# Patient Record
Sex: FEMALE | Race: WHITE
[De-identification: names, ages, dates, MRNs, and addresses within clinical notes are randomized per-mention and may not be internally consistent; named-entity substitution may affect disease eponyms.]

---

## 2017-01-04 ENCOUNTER — APPOINTMENT (OUTPATIENT)
Dept: SPINE | Facility: CLINIC | Age: 62
End: 2017-01-04

## 2017-01-25 ENCOUNTER — APPOINTMENT (OUTPATIENT)
Dept: SPINE | Facility: CLINIC | Age: 62
End: 2017-01-25

## 2017-06-13 ENCOUNTER — FORM ENCOUNTER (OUTPATIENT)
Age: 62
End: 2017-06-13

## 2017-06-14 ENCOUNTER — APPOINTMENT (OUTPATIENT)
Dept: SPINE | Facility: CLINIC | Age: 62
End: 2017-06-14

## 2017-06-14 ENCOUNTER — OUTPATIENT (OUTPATIENT)
Dept: OUTPATIENT SERVICES | Facility: HOSPITAL | Age: 62
LOS: 1 days | End: 2017-06-14
Payer: MEDICARE

## 2017-06-14 VITALS
SYSTOLIC BLOOD PRESSURE: 117 MMHG | OXYGEN SATURATION: 97 % | HEIGHT: 67 IN | BODY MASS INDEX: 21.19 KG/M2 | HEART RATE: 64 BPM | WEIGHT: 135 LBS | DIASTOLIC BLOOD PRESSURE: 81 MMHG

## 2017-06-14 DIAGNOSIS — Z86.69 PERSONAL HISTORY OF OTHER DISEASES OF THE NERVOUS SYSTEM AND SENSE ORGANS: ICD-10-CM

## 2017-06-14 DIAGNOSIS — Z83.3 FAMILY HISTORY OF DIABETES MELLITUS: ICD-10-CM

## 2017-06-14 DIAGNOSIS — Z86.39 PERSONAL HISTORY OF OTHER ENDOCRINE, NUTRITIONAL AND METABOLIC DISEASE: ICD-10-CM

## 2017-06-14 DIAGNOSIS — Z86.19 PERSONAL HISTORY OF OTHER INFECTIOUS AND PARASITIC DISEASES: ICD-10-CM

## 2017-06-14 DIAGNOSIS — Z82.3 FAMILY HISTORY OF STROKE: ICD-10-CM

## 2017-06-14 DIAGNOSIS — F17.200 NICOTINE DEPENDENCE, UNSPECIFIED, UNCOMPLICATED: ICD-10-CM

## 2017-06-14 DIAGNOSIS — R29.6 REPEATED FALLS: ICD-10-CM

## 2017-06-14 DIAGNOSIS — Z82.49 FAMILY HISTORY OF ISCHEMIC HEART DISEASE AND OTHER DISEASES OF THE CIRCULATORY SYSTEM: ICD-10-CM

## 2017-06-14 DIAGNOSIS — Z87.39 PERSONAL HISTORY OF OTHER DISEASES OF THE MUSCULOSKELETAL SYSTEM AND CONNECTIVE TISSUE: ICD-10-CM

## 2017-06-14 DIAGNOSIS — Z78.9 OTHER SPECIFIED HEALTH STATUS: ICD-10-CM

## 2017-06-14 DIAGNOSIS — Z86.79 PERSONAL HISTORY OF OTHER DISEASES OF THE CIRCULATORY SYSTEM: ICD-10-CM

## 2017-06-14 PROCEDURE — 72084 X-RAY EXAM ENTIRE SPI 6/> VW: CPT | Mod: 26

## 2017-06-14 PROCEDURE — 72110 X-RAY EXAM L-2 SPINE 4/>VWS: CPT

## 2017-06-14 PROCEDURE — 72082 X-RAY EXAM ENTIRE SPI 2/3 VW: CPT

## 2017-06-15 PROBLEM — Z87.39 HISTORY OF ARTHRITIS: Status: RESOLVED | Noted: 2017-06-14 | Resolved: 2017-06-15

## 2017-06-15 PROBLEM — Z82.3 FAMILY HISTORY OF CEREBROVASCULAR ACCIDENT (CVA): Status: ACTIVE | Noted: 2017-06-14

## 2017-06-15 PROBLEM — Z83.3 FAMILY HISTORY OF DIABETES MELLITUS: Status: ACTIVE | Noted: 2017-06-14

## 2017-06-15 PROBLEM — Z86.69 HISTORY OF MIGRAINE HEADACHES: Status: RESOLVED | Noted: 2017-06-14 | Resolved: 2017-06-15

## 2017-06-15 PROBLEM — Z86.79 HISTORY OF VARICOSE VEINS: Status: RESOLVED | Noted: 2017-06-15 | Resolved: 2017-06-15

## 2017-06-15 PROBLEM — F17.200 CURRENT EVERY DAY SMOKER: Status: ACTIVE | Noted: 2017-06-14

## 2017-06-15 PROBLEM — Z86.39 HISTORY OF HYPERCHOLESTEROLEMIA: Status: RESOLVED | Noted: 2017-06-15 | Resolved: 2017-06-15

## 2017-06-15 PROBLEM — Z86.19 HISTORY OF HEPATITIS: Status: RESOLVED | Noted: 2017-06-14 | Resolved: 2017-06-15

## 2017-06-15 PROBLEM — Z78.9 DOES NOT USE ILLICIT DRUGS: Status: ACTIVE | Noted: 2017-06-14

## 2017-06-15 PROBLEM — R29.6 MULTIPLE FALLS: Status: RESOLVED | Noted: 2017-06-15 | Resolved: 2017-06-15

## 2017-06-15 PROBLEM — Z82.49 FAMILY HISTORY OF HYPERTENSION: Status: ACTIVE | Noted: 2017-06-14

## 2017-06-15 RX ORDER — NIFEDIPINE 90 MG/1
90 TABLET, EXTENDED RELEASE ORAL
Refills: 0 | Status: ACTIVE | COMMUNITY

## 2017-06-15 RX ORDER — DULOXETINE HYDROCHLORIDE 20 MG/1
20 CAPSULE, DELAYED RELEASE ORAL
Refills: 0 | Status: ACTIVE | COMMUNITY

## 2017-06-15 RX ORDER — GABAPENTIN 100 MG/1
100 CAPSULE ORAL
Refills: 0 | Status: ACTIVE | COMMUNITY

## 2017-07-26 ENCOUNTER — APPOINTMENT (OUTPATIENT)
Dept: SPINE | Facility: CLINIC | Age: 62
End: 2017-07-26

## 2017-08-03 ENCOUNTER — INPATIENT (INPATIENT)
Facility: HOSPITAL | Age: 62
LOS: 3 days | Discharge: HOME | End: 2017-08-07
Attending: INTERNAL MEDICINE | Admitting: INTERNAL MEDICINE

## 2017-08-03 DIAGNOSIS — K68.19 OTHER RETROPERITONEAL ABSCESS: ICD-10-CM

## 2017-08-03 DIAGNOSIS — G90.529 COMPLEX REGIONAL PAIN SYNDROME I OF UNSPECIFIED LOWER LIMB: ICD-10-CM

## 2017-08-03 DIAGNOSIS — R53.1 WEAKNESS: ICD-10-CM

## 2017-08-03 DIAGNOSIS — M86.9 OSTEOMYELITIS, UNSPECIFIED: ICD-10-CM

## 2017-08-03 DIAGNOSIS — L03.90 CELLULITIS, UNSPECIFIED: ICD-10-CM
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
## 2017-08-10 DIAGNOSIS — L03.113 CELLULITIS OF RIGHT UPPER LIMB: ICD-10-CM

## 2017-08-10 DIAGNOSIS — F32.9 MAJOR DEPRESSIVE DISORDER, SINGLE EPISODE, UNSPECIFIED: ICD-10-CM

## 2017-08-10 DIAGNOSIS — I10 ESSENTIAL (PRIMARY) HYPERTENSION: ICD-10-CM

## 2017-08-10 DIAGNOSIS — L03.111 CELLULITIS OF RIGHT AXILLA: ICD-10-CM

## 2017-08-10 DIAGNOSIS — K59.00 CONSTIPATION, UNSPECIFIED: ICD-10-CM

## 2017-08-10 DIAGNOSIS — I73.00 RAYNAUD'S SYNDROME WITHOUT GANGRENE: ICD-10-CM

## 2017-08-10 DIAGNOSIS — S61.451A OPEN BITE OF RIGHT HAND, INITIAL ENCOUNTER: ICD-10-CM
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
## 2017-08-11 DIAGNOSIS — S61.451A OPEN BITE OF RIGHT HAND, INITIAL ENCOUNTER: ICD-10-CM

## 2017-09-05 ENCOUNTER — FORM ENCOUNTER (OUTPATIENT)
Age: 62
End: 2017-09-05

## 2017-09-06 ENCOUNTER — APPOINTMENT (OUTPATIENT)
Dept: SPINE | Facility: CLINIC | Age: 62
End: 2017-09-06
Payer: COMMERCIAL

## 2017-09-06 ENCOUNTER — OUTPATIENT (OUTPATIENT)
Dept: OUTPATIENT SERVICES | Facility: HOSPITAL | Age: 62
LOS: 1 days | End: 2017-09-06
Payer: COMMERCIAL

## 2017-09-06 VITALS
HEIGHT: 67 IN | SYSTOLIC BLOOD PRESSURE: 117 MMHG | BODY MASS INDEX: 21.19 KG/M2 | HEART RATE: 70 BPM | OXYGEN SATURATION: 99 % | DIASTOLIC BLOOD PRESSURE: 79 MMHG | WEIGHT: 135 LBS

## 2017-09-06 PROCEDURE — 72131 CT LUMBAR SPINE W/O DYE: CPT | Mod: 26

## 2017-09-06 PROCEDURE — 99214 OFFICE O/P EST MOD 30 MIN: CPT

## 2017-09-06 PROCEDURE — 72131 CT LUMBAR SPINE W/O DYE: CPT

## 2017-09-13 ENCOUNTER — MOBILE ON CALL (OUTPATIENT)
Age: 62
End: 2017-09-13

## 2018-05-09 ENCOUNTER — OUTPATIENT (OUTPATIENT)
Dept: OUTPATIENT SERVICES | Facility: HOSPITAL | Age: 63
LOS: 1 days | Discharge: HOME | End: 2018-05-09

## 2018-05-09 VITALS
WEIGHT: 128.09 LBS | HEIGHT: 67 IN | RESPIRATION RATE: 16 BRPM | OXYGEN SATURATION: 100 % | DIASTOLIC BLOOD PRESSURE: 84 MMHG | TEMPERATURE: 96 F | HEART RATE: 58 BPM | SYSTOLIC BLOOD PRESSURE: 137 MMHG

## 2018-05-09 DIAGNOSIS — Z01.818 ENCOUNTER FOR OTHER PREPROCEDURAL EXAMINATION: ICD-10-CM

## 2018-05-09 DIAGNOSIS — G89.29 OTHER CHRONIC PAIN: ICD-10-CM

## 2018-05-09 DIAGNOSIS — M19.90 UNSPECIFIED OSTEOARTHRITIS, UNSPECIFIED SITE: Chronic | ICD-10-CM

## 2018-05-09 DIAGNOSIS — M54.5 LOW BACK PAIN: ICD-10-CM

## 2018-05-09 DIAGNOSIS — M06.9 RHEUMATOID ARTHRITIS, UNSPECIFIED: Chronic | ICD-10-CM

## 2018-05-09 DIAGNOSIS — M54.40 LUMBAGO WITH SCIATICA, UNSPECIFIED SIDE: Chronic | ICD-10-CM

## 2018-05-09 LAB
ALBUMIN SERPL ELPH-MCNC: 4.2 G/DL — SIGNIFICANT CHANGE UP (ref 3.5–5.2)
ALP SERPL-CCNC: 76 U/L — SIGNIFICANT CHANGE UP (ref 30–115)
ALT FLD-CCNC: 48 U/L — HIGH (ref 0–41)
ANION GAP SERPL CALC-SCNC: 12 MMOL/L — SIGNIFICANT CHANGE UP (ref 7–14)
APPEARANCE UR: CLEAR — SIGNIFICANT CHANGE UP
APTT BLD: 28.8 SEC — SIGNIFICANT CHANGE UP (ref 27–39.2)
AST SERPL-CCNC: 47 U/L — HIGH (ref 0–41)
BASOPHILS # BLD AUTO: 0.08 K/UL — SIGNIFICANT CHANGE UP (ref 0–0.2)
BASOPHILS NFR BLD AUTO: 1.4 % — HIGH (ref 0–1)
BILIRUB SERPL-MCNC: <0.2 MG/DL — SIGNIFICANT CHANGE UP (ref 0.2–1.2)
BILIRUB UR-MCNC: NEGATIVE — SIGNIFICANT CHANGE UP
BLD GP AB SCN SERPL QL: SIGNIFICANT CHANGE UP
BUN SERPL-MCNC: 28 MG/DL — HIGH (ref 10–20)
CALCIUM SERPL-MCNC: 11.2 MG/DL — HIGH (ref 8.5–10.1)
CHLORIDE SERPL-SCNC: 101 MMOL/L — SIGNIFICANT CHANGE UP (ref 98–110)
CO2 SERPL-SCNC: 30 MMOL/L — SIGNIFICANT CHANGE UP (ref 17–32)
COLOR SPEC: YELLOW — SIGNIFICANT CHANGE UP
CREAT SERPL-MCNC: 0.8 MG/DL — SIGNIFICANT CHANGE UP (ref 0.7–1.5)
DIFF PNL FLD: NEGATIVE — SIGNIFICANT CHANGE UP
EOSINOPHIL # BLD AUTO: 0.25 K/UL — SIGNIFICANT CHANGE UP (ref 0–0.7)
EOSINOPHIL NFR BLD AUTO: 4.2 % — SIGNIFICANT CHANGE UP (ref 0–8)
GLUCOSE SERPL-MCNC: 86 MG/DL — SIGNIFICANT CHANGE UP (ref 70–99)
GLUCOSE UR QL: NEGATIVE MG/DL — SIGNIFICANT CHANGE UP
HCT VFR BLD CALC: 39.7 % — SIGNIFICANT CHANGE UP (ref 37–47)
HGB BLD-MCNC: 13.6 G/DL — SIGNIFICANT CHANGE UP (ref 12–16)
IMM GRANULOCYTES NFR BLD AUTO: 0.2 % — SIGNIFICANT CHANGE UP (ref 0.1–0.3)
INR BLD: 0.98 RATIO — SIGNIFICANT CHANGE UP (ref 0.65–1.3)
KETONES UR-MCNC: NEGATIVE — SIGNIFICANT CHANGE UP
LEUKOCYTE ESTERASE UR-ACNC: NEGATIVE — SIGNIFICANT CHANGE UP
LYMPHOCYTES # BLD AUTO: 1.63 K/UL — SIGNIFICANT CHANGE UP (ref 1.2–3.4)
LYMPHOCYTES # BLD AUTO: 27.6 % — SIGNIFICANT CHANGE UP (ref 20.5–51.1)
MCHC RBC-ENTMCNC: 31.7 PG — HIGH (ref 27–31)
MCHC RBC-ENTMCNC: 34.3 G/DL — SIGNIFICANT CHANGE UP (ref 32–37)
MCV RBC AUTO: 92.5 FL — SIGNIFICANT CHANGE UP (ref 81–99)
MONOCYTES # BLD AUTO: 0.41 K/UL — SIGNIFICANT CHANGE UP (ref 0.1–0.6)
MONOCYTES NFR BLD AUTO: 6.9 % — SIGNIFICANT CHANGE UP (ref 1.7–9.3)
NEUTROPHILS # BLD AUTO: 3.52 K/UL — SIGNIFICANT CHANGE UP (ref 1.4–6.5)
NEUTROPHILS NFR BLD AUTO: 59.7 % — SIGNIFICANT CHANGE UP (ref 42.2–75.2)
NITRITE UR-MCNC: NEGATIVE — SIGNIFICANT CHANGE UP
NRBC # BLD: 0 /100 WBCS — SIGNIFICANT CHANGE UP (ref 0–0)
PH UR: 7 — SIGNIFICANT CHANGE UP (ref 5–8)
PLATELET # BLD AUTO: 217 K/UL — SIGNIFICANT CHANGE UP (ref 130–400)
POTASSIUM SERPL-MCNC: 4.4 MMOL/L — SIGNIFICANT CHANGE UP (ref 3.5–5)
POTASSIUM SERPL-SCNC: 4.4 MMOL/L — SIGNIFICANT CHANGE UP (ref 3.5–5)
PROT SERPL-MCNC: 6.6 G/DL — SIGNIFICANT CHANGE UP (ref 6–8)
PROT UR-MCNC: NEGATIVE MG/DL — SIGNIFICANT CHANGE UP
PROTHROM AB SERPL-ACNC: 10.6 SEC — SIGNIFICANT CHANGE UP (ref 9.95–12.87)
RBC # BLD: 4.29 M/UL — SIGNIFICANT CHANGE UP (ref 4.2–5.4)
RBC # FLD: 12.8 % — SIGNIFICANT CHANGE UP (ref 11.5–14.5)
SODIUM SERPL-SCNC: 143 MMOL/L — SIGNIFICANT CHANGE UP (ref 135–146)
SP GR SPEC: 1.02 — SIGNIFICANT CHANGE UP (ref 1.01–1.03)
TYPE + AB SCN PNL BLD: SIGNIFICANT CHANGE UP
UROBILINOGEN FLD QL: 0.2 MG/DL — SIGNIFICANT CHANGE UP (ref 0.2–0.2)
WBC # BLD: 5.9 K/UL — SIGNIFICANT CHANGE UP (ref 4.8–10.8)
WBC # FLD AUTO: 5.9 K/UL — SIGNIFICANT CHANGE UP (ref 4.8–10.8)

## 2018-05-09 NOTE — H&P PST ADULT - REASON FOR ADMISSION
62 yo female presents  s/p fall ~10 yrs ago &  c/o "10 yrs of pain, I have been getting epidurals, my legs give me crazy pain 24/7;  in 2015, I had procedure that they cut the nerve in my left leg, it has made my pain worse, I am having a spinal cord stimulator"; denies cp,palp,sob,dyspnea,dysuria; ex liam: 2 fos/blocks w/o sob

## 2018-05-09 NOTE — H&P PST ADULT - SKIN COMMENTS
b/l lower extremities warm& reddened from mid shin to toes, no open areas pt states x10 yrs, minimal edema

## 2018-05-09 NOTE — H&P PST ADULT - ATTENDING COMMENTS
05-16-18 @ 11:25  ANA LILIA VERMA  [id number]  63yFemale      I have discussed the risks and benefits of SCS with the patient including but not limited to bleeding, infection, weakness, numbness, paralysis, CSF leak requiring repair, no change or increase in pain or other symptoms, change in bowel/bladder/sexual function, need for decompression, revision, repeat or other procedure(s).  I have also discussed the possibility of the following:    Removal or replacement of device(s)  Lack of coverage compared to trial or abdominal pain/paresthesias  Extended hospital/rehab/skilled nursing facility stay  Need for flat bedrest      I have also discussed the alternatives to the procedure as well as options for no treatment and/or expected courses for all.  I also discussed the role of any MD/PA first assistant and the patient assents to their participation.  All questions were answered and the patient wishes to proceed.

## 2018-05-09 NOTE — H&P PST ADULT - PMH
Anxiety    Depression    Kidney cysts    OA (osteoarthritis)    Osteoporosis    Peripheral neuropathy    RA (rheumatoid arthritis)

## 2018-05-10 DIAGNOSIS — F41.9 ANXIETY DISORDER, UNSPECIFIED: ICD-10-CM

## 2018-05-10 DIAGNOSIS — F32.9 MAJOR DEPRESSIVE DISORDER, SINGLE EPISODE, UNSPECIFIED: ICD-10-CM

## 2018-05-10 DIAGNOSIS — M54.9 DORSALGIA, UNSPECIFIED: ICD-10-CM

## 2018-05-10 DIAGNOSIS — Z87.891 PERSONAL HISTORY OF NICOTINE DEPENDENCE: ICD-10-CM

## 2018-05-16 ENCOUNTER — OUTPATIENT (OUTPATIENT)
Dept: OUTPATIENT SERVICES | Facility: HOSPITAL | Age: 63
LOS: 1 days | Discharge: HOME | End: 2018-05-16

## 2018-05-16 VITALS — RESPIRATION RATE: 17 BRPM | SYSTOLIC BLOOD PRESSURE: 102 MMHG | DIASTOLIC BLOOD PRESSURE: 57 MMHG | HEART RATE: 57 BPM

## 2018-05-16 VITALS
RESPIRATION RATE: 18 BRPM | WEIGHT: 128.09 LBS | SYSTOLIC BLOOD PRESSURE: 119 MMHG | HEIGHT: 67 IN | HEART RATE: 65 BPM | TEMPERATURE: 98 F | DIASTOLIC BLOOD PRESSURE: 83 MMHG

## 2018-05-16 DIAGNOSIS — M54.40 LUMBAGO WITH SCIATICA, UNSPECIFIED SIDE: Chronic | ICD-10-CM

## 2018-05-16 RX ORDER — SODIUM CHLORIDE 9 MG/ML
1000 INJECTION, SOLUTION INTRAVENOUS
Qty: 0 | Refills: 0 | Status: DISCONTINUED | OUTPATIENT
Start: 2018-05-16 | End: 2018-05-17

## 2018-05-16 RX ORDER — ONDANSETRON 8 MG/1
4 TABLET, FILM COATED ORAL ONCE
Qty: 0 | Refills: 0 | Status: DISCONTINUED | OUTPATIENT
Start: 2018-05-16 | End: 2018-05-17

## 2018-05-16 RX ORDER — HYDROMORPHONE HYDROCHLORIDE 2 MG/ML
1 INJECTION INTRAMUSCULAR; INTRAVENOUS; SUBCUTANEOUS
Qty: 0 | Refills: 0 | Status: DISCONTINUED | OUTPATIENT
Start: 2018-05-16 | End: 2018-05-17

## 2018-05-16 RX ADMIN — SODIUM CHLORIDE 100 MILLILITER(S): 9 INJECTION, SOLUTION INTRAVENOUS at 15:43

## 2018-05-16 RX ADMIN — HYDROMORPHONE HYDROCHLORIDE 1 MILLIGRAM(S): 2 INJECTION INTRAMUSCULAR; INTRAVENOUS; SUBCUTANEOUS at 18:08

## 2018-05-16 NOTE — ASU PATIENT PROFILE, ADULT - FALLEN IN THE PAST
FELL ASLEEP IN CHAIR AT HOME AND FELL TO FLOOR HAS A BRUISE ON LEFT THIGH AND STATES SHE HIT HER HEAD.  NO FOLLOW UP BY PT./yes

## 2018-05-16 NOTE — PRE-ANESTHESIA EVALUATION ADULT - NSANTHPEFT_GEN_ALL_CORE
CVS:rrr+s1s2  lungs:+b/s bilaterally  patient with classical signs of Raynauds phenomenum such as purple fingers and toes, also with b/l lower extremity cellulitis which I spoke to surgeon and was ok to proceed

## 2018-05-16 NOTE — PRE-ANESTHESIA EVALUATION ADULT - NSANTHADDINFOFT_GEN_ALL_CORE
General anesthesia. discussed with the patient all the risks, benefits, alternatives, complications. all questions answered. willing to proceed

## 2018-05-16 NOTE — ASU DISCHARGE PLAN (ADULT/PEDIATRIC). - MEDICATION SUMMARY - MEDICATIONS TO TAKE
I will START or STAY ON the medications listed below when I get home from the hospital:    nabumetone 750 mg oral tablet  -- 2 tab(s) by mouth once a day, As Needed  -- Indication: For pain    HYDROcodone-acetaminophen 10 mg-325 mg oral tablet  -- 1 tab(s) by mouth every 6 hours, As Needed  -- Indication: For pain    gabapentin 600 mg oral tablet  -- 1 tab(s) by mouth 3 times a day  -- Indication: For pain    Ativan 0.5 mg oral tablet  -- orally once a day (at bedtime)  -- Indication: For sedative    Cymbalta 60 mg oral delayed release capsule  -- 1 cap(s) by mouth once a day  -- Indication: For depression    NIFEdipine 90 mg oral tablet, extended release  -- 1 tab(s) by mouth once a day, per pt was ordered by dr lechuga  for hand pain  -- Indication: For HTN

## 2018-05-16 NOTE — ASU PREOP CHECKLIST - PATIENT'S PERSONAL PROPERTY GIVEN TO
family member/locker #[id number]
Alert and oriented x 3, normal mood and affect, no apparent risk to self or others.

## 2018-05-16 NOTE — CHART NOTE - NSCHARTNOTEFT_GEN_A_CORE
05-16-18 @ 11:27  ANA LILIA VERMA  [id number]  63yFemale    I have discussed the risks and benefits of SCS with the patient including but not limited to bleeding, infection, weakness, numbness, paralysis, CSF leak requiring repair, no change or increase in pain or other symptoms, change in bowel/bladder/sexual function, need for decompression, revision, repeat or other procedure(s).  I have also discussed the possibility of the following:    Removal or replacement of device(s)  Lack of coverage compared to trial or abdominal pain/paresthesias  Extended hospital/rehab/skilled nursing facility stay  Need for flat bedrest    I have also discussed the alternatives to the procedure as well as options for no treatment and/or expected courses for all.  I also discussed the role of any MD/PA first assistant and the patient assents to their participation.  All questions were answered and the patient wishes to proceed.

## 2018-05-16 NOTE — BRIEF OPERATIVE NOTE - PROCEDURE
<<-----Click on this checkbox to enter Procedure Spinal cord nerve stimulator implantation  05/16/2018  via thoracic laminectomy  Active  [id number]

## 2018-05-22 DIAGNOSIS — M06.9 RHEUMATOID ARTHRITIS, UNSPECIFIED: ICD-10-CM

## 2018-05-22 DIAGNOSIS — G89.4 CHRONIC PAIN SYNDROME: ICD-10-CM

## 2018-05-22 DIAGNOSIS — M81.0 AGE-RELATED OSTEOPOROSIS WITHOUT CURRENT PATHOLOGICAL FRACTURE: ICD-10-CM

## 2018-05-22 DIAGNOSIS — G90.523 COMPLEX REGIONAL PAIN SYNDROME I OF LOWER LIMB, BILATERAL: ICD-10-CM

## 2018-05-22 DIAGNOSIS — F32.9 MAJOR DEPRESSIVE DISORDER, SINGLE EPISODE, UNSPECIFIED: ICD-10-CM

## 2018-05-22 DIAGNOSIS — F17.210 NICOTINE DEPENDENCE, CIGARETTES, UNCOMPLICATED: ICD-10-CM

## 2018-05-22 DIAGNOSIS — M19.90 UNSPECIFIED OSTEOARTHRITIS, UNSPECIFIED SITE: ICD-10-CM

## 2018-05-22 DIAGNOSIS — I73.00 RAYNAUD'S SYNDROME WITHOUT GANGRENE: ICD-10-CM

## 2018-05-22 DIAGNOSIS — F41.9 ANXIETY DISORDER, UNSPECIFIED: ICD-10-CM

## 2018-05-22 DIAGNOSIS — M40.209 UNSPECIFIED KYPHOSIS, SITE UNSPECIFIED: ICD-10-CM

## 2018-05-22 DIAGNOSIS — G62.9 POLYNEUROPATHY, UNSPECIFIED: ICD-10-CM

## 2019-01-07 ENCOUNTER — EMERGENCY (EMERGENCY)
Facility: HOSPITAL | Age: 64
LOS: 0 days | Discharge: HOME | End: 2019-01-07
Attending: EMERGENCY MEDICINE | Admitting: EMERGENCY MEDICINE

## 2019-01-07 VITALS
OXYGEN SATURATION: 100 % | HEIGHT: 67 IN | TEMPERATURE: 98 F | DIASTOLIC BLOOD PRESSURE: 71 MMHG | SYSTOLIC BLOOD PRESSURE: 131 MMHG | HEART RATE: 87 BPM | WEIGHT: 119.93 LBS | RESPIRATION RATE: 18 BRPM

## 2019-01-07 VITALS — SYSTOLIC BLOOD PRESSURE: 104 MMHG | DIASTOLIC BLOOD PRESSURE: 63 MMHG

## 2019-01-07 DIAGNOSIS — F32.9 MAJOR DEPRESSIVE DISORDER, SINGLE EPISODE, UNSPECIFIED: ICD-10-CM

## 2019-01-07 DIAGNOSIS — Z98.890 OTHER SPECIFIED POSTPROCEDURAL STATES: ICD-10-CM

## 2019-01-07 DIAGNOSIS — K62.3 RECTAL PROLAPSE: ICD-10-CM

## 2019-01-07 DIAGNOSIS — Z79.899 OTHER LONG TERM (CURRENT) DRUG THERAPY: ICD-10-CM

## 2019-01-07 DIAGNOSIS — M54.40 LUMBAGO WITH SCIATICA, UNSPECIFIED SIDE: Chronic | ICD-10-CM

## 2019-01-07 PROBLEM — M19.90 UNSPECIFIED OSTEOARTHRITIS, UNSPECIFIED SITE: Chronic | Status: ACTIVE | Noted: 2018-05-09

## 2019-01-07 PROBLEM — M06.9 RHEUMATOID ARTHRITIS, UNSPECIFIED: Chronic | Status: ACTIVE | Noted: 2018-05-09

## 2019-01-07 PROBLEM — F41.9 ANXIETY DISORDER, UNSPECIFIED: Chronic | Status: ACTIVE | Noted: 2018-05-09

## 2019-01-07 PROBLEM — M81.0 AGE-RELATED OSTEOPOROSIS WITHOUT CURRENT PATHOLOGICAL FRACTURE: Chronic | Status: ACTIVE | Noted: 2018-05-09

## 2019-01-07 PROBLEM — N28.1 CYST OF KIDNEY, ACQUIRED: Chronic | Status: ACTIVE | Noted: 2018-05-09

## 2019-01-07 PROBLEM — G62.9 POLYNEUROPATHY, UNSPECIFIED: Chronic | Status: ACTIVE | Noted: 2018-05-09

## 2019-01-07 LAB
ALBUMIN SERPL ELPH-MCNC: 3.6 G/DL — SIGNIFICANT CHANGE UP (ref 3.5–5.2)
ALP SERPL-CCNC: 137 U/L — HIGH (ref 30–115)
ALT FLD-CCNC: 24 U/L — SIGNIFICANT CHANGE UP (ref 0–41)
ANION GAP SERPL CALC-SCNC: 11 MMOL/L — SIGNIFICANT CHANGE UP (ref 7–14)
APTT BLD: 33 SEC — SIGNIFICANT CHANGE UP (ref 27–39.2)
AST SERPL-CCNC: 21 U/L — SIGNIFICANT CHANGE UP (ref 0–41)
BASOPHILS # BLD AUTO: 0.07 K/UL — SIGNIFICANT CHANGE UP (ref 0–0.2)
BASOPHILS NFR BLD AUTO: 0.6 % — SIGNIFICANT CHANGE UP (ref 0–1)
BILIRUB SERPL-MCNC: <0.2 MG/DL — SIGNIFICANT CHANGE UP (ref 0.2–1.2)
BLD GP AB SCN SERPL QL: SIGNIFICANT CHANGE UP
BUN SERPL-MCNC: 33 MG/DL — HIGH (ref 10–20)
CALCIUM SERPL-MCNC: 10.3 MG/DL — HIGH (ref 8.5–10.1)
CHLORIDE SERPL-SCNC: 93 MMOL/L — LOW (ref 98–110)
CO2 SERPL-SCNC: 31 MMOL/L — SIGNIFICANT CHANGE UP (ref 17–32)
CREAT SERPL-MCNC: 0.9 MG/DL — SIGNIFICANT CHANGE UP (ref 0.7–1.5)
EOSINOPHIL # BLD AUTO: 0.16 K/UL — SIGNIFICANT CHANGE UP (ref 0–0.7)
EOSINOPHIL NFR BLD AUTO: 1.4 % — SIGNIFICANT CHANGE UP (ref 0–8)
GLUCOSE SERPL-MCNC: 99 MG/DL — SIGNIFICANT CHANGE UP (ref 70–99)
HCT VFR BLD CALC: 34.7 % — LOW (ref 37–47)
HGB BLD-MCNC: 11.3 G/DL — LOW (ref 12–16)
IMM GRANULOCYTES NFR BLD AUTO: 1.4 % — HIGH (ref 0.1–0.3)
INR BLD: 0.98 RATIO — SIGNIFICANT CHANGE UP (ref 0.65–1.3)
LYMPHOCYTES # BLD AUTO: 2.27 K/UL — SIGNIFICANT CHANGE UP (ref 1.2–3.4)
LYMPHOCYTES # BLD AUTO: 20.2 % — LOW (ref 20.5–51.1)
MCHC RBC-ENTMCNC: 31.6 PG — HIGH (ref 27–31)
MCHC RBC-ENTMCNC: 32.6 G/DL — SIGNIFICANT CHANGE UP (ref 32–37)
MCV RBC AUTO: 96.9 FL — SIGNIFICANT CHANGE UP (ref 81–99)
MONOCYTES # BLD AUTO: 0.65 K/UL — HIGH (ref 0.1–0.6)
MONOCYTES NFR BLD AUTO: 5.8 % — SIGNIFICANT CHANGE UP (ref 1.7–9.3)
NEUTROPHILS # BLD AUTO: 7.92 K/UL — HIGH (ref 1.4–6.5)
NEUTROPHILS NFR BLD AUTO: 70.6 % — SIGNIFICANT CHANGE UP (ref 42.2–75.2)
NRBC # BLD: 0 /100 WBCS — SIGNIFICANT CHANGE UP (ref 0–0)
PLATELET # BLD AUTO: 483 K/UL — HIGH (ref 130–400)
POTASSIUM SERPL-MCNC: 4.6 MMOL/L — SIGNIFICANT CHANGE UP (ref 3.5–5)
POTASSIUM SERPL-SCNC: 4.6 MMOL/L — SIGNIFICANT CHANGE UP (ref 3.5–5)
PROT SERPL-MCNC: 6.7 G/DL — SIGNIFICANT CHANGE UP (ref 6–8)
PROTHROM AB SERPL-ACNC: 11.3 SEC — SIGNIFICANT CHANGE UP (ref 9.95–12.87)
RBC # BLD: 3.58 M/UL — LOW (ref 4.2–5.4)
RBC # FLD: 12.9 % — SIGNIFICANT CHANGE UP (ref 11.5–14.5)
SODIUM SERPL-SCNC: 135 MMOL/L — SIGNIFICANT CHANGE UP (ref 135–146)
TYPE + AB SCN PNL BLD: SIGNIFICANT CHANGE UP
WBC # BLD: 11.23 K/UL — HIGH (ref 4.8–10.8)
WBC # FLD AUTO: 11.23 K/UL — HIGH (ref 4.8–10.8)

## 2019-01-07 RX ORDER — MORPHINE SULFATE 50 MG/1
4 CAPSULE, EXTENDED RELEASE ORAL ONCE
Qty: 0 | Refills: 0 | Status: DISCONTINUED | OUTPATIENT
Start: 2019-01-07 | End: 2019-01-07

## 2019-01-07 RX ORDER — SODIUM CHLORIDE 9 MG/ML
1000 INJECTION INTRAMUSCULAR; INTRAVENOUS; SUBCUTANEOUS ONCE
Qty: 0 | Refills: 0 | Status: COMPLETED | OUTPATIENT
Start: 2019-01-07 | End: 2019-01-07

## 2019-01-07 RX ORDER — HYDROCODONE BITARTRATE AND ACETAMINOPHEN 7.5; 325 MG/15ML; MG/15ML
1 SOLUTION ORAL
Qty: 0 | Refills: 0 | DISCHARGE
Start: 2019-01-07 | End: 2019-01-11

## 2019-01-07 RX ORDER — SODIUM CHLORIDE 9 MG/ML
1000 INJECTION INTRAMUSCULAR; INTRAVENOUS; SUBCUTANEOUS ONCE
Qty: 0 | Refills: 0 | Status: DISCONTINUED | OUTPATIENT
Start: 2019-01-07 | End: 2019-01-07

## 2019-01-07 RX ADMIN — MORPHINE SULFATE 4 MILLIGRAM(S): 50 CAPSULE, EXTENDED RELEASE ORAL at 17:07

## 2019-01-07 RX ADMIN — MORPHINE SULFATE 4 MILLIGRAM(S): 50 CAPSULE, EXTENDED RELEASE ORAL at 17:35

## 2019-01-07 RX ADMIN — SODIUM CHLORIDE 1000 MILLILITER(S): 9 INJECTION INTRAMUSCULAR; INTRAVENOUS; SUBCUTANEOUS at 18:45

## 2019-01-07 RX ADMIN — SODIUM CHLORIDE 2000 MILLILITER(S): 9 INJECTION INTRAMUSCULAR; INTRAVENOUS; SUBCUTANEOUS at 15:32

## 2019-01-07 RX ADMIN — MORPHINE SULFATE 4 MILLIGRAM(S): 50 CAPSULE, EXTENDED RELEASE ORAL at 19:53

## 2019-01-07 NOTE — ED PROVIDER NOTE - MEDICAL DECISION MAKING DETAILS
follow up at [facility]. Pt advised regarding symptomatic/supportive care, importance of PMD f/u, and symptoms to prompt ED return.

## 2019-01-07 NOTE — ED ADULT NURSE NOTE - NS ED NURSE IV DC DT
Progress Note - Neurosurgery   Desire Britton 73 y o  female MRN: [id number]  Unit/Bed#: [facility] 827-01 Encounter: [id number]    Assessment:  1  Acute lumbar radiculopathy  2  Lumbar spinal stenosis  3  Lumbar facet hypertrophy/arthopathy  4  Lumbar degenerative disc disease  5  History of uterine cancer  6  Chronic pain syndrome      Plan:  - pain management  - consider CLIFF Monday  - gabapentin   - PT/OT    Subjective/Objective     Pain radiating down her left leg  Wants to get out of bed to the bath room      Invasive Devices     Peripheral Intravenous Line            Peripheral IV 05/17/18 Right Hand 1 day                Physical Exam:     Vitals: Blood pressure 137/64, pulse 63, temperature 98 6 °F (37 °C), temperature source Oral, resp  rate 18, height 5' 2" (1 575 m), weight 98 9 kg (218 lb), SpO2 97 %  ,Body mass index is 39 87 kg/m²  Awake and alert  Moves all extremities  LLE strength 4+/5  RLL 5/5  Sensation intact  Lungs clear   Heart regular  Abdomen soft      Lab Results: I have personally reviewed pertinent results  Imaging Studies: I have personally reviewed pertinent reports          VTE Pharmacologic Prophylaxis: Enoxaparin (Lovenox)    VTE Mechanical Prophylaxis: sequential compression device 07-Jan-2019 21:09

## 2019-01-07 NOTE — ED PROVIDER NOTE - PROGRESS NOTE DETAILS
spoke with surgery, will see pateint ATTENDING NOTE:   63 y/o F with PMH of recent uterine and rectal prolapse seen at [facility], presenting with prolapse of her rectum. Exam as noted above. A/P: Will get surgical consult, labs and likely admit. Pateint was seen by surgery and prolapse was reduced, but it came out again. Plan is to d/c pateint to follow up as outpateint with surgeon from [facility] or Dr Morales. patient agrees.

## 2019-01-07 NOTE — ED PROVIDER NOTE - NS ED ROS FT
Constitutional: See HPI. No fever/chills.  Eyes: No visual changes, eye pain or discharge.  ENT: No hearing changes, pain, discharge or infections.  Neck: No neck pain or stiffness.  Cardiac: No chest pain, SOB or edema. No chest pain with exertion.  Respiratory: No cough or respiratory distress. No hemoptysis.   GI: + rectal prolapse. No nausea, vomiting, diarrhea or abdominal pain.  : No dysuria, frequency or burning.   MS: No myalgia, muscle weakness, joint pain or back pain.  Neuro: No headache or weakness. No LOC.  Skin: No rash.  Endocrine: No history of thyroid disease or diabetes.  Except as documented in the HPI, all other systems are negative.

## 2019-01-07 NOTE — ED ADULT TRIAGE NOTE - CHIEF COMPLAINT QUOTE
"I have a rectal prolapse, and a uterus prolapse. I have been bleeding from the rectal prolapse since last saturday"

## 2019-01-07 NOTE — CONSULT NOTE ADULT - SUBJECTIVE AND OBJECTIVE BOX
ANA LILIA VERMA [id number]  64y Female PMH below recently seen at [facility] in [place] for a similar problem. Patient presents with rectal prolapse and is found to have pelvic organ prolapse as well (history of 2 ). Patient was discharged from [facility] with plan for outpatient follow up and repair however was unsatisfied after recurrence of prolapse and patient presented to [facility] ED today for evaluation      PAST MEDICAL & SURGICAL HISTORY:  OA (osteoarthritis)  RA (rheumatoid arthritis)  Peripheral neuropathy  Kidney cysts  Osteoporosis  Depression  Anxiety  Low back pain with radiation:         MEDICATIONS  (STANDING):    MEDICATIONS  (PRN):      Allergies    No Known Allergies    Intolerances        REVIEW OF SYSTEMS    [X] A ten-point review of systems was otherwise negative except as noted.  [ ] Due to altered mental status/intubation, subjective information were not able to be obtained from the patient. History was obtained, to the extent possible, from review of the chart and collateral sources of information.      Vital Signs Last 24 Hrs  T(C): 35.7 (2019 16:48), Max: 36.7 (2019 12:08)  T(F): 96.2 (2019 16:48), Max: 98 (2019 12:08)  HR: 79 (2019 16:48) (79 - 87)  BP: 102/51 (2019 16:48) (102/51 - 131/71)  BP(mean): --  RR: 18 (2019 16:48) (18 - 18)  SpO2: 93% (2019 16:48) (93% - 100%)    PHYSICAL EXAM:  GENERAL: NAD, appears in discomfort secondary to prolapse  CHEST/LUNG: Clear to auscultation bilaterally  HEART: Regular rate and rhythm  ABDOMEN: Soft, Nontender, Nondistended;   RECTAL: prolapsed rectum 6cm, s/p reduction at bedside      LABS:  Labs:  CAPILLARY BLOOD GLUCOSE                              11.3   11.23 )-----------( 483      ( 2019 15:00 )             34.7       Auto Neutrophil %: 70.6 % (19 @ 15:00)  Auto Immature Granulocyte %: 1.4 % (19 @ 15:00)        135  |  93<L>  |  33<H>  ----------------------------<  99  4.6   |  31  |  0.9      Calcium, Total Serum: 10.3 mg/dL (19 @ 15:00)      LFTs:             6.7  | <0.2 | 21       ------------------[137     ( 2019 15:00 )  3.6  | x    | 24          Lipase:x      Amylase:x             Coags:     11.30  ----< 0.98    ( 2019 15:00 )     33.0          RADIOLOGY & ADDITIONAL STUDIES:  None

## 2019-01-07 NOTE — CONSULT NOTE ADULT - ASSESSMENT
64F with rectal prolapse presents to ED for evaluation    Plan:  S/P reduction at bedside  No need for acute/emergent surgical intervention  Patient will require outpatient follow up with Dr. Aguirre for eventual repair    OBGYN consult for reported pelvic organ prolapse    Plan discussed with Dr. Aguirre

## 2019-01-07 NOTE — ED PROVIDER NOTE - CARE PROVIDER_API CALL
Macho Romeo), Surgery  [address] [place] [address]  [address] [place] [address]  Phone: [phone]  Fax: [phone]    your surgeon from [place],   Phone: (   )    -  Fax: (   )    -

## 2019-01-07 NOTE — ED PROVIDER NOTE - OBJECTIVE STATEMENT
Patient is a 64 year old female with PMHX RA, chronic pain presents to ED with c/o rectal prolapse x 10 days. States that 10 days ago she experienced rectal and uterine prolapse. Was seen at a hospital in [place] for the same. Had pessary placed for the uterus and the rectum was reduced. Patient states that since then the rectum has prolapsed again after several reductions. Patient also c/o bleeding from the area. Came to [facility] because she wanted to see a different Dr to get this resolved. Patient denies abdominal pain n/v/d, fevers, cp, sob

## 2019-01-07 NOTE — ED PROVIDER NOTE - PROVIDER TOKENS
TOKEN:'[id number]',FREE:[LAST:[your surgeon from [place]],PHONE:[(   )    -],FAX:[(   )    -]]

## 2019-01-07 NOTE — ED PROVIDER NOTE - PHYSICAL EXAMINATION
VITAL SIGNS: I have reviewed nursing notes and confirm.  CONSTITUTIONAL: Well-developed; well-nourished; in no acute distress.  SKIN: Skin exam is warm and dry, no acute rash.  HEAD: Normocephalic; atraumatic.  EYES: PERRL, EOM intact; conjunctiva and sclera clear.  ENT: No nasal discharge; airway clear.   NECK: Supple; non tender.  CARD: S1, S2 normal; no murmurs, gallops, or rubs. Regular rate and rhythm.  RESP: No wheezes, rales or rhonchi.  ABD: Normal bowel sounds; soft; non-distended; non-tender; no hepatosplenomegaly.  RECTAL: Rectum prolapsed. no bleeding noted.  EXT: Normal ROM. No clubbing, cyanosis or edema.  LYMPH: No acute cervical adenopathy.  NEURO: Alert, oriented. Grossly unremarkable. No focal deficits.  PSYCH: Cooperative, appropriate.

## 2019-01-21 NOTE — ASU PATIENT PROFILE, ADULT - MEDICATION HERBAL REMEDIES, PROFILE
Alert and oriented to person, place and time, memory intact, behavior appropriate to situation, PERRL. no

## 2020-03-25 ENCOUNTER — RESULT REVIEW (OUTPATIENT)
Age: 65
End: 2020-03-25

## 2020-03-25 ENCOUNTER — OUTPATIENT (OUTPATIENT)
Dept: OUTPATIENT SERVICES | Facility: HOSPITAL | Age: 65
LOS: 1 days | Discharge: HOME | End: 2020-03-25
Payer: COMMERCIAL

## 2020-03-25 DIAGNOSIS — K50.90 CROHN'S DISEASE, UNSPECIFIED, WITHOUT COMPLICATIONS: ICD-10-CM

## 2020-03-25 DIAGNOSIS — M54.40 LUMBAGO WITH SCIATICA, UNSPECIFIED SIDE: Chronic | ICD-10-CM

## 2020-03-25 PROCEDURE — 74183 MRI ABD W/O CNTR FLWD CNTR: CPT | Mod: 26

## 2020-03-25 PROCEDURE — 72197 MRI PELVIS W/O & W/DYE: CPT | Mod: 26

## 2020-07-24 ENCOUNTER — EMERGENCY (EMERGENCY)
Facility: HOSPITAL | Age: 65
LOS: 0 days | Discharge: HOME | End: 2020-07-24
Attending: STUDENT IN AN ORGANIZED HEALTH CARE EDUCATION/TRAINING PROGRAM | Admitting: STUDENT IN AN ORGANIZED HEALTH CARE EDUCATION/TRAINING PROGRAM
Payer: COMMERCIAL

## 2020-07-24 VITALS
SYSTOLIC BLOOD PRESSURE: 136 MMHG | TEMPERATURE: 98 F | OXYGEN SATURATION: 98 % | DIASTOLIC BLOOD PRESSURE: 83 MMHG | HEART RATE: 69 BPM | RESPIRATION RATE: 18 BRPM

## 2020-07-24 VITALS
SYSTOLIC BLOOD PRESSURE: 121 MMHG | HEART RATE: 77 BPM | OXYGEN SATURATION: 99 % | RESPIRATION RATE: 18 BRPM | TEMPERATURE: 98 F | DIASTOLIC BLOOD PRESSURE: 74 MMHG

## 2020-07-24 DIAGNOSIS — Z23 ENCOUNTER FOR IMMUNIZATION: ICD-10-CM

## 2020-07-24 DIAGNOSIS — Z79.899 OTHER LONG TERM (CURRENT) DRUG THERAPY: ICD-10-CM

## 2020-07-24 DIAGNOSIS — Z79.891 LONG TERM (CURRENT) USE OF OPIATE ANALGESIC: ICD-10-CM

## 2020-07-24 DIAGNOSIS — S81.812A LACERATION WITHOUT FOREIGN BODY, LEFT LOWER LEG, INITIAL ENCOUNTER: ICD-10-CM

## 2020-07-24 DIAGNOSIS — Z87.39 PERSONAL HISTORY OF OTHER DISEASES OF THE MUSCULOSKELETAL SYSTEM AND CONNECTIVE TISSUE: ICD-10-CM
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
## 2020-07-24 DIAGNOSIS — Z86.59 PERSONAL HISTORY OF OTHER MENTAL AND BEHAVIORAL DISORDERS: ICD-10-CM
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
## 2020-07-24 DIAGNOSIS — Z86.69 PERSONAL HISTORY OF OTHER DISEASES OF THE NERVOUS SYSTEM AND SENSE ORGANS: ICD-10-CM
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
## 2020-07-24 DIAGNOSIS — M54.40 LUMBAGO WITH SCIATICA, UNSPECIFIED SIDE: Chronic | ICD-10-CM

## 2020-07-24 DIAGNOSIS — F17.210 NICOTINE DEPENDENCE, CIGARETTES, UNCOMPLICATED: ICD-10-CM

## 2020-07-24 PROCEDURE — 73590 X-RAY EXAM OF LOWER LEG: CPT | Mod: 26,LT

## 2020-07-24 PROCEDURE — 12002 RPR S/N/AX/GEN/TRNK2.6-7.5CM: CPT

## 2020-07-24 PROCEDURE — 99283 EMERGENCY DEPT VISIT LOW MDM: CPT | Mod: 25

## 2020-07-24 RX ORDER — TETANUS TOXOID, REDUCED DIPHTHERIA TOXOID AND ACELLULAR PERTUSSIS VACCINE, ADSORBED 5; 2.5; 8; 8; 2.5 [IU]/.5ML; [IU]/.5ML; UG/.5ML; UG/.5ML; UG/.5ML
0.5 SUSPENSION INTRAMUSCULAR ONCE
Refills: 0 | Status: COMPLETED | OUTPATIENT
Start: 2020-07-24 | End: 2020-07-24

## 2020-07-24 RX ORDER — CEPHALEXIN 500 MG
1 CAPSULE ORAL
Qty: 14 | Refills: 0
Start: 2020-07-24 | End: 2020-07-30

## 2020-07-24 RX ADMIN — TETANUS TOXOID, REDUCED DIPHTHERIA TOXOID AND ACELLULAR PERTUSSIS VACCINE, ADSORBED 0.5 MILLILITER(S): 5; 2.5; 8; 8; 2.5 SUSPENSION INTRAMUSCULAR at 12:54

## 2020-07-24 NOTE — ED ADULT NURSE NOTE - OBJECTIVE STATEMENT
Pt presents to ED pt states "I fell this morning in my basement and my foot hurts". Pt denies LOC denies head injury, denies AC. Pt ambulatory with steady gait.

## 2020-07-24 NOTE — ED PROVIDER NOTE - NSFOLLOWUPINSTRUCTIONS_ED_ALL_ED_FT
Laceration    A laceration is a cut that goes through all of the layers of the skin and into the tissue that is right under the skin. Some lacerations heal on their own. Others need to be closed with skin adhesive strips, skin glue, stitches (sutures), or staples. Proper laceration care minimizes the risk of infection and helps the laceration to heal better.  If non-absorbable stitches or staples have been placed, they must be taken out within the time frame instructed by your healthcare provider.    SEEK IMMEDIATE MEDICAL CARE IF YOU HAVE ANY OF THE FOLLOWING SYMPTOMS: swelling around the wound, worsening pain, drainage from the wound, red streaking going away from your wound, inability to move finger or toe near the laceration, or discoloration of skin near the laceration.  --------------  Please take your Keflex twice a day for 7 days.  Please be sure to follow up with your Telemedicine appointment on Tuesday 7/28/20 at 10 AM.  Please return to the ED or see your primary doctor in 12-15 days to have your sutures removed.

## 2020-07-24 NOTE — ED PROVIDER NOTE - PROGRESS NOTE DETAILS
SL: Laceration repair performed, pt tolerated procedure well, return precuations given. Plan for XR and tetanus. Will assist patient with telemedicine follow up.

## 2020-07-24 NOTE — ED PROVIDER NOTE - NS ED ROS FT
Review of Systems:  CONSTITUTIONAL: No fever, No diaphoresis  SKIN: cut on left leg  HEMATOLOGIC: chronic discoloration of legs  EYES: No eye pain, No blurred vision  ENT: No change in hearing, No sore throat, No neck pain, No rhinorrhea, No ear pain  RESPIRATORY: No shortness of breath, No cough  CARDIAC: No chest pain, No palpitations  GI: No abdominal pain, No nausea, No vomiting, No diarrhea, No constipation, No bright red blood per rectum or melena. No flank pain  : No dysuria, frequency, hematuria.   ENDO: No polydypsia, No polyuria, No heat/cold intolerance  MUSCULOSKELETAL: No joint paint, No swelling, No back pain  NEUROLOGIC: No numbness, No focal weakness, No headache, No dizziness  All other systems negative, unless specified in HPI Review of Systems:  CONSTITUTIONAL: No fever, No diaphoresis  SKIN: cut on left leg  ENT: No change in hearing, No sore throat, No neck pain, No rhinorrhea, No ear pain  RESPIRATORY: No shortness of breath, No cough  CARDIAC: No chest pain, No palpitations  GI: No abdominal pain, No nausea, No vomiting, No diarrhea, No constipation, No bright red blood per rectum or melena. No flank pain  : No dysuria, frequency, hematuria.   ENDO: No polydypsia, No polyuria, No heat/cold intolerance  MUSCULOSKELETAL: +L leg pain per HPI  NEUROLOGIC: No numbness, No focal weakness, No headache, No dizziness  All other systems negative, unless specified in HPI

## 2020-07-24 NOTE — ED PROVIDER NOTE - OBJECTIVE STATEMENT
65F with PMHx of RA, OA, and chronic pain presents to ED for a fall today. Pt was walking up the stairs from basement and tripped and fell forward, caught the railing but landed on her L leg. Now complaining of 8/10 pain in LLE, non radiating, constant, has improved after 1 dose of oxycodone taken prior to arrival. No LOC, did not hit head. Only injury is laceration on L leg, not complaining of pain anywhere else. No fever/chills, chest pain/sob/cough, abd pain/n/v/d.

## 2020-07-24 NOTE — ED PROCEDURE NOTE - CPROC ED LACER REPAIR DETAIL1
Multiple flaps were aligned./Undermining was performed./The wound was explored to base in bloodless field./No foreign body

## 2020-07-24 NOTE — ED PROCEDURE NOTE - CPROC ED LOCAL ANESTHESIA1
Inpatient Rehabilitation - Physical Therapy Treatment Note  [facility]     Patient Name: Liana Carrero  : 1936  MRN: [id number]    Today's Date: 2020                 Admit Date: 2019      Visit Dx:      ICD-10-CM ICD-9-CM   1. Unsteadiness on feet R26.81 781.2   2. Difficulty walking R26.2 719.7   3. Hemiparesis of left nondominant side due to cerebrovascular disease (CMS/[facility]) I67.9 438.22    G81.94        Patient Active Problem List   Diagnosis   • Stroke (cerebrum) (CMS/[facility])   • Chest pain       Therapy Treatment    IRF Treatment Summary     Row Name 20 1403 20 1015 20 0941       Evaluation/Treatment Time and Intent    Subjective Information  complains of;fatigue  -SL  no complaints  -EE  no complaints  -SL    Existing Precautions/Restrictions  fall  -SL  fall  -EE  fall  -SL    Document Type  therapy note (daily note)  -SL  therapy note (daily note)  -EE  therapy note (daily note)  -    Mode of Treatment  individual therapy;speech-language pathology  -  physical therapy  -EE  individual therapy;speech-language pathology  -    Patient/Family Observations  cooperative  -SL  Pt sitting up in WC in am, supine in bed in pm in no acute distress.   -EE  pt was in bed when slp arrived- encouraged to get up and participate in tx; upset re: extension of rehab stay  -SL    Recorded by [SL] Kaetlyn James, MS CCC-SLP [EE] Kayla Uribe, PT [SL] Katelyn James MS CCC-SLP    Row Name 20 1015             Cognition/Psychosocial- PT/OT    Orientation Status (Cognition)  oriented x 4  -EE      Follows Commands (Cognition)  follows one step commands;over 90% accuracy;repetition of directions required;verbal cues/prompting required;increased processing time needed  -EE      Personal Safety Interventions  fall prevention program maintained;gait belt;muscle strengthening facilitated;nonskid shoes/slippers when out of bed;supervised activity  -EE      Cognitive Function (Cognitive)  safety  deficit  -EE      Safety Deficit (Cognitive)  awareness of need for assistance;insight into deficits/self awareness  -EE      Recorded by [EE] Kayla Uribe, PT      Row Name 01/02/20 1015             Bed Mobility Assessment/Treatment    Supine-Sit [place] (Bed Mobility)  conditional independence  -EE      Assistive Device (Bed Mobility)  bed rails  -EE      Recorded by [EE] Kayla Uribe, PT      Row Name 01/02/20 1015             Bed-Chair Transfer    Bed-Chair [place] (Transfers)  contact guard;stand by assist;verbal cues  -EE      Assistive Device (Bed-Chair Transfers)  wheelchair  -EE      Recorded by [EE] Kayla Uribe, PT      Row Name 01/02/20 1015             Sit-Stand Transfer    Sit-Stand [place] (Transfers)  contact guard;stand by assist;verbal cues  -EE      Assistive Device (Sit-Stand Transfers)  walker, front-wheeled  -EE      Recorded by [EE] Kayla Uribe, PT      Row Name 01/02/20 1015             Stand-Sit Transfer    Stand-Sit [place] (Transfers)  contact guard;verbal cues  -EE      Assistive Device (Stand-Sit Transfers)  walker, front-wheeled  -EE      Recorded by [EE] Kayla Uribe, PT      Row Name 01/02/20 1015             Gait/Stairs Assessment/Training    [place] Level (Gait)  contact guard;minimum assist (75% patient effort);verbal cues  -EE      Assistive Device (Gait)  walker, front-wheeled  -EE      Distance in Feet (Gait)  160', 80' x 3  -EE      Pattern (Gait)  step-through  -EE      Deviations/Abnormal Patterns (Gait)  stride length decreased;radha decreased  -EE      Bilateral Gait Deviations  forward flexed posture;heel strike decreased  -EE      Left Sided Gait Deviations  leans left  -EE      Right Sided Gait Deviations  weight shift ability decreased  -EE      [place] Level (Stairs)  contact guard;minimum assist (75% patient effort);verbal cues  -EE      Handrail Location (Stairs)  both sides  -EE      Number of Steps (Stairs)  8  -EE      Ascending  Technique (Stairs)  step-over-step  -EE      Descending Technique (Stairs)  step-to-step  -EE      Stairs, Safety Issues  balance decreased during turns;weight-shifting ability decreased  -EE      Comment (Gait/Stairs)  L lean worsened with fatigue. Verbal cues to stay close to walker for safety. Performed visual scanning activity to locate objects while ambulating through gym.   -EE      Recorded by [EE] Kayla Uribe, PT      Row Name 01/02/20 1015             Curb Negotiation (Mobility)    [place], Curb Negotiation  minimal assist, 75% or more patient effort;contact guard;verbal cues  -EE      Comment, [place] Negotiation (Mobility)  with rwx; verbal cues for sequencing  -EE      Recorded by [EE] Kayla Uribe, PT      Row Name 01/02/20 1015             Pain Scale: Numbers Pre/Post-Treatment    Pain Scale: Numbers, Pretreatment  3/10  -EE      Pain Scale: Numbers, Post-Treatment  3/10  -EE      Pain Location - Side  Bilateral  -EE      Pain Location - Orientation  lower  -EE      Pain Location  back  -EE      Pain Intervention(s)  Repositioned;Medication (See MAR);Ambulation/increased activity  -EE      Recorded by [EE] Kayla Uribe, PT      Row Name 01/02/20 1015             Lower Extremity Seated Therapeutic Exercise    Performed, Seated Lower Extremity (Therapeutic Exercise)  hip abduction/adduction;knee flexion/extension;LAQ (long arc quad), knee extension;ankle dorsiflexion/plantarflexion  -EE      Device, Seated Lower Extremity (Therapeutic Exercise)  elastic bands/tubing;free weights, cuff 1# weight, yellow theraband  -EE      Exercise Type, Seated Lower Extremity (Therapeutic Exercise)  AROM (active range of motion);resistive exercise  -EE      Expected Outcomes, Seated Lower Extremity (Therapeutic Exercise)  improve functional stability;improve performance, gait skills;improve performance, transfer skills  -EE      Sets/Reps Detail, Seated Lower Extremity (Therapeutic Exercise)  1/15  -EE      Recorded  by [EE] Kayla Uribe PT      Row Name 01/02/20 1015             Positioning and Restraints    Pre-Treatment Position  sitting in chair/recliner  -EE      Post Treatment Position  wheelchair  -EE      In Wheelchair  sitting;exit alarm on;with OT;with family/caregiver  -EE      Recorded by [EE] Kayla Uribe PT        User Key  (r) = Recorded By, (t) = Taken By, (c) = Cosigned By    Initials Name Effective Dates    Katelyn Fan, MS CCC-SLP 06/08/18 -     EE Kayla Uirbe PT 04/03/18 -                  PT Recommendation and Plan  Anticipated Equipment Needs at Discharge (PT Eval): (TBD pending progress)  Planned Therapy Interventions (PT Eval): balance training, home exercise program, bed mobility training, gait training, motor coordination training, neuromuscular re-education, patient/family education, postural re-education, ROM (range of motion), stair training, strengthening, stretching, transfer training  Frequency of Treatment (PT Eval): 60 minutes per session     Daily Summary of Progress (PT)  Functional Goal Overall Progress: Physical Therapy: progressing toward functional goals as expected  Impairments Continuing to Limit Function: Physical Therapy: impaired balance, impaired vision, pain, postural control impaired, strength decreased               Time Calculation:     PT Charges     Row Name 01/02/20 1510 01/02/20 1243          Time Calculation    Start Time  1230  -EE  1000  -EE     Stop Time  1300  -EE  1030  -EE     Time Calculation (min)  30 min  -EE  30 min  -EE     PT Received On  01/02/20  -EE  01/02/20  -EE     PT - Next Appointment  01/03/20  -EE  01/02/20  -EE        Time Calculation- PT    Total Timed Code Minutes- PT  30 minute(s)  -EE  30 minute(s)  -EE       User Key  (r) = Recorded By, (t) = Taken By, (c) = Cosigned By    Initials Name Provider Type    EE Kayla Uribe PT Physical Therapist          Therapy Charges for Today     Code Description Service Date Service Provider Modifiers Qty     02162969779 HC GAIT TRAINING EA 15 MIN 1/2/2020 Kayla Uribe, PT GP 2    64042702732 HC PT THER PROC EA 15 MIN 1/2/2020 Kayla Uribe, PT GP 1    [id number] HC PT NEUROMUSC RE EDUCATION EA 15 MIN 1/2/2020 Kayla Uribe, PT GP 1                   Kayla Uribe, PT  1/2/2020        with EPI/2% lidocaine

## 2020-07-24 NOTE — ED PROVIDER NOTE - ATTENDING CONTRIBUTION TO CARE
65F with PMHx of RA, OA, and chronic pain here for nonsyncopal fall. pt was walking up stairs and fell forward landed on L leg. no head/neck pain. no loc. no hand pain.     vss  gen- NAD, aaox3  card-rrr  lungs-ctab, no wheezing or rhonchi  abd-sntnd, no guarding or rebound  neuro- full str/sensation, cn ii-xii grossly intact, normal coordination   MSK  UE- no hand pain b/l, no snuff box ttp b/l  LLE- L shin w/ triangular lac, no active bleeding, wound not dirty  RLE- stigmata of chronic venous stasis  Spine- no midline c/t/l spine ttp    xr, tdap, lac repair

## 2020-07-24 NOTE — ED PROVIDER NOTE - PATIENT PORTAL LINK FT
You can access the FollowMyHealth Patient Portal offered by [facility] by registering at the following website: [email or web address]. By joining [employer]’s FollowMyHealth portal, you will also be able to view your health information using other applications (apps) compatible with our system.

## 2020-07-24 NOTE — ED PROVIDER NOTE - ADDITIONAL NOTES AND INSTRUCTIONS:
Carlitokamran Armando received care at [facility] on 7/24/20. Please excuse her from work until 7/29/20 or until she feels ready to work again.

## 2020-07-24 NOTE — ED PROVIDER NOTE - PHYSICAL EXAMINATION
VITAL SIGNS: I have reviewed nursing notes and confirm.  CONSTITUTIONAL: well-appearing, non-toxic, NAD  HEAD: NCAT  EYES: EOMI, no scleral icterus  ENT: Moist mucous membranes  NECK: Supple; non tender. Full ROM.   CARD: RRR, no murmurs, rubs or gallops  RESP: clear to ausculation b/l.  No rales, rhonchi, or wheezing.  ABD: soft, non-tender, non-distended, no rebound or guarding. No CVA tenderness  EXT: Triangular laceration over L shin 6cm x 5 cm x 4cm, Bilateral LE edema, LLE warm to touch, RLE cool to touch, chronic discoloration and scar tissue on RLE, no TTP of extrem, distal pulses +1 BL.  NEURO: Decreased strength in LLE due to pain. Equal sensation of LE bilaterally.  PSYCH: Cooperative, appropriate.

## 2020-07-28 ENCOUNTER — APPOINTMENT (OUTPATIENT)
Dept: EMERGENCY DEPT | Facility: TELEHEALTH | Age: 65
End: 2020-07-28

## 2020-08-29 ENCOUNTER — EMERGENCY (EMERGENCY)
Facility: HOSPITAL | Age: 65
LOS: 0 days | Discharge: LEFT BEFORE DISCHARGE | End: 2020-08-29
Attending: STUDENT IN AN ORGANIZED HEALTH CARE EDUCATION/TRAINING PROGRAM | Admitting: STUDENT IN AN ORGANIZED HEALTH CARE EDUCATION/TRAINING PROGRAM
Payer: COMMERCIAL

## 2020-08-29 VITALS
TEMPERATURE: 97 F | OXYGEN SATURATION: 100 % | SYSTOLIC BLOOD PRESSURE: 122 MMHG | HEART RATE: 87 BPM | RESPIRATION RATE: 18 BRPM | DIASTOLIC BLOOD PRESSURE: 87 MMHG

## 2020-08-29 DIAGNOSIS — Z79.899 OTHER LONG TERM (CURRENT) DRUG THERAPY: ICD-10-CM

## 2020-08-29 DIAGNOSIS — F17.200 NICOTINE DEPENDENCE, UNSPECIFIED, UNCOMPLICATED: ICD-10-CM

## 2020-08-29 DIAGNOSIS — M54.40 LUMBAGO WITH SCIATICA, UNSPECIFIED SIDE: Chronic | ICD-10-CM

## 2020-08-29 DIAGNOSIS — M79.89 OTHER SPECIFIED SOFT TISSUE DISORDERS: ICD-10-CM

## 2020-08-29 DIAGNOSIS — F32.9 MAJOR DEPRESSIVE DISORDER, SINGLE EPISODE, UNSPECIFIED: ICD-10-CM

## 2020-08-29 DIAGNOSIS — R50.9 FEVER, UNSPECIFIED: ICD-10-CM

## 2020-08-29 DIAGNOSIS — L03.116 CELLULITIS OF LEFT LOWER LIMB: ICD-10-CM

## 2020-08-29 LAB
ALBUMIN SERPL ELPH-MCNC: 3.7 G/DL — SIGNIFICANT CHANGE UP (ref 3.5–5.2)
ALP SERPL-CCNC: 69 U/L — SIGNIFICANT CHANGE UP (ref 30–115)
ALT FLD-CCNC: 36 U/L — SIGNIFICANT CHANGE UP (ref 0–41)
ANION GAP SERPL CALC-SCNC: 10 MMOL/L — SIGNIFICANT CHANGE UP (ref 7–14)
AST SERPL-CCNC: 32 U/L — SIGNIFICANT CHANGE UP (ref 0–41)
BASOPHILS # BLD AUTO: 0.1 K/UL — SIGNIFICANT CHANGE UP (ref 0–0.2)
BASOPHILS NFR BLD AUTO: 1.3 % — HIGH (ref 0–1)
BILIRUB SERPL-MCNC: <0.2 MG/DL — SIGNIFICANT CHANGE UP (ref 0.2–1.2)
BUN SERPL-MCNC: 21 MG/DL — HIGH (ref 10–20)
CALCIUM SERPL-MCNC: 9.2 MG/DL — SIGNIFICANT CHANGE UP (ref 8.5–10.1)
CHLORIDE SERPL-SCNC: 103 MMOL/L — SIGNIFICANT CHANGE UP (ref 98–110)
CO2 SERPL-SCNC: 27 MMOL/L — SIGNIFICANT CHANGE UP (ref 17–32)
CREAT SERPL-MCNC: 0.6 MG/DL — LOW (ref 0.7–1.5)
EOSINOPHIL # BLD AUTO: 0.33 K/UL — SIGNIFICANT CHANGE UP (ref 0–0.7)
EOSINOPHIL NFR BLD AUTO: 4.1 % — SIGNIFICANT CHANGE UP (ref 0–8)
GLUCOSE SERPL-MCNC: 80 MG/DL — SIGNIFICANT CHANGE UP (ref 70–99)
HCT VFR BLD CALC: 37.2 % — SIGNIFICANT CHANGE UP (ref 37–47)
HGB BLD-MCNC: 11.9 G/DL — LOW (ref 12–16)
IMM GRANULOCYTES NFR BLD AUTO: 0.5 % — HIGH (ref 0.1–0.3)
LACTATE SERPL-SCNC: 0.8 MMOL/L — SIGNIFICANT CHANGE UP (ref 0.7–2)
LYMPHOCYTES # BLD AUTO: 2.08 K/UL — SIGNIFICANT CHANGE UP (ref 1.2–3.4)
LYMPHOCYTES # BLD AUTO: 26 % — SIGNIFICANT CHANGE UP (ref 20.5–51.1)
MCHC RBC-ENTMCNC: 31.2 PG — HIGH (ref 27–31)
MCHC RBC-ENTMCNC: 32 G/DL — SIGNIFICANT CHANGE UP (ref 32–37)
MCV RBC AUTO: 97.6 FL — SIGNIFICANT CHANGE UP (ref 81–99)
MONOCYTES # BLD AUTO: 0.56 K/UL — SIGNIFICANT CHANGE UP (ref 0.1–0.6)
MONOCYTES NFR BLD AUTO: 7 % — SIGNIFICANT CHANGE UP (ref 1.7–9.3)
NEUTROPHILS # BLD AUTO: 4.88 K/UL — SIGNIFICANT CHANGE UP (ref 1.4–6.5)
NEUTROPHILS NFR BLD AUTO: 61.1 % — SIGNIFICANT CHANGE UP (ref 42.2–75.2)
NRBC # BLD: 0 /100 WBCS — SIGNIFICANT CHANGE UP (ref 0–0)
PLATELET # BLD AUTO: 307 K/UL — SIGNIFICANT CHANGE UP (ref 130–400)
POTASSIUM SERPL-MCNC: 4.5 MMOL/L — SIGNIFICANT CHANGE UP (ref 3.5–5)
POTASSIUM SERPL-SCNC: 4.5 MMOL/L — SIGNIFICANT CHANGE UP (ref 3.5–5)
PROT SERPL-MCNC: 6 G/DL — SIGNIFICANT CHANGE UP (ref 6–8)
RBC # BLD: 3.81 M/UL — LOW (ref 4.2–5.4)
RBC # FLD: 13.5 % — SIGNIFICANT CHANGE UP (ref 11.5–14.5)
SODIUM SERPL-SCNC: 140 MMOL/L — SIGNIFICANT CHANGE UP (ref 135–146)
WBC # BLD: 7.99 K/UL — SIGNIFICANT CHANGE UP (ref 4.8–10.8)
WBC # FLD AUTO: 7.99 K/UL — SIGNIFICANT CHANGE UP (ref 4.8–10.8)

## 2020-08-29 PROCEDURE — 99284 EMERGENCY DEPT VISIT MOD MDM: CPT

## 2020-08-29 RX ORDER — VANCOMYCIN HCL 1 G
1000 VIAL (EA) INTRAVENOUS ONCE
Refills: 0 | Status: COMPLETED | OUTPATIENT
Start: 2020-08-29 | End: 2020-08-29

## 2020-08-29 RX ORDER — ACETAMINOPHEN 500 MG
975 TABLET ORAL ONCE
Refills: 0 | Status: COMPLETED | OUTPATIENT
Start: 2020-08-29 | End: 2020-08-29

## 2020-08-29 RX ORDER — AZTREONAM 2 G
2000 VIAL (EA) INJECTION ONCE
Refills: 0 | Status: COMPLETED | OUTPATIENT
Start: 2020-08-29 | End: 2020-08-29

## 2020-08-29 RX ORDER — CEPHALEXIN 500 MG
1 CAPSULE ORAL
Qty: 30 | Refills: 0
Start: 2020-08-29 | End: 2020-09-07

## 2020-08-29 RX ORDER — SODIUM CHLORIDE 9 MG/ML
1000 INJECTION, SOLUTION INTRAVENOUS ONCE
Refills: 0 | Status: COMPLETED | OUTPATIENT
Start: 2020-08-29 | End: 2020-08-29

## 2020-08-29 RX ORDER — VANCOMYCIN HCL 1 G
VIAL (EA) INTRAVENOUS
Refills: 0 | Status: DISCONTINUED | OUTPATIENT
Start: 2020-08-29 | End: 2020-08-29

## 2020-08-29 RX ORDER — AZTREONAM 2 G
2 VIAL (EA) INJECTION
Qty: 40 | Refills: 0
Start: 2020-08-29 | End: 2020-09-07

## 2020-08-29 RX ORDER — AZTREONAM 2 G
VIAL (EA) INJECTION
Refills: 0 | Status: DISCONTINUED | OUTPATIENT
Start: 2020-08-29 | End: 2020-08-29

## 2020-08-29 RX ADMIN — SODIUM CHLORIDE 1000 MILLILITER(S): 9 INJECTION, SOLUTION INTRAVENOUS at 15:14

## 2020-08-29 NOTE — ED ADULT NURSE NOTE - NSIMPLEMENTINTERV_GEN_ALL_ED
Implemented All Fall with Harm Risk Interventions:  [place] to call system. Call bell, personal items and telephone within reach. Instruct patient to call for assistance. Room bathroom lighting operational. Non-slip footwear when patient is off stretcher. Physically safe environment: no spills, clutter or unnecessary equipment. Stretcher in lowest position, wheels locked, appropriate side rails in place. Provide visual cue, wrist band, yellow gown, etc. Monitor gait and stability. Monitor for mental status changes and reorient to person, place, and time. Review medications for side effects contributing to fall risk. Reinforce activity limits and safety measures with patient and family. Provide visual clues: red socks.

## 2020-08-29 NOTE — ED ADULT TRIAGE NOTE - CHIEF COMPLAINT QUOTE
left leg swelling and redness x 1 week after stitches removed 2 weeks ago. left leg swelling and redness x 1 week after stitches removed 2 weeks ago. doppler used. pedal pulses heard. sensation in tact

## 2020-08-29 NOTE — ED PROVIDER NOTE - CLINICAL SUMMARY MEDICAL DECISION MAKING FREE TEXT BOX
pt w/ L leg cellulitis. labs reassuring, but extent of cellulitis, age of patient pmh of pt, and outpt abx failure concerning. Plan was for admission. Pt declined. Pt also declined further tx including ordered abx. Had extension conversation with pt regarding, need for abx, need for admission; also risk of not getting abx and inpt caere, including worsening condition, hospitlaization, loss of lifestyle, death. Pt understood, but wanted to leave, pref outpt tx. abx sent to pharmacy. pt welcomed back at anytime. pt AMA;ed.

## 2020-08-29 NOTE — ED PROVIDER NOTE - OBJECTIVE STATEMENT
65F with a hx of RA, OA, osteoporosis, neuropathy, nerve ablation (2015), depression, and anxiety presents with left lower extremity pain, swelling, redness, and open sores x2 weeks. Pt had a mechanical fall approximately 1mo ago and was seen in our ED for a lac sustained to the left shin for which she received sutures. The sutures were subsequently removed and she apparently recovered well until two weeks ago when she had another mechanical fall. Despite no obvious lacerations or injuries to the lower leg from the fall, the patient reports redness, swelling, and pain beginning in her foot and lower shin which have since worsened continually and spread upward on her leg. She endorses subjective fevers/chills and fatigue but no chest pain, cough, SOB, abdominal pain, N/V/D, or urinary symptoms. The patient does smoke cigarettes but has no history of DVT or PE, no recent surgeries or immobilization, no known hypercoaguable disorders or malignancies.

## 2020-08-29 NOTE — ED PROVIDER NOTE - NS ED ROS FT
Eyes:  No visual changes, eye pain or discharge.  ENMT:  No hearing changes, pain, no sore throat or runny nose, no difficulty swallowing  Cardiac:  No chest pain, SOB or edema. No chest pain with exertion.  Respiratory:  No cough or respiratory distress. No hemoptysis. No history of asthma or RAD.  GI:  No nausea, vomiting, diarrhea or abdominal pain.  :  No dysuria, frequency or burning.  MS:  + left lower extremity pain, swelling, redness with spontaneous weeping wounds appearing. No muscle weakness, joint pain or back pain.  Neuro:  No headache or weakness.  No LOC.  Skin:  No skin rash.   Endocrine: No history of thyroid disease or diabetes.

## 2020-08-29 NOTE — ED PROVIDER NOTE - PROGRESS NOTE DETAILS
TD: Patient wishes to leave AMA. Discussed with patient the medical indications for further evaluation in the ED and admission for treatment of her cellulitis. Patient indicates understanding but states that she wants to be at home and not in the hospital. Patient informed that if her symptoms worsen, change, or if she develops any new symptoms she is always welcome to return to the ED for evaluation and treatment. Patient understands and agrees with the plan to discharge her AMA with a prescription for home oral keflex and bactrim.

## 2020-08-29 NOTE — ED PROVIDER NOTE - ATTENDING CONTRIBUTION TO CARE
64 y/o F   pmh OA, RA, neuropathy  p/w LLE pain swelling erythema open draining wounds.  Fell 2wks ago trauma to leg, seen by PMD then, given and completed course augment 4d ago.  + subjective fever and chills. + fatigue. No n/v 66 y/o F   pmh OA, RA, neuropathy, depression, anxiety  p/w LLE pain swelling erythema open draining wounds.  Fell 2wks ago trauma to leg, seen by PMD then, given and completed course augment 4d ago.  + subjective fever and chills. + fatigue. No n/v    PE: tired appearing, LLE: + erythema, swelling and ttp foot to about tib plateau; + multiple small wounds on anterior and medial leg, w/ larger about 3 x 3 cm wound on ant tib draining whitish material; + DP pulse.    IMP: cellulitis, w/ outpt abx failure.  P: labs, blood cx, iv abx, ivf, analgesia, reassess, plan to admit.

## 2020-08-29 NOTE — ED ADULT NURSE NOTE - OBJECTIVE STATEMENT
presented to ED with left leg redness, swelling, pain and multiple new ulcerations s/p fall and stitch removal last week. Denies SOB, chest pain, f/n/v/d.

## 2020-08-29 NOTE — ED PROVIDER NOTE - NSFOLLOWUPINSTRUCTIONS_ED_ALL_ED_FT
You have been diagnosed with cellulitis. Please return to the nearest emergency department immediately if you have any worsening, changed, or new symptoms including fevers/chills, chest pain, shortness of breath, or worsened redness. You are always welcome to return to our emergency department for evaluation and treatment.    ------------------------------------------    Cellulitis    Cellulitis is a skin infection caused by bacteria. This condition occurs most often in the arms and lower legs but can occur anywhere over the body. Symptoms include redness, swelling, warm skin, tenderness, and chills/fever. If you were prescribed an antibiotic medicine, take it as told by your health care provider. Do not stop taking the antibiotic even if you start to feel better.    SEEK IMMEDIATE MEDICAL CARE IF YOU HAVE THE FOLLOWING SYMPTOMS: worsening fever, red streaks coming from affected area, vomiting or diarrhea, or dizziness/lightheadedness.

## 2020-08-29 NOTE — ED PROVIDER NOTE - PATIENT PORTAL LINK FT
You can access the FollowMyHealth Patient Portal offered by [facility] by registering at the following website: [email or web address]. By joining [employer]’s FollowMyHealth portal, you will also be able to view your health information using other applications (apps) compatible with our system.

## 2020-08-29 NOTE — ED ADULT NURSE NOTE - CHIEF COMPLAINT QUOTE
left leg swelling and redness x 1 week after stitches removed 2 weeks ago. doppler used. pedal pulses heard. sensation in tact

## 2020-08-29 NOTE — ED PROVIDER NOTE - NSPTACCESSSVCSAPPTDETAILS_ED_ALL_ED_FT
URGENT please have patient follow up with PCP as soon as possible for her cellulitis for which she left our emergency department AMA.

## 2020-08-29 NOTE — ED PROVIDER NOTE - MUSCULOSKELETAL MINIMAL EXAM
Left leg severely erythematous with significant edema and TTP. Multiple small, weeping wounds present on left foot and lower leg raning in size from 0.5cm to 2cm in diameter. Sensory, motor intact with full ROM

## 2020-09-03 LAB
CULTURE RESULTS: SIGNIFICANT CHANGE UP
CULTURE RESULTS: SIGNIFICANT CHANGE UP
SPECIMEN SOURCE: SIGNIFICANT CHANGE UP
SPECIMEN SOURCE: SIGNIFICANT CHANGE UP

## 2021-08-25 NOTE — PACU DISCHARGE NOTE - NAUSEA/VOMITING:
Algie Marion called requesting a refill of the below medication which has been pended for you:     Requested Prescriptions     Pending Prescriptions Disp Refills    dilTIAZem (CARDIZEM CD) 180 MG extended release capsule [Pharmacy Med Name: DILTIAZEM HCL ER 180MG COAT 180 Capsule] 30 capsule 1     Sig: TAKE 1 CAPSULE BY MOUTH DAILY    calcitRIOL (ROCALTROL) 0.25 MCG capsule [Pharmacy Med Name: CALCITRIOL 0.25 MCG CAPS 0.25 Capsule] 30 capsule 4     Sig: TAKE 1 CAPSULE BY MOUTH DAILY       Last Appointment Date: 6/28/2021  Next Appointment Date: 9/28/2021    Allergies   Allergen Reactions    Other Anaphylaxis and Itching     Cloth bandaids , causes redness of skin    Ciprofloxacin Itching    Codeine Itching    Perflutren Lipid Microsphere Other (See Comments)     Back pain    Tetanus Toxoids Itching     Itching around site    Tizanidine Hcl Itching    Tetanus Toxoid      Reaction: [phone]
None
None

## 2022-01-01 ENCOUNTER — TRANSCRIPTION ENCOUNTER (OUTPATIENT)
Age: 67
End: 2022-01-01

## 2022-01-01 ENCOUNTER — EMERGENCY (EMERGENCY)
Facility: HOSPITAL | Age: 67
LOS: 0 days | Discharge: LEFT BEFORE DISCHARGE | End: 2022-04-04
Attending: STUDENT IN AN ORGANIZED HEALTH CARE EDUCATION/TRAINING PROGRAM | Admitting: STUDENT IN AN ORGANIZED HEALTH CARE EDUCATION/TRAINING PROGRAM
Payer: COMMERCIAL

## 2022-01-01 ENCOUNTER — OUTPATIENT (OUTPATIENT)
Dept: OUTPATIENT SERVICES | Facility: HOSPITAL | Age: 67
LOS: 1 days | Discharge: HOME | End: 2022-01-01

## 2022-01-01 ENCOUNTER — OUTPATIENT (OUTPATIENT)
Dept: OUTPATIENT SERVICES | Facility: HOSPITAL | Age: 67
LOS: 1 days | Discharge: HOME | End: 2022-01-01
Payer: COMMERCIAL

## 2022-01-01 ENCOUNTER — EMERGENCY (EMERGENCY)
Facility: HOSPITAL | Age: 67
LOS: 0 days | Discharge: LEFT BEFORE DISCHARGE | End: 2022-04-27
Attending: EMERGENCY MEDICINE | Admitting: EMERGENCY MEDICINE
Payer: COMMERCIAL

## 2022-01-01 ENCOUNTER — APPOINTMENT (OUTPATIENT)
Dept: BURN CARE | Facility: CLINIC | Age: 67
End: 2022-01-01

## 2022-01-01 ENCOUNTER — LABORATORY RESULT (OUTPATIENT)
Age: 67
End: 2022-01-01

## 2022-01-01 ENCOUNTER — APPOINTMENT (OUTPATIENT)
Dept: NEUROSURGERY | Facility: CLINIC | Age: 67
End: 2022-01-01
Payer: COMMERCIAL

## 2022-01-01 ENCOUNTER — APPOINTMENT (OUTPATIENT)
Dept: BURN CARE | Facility: CLINIC | Age: 67
End: 2022-01-01
Payer: COMMERCIAL

## 2022-01-01 ENCOUNTER — NON-APPOINTMENT (OUTPATIENT)
Age: 67
End: 2022-01-01

## 2022-01-01 ENCOUNTER — APPOINTMENT (OUTPATIENT)
Dept: NEUROSURGERY | Facility: CLINIC | Age: 67
End: 2022-01-01

## 2022-01-01 ENCOUNTER — APPOINTMENT (OUTPATIENT)
Dept: NEUROSURGERY | Facility: HOSPITAL | Age: 67
End: 2022-01-01

## 2022-01-01 ENCOUNTER — EMERGENCY (EMERGENCY)
Facility: HOSPITAL | Age: 67
LOS: 0 days | Discharge: HOME | End: 2022-05-12
Attending: STUDENT IN AN ORGANIZED HEALTH CARE EDUCATION/TRAINING PROGRAM | Admitting: STUDENT IN AN ORGANIZED HEALTH CARE EDUCATION/TRAINING PROGRAM
Payer: COMMERCIAL

## 2022-01-01 ENCOUNTER — INPATIENT (INPATIENT)
Facility: HOSPITAL | Age: 67
LOS: 26 days | Discharge: NURSING, REHAB OR LONG-TERM CARE | End: 2022-12-24
Attending: INTERNAL MEDICINE | Admitting: INTERNAL MEDICINE

## 2022-01-01 ENCOUNTER — INPATIENT (INPATIENT)
Facility: HOSPITAL | Age: 67
LOS: 0 days | Discharge: HOME HEALTH | End: 2022-11-11
Attending: HOSPITALIST | Admitting: HOSPITALIST
Payer: COMMERCIAL

## 2022-01-01 ENCOUNTER — EMERGENCY (EMERGENCY)
Facility: HOSPITAL | Age: 67
LOS: 0 days | Discharge: HOME | End: 2022-03-16
Attending: EMERGENCY MEDICINE | Admitting: EMERGENCY MEDICINE
Payer: COMMERCIAL

## 2022-01-01 VITALS
HEART RATE: 81 BPM | OXYGEN SATURATION: 98 % | TEMPERATURE: 98 F | SYSTOLIC BLOOD PRESSURE: 122 MMHG | DIASTOLIC BLOOD PRESSURE: 75 MMHG | RESPIRATION RATE: 21 BRPM | SYSTOLIC BLOOD PRESSURE: 110 MMHG | RESPIRATION RATE: 20 BRPM | DIASTOLIC BLOOD PRESSURE: 58 MMHG | HEART RATE: 70 BPM | OXYGEN SATURATION: 98 %

## 2022-01-01 VITALS — WEIGHT: 108.03 LBS

## 2022-01-01 VITALS
RESPIRATION RATE: 18 BRPM | DIASTOLIC BLOOD PRESSURE: 71 MMHG | SYSTOLIC BLOOD PRESSURE: 118 MMHG | HEART RATE: 70 BPM | WEIGHT: 130.07 LBS | HEIGHT: 67 IN | OXYGEN SATURATION: 99 % | TEMPERATURE: 98 F

## 2022-01-01 VITALS
HEART RATE: 77 BPM | TEMPERATURE: 99 F | SYSTOLIC BLOOD PRESSURE: 111 MMHG | DIASTOLIC BLOOD PRESSURE: 57 MMHG | RESPIRATION RATE: 19 BRPM | OXYGEN SATURATION: 98 %

## 2022-01-01 VITALS — BODY MASS INDEX: 16.95 KG/M2 | HEIGHT: 67 IN | WEIGHT: 108 LBS

## 2022-01-01 VITALS
HEART RATE: 69 BPM | HEIGHT: 67 IN | WEIGHT: 106.04 LBS | DIASTOLIC BLOOD PRESSURE: 67 MMHG | OXYGEN SATURATION: 100 % | TEMPERATURE: 96 F | SYSTOLIC BLOOD PRESSURE: 119 MMHG | RESPIRATION RATE: 16 BRPM

## 2022-01-01 VITALS
DIASTOLIC BLOOD PRESSURE: 59 MMHG | HEIGHT: 67 IN | WEIGHT: 110.01 LBS | TEMPERATURE: 98 F | HEART RATE: 78 BPM | OXYGEN SATURATION: 96 % | SYSTOLIC BLOOD PRESSURE: 103 MMHG | RESPIRATION RATE: 17 BRPM

## 2022-01-01 VITALS
OXYGEN SATURATION: 99 % | DIASTOLIC BLOOD PRESSURE: 70 MMHG | WEIGHT: 108.03 LBS | HEART RATE: 88 BPM | HEIGHT: 67 IN | TEMPERATURE: 97 F | SYSTOLIC BLOOD PRESSURE: 114 MMHG

## 2022-01-01 VITALS
HEIGHT: 67 IN | DIASTOLIC BLOOD PRESSURE: 45 MMHG | HEART RATE: 89 BPM | SYSTOLIC BLOOD PRESSURE: 72 MMHG | RESPIRATION RATE: 16 BRPM

## 2022-01-01 VITALS — WEIGHT: 128 LBS | HEIGHT: 67 IN | BODY MASS INDEX: 20.09 KG/M2

## 2022-01-01 VITALS
HEIGHT: 63 IN | WEIGHT: 103.62 LBS | SYSTOLIC BLOOD PRESSURE: 103 MMHG | OXYGEN SATURATION: 96 % | RESPIRATION RATE: 18 BRPM | HEART RATE: 56 BPM | TEMPERATURE: 97 F | DIASTOLIC BLOOD PRESSURE: 64 MMHG

## 2022-01-01 VITALS — WEIGHT: 108.03 LBS | HEIGHT: 67 IN
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
## 2022-01-01 DIAGNOSIS — M46.42 DISCITIS, UNSPECIFIED, CERVICAL REGION: ICD-10-CM

## 2022-01-01 DIAGNOSIS — F50.00 ANOREXIA NERVOSA, UNSPECIFIED: ICD-10-CM

## 2022-01-01 DIAGNOSIS — S81.801D UNSPECIFIED OPEN WOUND, RIGHT LOWER LEG, SUBSEQUENT ENCOUNTER: ICD-10-CM

## 2022-01-01 DIAGNOSIS — L97.519 NON-PRESSURE CHRONIC ULCER OF OTHER PART OF RIGHT FOOT WITH UNSPECIFIED SEVERITY: ICD-10-CM

## 2022-01-01 DIAGNOSIS — S81.802D UNSPECIFIED OPEN WOUND, LEFT LOWER LEG, SUBSEQUENT ENCOUNTER: ICD-10-CM

## 2022-01-01 DIAGNOSIS — L03.116 CELLULITIS OF LEFT LOWER LIMB: ICD-10-CM
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
## 2022-01-01 DIAGNOSIS — M06.9 RHEUMATOID ARTHRITIS, UNSPECIFIED: ICD-10-CM

## 2022-01-01 DIAGNOSIS — S80.822A BLISTER (NONTHERMAL), LEFT LOWER LEG, INITIAL ENCOUNTER: ICD-10-CM

## 2022-01-01 DIAGNOSIS — M19.90 UNSPECIFIED OSTEOARTHRITIS, UNSPECIFIED SITE: ICD-10-CM
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
## 2022-01-01 DIAGNOSIS — S81.012A LACERATION WITHOUT FOREIGN BODY, LEFT KNEE, INITIAL ENCOUNTER: ICD-10-CM

## 2022-01-01 DIAGNOSIS — M54.40 LUMBAGO WITH SCIATICA, UNSPECIFIED SIDE: Chronic | ICD-10-CM

## 2022-01-01 DIAGNOSIS — R65.21 SEVERE SEPSIS WITH SEPTIC SHOCK: ICD-10-CM

## 2022-01-01 DIAGNOSIS — G62.9 POLYNEUROPATHY, UNSPECIFIED: ICD-10-CM

## 2022-01-01 DIAGNOSIS — K50.90 CROHN'S DISEASE, UNSPECIFIED, WITHOUT COMPLICATIONS: ICD-10-CM

## 2022-01-01 DIAGNOSIS — R22.0 LOCALIZED SWELLING, MASS AND LUMP, HEAD: ICD-10-CM

## 2022-01-01 DIAGNOSIS — F17.210 NICOTINE DEPENDENCE, CIGARETTES, UNCOMPLICATED: ICD-10-CM

## 2022-01-01 DIAGNOSIS — R63.0 ANOREXIA: ICD-10-CM

## 2022-01-01 DIAGNOSIS — F41.9 ANXIETY DISORDER, UNSPECIFIED: ICD-10-CM

## 2022-01-01 DIAGNOSIS — S81.812D LACERATION WITHOUT FOREIGN BODY, LEFT LOWER LEG, SUBSEQUENT ENCOUNTER: ICD-10-CM

## 2022-01-01 DIAGNOSIS — E16.2 HYPOGLYCEMIA, UNSPECIFIED: ICD-10-CM

## 2022-01-01 DIAGNOSIS — Z53.29 PROCEDURE AND TREATMENT NOT CARRIED OUT BECAUSE OF PATIENT'S DECISION FOR OTHER REASONS: ICD-10-CM

## 2022-01-01 DIAGNOSIS — S81.002A UNSPECIFIED OPEN WOUND, LEFT KNEE, INITIAL ENCOUNTER: ICD-10-CM

## 2022-01-01 DIAGNOSIS — T85.113A BREAKDOWN (MECHANICAL) OF IMPLANTED ELECTRONIC NEUROSTIMULATOR, GENERATOR, INITIAL ENCOUNTER: ICD-10-CM

## 2022-01-01 DIAGNOSIS — F32.A DEPRESSION, UNSPECIFIED: ICD-10-CM

## 2022-01-01 DIAGNOSIS — Z86.19 PERSONAL HISTORY OF OTHER INFECTIOUS AND PARASITIC DISEASES: ICD-10-CM

## 2022-01-01 DIAGNOSIS — G89.4 CHRONIC PAIN SYNDROME: ICD-10-CM

## 2022-01-01 DIAGNOSIS — I73.9 PERIPHERAL VASCULAR DISEASE, UNSPECIFIED: ICD-10-CM

## 2022-01-01 DIAGNOSIS — E87.29 OTHER ACIDOSIS: ICD-10-CM

## 2022-01-01 DIAGNOSIS — S91.302A UNSPECIFIED OPEN WOUND, LEFT FOOT, INITIAL ENCOUNTER: ICD-10-CM

## 2022-01-01 DIAGNOSIS — L97.329 NON-PRESSURE CHRONIC ULCER OF LEFT ANKLE WITH UNSPECIFIED SEVERITY: ICD-10-CM

## 2022-01-01 DIAGNOSIS — Z01.818 ENCOUNTER FOR OTHER PREPROCEDURAL EXAMINATION: ICD-10-CM

## 2022-01-01 DIAGNOSIS — D69.6 THROMBOCYTOPENIA, UNSPECIFIED: ICD-10-CM

## 2022-01-01 DIAGNOSIS — E03.9 HYPOTHYROIDISM, UNSPECIFIED: ICD-10-CM

## 2022-01-01 DIAGNOSIS — M81.0 AGE-RELATED OSTEOPOROSIS WITHOUT CURRENT PATHOLOGICAL FRACTURE: ICD-10-CM

## 2022-01-01 DIAGNOSIS — S81.802A UNSPECIFIED OPEN WOUND, LEFT LOWER LEG, INITIAL ENCOUNTER: ICD-10-CM

## 2022-01-01 DIAGNOSIS — S32.020A WEDGE COMPRESSION FRACTURE OF SECOND LUMBAR VERTEBRA, INITIAL ENCOUNTER FOR CLOSED FRACTURE: ICD-10-CM
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
## 2022-01-01 DIAGNOSIS — A41.52 SEPSIS DUE TO PSEUDOMONAS: ICD-10-CM

## 2022-01-01 DIAGNOSIS — M79.7 FIBROMYALGIA: ICD-10-CM

## 2022-01-01 DIAGNOSIS — Z96.82 PRESENCE OF NEUROSTIMULATOR: ICD-10-CM

## 2022-01-01 DIAGNOSIS — L08.89 OTHER SPECIFIED LOCAL INFECTIONS OF THE SKIN AND SUBCUTANEOUS TISSUE: ICD-10-CM
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
## 2022-01-01 DIAGNOSIS — Z86.79 PERSONAL HISTORY OF OTHER DISEASES OF THE CIRCULATORY SYSTEM: ICD-10-CM

## 2022-01-01 DIAGNOSIS — Z86.718 PERSONAL HISTORY OF OTHER VENOUS THROMBOSIS AND EMBOLISM: ICD-10-CM

## 2022-01-01 DIAGNOSIS — G89.29 OTHER CHRONIC PAIN: ICD-10-CM

## 2022-01-01 DIAGNOSIS — A41.9 SEPSIS, UNSPECIFIED ORGANISM: ICD-10-CM

## 2022-01-01 DIAGNOSIS — E43 UNSPECIFIED SEVERE PROTEIN-CALORIE MALNUTRITION: ICD-10-CM

## 2022-01-01 DIAGNOSIS — E83.42 HYPOMAGNESEMIA: ICD-10-CM

## 2022-01-01 DIAGNOSIS — F17.200 NICOTINE DEPENDENCE, UNSPECIFIED, UNCOMPLICATED: ICD-10-CM

## 2022-01-01 DIAGNOSIS — M54.50 LOW BACK PAIN, UNSPECIFIED: ICD-10-CM
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
## 2022-01-01 DIAGNOSIS — I83.028 VARICOSE VEINS OF LEFT LOWER EXTREMITY WITH ULCER OTHER PART OF LOWER LEG: ICD-10-CM
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
## 2022-01-01 DIAGNOSIS — M72.6 NECROTIZING FASCIITIS: ICD-10-CM

## 2022-01-01 DIAGNOSIS — Z79.891 LONG TERM (CURRENT) USE OF OPIATE ANALGESIC: ICD-10-CM

## 2022-01-01 DIAGNOSIS — L97.529 NON-PRESSURE CHRONIC ULCER OF OTHER PART OF LEFT FOOT WITH UNSPECIFIED SEVERITY: ICD-10-CM
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
## 2022-01-01 DIAGNOSIS — Z51.5 ENCOUNTER FOR PALLIATIVE CARE: ICD-10-CM

## 2022-01-01 DIAGNOSIS — N28.1 CYST OF KIDNEY, ACQUIRED: ICD-10-CM

## 2022-01-01 DIAGNOSIS — G92.8 OTHER TOXIC ENCEPHALOPATHY: ICD-10-CM

## 2022-01-01 DIAGNOSIS — E78.5 HYPERLIPIDEMIA, UNSPECIFIED: ICD-10-CM
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
## 2022-01-01 DIAGNOSIS — M25.562 PAIN IN LEFT KNEE: ICD-10-CM

## 2022-01-01 DIAGNOSIS — S12.500A UNSPECIFIED DISPLACED FRACTURE OF SIXTH CERVICAL VERTEBRA, INITIAL ENCOUNTER FOR CLOSED FRACTURE: ICD-10-CM

## 2022-01-01 DIAGNOSIS — I83.018 VARICOSE VEINS OF RIGHT LOWER EXTREMITY WITH ULCER OTHER PART OF LOWER LEG: ICD-10-CM
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
## 2022-01-01 DIAGNOSIS — M86.8X7 OTHER OSTEOMYELITIS, ANKLE AND FOOT: ICD-10-CM

## 2022-01-01 DIAGNOSIS — M54.2 CERVICALGIA: ICD-10-CM
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
## 2022-01-01 DIAGNOSIS — S00.93XA CONTUSION OF UNSPECIFIED PART OF HEAD, INITIAL ENCOUNTER: ICD-10-CM

## 2022-01-01 DIAGNOSIS — J96.01 ACUTE RESPIRATORY FAILURE WITH HYPOXIA: ICD-10-CM

## 2022-01-01 DIAGNOSIS — L98.8 OTHER SPECIFIED DISORDERS OF THE SKIN AND SUBCUTANEOUS TISSUE: ICD-10-CM

## 2022-01-01 DIAGNOSIS — M25.511 PAIN IN RIGHT SHOULDER: ICD-10-CM

## 2022-01-01 DIAGNOSIS — S81.002D UNSPECIFIED OPEN WOUND, LEFT KNEE, SUBSEQUENT ENCOUNTER: ICD-10-CM

## 2022-01-01 DIAGNOSIS — L03.115 CELLULITIS OF RIGHT LOWER LIMB: ICD-10-CM

## 2022-01-01 DIAGNOSIS — R13.10 DYSPHAGIA, UNSPECIFIED: ICD-10-CM

## 2022-01-01 DIAGNOSIS — K71.9 TOXIC LIVER DISEASE, UNSPECIFIED: ICD-10-CM

## 2022-01-01 DIAGNOSIS — F55.2 ABUSE OF LAXATIVES: ICD-10-CM

## 2022-01-01 DIAGNOSIS — U07.1 COVID-19: ICD-10-CM

## 2022-01-01 DIAGNOSIS — T50.995A ADVERSE EFFECT OF OTHER DRUGS, MEDICAMENTS AND BIOLOGICAL SUBSTANCES, INITIAL ENCOUNTER: ICD-10-CM

## 2022-01-01 DIAGNOSIS — G90.523 COMPLEX REGIONAL PAIN SYNDROME I OF LOWER LIMB, BILATERAL: ICD-10-CM

## 2022-01-01 DIAGNOSIS — R52 PAIN, UNSPECIFIED: ICD-10-CM

## 2022-01-01 DIAGNOSIS — R46.0 VERY LOW LEVEL OF PERSONAL HYGIENE: ICD-10-CM
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
## 2022-01-01 DIAGNOSIS — R91.8 OTHER NONSPECIFIC ABNORMAL FINDING OF LUNG FIELD: ICD-10-CM

## 2022-01-01 LAB
-  AMIKACIN: SIGNIFICANT CHANGE UP
-  AZTREONAM: SIGNIFICANT CHANGE UP
-  CEFEPIME: SIGNIFICANT CHANGE UP
-  CEFTAZIDIME: SIGNIFICANT CHANGE UP
-  CEFTRIAXONE: SIGNIFICANT CHANGE UP
-  CIPROFLOXACIN: SIGNIFICANT CHANGE UP
-  GENTAMICIN: SIGNIFICANT CHANGE UP
-  IMIPENEM: SIGNIFICANT CHANGE UP
-  LEVOFLOXACIN: SIGNIFICANT CHANGE UP
-  MEROPENEM: SIGNIFICANT CHANGE UP
-  PIPERACILLIN/TAZOBACTAM: SIGNIFICANT CHANGE UP
-  TOBRAMYCIN: SIGNIFICANT CHANGE UP
-  TRIMETHOPRIM/SULFAMETHOXAZOLE: SIGNIFICANT CHANGE UP
24R-OH-CALCIDIOL SERPL-MCNC: 89 NG/ML — HIGH (ref 30–80)
6-ACETYLMORPHINE, UR RESULT: NEGATIVE NG/ML — SIGNIFICANT CHANGE UP
6MAM UR CFM-MCNC: NEGATIVE NG/ML — SIGNIFICANT CHANGE UP
A1C WITH ESTIMATED AVERAGE GLUCOSE RESULT: 5.1 % — SIGNIFICANT CHANGE UP (ref 4–5.6)
A1C WITH ESTIMATED AVERAGE GLUCOSE RESULT: 5.4 % — SIGNIFICANT CHANGE UP (ref 4–5.6)
ALBUMIN SERPL ELPH-MCNC: 2.2 G/DL — LOW (ref 3.5–5.2)
ALBUMIN SERPL ELPH-MCNC: 2.2 G/DL — LOW (ref 3.5–5.2)
ALBUMIN SERPL ELPH-MCNC: 2.3 G/DL — LOW (ref 3.5–5.2)
ALBUMIN SERPL ELPH-MCNC: 2.4 G/DL — LOW (ref 3.5–5.2)
ALBUMIN SERPL ELPH-MCNC: 2.5 G/DL — LOW (ref 3.5–5.2)
ALBUMIN SERPL ELPH-MCNC: 2.6 G/DL — LOW (ref 3.5–5.2)
ALBUMIN SERPL ELPH-MCNC: 2.7 G/DL — LOW (ref 3.5–5.2)
ALBUMIN SERPL ELPH-MCNC: 2.8 G/DL — LOW (ref 3.5–5.2)
ALBUMIN SERPL ELPH-MCNC: 3.1 G/DL — LOW (ref 3.5–5.2)
ALBUMIN SERPL ELPH-MCNC: 3.7 G/DL — SIGNIFICANT CHANGE UP (ref 3.5–5.2)
ALP SERPL-CCNC: 122 U/L — HIGH (ref 30–115)
ALP SERPL-CCNC: 130 U/L — HIGH (ref 30–115)
ALP SERPL-CCNC: 130 U/L — HIGH (ref 30–115)
ALP SERPL-CCNC: 136 U/L — HIGH (ref 30–115)
ALP SERPL-CCNC: 136 U/L — HIGH (ref 30–115)
ALP SERPL-CCNC: 141 U/L — HIGH (ref 30–115)
ALP SERPL-CCNC: 142 U/L — HIGH (ref 30–115)
ALP SERPL-CCNC: 157 U/L — HIGH (ref 30–115)
ALP SERPL-CCNC: 157 U/L — HIGH (ref 30–115)
ALP SERPL-CCNC: 159 U/L — HIGH (ref 30–115)
ALP SERPL-CCNC: 162 U/L — HIGH (ref 30–115)
ALP SERPL-CCNC: 163 U/L — HIGH (ref 30–115)
ALP SERPL-CCNC: 163 U/L — HIGH (ref 30–115)
ALP SERPL-CCNC: 164 U/L — HIGH (ref 30–115)
ALP SERPL-CCNC: 164 U/L — HIGH (ref 30–115)
ALP SERPL-CCNC: 165 U/L — HIGH (ref 30–115)
ALP SERPL-CCNC: 168 U/L — HIGH (ref 30–115)
ALP SERPL-CCNC: 168 U/L — HIGH (ref 30–115)
ALP SERPL-CCNC: 175 U/L — HIGH (ref 30–115)
ALP SERPL-CCNC: 180 U/L — HIGH (ref 30–115)
ALP SERPL-CCNC: 180 U/L — HIGH (ref 30–115)
ALP SERPL-CCNC: 184 U/L — HIGH (ref 30–115)
ALP SERPL-CCNC: 249 U/L — HIGH (ref 30–115)
ALP SERPL-CCNC: 250 U/L — HIGH (ref 30–115)
ALP SERPL-CCNC: 270 U/L — HIGH (ref 30–115)
ALP SERPL-CCNC: 324 U/L — HIGH (ref 30–115)
ALP SERPL-CCNC: 362 U/L — HIGH (ref 30–115)
ALP SERPL-CCNC: 426 U/L — HIGH (ref 30–115)
ALP SERPL-CCNC: 86 U/L — SIGNIFICANT CHANGE UP (ref 30–115)
ALT FLD-CCNC: 103 U/L — HIGH (ref 0–41)
ALT FLD-CCNC: 112 U/L — HIGH (ref 0–41)
ALT FLD-CCNC: 12 U/L — SIGNIFICANT CHANGE UP (ref 0–41)
ALT FLD-CCNC: 15 U/L — SIGNIFICANT CHANGE UP (ref 0–41)
ALT FLD-CCNC: 150 U/L — HIGH (ref 0–41)
ALT FLD-CCNC: 16 U/L — SIGNIFICANT CHANGE UP (ref 0–41)
ALT FLD-CCNC: 18 U/L — SIGNIFICANT CHANGE UP (ref 0–41)
ALT FLD-CCNC: 221 U/L — HIGH (ref 0–41)
ALT FLD-CCNC: 27 U/L — SIGNIFICANT CHANGE UP (ref 0–41)
ALT FLD-CCNC: 284 U/L — HIGH (ref 0–41)
ALT FLD-CCNC: 31 U/L — SIGNIFICANT CHANGE UP (ref 0–41)
ALT FLD-CCNC: 31 U/L — SIGNIFICANT CHANGE UP (ref 0–41)
ALT FLD-CCNC: 32 U/L — SIGNIFICANT CHANGE UP (ref 0–41)
ALT FLD-CCNC: 33 U/L — SIGNIFICANT CHANGE UP (ref 0–41)
ALT FLD-CCNC: 35 U/L — SIGNIFICANT CHANGE UP (ref 0–41)
ALT FLD-CCNC: 35 U/L — SIGNIFICANT CHANGE UP (ref 0–41)
ALT FLD-CCNC: 36 U/L — SIGNIFICANT CHANGE UP (ref 0–41)
ALT FLD-CCNC: 37 U/L — SIGNIFICANT CHANGE UP (ref 0–41)
ALT FLD-CCNC: 39 U/L — SIGNIFICANT CHANGE UP (ref 0–41)
ALT FLD-CCNC: 39 U/L — SIGNIFICANT CHANGE UP (ref 0–41)
ALT FLD-CCNC: 40 U/L — SIGNIFICANT CHANGE UP (ref 0–41)
ALT FLD-CCNC: 40 U/L — SIGNIFICANT CHANGE UP (ref 0–41)
ALT FLD-CCNC: 43 U/L — HIGH (ref 0–41)
ALT FLD-CCNC: 44 U/L — HIGH (ref 0–41)
ALT FLD-CCNC: 45 U/L — HIGH (ref 0–41)
ALT FLD-CCNC: 56 U/L — HIGH (ref 0–41)
ALT FLD-CCNC: 59 U/L — HIGH (ref 0–41)
ALT FLD-CCNC: 66 U/L — HIGH (ref 0–41)
ALT FLD-CCNC: 84 U/L — HIGH (ref 0–41)
AMMONIA BLD-MCNC: 22 UMOL/L — SIGNIFICANT CHANGE UP (ref 11–55)
AMPHET UR-MCNC: NEGATIVE — SIGNIFICANT CHANGE UP
AMYLASE P1 CFR SERPL: 32 U/L — SIGNIFICANT CHANGE UP (ref 25–115)
ANION GAP SERPL CALC-SCNC: 10 MMOL/L — SIGNIFICANT CHANGE UP (ref 7–14)
ANION GAP SERPL CALC-SCNC: 11 MMOL/L — SIGNIFICANT CHANGE UP (ref 7–14)
ANION GAP SERPL CALC-SCNC: 11 MMOL/L — SIGNIFICANT CHANGE UP (ref 7–14)
ANION GAP SERPL CALC-SCNC: 13 MMOL/L — SIGNIFICANT CHANGE UP (ref 7–14)
ANION GAP SERPL CALC-SCNC: 19 MMOL/L — HIGH (ref 7–14)
ANION GAP SERPL CALC-SCNC: 5 MMOL/L — LOW (ref 7–14)
ANION GAP SERPL CALC-SCNC: 6 MMOL/L — LOW (ref 7–14)
ANION GAP SERPL CALC-SCNC: 7 MMOL/L — SIGNIFICANT CHANGE UP (ref 7–14)
ANION GAP SERPL CALC-SCNC: 8 MMOL/L — SIGNIFICANT CHANGE UP (ref 7–14)
ANION GAP SERPL CALC-SCNC: 9 MMOL/L — SIGNIFICANT CHANGE UP (ref 7–14)
APPEARANCE UR: CLEAR — SIGNIFICANT CHANGE UP
APTT BLD: 28.9 SEC — SIGNIFICANT CHANGE UP (ref 27–39.2)
APTT BLD: 35.4 SEC — SIGNIFICANT CHANGE UP (ref 27–39.2)
APTT BLD: 40.2 SEC — HIGH (ref 27–39.2)
APTT BLD: 44 SEC — HIGH (ref 27–39.2)
AST SERPL-CCNC: 100 U/L — HIGH (ref 0–41)
AST SERPL-CCNC: 11 U/L — SIGNIFICANT CHANGE UP (ref 0–41)
AST SERPL-CCNC: 11 U/L — SIGNIFICANT CHANGE UP (ref 0–41)
AST SERPL-CCNC: 118 U/L — HIGH (ref 0–41)
AST SERPL-CCNC: 12 U/L — SIGNIFICANT CHANGE UP (ref 0–41)
AST SERPL-CCNC: 128 U/L — HIGH (ref 0–41)
AST SERPL-CCNC: 16 U/L — SIGNIFICANT CHANGE UP (ref 0–41)
AST SERPL-CCNC: 165 U/L — HIGH (ref 0–41)
AST SERPL-CCNC: 20 U/L — SIGNIFICANT CHANGE UP (ref 0–41)
AST SERPL-CCNC: 20 U/L — SIGNIFICANT CHANGE UP (ref 0–41)
AST SERPL-CCNC: 23 U/L — SIGNIFICANT CHANGE UP (ref 0–41)
AST SERPL-CCNC: 23 U/L — SIGNIFICANT CHANGE UP (ref 0–41)
AST SERPL-CCNC: 25 U/L — SIGNIFICANT CHANGE UP (ref 0–41)
AST SERPL-CCNC: 25 U/L — SIGNIFICANT CHANGE UP (ref 0–41)
AST SERPL-CCNC: 26 U/L — SIGNIFICANT CHANGE UP (ref 0–41)
AST SERPL-CCNC: 27 U/L — SIGNIFICANT CHANGE UP (ref 0–41)
AST SERPL-CCNC: 29 U/L — SIGNIFICANT CHANGE UP (ref 0–41)
AST SERPL-CCNC: 30 U/L — SIGNIFICANT CHANGE UP (ref 0–41)
AST SERPL-CCNC: 32 U/L — SIGNIFICANT CHANGE UP (ref 0–41)
AST SERPL-CCNC: 32 U/L — SIGNIFICANT CHANGE UP (ref 0–41)
AST SERPL-CCNC: 381 U/L — HIGH (ref 0–41)
AST SERPL-CCNC: 47 U/L — HIGH (ref 0–41)
AST SERPL-CCNC: 51 U/L — HIGH (ref 0–41)
AST SERPL-CCNC: 55 U/L — HIGH (ref 0–41)
AST SERPL-CCNC: 572 U/L — HIGH (ref 0–41)
AST SERPL-CCNC: 88 U/L — HIGH (ref 0–41)
AST SERPL-CCNC: 9 U/L — SIGNIFICANT CHANGE UP (ref 0–41)
BACTERIA # UR AUTO: NEGATIVE — SIGNIFICANT CHANGE UP
BACTERIA SPEC CULT: ABNORMAL
BARBITURATES UR SCN-MCNC: NEGATIVE — SIGNIFICANT CHANGE UP
BASE EXCESS BLDA CALC-SCNC: 3.9 MMOL/L — HIGH (ref -2–3)
BASE EXCESS BLDV CALC-SCNC: -3.2 MMOL/L — LOW (ref -2–3)
BASE EXCESS BLDV CALC-SCNC: 0.3 MMOL/L — SIGNIFICANT CHANGE UP (ref -2–3)
BASOPHILS # BLD AUTO: 0 K/UL — SIGNIFICANT CHANGE UP (ref 0–0.2)
BASOPHILS # BLD AUTO: 0.01 K/UL — SIGNIFICANT CHANGE UP (ref 0–0.2)
BASOPHILS # BLD AUTO: 0.02 K/UL — SIGNIFICANT CHANGE UP (ref 0–0.2)
BASOPHILS # BLD AUTO: 0.03 K/UL — SIGNIFICANT CHANGE UP (ref 0–0.2)
BASOPHILS # BLD AUTO: 0.04 K/UL — SIGNIFICANT CHANGE UP (ref 0–0.2)
BASOPHILS # BLD AUTO: 0.04 K/UL — SIGNIFICANT CHANGE UP (ref 0–0.2)
BASOPHILS # BLD AUTO: 0.05 K/UL — SIGNIFICANT CHANGE UP (ref 0–0.2)
BASOPHILS # BLD AUTO: 0.06 K/UL — SIGNIFICANT CHANGE UP (ref 0–0.2)
BASOPHILS # BLD AUTO: 0.06 K/UL — SIGNIFICANT CHANGE UP (ref 0–0.2)
BASOPHILS # BLD AUTO: 0.08 K/UL — SIGNIFICANT CHANGE UP (ref 0–0.2)
BASOPHILS NFR BLD AUTO: 0 % — SIGNIFICANT CHANGE UP (ref 0–1)
BASOPHILS NFR BLD AUTO: 0.1 % — SIGNIFICANT CHANGE UP (ref 0–1)
BASOPHILS NFR BLD AUTO: 0.1 % — SIGNIFICANT CHANGE UP (ref 0–1)
BASOPHILS NFR BLD AUTO: 0.2 % — SIGNIFICANT CHANGE UP (ref 0–1)
BASOPHILS NFR BLD AUTO: 0.3 % — SIGNIFICANT CHANGE UP (ref 0–1)
BASOPHILS NFR BLD AUTO: 0.4 % — SIGNIFICANT CHANGE UP (ref 0–1)
BASOPHILS NFR BLD AUTO: 0.5 % — SIGNIFICANT CHANGE UP (ref 0–1)
BASOPHILS NFR BLD AUTO: 0.5 % — SIGNIFICANT CHANGE UP (ref 0–1)
BASOPHILS NFR BLD AUTO: 0.6 % — SIGNIFICANT CHANGE UP (ref 0–1)
BASOPHILS NFR BLD AUTO: 0.7 % — SIGNIFICANT CHANGE UP (ref 0–1)
BASOPHILS NFR BLD AUTO: 0.9 % — SIGNIFICANT CHANGE UP (ref 0–1)
BASOPHILS NFR BLD AUTO: 1.2 % — HIGH (ref 0–1)
BENZODIAZ UR-MCNC: NEGATIVE — SIGNIFICANT CHANGE UP
BILIRUB SERPL-MCNC: 0.2 MG/DL — SIGNIFICANT CHANGE UP (ref 0.2–1.2)
BILIRUB SERPL-MCNC: 0.3 MG/DL — SIGNIFICANT CHANGE UP (ref 0.2–1.2)
BILIRUB SERPL-MCNC: 0.4 MG/DL — SIGNIFICANT CHANGE UP (ref 0.2–1.2)
BILIRUB SERPL-MCNC: <0.2 MG/DL — SIGNIFICANT CHANGE UP (ref 0.2–1.2)
BILIRUB UR-MCNC: NEGATIVE — SIGNIFICANT CHANGE UP
BLD GP AB SCN SERPL QL: SIGNIFICANT CHANGE UP
BUN SERPL-MCNC: 10 MG/DL — SIGNIFICANT CHANGE UP (ref 10–20)
BUN SERPL-MCNC: 10 MG/DL — SIGNIFICANT CHANGE UP (ref 10–20)
BUN SERPL-MCNC: 11 MG/DL — SIGNIFICANT CHANGE UP (ref 10–20)
BUN SERPL-MCNC: 11 MG/DL — SIGNIFICANT CHANGE UP (ref 10–20)
BUN SERPL-MCNC: 12 MG/DL — SIGNIFICANT CHANGE UP (ref 10–20)
BUN SERPL-MCNC: 17 MG/DL — SIGNIFICANT CHANGE UP (ref 10–20)
BUN SERPL-MCNC: 17 MG/DL — SIGNIFICANT CHANGE UP (ref 10–20)
BUN SERPL-MCNC: 18 MG/DL — SIGNIFICANT CHANGE UP (ref 10–20)
BUN SERPL-MCNC: 18 MG/DL — SIGNIFICANT CHANGE UP (ref 10–20)
BUN SERPL-MCNC: 19 MG/DL — SIGNIFICANT CHANGE UP (ref 10–20)
BUN SERPL-MCNC: 19 MG/DL — SIGNIFICANT CHANGE UP (ref 10–20)
BUN SERPL-MCNC: 20 MG/DL — SIGNIFICANT CHANGE UP (ref 10–20)
BUN SERPL-MCNC: 21 MG/DL — HIGH (ref 10–20)
BUN SERPL-MCNC: 22 MG/DL — HIGH (ref 10–20)
BUN SERPL-MCNC: 24 MG/DL — HIGH (ref 10–20)
BUN SERPL-MCNC: 26 MG/DL — HIGH (ref 10–20)
BUN SERPL-MCNC: 27 MG/DL — HIGH (ref 10–20)
BUN SERPL-MCNC: 28 MG/DL — HIGH (ref 10–20)
BUN SERPL-MCNC: 29 MG/DL — HIGH (ref 10–20)
BUN SERPL-MCNC: 31 MG/DL — HIGH (ref 10–20)
BUN SERPL-MCNC: 31 MG/DL — HIGH (ref 10–20)
BUN SERPL-MCNC: 35 MG/DL — HIGH (ref 10–20)
BUN SERPL-MCNC: 5 MG/DL — LOW (ref 10–20)
BUN SERPL-MCNC: 52 MG/DL — HIGH (ref 10–20)
BUN SERPL-MCNC: 6 MG/DL — LOW (ref 10–20)
BUN SERPL-MCNC: 8 MG/DL — LOW (ref 10–20)
BUN SERPL-MCNC: 9 MG/DL — LOW (ref 10–20)
BUN SERPL-MCNC: 9 MG/DL — LOW (ref 10–20)
C DIFF BY PCR RESULT: SIGNIFICANT CHANGE UP
CA-I SERPL-SCNC: 1.33 MMOL/L — SIGNIFICANT CHANGE UP (ref 1.15–1.33)
CALCIUM SERPL-MCNC: 7.4 MG/DL — LOW (ref 8.4–10.5)
CALCIUM SERPL-MCNC: 7.5 MG/DL — LOW (ref 8.4–10.5)
CALCIUM SERPL-MCNC: 7.6 MG/DL — LOW (ref 8.4–10.5)
CALCIUM SERPL-MCNC: 7.7 MG/DL — LOW (ref 8.4–10.5)
CALCIUM SERPL-MCNC: 7.7 MG/DL — LOW (ref 8.4–10.5)
CALCIUM SERPL-MCNC: 7.9 MG/DL — LOW (ref 8.4–10.4)
CALCIUM SERPL-MCNC: 7.9 MG/DL — LOW (ref 8.4–10.5)
CALCIUM SERPL-MCNC: 8 MG/DL — LOW (ref 8.4–10.5)
CALCIUM SERPL-MCNC: 8.1 MG/DL — LOW (ref 8.4–10.4)
CALCIUM SERPL-MCNC: 8.1 MG/DL — LOW (ref 8.4–10.5)
CALCIUM SERPL-MCNC: 8.2 MG/DL — LOW (ref 8.4–10.5)
CALCIUM SERPL-MCNC: 8.4 MG/DL — SIGNIFICANT CHANGE UP (ref 8.4–10.4)
CALCIUM SERPL-MCNC: 8.4 MG/DL — SIGNIFICANT CHANGE UP (ref 8.4–10.5)
CALCIUM SERPL-MCNC: 8.4 MG/DL — SIGNIFICANT CHANGE UP (ref 8.4–10.5)
CALCIUM SERPL-MCNC: 8.5 MG/DL — SIGNIFICANT CHANGE UP (ref 8.4–10.5)
CALCIUM SERPL-MCNC: 8.6 MG/DL — SIGNIFICANT CHANGE UP (ref 8.4–10.5)
CALCIUM SERPL-MCNC: 8.6 MG/DL — SIGNIFICANT CHANGE UP (ref 8.4–10.5)
CALCIUM SERPL-MCNC: 8.7 MG/DL — SIGNIFICANT CHANGE UP (ref 8.4–10.4)
CALCIUM SERPL-MCNC: 8.7 MG/DL — SIGNIFICANT CHANGE UP (ref 8.4–10.4)
CALCIUM SERPL-MCNC: 8.8 MG/DL — SIGNIFICANT CHANGE UP (ref 8.4–10.5)
CALCIUM SERPL-MCNC: 8.8 MG/DL — SIGNIFICANT CHANGE UP (ref 8.4–10.5)
CALCIUM SERPL-MCNC: 8.9 MG/DL — SIGNIFICANT CHANGE UP (ref 8.4–10.4)
CALCIUM SERPL-MCNC: 9.1 MG/DL — SIGNIFICANT CHANGE UP (ref 8.4–10.5)
CALCIUM SERPL-MCNC: 9.1 MG/DL — SIGNIFICANT CHANGE UP (ref 8.4–10.5)
CALCIUM SERPL-MCNC: 9.2 MG/DL — SIGNIFICANT CHANGE UP (ref 8.4–10.4)
CALCIUM SERPL-MCNC: 9.2 MG/DL — SIGNIFICANT CHANGE UP (ref 8.4–10.4)
CALCIUM SERPL-MCNC: 9.6 MG/DL — SIGNIFICANT CHANGE UP (ref 8.5–10.1)
CHLORIDE SERPL-SCNC: 100 MMOL/L — SIGNIFICANT CHANGE UP (ref 98–110)
CHLORIDE SERPL-SCNC: 101 MMOL/L — SIGNIFICANT CHANGE UP (ref 98–110)
CHLORIDE SERPL-SCNC: 101 MMOL/L — SIGNIFICANT CHANGE UP (ref 98–110)
CHLORIDE SERPL-SCNC: 102 MMOL/L — SIGNIFICANT CHANGE UP (ref 98–110)
CHLORIDE SERPL-SCNC: 102 MMOL/L — SIGNIFICANT CHANGE UP (ref 98–110)
CHLORIDE SERPL-SCNC: 103 MMOL/L — SIGNIFICANT CHANGE UP (ref 98–110)
CHLORIDE SERPL-SCNC: 104 MMOL/L — SIGNIFICANT CHANGE UP (ref 98–110)
CHLORIDE SERPL-SCNC: 104 MMOL/L — SIGNIFICANT CHANGE UP (ref 98–110)
CHLORIDE SERPL-SCNC: 105 MMOL/L — SIGNIFICANT CHANGE UP (ref 98–110)
CHLORIDE SERPL-SCNC: 106 MMOL/L — SIGNIFICANT CHANGE UP (ref 98–110)
CHLORIDE SERPL-SCNC: 107 MMOL/L — SIGNIFICANT CHANGE UP (ref 98–110)
CHLORIDE SERPL-SCNC: 109 MMOL/L — SIGNIFICANT CHANGE UP (ref 98–110)
CHLORIDE SERPL-SCNC: 111 MMOL/L — HIGH (ref 98–110)
CHLORIDE SERPL-SCNC: 90 MMOL/L — LOW (ref 98–110)
CHLORIDE SERPL-SCNC: 91 MMOL/L — LOW (ref 98–110)
CHLORIDE SERPL-SCNC: 92 MMOL/L — LOW (ref 98–110)
CHLORIDE SERPL-SCNC: 93 MMOL/L — LOW (ref 98–110)
CHLORIDE SERPL-SCNC: 93 MMOL/L — LOW (ref 98–110)
CHLORIDE SERPL-SCNC: 94 MMOL/L — LOW (ref 98–110)
CHLORIDE SERPL-SCNC: 95 MMOL/L — LOW (ref 98–110)
CHLORIDE SERPL-SCNC: 96 MMOL/L — LOW (ref 98–110)
CHLORIDE SERPL-SCNC: 97 MMOL/L — LOW (ref 98–110)
CHLORIDE SERPL-SCNC: 97 MMOL/L — LOW (ref 98–110)
CHOLEST SERPL-MCNC: 98 MG/DL — SIGNIFICANT CHANGE UP
CHOLEST SERPL-MCNC: 99 MG/DL — SIGNIFICANT CHANGE UP
CK SERPL-CCNC: 127 U/L — SIGNIFICANT CHANGE UP (ref 0–225)
CK SERPL-CCNC: 190 U/L — SIGNIFICANT CHANGE UP (ref 0–225)
CK SERPL-CCNC: 330 U/L — HIGH (ref 0–225)
CO2 SERPL-SCNC: 18 MMOL/L — SIGNIFICANT CHANGE UP (ref 17–32)
CO2 SERPL-SCNC: 23 MMOL/L — SIGNIFICANT CHANGE UP (ref 17–32)
CO2 SERPL-SCNC: 24 MMOL/L — SIGNIFICANT CHANGE UP (ref 17–32)
CO2 SERPL-SCNC: 25 MMOL/L — SIGNIFICANT CHANGE UP (ref 17–32)
CO2 SERPL-SCNC: 25 MMOL/L — SIGNIFICANT CHANGE UP (ref 17–32)
CO2 SERPL-SCNC: 26 MMOL/L — SIGNIFICANT CHANGE UP (ref 17–32)
CO2 SERPL-SCNC: 26 MMOL/L — SIGNIFICANT CHANGE UP (ref 17–32)
CO2 SERPL-SCNC: 27 MMOL/L — SIGNIFICANT CHANGE UP (ref 17–32)
CO2 SERPL-SCNC: 28 MMOL/L — SIGNIFICANT CHANGE UP (ref 17–32)
CO2 SERPL-SCNC: 29 MMOL/L — SIGNIFICANT CHANGE UP (ref 17–32)
CO2 SERPL-SCNC: 30 MMOL/L — SIGNIFICANT CHANGE UP (ref 17–32)
CO2 SERPL-SCNC: 31 MMOL/L — SIGNIFICANT CHANGE UP (ref 17–32)
CO2 SERPL-SCNC: 32 MMOL/L — SIGNIFICANT CHANGE UP (ref 17–32)
CO2 SERPL-SCNC: 32 MMOL/L — SIGNIFICANT CHANGE UP (ref 17–32)
CO2 SERPL-SCNC: 33 MMOL/L — HIGH (ref 17–32)
CO2 SERPL-SCNC: 33 MMOL/L — HIGH (ref 17–32)
CO2 SERPL-SCNC: 34 MMOL/L — HIGH (ref 17–32)
CO2 SERPL-SCNC: 35 MMOL/L — HIGH (ref 17–32)
CO2 SERPL-SCNC: 37 MMOL/L — HIGH (ref 17–32)
CO2 SERPL-SCNC: 39 MMOL/L — HIGH (ref 17–32)
COCAINE METAB.OTHER UR-MCNC: NEGATIVE — SIGNIFICANT CHANGE UP
COD CRY URNS QL: ABNORMAL
CODEINE UR CFM-MCNC: NEGATIVE NG/ML — SIGNIFICANT CHANGE UP
CODEINE, UR RESULT: NEGATIVE NG/ML — SIGNIFICANT CHANGE UP
COLOR SPEC: YELLOW — SIGNIFICANT CHANGE UP
COPPER SERPL-MCNC: 108 UG/DL — SIGNIFICANT CHANGE UP (ref 80–158)
CORTICOSTEROID BINDING GLOBULIN RESULT: 1.5 MG/DL — LOW
CORTIS F/TOTAL MFR SERPL: 84 % — SIGNIFICANT CHANGE UP
CORTIS SERPL-MCNC: 63 UG/DL — HIGH
CORTISOL, FREE RESULT: 53 UG/DL — HIGH
CREAT SERPL-MCNC: 0.5 MG/DL — LOW (ref 0.7–1.5)
CREAT SERPL-MCNC: 0.6 MG/DL — LOW (ref 0.7–1.5)
CREAT SERPL-MCNC: 0.6 MG/DL — LOW (ref 0.7–1.5)
CREAT SERPL-MCNC: 0.7 MG/DL — SIGNIFICANT CHANGE UP (ref 0.7–1.5)
CREAT SERPL-MCNC: 0.8 MG/DL — SIGNIFICANT CHANGE UP (ref 0.7–1.5)
CREAT SERPL-MCNC: 0.9 MG/DL — SIGNIFICANT CHANGE UP (ref 0.7–1.5)
CREAT SERPL-MCNC: <0.5 MG/DL — LOW (ref 0.7–1.5)
CRP SERPL-MCNC: 9.6 MG/L — HIGH
CULTURE RESULTS: SIGNIFICANT CHANGE UP
D DIMER BLD IA.RAPID-MCNC: 1602 NG/ML DDU — HIGH (ref 0–230)
D DIMER BLD IA.RAPID-MCNC: 982 NG/ML DDU — HIGH
DIFF PNL FLD: ABNORMAL
EGFR: 103 ML/MIN/1.73M2 — SIGNIFICANT CHANGE UP
EGFR: 109 ML/MIN/1.73M2 — SIGNIFICANT CHANGE UP
EGFR: 117 ML/MIN/1.73M2 — SIGNIFICANT CHANGE UP
EGFR: 129 ML/MIN/1.73M2 — SIGNIFICANT CHANGE UP
EGFR: 129 ML/MIN/1.73M2 — SIGNIFICANT CHANGE UP
EGFR: 71 ML/MIN/1.73M2 — SIGNIFICANT CHANGE UP
EGFR: 81 ML/MIN/1.73M2 — SIGNIFICANT CHANGE UP
EGFR: 95 ML/MIN/1.73M2 — SIGNIFICANT CHANGE UP
EGFR: 99 ML/MIN/1.73M2 — SIGNIFICANT CHANGE UP
EGFR: 99 ML/MIN/1.73M2 — SIGNIFICANT CHANGE UP
EOSINOPHIL # BLD AUTO: 0 K/UL — SIGNIFICANT CHANGE UP (ref 0–0.7)
EOSINOPHIL # BLD AUTO: 0.02 K/UL — SIGNIFICANT CHANGE UP (ref 0–0.7)
EOSINOPHIL # BLD AUTO: 0.04 K/UL — SIGNIFICANT CHANGE UP (ref 0–0.7)
EOSINOPHIL # BLD AUTO: 0.05 K/UL — SIGNIFICANT CHANGE UP (ref 0–0.7)
EOSINOPHIL # BLD AUTO: 0.06 K/UL — SIGNIFICANT CHANGE UP (ref 0–0.7)
EOSINOPHIL # BLD AUTO: 0.07 K/UL — SIGNIFICANT CHANGE UP (ref 0–0.7)
EOSINOPHIL # BLD AUTO: 0.07 K/UL — SIGNIFICANT CHANGE UP (ref 0–0.7)
EOSINOPHIL # BLD AUTO: 0.12 K/UL — SIGNIFICANT CHANGE UP (ref 0–0.7)
EOSINOPHIL NFR BLD AUTO: 0 % — SIGNIFICANT CHANGE UP (ref 0–8)
EOSINOPHIL NFR BLD AUTO: 0.3 % — SIGNIFICANT CHANGE UP (ref 0–8)
EOSINOPHIL NFR BLD AUTO: 0.4 % — SIGNIFICANT CHANGE UP (ref 0–8)
EOSINOPHIL NFR BLD AUTO: 0.4 % — SIGNIFICANT CHANGE UP (ref 0–8)
EOSINOPHIL NFR BLD AUTO: 0.5 % — SIGNIFICANT CHANGE UP (ref 0–8)
EOSINOPHIL NFR BLD AUTO: 0.5 % — SIGNIFICANT CHANGE UP (ref 0–8)
EOSINOPHIL NFR BLD AUTO: 0.7 % — SIGNIFICANT CHANGE UP (ref 0–8)
EOSINOPHIL NFR BLD AUTO: 0.7 % — SIGNIFICANT CHANGE UP (ref 0–8)
EOSINOPHIL NFR BLD AUTO: 0.8 % — SIGNIFICANT CHANGE UP (ref 0–8)
EOSINOPHIL NFR BLD AUTO: 0.9 % — SIGNIFICANT CHANGE UP (ref 0–8)
EOSINOPHIL NFR BLD AUTO: 1.2 % — SIGNIFICANT CHANGE UP (ref 0–8)
EOSINOPHIL NFR BLD AUTO: 1.3 % — SIGNIFICANT CHANGE UP (ref 0–8)
EOSINOPHIL NFR BLD AUTO: 1.8 % — SIGNIFICANT CHANGE UP (ref 0–8)
EPI CELLS # UR: 3 /HPF — SIGNIFICANT CHANGE UP (ref 0–5)
ERYTHROCYTE [SEDIMENTATION RATE] IN BLOOD: 25 MM/HR — HIGH (ref 0–20)
ESTIMATED AVERAGE GLUCOSE: 100 MG/DL — SIGNIFICANT CHANGE UP (ref 68–114)
ESTIMATED AVERAGE GLUCOSE: 108 MG/DL — SIGNIFICANT CHANGE UP (ref 68–114)
FERRITIN SERPL-MCNC: 360 NG/ML — HIGH (ref 15–150)
FIBRINOGEN PPP-MCNC: 651 MG/DL — HIGH (ref 204.4–570.6)
FLUAV AG NPH QL: SIGNIFICANT CHANGE UP
FLUBV AG NPH QL: SIGNIFICANT CHANGE UP
FOLATE SERPL-MCNC: 8.4 NG/ML — SIGNIFICANT CHANGE UP
GAMMA INTERFERON BACKGROUND BLD IA-ACNC: 0.02 IU/ML — SIGNIFICANT CHANGE UP
GAS PNL BLDA: SIGNIFICANT CHANGE UP
GAS PNL BLDV: 126 MMOL/L — LOW (ref 136–145)
GAS PNL BLDV: 138 MMOL/L — SIGNIFICANT CHANGE UP (ref 136–145)
GAS PNL BLDV: SIGNIFICANT CHANGE UP
GAS PNL BLDV: SIGNIFICANT CHANGE UP
GI PCR PANEL: SIGNIFICANT CHANGE UP
GI PCR PANEL: SIGNIFICANT CHANGE UP
GLUCOSE BLDC GLUCOMTR-MCNC: 100 MG/DL — HIGH (ref 70–99)
GLUCOSE BLDC GLUCOMTR-MCNC: 101 MG/DL — HIGH (ref 70–99)
GLUCOSE BLDC GLUCOMTR-MCNC: 102 MG/DL — HIGH (ref 70–99)
GLUCOSE BLDC GLUCOMTR-MCNC: 105 MG/DL — HIGH (ref 70–99)
GLUCOSE BLDC GLUCOMTR-MCNC: 106 MG/DL — HIGH (ref 70–99)
GLUCOSE BLDC GLUCOMTR-MCNC: 108 MG/DL — HIGH (ref 70–99)
GLUCOSE BLDC GLUCOMTR-MCNC: 111 MG/DL — HIGH (ref 70–99)
GLUCOSE BLDC GLUCOMTR-MCNC: 112 MG/DL — HIGH (ref 70–99)
GLUCOSE BLDC GLUCOMTR-MCNC: 112 MG/DL — HIGH (ref 70–99)
GLUCOSE BLDC GLUCOMTR-MCNC: 113 MG/DL — HIGH (ref 70–99)
GLUCOSE BLDC GLUCOMTR-MCNC: 114 MG/DL — HIGH (ref 70–99)
GLUCOSE BLDC GLUCOMTR-MCNC: 114 MG/DL — HIGH (ref 70–99)
GLUCOSE BLDC GLUCOMTR-MCNC: 115 MG/DL — HIGH (ref 70–99)
GLUCOSE BLDC GLUCOMTR-MCNC: 115 MG/DL — HIGH (ref 70–99)
GLUCOSE BLDC GLUCOMTR-MCNC: 117 MG/DL — HIGH (ref 70–99)
GLUCOSE BLDC GLUCOMTR-MCNC: 118 MG/DL — HIGH (ref 70–99)
GLUCOSE BLDC GLUCOMTR-MCNC: 121 MG/DL — HIGH (ref 70–99)
GLUCOSE BLDC GLUCOMTR-MCNC: 122 MG/DL — HIGH (ref 70–99)
GLUCOSE BLDC GLUCOMTR-MCNC: 122 MG/DL — HIGH (ref 70–99)
GLUCOSE BLDC GLUCOMTR-MCNC: 123 MG/DL — HIGH (ref 70–99)
GLUCOSE BLDC GLUCOMTR-MCNC: 124 MG/DL — HIGH (ref 70–99)
GLUCOSE BLDC GLUCOMTR-MCNC: 127 MG/DL — HIGH (ref 70–99)
GLUCOSE BLDC GLUCOMTR-MCNC: 128 MG/DL — HIGH (ref 70–99)
GLUCOSE BLDC GLUCOMTR-MCNC: 128 MG/DL — HIGH (ref 70–99)
GLUCOSE BLDC GLUCOMTR-MCNC: 129 MG/DL — HIGH (ref 70–99)
GLUCOSE BLDC GLUCOMTR-MCNC: 131 MG/DL — HIGH (ref 70–99)
GLUCOSE BLDC GLUCOMTR-MCNC: 132 MG/DL — HIGH (ref 70–99)
GLUCOSE BLDC GLUCOMTR-MCNC: 132 MG/DL — HIGH (ref 70–99)
GLUCOSE BLDC GLUCOMTR-MCNC: 133 MG/DL — HIGH (ref 70–99)
GLUCOSE BLDC GLUCOMTR-MCNC: 133 MG/DL — HIGH (ref 70–99)
GLUCOSE BLDC GLUCOMTR-MCNC: 136 MG/DL — HIGH (ref 70–99)
GLUCOSE BLDC GLUCOMTR-MCNC: 137 MG/DL — HIGH (ref 70–99)
GLUCOSE BLDC GLUCOMTR-MCNC: 139 MG/DL — HIGH (ref 70–99)
GLUCOSE BLDC GLUCOMTR-MCNC: 140 MG/DL — HIGH (ref 70–99)
GLUCOSE BLDC GLUCOMTR-MCNC: 141 MG/DL — HIGH (ref 70–99)
GLUCOSE BLDC GLUCOMTR-MCNC: 142 MG/DL — HIGH (ref 70–99)
GLUCOSE BLDC GLUCOMTR-MCNC: 147 MG/DL — HIGH (ref 70–99)
GLUCOSE BLDC GLUCOMTR-MCNC: 148 MG/DL — HIGH (ref 70–99)
GLUCOSE BLDC GLUCOMTR-MCNC: 152 MG/DL — HIGH (ref 70–99)
GLUCOSE BLDC GLUCOMTR-MCNC: 153 MG/DL — HIGH (ref 70–99)
GLUCOSE BLDC GLUCOMTR-MCNC: 154 MG/DL — HIGH (ref 70–99)
GLUCOSE BLDC GLUCOMTR-MCNC: 155 MG/DL — HIGH (ref 70–99)
GLUCOSE BLDC GLUCOMTR-MCNC: 159 MG/DL — HIGH (ref 70–99)
GLUCOSE BLDC GLUCOMTR-MCNC: 160 MG/DL — HIGH (ref 70–99)
GLUCOSE BLDC GLUCOMTR-MCNC: 161 MG/DL — HIGH (ref 70–99)
GLUCOSE BLDC GLUCOMTR-MCNC: 168 MG/DL — HIGH (ref 70–99)
GLUCOSE BLDC GLUCOMTR-MCNC: 168 MG/DL — HIGH (ref 70–99)
GLUCOSE BLDC GLUCOMTR-MCNC: 171 MG/DL — HIGH (ref 70–99)
GLUCOSE BLDC GLUCOMTR-MCNC: 176 MG/DL — HIGH (ref 70–99)
GLUCOSE BLDC GLUCOMTR-MCNC: 182 MG/DL — HIGH (ref 70–99)
GLUCOSE BLDC GLUCOMTR-MCNC: 199 MG/DL — HIGH (ref 70–99)
GLUCOSE BLDC GLUCOMTR-MCNC: 200 MG/DL — HIGH (ref 70–99)
GLUCOSE BLDC GLUCOMTR-MCNC: 205 MG/DL — HIGH (ref 70–99)
GLUCOSE BLDC GLUCOMTR-MCNC: 232 MG/DL — HIGH (ref 70–99)
GLUCOSE BLDC GLUCOMTR-MCNC: 233 MG/DL — HIGH (ref 70–99)
GLUCOSE BLDC GLUCOMTR-MCNC: 236 MG/DL — HIGH (ref 70–99)
GLUCOSE BLDC GLUCOMTR-MCNC: 238 MG/DL — HIGH (ref 70–99)
GLUCOSE BLDC GLUCOMTR-MCNC: 239 MG/DL — HIGH (ref 70–99)
GLUCOSE BLDC GLUCOMTR-MCNC: 34 MG/DL — CRITICAL LOW (ref 70–99)
GLUCOSE BLDC GLUCOMTR-MCNC: 48 MG/DL — CRITICAL LOW (ref 70–99)
GLUCOSE BLDC GLUCOMTR-MCNC: 49 MG/DL — CRITICAL LOW (ref 70–99)
GLUCOSE BLDC GLUCOMTR-MCNC: 50 MG/DL — CRITICAL LOW (ref 70–99)
GLUCOSE BLDC GLUCOMTR-MCNC: 58 MG/DL — LOW (ref 70–99)
GLUCOSE BLDC GLUCOMTR-MCNC: 64 MG/DL — LOW (ref 70–99)
GLUCOSE BLDC GLUCOMTR-MCNC: 65 MG/DL — LOW (ref 70–99)
GLUCOSE BLDC GLUCOMTR-MCNC: 68 MG/DL — LOW (ref 70–99)
GLUCOSE BLDC GLUCOMTR-MCNC: 69 MG/DL — LOW (ref 70–99)
GLUCOSE BLDC GLUCOMTR-MCNC: 80 MG/DL — SIGNIFICANT CHANGE UP (ref 70–99)
GLUCOSE BLDC GLUCOMTR-MCNC: 81 MG/DL — SIGNIFICANT CHANGE UP (ref 70–99)
GLUCOSE BLDC GLUCOMTR-MCNC: 82 MG/DL — SIGNIFICANT CHANGE UP (ref 70–99)
GLUCOSE BLDC GLUCOMTR-MCNC: 84 MG/DL — SIGNIFICANT CHANGE UP (ref 70–99)
GLUCOSE BLDC GLUCOMTR-MCNC: 86 MG/DL — SIGNIFICANT CHANGE UP (ref 70–99)
GLUCOSE BLDC GLUCOMTR-MCNC: 89 MG/DL — SIGNIFICANT CHANGE UP (ref 70–99)
GLUCOSE BLDC GLUCOMTR-MCNC: 94 MG/DL — SIGNIFICANT CHANGE UP (ref 70–99)
GLUCOSE BLDC GLUCOMTR-MCNC: 95 MG/DL — SIGNIFICANT CHANGE UP (ref 70–99)
GLUCOSE BLDC GLUCOMTR-MCNC: 97 MG/DL — SIGNIFICANT CHANGE UP (ref 70–99)
GLUCOSE BLDC GLUCOMTR-MCNC: 99 MG/DL — SIGNIFICANT CHANGE UP (ref 70–99)
GLUCOSE SERPL-MCNC: 100 MG/DL — HIGH (ref 70–99)
GLUCOSE SERPL-MCNC: 102 MG/DL — HIGH (ref 70–99)
GLUCOSE SERPL-MCNC: 112 MG/DL — HIGH (ref 70–99)
GLUCOSE SERPL-MCNC: 113 MG/DL — HIGH (ref 70–99)
GLUCOSE SERPL-MCNC: 113 MG/DL — HIGH (ref 70–99)
GLUCOSE SERPL-MCNC: 117 MG/DL — HIGH (ref 70–99)
GLUCOSE SERPL-MCNC: 118 MG/DL — HIGH (ref 70–99)
GLUCOSE SERPL-MCNC: 118 MG/DL — HIGH (ref 70–99)
GLUCOSE SERPL-MCNC: 126 MG/DL — HIGH (ref 70–99)
GLUCOSE SERPL-MCNC: 150 MG/DL — HIGH (ref 70–99)
GLUCOSE SERPL-MCNC: 163 MG/DL — HIGH (ref 70–99)
GLUCOSE SERPL-MCNC: 181 MG/DL — HIGH (ref 70–99)
GLUCOSE SERPL-MCNC: 20 MG/DL — CRITICAL LOW (ref 70–99)
GLUCOSE SERPL-MCNC: 219 MG/DL — HIGH (ref 70–99)
GLUCOSE SERPL-MCNC: 35 MG/DL — CRITICAL LOW (ref 70–99)
GLUCOSE SERPL-MCNC: 47 MG/DL — CRITICAL LOW (ref 70–99)
GLUCOSE SERPL-MCNC: 50 MG/DL — CRITICAL LOW (ref 70–99)
GLUCOSE SERPL-MCNC: 50 MG/DL — CRITICAL LOW (ref 70–99)
GLUCOSE SERPL-MCNC: 51 MG/DL — CRITICAL LOW (ref 70–99)
GLUCOSE SERPL-MCNC: 62 MG/DL — LOW (ref 70–99)
GLUCOSE SERPL-MCNC: 62 MG/DL — LOW (ref 70–99)
GLUCOSE SERPL-MCNC: 65 MG/DL — LOW (ref 70–99)
GLUCOSE SERPL-MCNC: 65 MG/DL — LOW (ref 70–99)
GLUCOSE SERPL-MCNC: 66 MG/DL — LOW (ref 70–99)
GLUCOSE SERPL-MCNC: 75 MG/DL — SIGNIFICANT CHANGE UP (ref 70–99)
GLUCOSE SERPL-MCNC: 77 MG/DL — SIGNIFICANT CHANGE UP (ref 70–99)
GLUCOSE SERPL-MCNC: 77 MG/DL — SIGNIFICANT CHANGE UP (ref 70–99)
GLUCOSE SERPL-MCNC: 81 MG/DL — SIGNIFICANT CHANGE UP (ref 70–99)
GLUCOSE SERPL-MCNC: 86 MG/DL — SIGNIFICANT CHANGE UP (ref 70–99)
GLUCOSE SERPL-MCNC: 96 MG/DL — SIGNIFICANT CHANGE UP (ref 70–99)
GLUCOSE SERPL-MCNC: 96 MG/DL — SIGNIFICANT CHANGE UP (ref 70–99)
GLUCOSE SERPL-MCNC: 97 MG/DL — SIGNIFICANT CHANGE UP (ref 70–99)
GLUCOSE UR QL: ABNORMAL
HCO3 BLDA-SCNC: 30 MMOL/L — HIGH (ref 21–28)
HCO3 BLDV-SCNC: 26 MMOL/L — SIGNIFICANT CHANGE UP (ref 22–29)
HCO3 BLDV-SCNC: 29 MMOL/L — SIGNIFICANT CHANGE UP (ref 22–29)
HCT VFR BLD CALC: 25.7 % — LOW (ref 37–47)
HCT VFR BLD CALC: 26.7 % — LOW (ref 37–47)
HCT VFR BLD CALC: 27.4 % — LOW (ref 37–47)
HCT VFR BLD CALC: 27.8 % — LOW (ref 37–47)
HCT VFR BLD CALC: 28 % — LOW (ref 37–47)
HCT VFR BLD CALC: 28.1 % — LOW (ref 37–47)
HCT VFR BLD CALC: 28.3 % — LOW (ref 37–47)
HCT VFR BLD CALC: 28.4 % — LOW (ref 37–47)
HCT VFR BLD CALC: 28.5 % — LOW (ref 37–47)
HCT VFR BLD CALC: 28.8 % — LOW (ref 37–47)
HCT VFR BLD CALC: 29.5 % — LOW (ref 37–47)
HCT VFR BLD CALC: 29.6 % — LOW (ref 37–47)
HCT VFR BLD CALC: 29.7 % — LOW (ref 37–47)
HCT VFR BLD CALC: 29.8 % — LOW (ref 37–47)
HCT VFR BLD CALC: 30.8 % — LOW (ref 37–47)
HCT VFR BLD CALC: 30.8 % — LOW (ref 37–47)
HCT VFR BLD CALC: 31.3 % — LOW (ref 37–47)
HCT VFR BLD CALC: 32.1 % — LOW (ref 37–47)
HCT VFR BLD CALC: 32.5 % — LOW (ref 37–47)
HCT VFR BLD CALC: 32.7 % — LOW (ref 37–47)
HCT VFR BLD CALC: 32.7 % — LOW (ref 37–47)
HCT VFR BLD CALC: 32.8 % — LOW (ref 37–47)
HCT VFR BLD CALC: 33.2 % — LOW (ref 37–47)
HCT VFR BLD CALC: 34.4 % — LOW (ref 37–47)
HCT VFR BLD CALC: 34.5 % — LOW (ref 37–47)
HCT VFR BLD CALC: 34.6 % — LOW (ref 37–47)
HCT VFR BLD CALC: 35.2 % — LOW (ref 37–47)
HCT VFR BLD CALC: 35.3 % — LOW (ref 37–47)
HCT VFR BLD CALC: 35.4 % — LOW (ref 37–47)
HCT VFR BLD CALC: 38.5 % — SIGNIFICANT CHANGE UP (ref 37–47)
HCT VFR BLDA CALC: 30 % — LOW (ref 39–51)
HCT VFR BLDA CALC: 41 % — SIGNIFICANT CHANGE UP (ref 39–51)
HCV AB S/CO SERPL IA: 0.04 COI — SIGNIFICANT CHANGE UP
HCV AB SERPL-IMP: SIGNIFICANT CHANGE UP
HDLC SERPL-MCNC: 63 MG/DL — SIGNIFICANT CHANGE UP
HDLC SERPL-MCNC: 68 MG/DL — SIGNIFICANT CHANGE UP
HEPARIN-PF4 AB RESULT: <0.6 U/ML — SIGNIFICANT CHANGE UP (ref 0–0.9)
HGB BLD CALC-MCNC: 10.1 G/DL — LOW (ref 12.6–17.4)
HGB BLD CALC-MCNC: 13.7 G/DL — SIGNIFICANT CHANGE UP (ref 12.6–17.4)
HGB BLD-MCNC: 10 G/DL — LOW (ref 12–16)
HGB BLD-MCNC: 10.2 G/DL — LOW (ref 12–16)
HGB BLD-MCNC: 10.2 G/DL — LOW (ref 12–16)
HGB BLD-MCNC: 10.5 G/DL — LOW (ref 12–16)
HGB BLD-MCNC: 10.8 G/DL — LOW (ref 12–16)
HGB BLD-MCNC: 10.8 G/DL — LOW (ref 12–16)
HGB BLD-MCNC: 10.9 G/DL — LOW (ref 12–16)
HGB BLD-MCNC: 11 G/DL — LOW (ref 12–16)
HGB BLD-MCNC: 11 G/DL — LOW (ref 12–16)
HGB BLD-MCNC: 11.1 G/DL — LOW (ref 12–16)
HGB BLD-MCNC: 11.3 G/DL — LOW (ref 12–16)
HGB BLD-MCNC: 11.3 G/DL — LOW (ref 12–16)
HGB BLD-MCNC: 11.5 G/DL — LOW (ref 12–16)
HGB BLD-MCNC: 11.6 G/DL — LOW (ref 12–16)
HGB BLD-MCNC: 11.9 G/DL — LOW (ref 12–16)
HGB BLD-MCNC: 11.9 G/DL — LOW (ref 12–16)
HGB BLD-MCNC: 12 G/DL — SIGNIFICANT CHANGE UP (ref 12–16)
HGB BLD-MCNC: 13.3 G/DL — SIGNIFICANT CHANGE UP (ref 12–16)
HGB BLD-MCNC: 8.5 G/DL — LOW (ref 12–16)
HGB BLD-MCNC: 9.2 G/DL — LOW (ref 12–16)
HGB BLD-MCNC: 9.3 G/DL — LOW (ref 12–16)
HGB BLD-MCNC: 9.4 G/DL — LOW (ref 12–16)
HGB BLD-MCNC: 9.6 G/DL — LOW (ref 12–16)
HGB BLD-MCNC: 9.7 G/DL — LOW (ref 12–16)
HGB BLD-MCNC: 9.8 G/DL — LOW (ref 12–16)
HOROWITZ INDEX BLDA+IHG-RTO: 21 — SIGNIFICANT CHANGE UP
HYALINE CASTS # UR AUTO: 1 /LPF — SIGNIFICANT CHANGE UP (ref 0–7)
HYDROCODONE UR QL CFM: 420 NG/ML — SIGNIFICANT CHANGE UP
HYDROCODONE, UR RESULT: 420 NG/ML — SIGNIFICANT CHANGE UP
HYDROMORPHONE UR QL CFM: 200 NG/ML — SIGNIFICANT CHANGE UP
HYDROMORPHONE, UR RESULT: 200 NG/ML — SIGNIFICANT CHANGE UP
IMM GRANULOCYTES NFR BLD AUTO: 0.2 % — SIGNIFICANT CHANGE UP (ref 0.1–0.3)
IMM GRANULOCYTES NFR BLD AUTO: 0.3 % — SIGNIFICANT CHANGE UP (ref 0.1–0.3)
IMM GRANULOCYTES NFR BLD AUTO: 0.4 % — HIGH (ref 0.1–0.3)
IMM GRANULOCYTES NFR BLD AUTO: 0.5 % — HIGH (ref 0.1–0.3)
IMM GRANULOCYTES NFR BLD AUTO: 0.6 % — HIGH (ref 0.1–0.3)
IMM GRANULOCYTES NFR BLD AUTO: 0.6 % — HIGH (ref 0.1–0.3)
IMM GRANULOCYTES NFR BLD AUTO: 0.7 % — HIGH (ref 0.1–0.3)
IMM GRANULOCYTES NFR BLD AUTO: 0.7 % — HIGH (ref 0.1–0.3)
IMM GRANULOCYTES NFR BLD AUTO: 0.8 % — HIGH (ref 0.1–0.3)
IMM GRANULOCYTES NFR BLD AUTO: 0.9 % — HIGH (ref 0.1–0.3)
IMM GRANULOCYTES NFR BLD AUTO: 1 % — HIGH (ref 0.1–0.3)
INR BLD: 0.95 RATIO — SIGNIFICANT CHANGE UP (ref 0.65–1.3)
INR BLD: 0.99 RATIO — SIGNIFICANT CHANGE UP (ref 0.65–1.3)
INR BLD: 1.14 RATIO — SIGNIFICANT CHANGE UP (ref 0.65–1.3)
INR BLD: 1.5 RATIO — HIGH (ref 0.65–1.3)
IRON SATN MFR SERPL: 19 UG/DL — LOW (ref 35–150)
KETONES UR-MCNC: SIGNIFICANT CHANGE UP
LACTATE BLDV-MCNC: 0.6 MMOL/L — SIGNIFICANT CHANGE UP (ref 0.5–2)
LACTATE BLDV-MCNC: 0.8 MMOL/L — SIGNIFICANT CHANGE UP (ref 0.5–2)
LEGIONELLA AG UR QL: NEGATIVE — SIGNIFICANT CHANGE UP
LEUKOCYTE ESTERASE UR-ACNC: ABNORMAL
LIDOCAIN IGE QN: 28 U/L — SIGNIFICANT CHANGE UP (ref 7–60)
LIPID PNL WITH DIRECT LDL SERPL: 20 MG/DL — SIGNIFICANT CHANGE UP
LIPID PNL WITH DIRECT LDL SERPL: 21 MG/DL — SIGNIFICANT CHANGE UP
LYMPHOCYTES # BLD AUTO: 0.29 K/UL — LOW (ref 1.2–3.4)
LYMPHOCYTES # BLD AUTO: 0.37 K/UL — LOW (ref 1.2–3.4)
LYMPHOCYTES # BLD AUTO: 0.42 K/UL — LOW (ref 1.2–3.4)
LYMPHOCYTES # BLD AUTO: 0.43 K/UL — LOW (ref 1.2–3.4)
LYMPHOCYTES # BLD AUTO: 0.46 K/UL — LOW (ref 1.2–3.4)
LYMPHOCYTES # BLD AUTO: 0.49 K/UL — LOW (ref 1.2–3.4)
LYMPHOCYTES # BLD AUTO: 0.52 K/UL — LOW (ref 1.2–3.4)
LYMPHOCYTES # BLD AUTO: 0.54 K/UL — LOW (ref 1.2–3.4)
LYMPHOCYTES # BLD AUTO: 0.54 K/UL — LOW (ref 1.2–3.4)
LYMPHOCYTES # BLD AUTO: 0.55 K/UL — LOW (ref 1.2–3.4)
LYMPHOCYTES # BLD AUTO: 0.59 K/UL — LOW (ref 1.2–3.4)
LYMPHOCYTES # BLD AUTO: 0.61 K/UL — LOW (ref 1.2–3.4)
LYMPHOCYTES # BLD AUTO: 0.63 K/UL — LOW (ref 1.2–3.4)
LYMPHOCYTES # BLD AUTO: 0.63 K/UL — LOW (ref 1.2–3.4)
LYMPHOCYTES # BLD AUTO: 0.67 K/UL — LOW (ref 1.2–3.4)
LYMPHOCYTES # BLD AUTO: 0.69 K/UL — LOW (ref 1.2–3.4)
LYMPHOCYTES # BLD AUTO: 0.71 K/UL — LOW (ref 1.2–3.4)
LYMPHOCYTES # BLD AUTO: 0.72 K/UL — LOW (ref 1.2–3.4)
LYMPHOCYTES # BLD AUTO: 0.89 K/UL — LOW (ref 1.2–3.4)
LYMPHOCYTES # BLD AUTO: 1.67 K/UL — SIGNIFICANT CHANGE UP (ref 1.2–3.4)
LYMPHOCYTES # BLD AUTO: 1.81 K/UL — SIGNIFICANT CHANGE UP (ref 1.2–3.4)
LYMPHOCYTES # BLD AUTO: 10.1 % — LOW (ref 20.5–51.1)
LYMPHOCYTES # BLD AUTO: 10.6 % — LOW (ref 20.5–51.1)
LYMPHOCYTES # BLD AUTO: 10.8 % — LOW (ref 20.5–51.1)
LYMPHOCYTES # BLD AUTO: 10.9 % — LOW (ref 20.5–51.1)
LYMPHOCYTES # BLD AUTO: 12.2 % — LOW (ref 20.5–51.1)
LYMPHOCYTES # BLD AUTO: 12.9 % — LOW (ref 20.5–51.1)
LYMPHOCYTES # BLD AUTO: 13.1 % — LOW (ref 20.5–51.1)
LYMPHOCYTES # BLD AUTO: 13.6 % — LOW (ref 20.5–51.1)
LYMPHOCYTES # BLD AUTO: 14.6 % — LOW (ref 20.5–51.1)
LYMPHOCYTES # BLD AUTO: 18.3 % — LOW (ref 20.5–51.1)
LYMPHOCYTES # BLD AUTO: 27.7 % — SIGNIFICANT CHANGE UP (ref 20.5–51.1)
LYMPHOCYTES # BLD AUTO: 3.5 % — LOW (ref 20.5–51.1)
LYMPHOCYTES # BLD AUTO: 3.7 % — LOW (ref 20.5–51.1)
LYMPHOCYTES # BLD AUTO: 3.9 % — LOW (ref 20.5–51.1)
LYMPHOCYTES # BLD AUTO: 5.7 % — LOW (ref 20.5–51.1)
LYMPHOCYTES # BLD AUTO: 6.7 % — LOW (ref 20.5–51.1)
LYMPHOCYTES # BLD AUTO: 6.8 % — LOW (ref 20.5–51.1)
LYMPHOCYTES # BLD AUTO: 6.9 % — LOW (ref 20.5–51.1)
LYMPHOCYTES # BLD AUTO: 7.8 % — LOW (ref 20.5–51.1)
LYMPHOCYTES # BLD AUTO: 8.2 % — LOW (ref 20.5–51.1)
LYMPHOCYTES # BLD AUTO: 8.6 % — LOW (ref 20.5–51.1)
LYMPHOCYTES # BLD AUTO: 8.7 % — LOW (ref 20.5–51.1)
LYMPHOCYTES # BLD AUTO: 9 % — LOW (ref 20.5–51.1)
M TB IFN-G BLD-IMP: NEGATIVE — SIGNIFICANT CHANGE UP
M TB IFN-G CD4+ BCKGRND COR BLD-ACNC: -0.01 IU/ML — SIGNIFICANT CHANGE UP
M TB IFN-G CD4+CD8+ BCKGRND COR BLD-ACNC: -0.01 IU/ML — SIGNIFICANT CHANGE UP
MAGNESIUM SERPL-MCNC: 1.2 MG/DL — LOW (ref 1.8–2.4)
MAGNESIUM SERPL-MCNC: 1.3 MG/DL — LOW (ref 1.8–2.4)
MAGNESIUM SERPL-MCNC: 1.4 MG/DL — LOW (ref 1.8–2.4)
MAGNESIUM SERPL-MCNC: 1.5 MG/DL — LOW (ref 1.8–2.4)
MAGNESIUM SERPL-MCNC: 1.5 MG/DL — LOW (ref 1.8–2.4)
MAGNESIUM SERPL-MCNC: 1.6 MG/DL — LOW (ref 1.8–2.4)
MAGNESIUM SERPL-MCNC: 1.7 MG/DL — LOW (ref 1.8–2.4)
MAGNESIUM SERPL-MCNC: 1.8 MG/DL — SIGNIFICANT CHANGE UP (ref 1.8–2.4)
MAGNESIUM SERPL-MCNC: 1.8 MG/DL — SIGNIFICANT CHANGE UP (ref 1.8–2.4)
MAGNESIUM SERPL-MCNC: 1.9 MG/DL — SIGNIFICANT CHANGE UP (ref 1.8–2.4)
MAGNESIUM SERPL-MCNC: 2.1 MG/DL — SIGNIFICANT CHANGE UP (ref 1.8–2.4)
MAGNESIUM SERPL-MCNC: 2.2 MG/DL — SIGNIFICANT CHANGE UP (ref 1.8–2.4)
MAGNESIUM SERPL-MCNC: 2.3 MG/DL — SIGNIFICANT CHANGE UP (ref 1.8–2.4)
MCHC RBC-ENTMCNC: 31.9 PG — HIGH (ref 27–31)
MCHC RBC-ENTMCNC: 32.1 PG — HIGH (ref 27–31)
MCHC RBC-ENTMCNC: 32.3 PG — HIGH (ref 27–31)
MCHC RBC-ENTMCNC: 32.4 PG — HIGH (ref 27–31)
MCHC RBC-ENTMCNC: 32.4 PG — HIGH (ref 27–31)
MCHC RBC-ENTMCNC: 32.5 PG — HIGH (ref 27–31)
MCHC RBC-ENTMCNC: 32.5 PG — HIGH (ref 27–31)
MCHC RBC-ENTMCNC: 32.6 PG — HIGH (ref 27–31)
MCHC RBC-ENTMCNC: 32.6 PG — HIGH (ref 27–31)
MCHC RBC-ENTMCNC: 32.7 PG — HIGH (ref 27–31)
MCHC RBC-ENTMCNC: 32.8 G/DL — SIGNIFICANT CHANGE UP (ref 32–37)
MCHC RBC-ENTMCNC: 32.8 PG — HIGH (ref 27–31)
MCHC RBC-ENTMCNC: 32.8 PG — HIGH (ref 27–31)
MCHC RBC-ENTMCNC: 32.9 PG — HIGH (ref 27–31)
MCHC RBC-ENTMCNC: 32.9 PG — HIGH (ref 27–31)
MCHC RBC-ENTMCNC: 33.1 G/DL — SIGNIFICANT CHANGE UP (ref 32–37)
MCHC RBC-ENTMCNC: 33.1 G/DL — SIGNIFICANT CHANGE UP (ref 32–37)
MCHC RBC-ENTMCNC: 33.1 PG — HIGH (ref 27–31)
MCHC RBC-ENTMCNC: 33.2 G/DL — SIGNIFICANT CHANGE UP (ref 32–37)
MCHC RBC-ENTMCNC: 33.2 PG — HIGH (ref 27–31)
MCHC RBC-ENTMCNC: 33.3 G/DL — SIGNIFICANT CHANGE UP (ref 32–37)
MCHC RBC-ENTMCNC: 33.3 PG — HIGH (ref 27–31)
MCHC RBC-ENTMCNC: 33.3 PG — HIGH (ref 27–31)
MCHC RBC-ENTMCNC: 33.5 G/DL — SIGNIFICANT CHANGE UP (ref 32–37)
MCHC RBC-ENTMCNC: 33.6 G/DL — SIGNIFICANT CHANGE UP (ref 32–37)
MCHC RBC-ENTMCNC: 33.6 G/DL — SIGNIFICANT CHANGE UP (ref 32–37)
MCHC RBC-ENTMCNC: 33.6 PG — HIGH (ref 27–31)
MCHC RBC-ENTMCNC: 33.7 G/DL — SIGNIFICANT CHANGE UP (ref 32–37)
MCHC RBC-ENTMCNC: 33.7 PG — HIGH (ref 27–31)
MCHC RBC-ENTMCNC: 33.9 G/DL — SIGNIFICANT CHANGE UP (ref 32–37)
MCHC RBC-ENTMCNC: 33.9 PG — HIGH (ref 27–31)
MCHC RBC-ENTMCNC: 34.1 G/DL — SIGNIFICANT CHANGE UP (ref 32–37)
MCHC RBC-ENTMCNC: 34.1 G/DL — SIGNIFICANT CHANGE UP (ref 32–37)
MCHC RBC-ENTMCNC: 34.2 G/DL — SIGNIFICANT CHANGE UP (ref 32–37)
MCHC RBC-ENTMCNC: 34.3 G/DL — SIGNIFICANT CHANGE UP (ref 32–37)
MCHC RBC-ENTMCNC: 34.5 G/DL — SIGNIFICANT CHANGE UP (ref 32–37)
MCHC RBC-ENTMCNC: 34.6 G/DL — SIGNIFICANT CHANGE UP (ref 32–37)
MCHC RBC-ENTMCNC: 34.6 G/DL — SIGNIFICANT CHANGE UP (ref 32–37)
MCHC RBC-ENTMCNC: 34.9 G/DL — SIGNIFICANT CHANGE UP (ref 32–37)
MCHC RBC-ENTMCNC: 35 G/DL — SIGNIFICANT CHANGE UP (ref 32–37)
MCHC RBC-ENTMCNC: 35.3 G/DL — SIGNIFICANT CHANGE UP (ref 32–37)
MCHC RBC-ENTMCNC: 35.7 G/DL — SIGNIFICANT CHANGE UP (ref 32–37)
MCHC RBC-ENTMCNC: 35.8 G/DL — SIGNIFICANT CHANGE UP (ref 32–37)
MCHC RBC-ENTMCNC: 36.1 G/DL — SIGNIFICANT CHANGE UP (ref 32–37)
MCHC RBC-ENTMCNC: 36.2 G/DL — SIGNIFICANT CHANGE UP (ref 32–37)
MCV RBC AUTO: 92.8 FL — SIGNIFICANT CHANGE UP (ref 81–99)
MCV RBC AUTO: 92.9 FL — SIGNIFICANT CHANGE UP (ref 81–99)
MCV RBC AUTO: 93 FL — SIGNIFICANT CHANGE UP (ref 81–99)
MCV RBC AUTO: 93.1 FL — SIGNIFICANT CHANGE UP (ref 81–99)
MCV RBC AUTO: 94.3 FL — SIGNIFICANT CHANGE UP (ref 81–99)
MCV RBC AUTO: 94.7 FL — SIGNIFICANT CHANGE UP (ref 81–99)
MCV RBC AUTO: 94.7 FL — SIGNIFICANT CHANGE UP (ref 81–99)
MCV RBC AUTO: 95.1 FL — SIGNIFICANT CHANGE UP (ref 81–99)
MCV RBC AUTO: 95.2 FL — SIGNIFICANT CHANGE UP (ref 81–99)
MCV RBC AUTO: 95.3 FL — SIGNIFICANT CHANGE UP (ref 81–99)
MCV RBC AUTO: 95.3 FL — SIGNIFICANT CHANGE UP (ref 81–99)
MCV RBC AUTO: 95.7 FL — SIGNIFICANT CHANGE UP (ref 81–99)
MCV RBC AUTO: 95.8 FL — SIGNIFICANT CHANGE UP (ref 81–99)
MCV RBC AUTO: 95.9 FL — SIGNIFICANT CHANGE UP (ref 81–99)
MCV RBC AUTO: 96.5 FL — SIGNIFICANT CHANGE UP (ref 81–99)
MCV RBC AUTO: 96.9 FL — SIGNIFICANT CHANGE UP (ref 81–99)
MCV RBC AUTO: 97 FL — SIGNIFICANT CHANGE UP (ref 81–99)
MCV RBC AUTO: 97 FL — SIGNIFICANT CHANGE UP (ref 81–99)
MCV RBC AUTO: 97.2 FL — SIGNIFICANT CHANGE UP (ref 81–99)
MCV RBC AUTO: 97.5 FL — SIGNIFICANT CHANGE UP (ref 81–99)
MCV RBC AUTO: 97.7 FL — SIGNIFICANT CHANGE UP (ref 81–99)
MCV RBC AUTO: 97.8 FL — SIGNIFICANT CHANGE UP (ref 81–99)
MCV RBC AUTO: 97.9 FL — SIGNIFICANT CHANGE UP (ref 81–99)
MCV RBC AUTO: 98.5 FL — SIGNIFICANT CHANGE UP (ref 81–99)
MCV RBC AUTO: 98.6 FL — SIGNIFICANT CHANGE UP (ref 81–99)
MCV RBC AUTO: 98.9 FL — SIGNIFICANT CHANGE UP (ref 81–99)
MCV RBC AUTO: 99.1 FL — HIGH (ref 81–99)
MCV RBC AUTO: 99.3 FL — HIGH (ref 81–99)
METHADONE UR-MCNC: NEGATIVE — SIGNIFICANT CHANGE UP
METHOD TYPE: SIGNIFICANT CHANGE UP
MONOCYTES # BLD AUTO: 0.11 K/UL — SIGNIFICANT CHANGE UP (ref 0.1–0.6)
MONOCYTES # BLD AUTO: 0.16 K/UL — SIGNIFICANT CHANGE UP (ref 0.1–0.6)
MONOCYTES # BLD AUTO: 0.18 K/UL — SIGNIFICANT CHANGE UP (ref 0.1–0.6)
MONOCYTES # BLD AUTO: 0.2 K/UL — SIGNIFICANT CHANGE UP (ref 0.1–0.6)
MONOCYTES # BLD AUTO: 0.21 K/UL — SIGNIFICANT CHANGE UP (ref 0.1–0.6)
MONOCYTES # BLD AUTO: 0.22 K/UL — SIGNIFICANT CHANGE UP (ref 0.1–0.6)
MONOCYTES # BLD AUTO: 0.24 K/UL — SIGNIFICANT CHANGE UP (ref 0.1–0.6)
MONOCYTES # BLD AUTO: 0.24 K/UL — SIGNIFICANT CHANGE UP (ref 0.1–0.6)
MONOCYTES # BLD AUTO: 0.28 K/UL — SIGNIFICANT CHANGE UP (ref 0.1–0.6)
MONOCYTES # BLD AUTO: 0.28 K/UL — SIGNIFICANT CHANGE UP (ref 0.1–0.6)
MONOCYTES # BLD AUTO: 0.29 K/UL — SIGNIFICANT CHANGE UP (ref 0.1–0.6)
MONOCYTES # BLD AUTO: 0.31 K/UL — SIGNIFICANT CHANGE UP (ref 0.1–0.6)
MONOCYTES # BLD AUTO: 0.33 K/UL — SIGNIFICANT CHANGE UP (ref 0.1–0.6)
MONOCYTES # BLD AUTO: 0.37 K/UL — SIGNIFICANT CHANGE UP (ref 0.1–0.6)
MONOCYTES # BLD AUTO: 0.4 K/UL — SIGNIFICANT CHANGE UP (ref 0.1–0.6)
MONOCYTES # BLD AUTO: 0.41 K/UL — SIGNIFICANT CHANGE UP (ref 0.1–0.6)
MONOCYTES # BLD AUTO: 0.42 K/UL — SIGNIFICANT CHANGE UP (ref 0.1–0.6)
MONOCYTES # BLD AUTO: 0.43 K/UL — SIGNIFICANT CHANGE UP (ref 0.1–0.6)
MONOCYTES # BLD AUTO: 0.67 K/UL — HIGH (ref 0.1–0.6)
MONOCYTES NFR BLD AUTO: 0.9 % — LOW (ref 1.7–9.3)
MONOCYTES NFR BLD AUTO: 10.1 % — HIGH (ref 1.7–9.3)
MONOCYTES NFR BLD AUTO: 2.2 % — SIGNIFICANT CHANGE UP (ref 1.7–9.3)
MONOCYTES NFR BLD AUTO: 2.6 % — SIGNIFICANT CHANGE UP (ref 1.7–9.3)
MONOCYTES NFR BLD AUTO: 2.8 % — SIGNIFICANT CHANGE UP (ref 1.7–9.3)
MONOCYTES NFR BLD AUTO: 3.1 % — SIGNIFICANT CHANGE UP (ref 1.7–9.3)
MONOCYTES NFR BLD AUTO: 3.3 % — SIGNIFICANT CHANGE UP (ref 1.7–9.3)
MONOCYTES NFR BLD AUTO: 3.5 % — SIGNIFICANT CHANGE UP (ref 1.7–9.3)
MONOCYTES NFR BLD AUTO: 3.8 % — SIGNIFICANT CHANGE UP (ref 1.7–9.3)
MONOCYTES NFR BLD AUTO: 4.2 % — SIGNIFICANT CHANGE UP (ref 1.7–9.3)
MONOCYTES NFR BLD AUTO: 4.4 % — SIGNIFICANT CHANGE UP (ref 1.7–9.3)
MONOCYTES NFR BLD AUTO: 4.7 % — SIGNIFICANT CHANGE UP (ref 1.7–9.3)
MONOCYTES NFR BLD AUTO: 5 % — SIGNIFICANT CHANGE UP (ref 1.7–9.3)
MONOCYTES NFR BLD AUTO: 5.1 % — SIGNIFICANT CHANGE UP (ref 1.7–9.3)
MONOCYTES NFR BLD AUTO: 5.3 % — SIGNIFICANT CHANGE UP (ref 1.7–9.3)
MONOCYTES NFR BLD AUTO: 6.3 % — SIGNIFICANT CHANGE UP (ref 1.7–9.3)
MONOCYTES NFR BLD AUTO: 6.9 % — SIGNIFICANT CHANGE UP (ref 1.7–9.3)
MONOCYTES NFR BLD AUTO: 7 % — SIGNIFICANT CHANGE UP (ref 1.7–9.3)
MONOCYTES NFR BLD AUTO: 7.1 % — SIGNIFICANT CHANGE UP (ref 1.7–9.3)
MONOCYTES NFR BLD AUTO: 7.6 % — SIGNIFICANT CHANGE UP (ref 1.7–9.3)
MONOCYTES NFR BLD AUTO: 7.7 % — SIGNIFICANT CHANGE UP (ref 1.7–9.3)
MORPHINE UR QL CFM: NEGATIVE NG/ML — SIGNIFICANT CHANGE UP
MORPHINE, UR RESULT: NEGATIVE NG/ML — SIGNIFICANT CHANGE UP
MRSA PCR RESULT.: NEGATIVE — SIGNIFICANT CHANGE UP
NEUTROPHILS # BLD AUTO: 11.02 K/UL — HIGH (ref 1.4–6.5)
NEUTROPHILS # BLD AUTO: 13.13 K/UL — HIGH (ref 1.4–6.5)
NEUTROPHILS # BLD AUTO: 3.16 K/UL — SIGNIFICANT CHANGE UP (ref 1.4–6.5)
NEUTROPHILS # BLD AUTO: 3.46 K/UL — SIGNIFICANT CHANGE UP (ref 1.4–6.5)
NEUTROPHILS # BLD AUTO: 3.65 K/UL — SIGNIFICANT CHANGE UP (ref 1.4–6.5)
NEUTROPHILS # BLD AUTO: 3.73 K/UL — SIGNIFICANT CHANGE UP (ref 1.4–6.5)
NEUTROPHILS # BLD AUTO: 4.02 K/UL — SIGNIFICANT CHANGE UP (ref 1.4–6.5)
NEUTROPHILS # BLD AUTO: 4.09 K/UL — SIGNIFICANT CHANGE UP (ref 1.4–6.5)
NEUTROPHILS # BLD AUTO: 4.41 K/UL — SIGNIFICANT CHANGE UP (ref 1.4–6.5)
NEUTROPHILS # BLD AUTO: 4.65 K/UL — SIGNIFICANT CHANGE UP (ref 1.4–6.5)
NEUTROPHILS # BLD AUTO: 5.12 K/UL — SIGNIFICANT CHANGE UP (ref 1.4–6.5)
NEUTROPHILS # BLD AUTO: 5.52 K/UL — SIGNIFICANT CHANGE UP (ref 1.4–6.5)
NEUTROPHILS # BLD AUTO: 5.61 K/UL — SIGNIFICANT CHANGE UP (ref 1.4–6.5)
NEUTROPHILS # BLD AUTO: 5.67 K/UL — SIGNIFICANT CHANGE UP (ref 1.4–6.5)
NEUTROPHILS # BLD AUTO: 5.81 K/UL — SIGNIFICANT CHANGE UP (ref 1.4–6.5)
NEUTROPHILS # BLD AUTO: 5.94 K/UL — SIGNIFICANT CHANGE UP (ref 1.4–6.5)
NEUTROPHILS # BLD AUTO: 5.99 K/UL — SIGNIFICANT CHANGE UP (ref 1.4–6.5)
NEUTROPHILS # BLD AUTO: 6.31 K/UL — SIGNIFICANT CHANGE UP (ref 1.4–6.5)
NEUTROPHILS # BLD AUTO: 6.88 K/UL — HIGH (ref 1.4–6.5)
NEUTROPHILS # BLD AUTO: 7.07 K/UL — HIGH (ref 1.4–6.5)
NEUTROPHILS # BLD AUTO: 7.21 K/UL — HIGH (ref 1.4–6.5)
NEUTROPHILS # BLD AUTO: 7.63 K/UL — HIGH (ref 1.4–6.5)
NEUTROPHILS # BLD AUTO: 7.68 K/UL — HIGH (ref 1.4–6.5)
NEUTROPHILS NFR BLD AUTO: 62.5 % — SIGNIFICANT CHANGE UP (ref 42.2–75.2)
NEUTROPHILS NFR BLD AUTO: 75.2 % — SIGNIFICANT CHANGE UP (ref 42.2–75.2)
NEUTROPHILS NFR BLD AUTO: 76.6 % — HIGH (ref 42.2–75.2)
NEUTROPHILS NFR BLD AUTO: 77.5 % — HIGH (ref 42.2–75.2)
NEUTROPHILS NFR BLD AUTO: 77.5 % — HIGH (ref 42.2–75.2)
NEUTROPHILS NFR BLD AUTO: 77.6 % — HIGH (ref 42.2–75.2)
NEUTROPHILS NFR BLD AUTO: 78.6 % — HIGH (ref 42.2–75.2)
NEUTROPHILS NFR BLD AUTO: 78.6 % — HIGH (ref 42.2–75.2)
NEUTROPHILS NFR BLD AUTO: 82.4 % — HIGH (ref 42.2–75.2)
NEUTROPHILS NFR BLD AUTO: 82.7 % — HIGH (ref 42.2–75.2)
NEUTROPHILS NFR BLD AUTO: 84.4 % — HIGH (ref 42.2–75.2)
NEUTROPHILS NFR BLD AUTO: 85.3 % — HIGH (ref 42.2–75.2)
NEUTROPHILS NFR BLD AUTO: 86 % — HIGH (ref 42.2–75.2)
NEUTROPHILS NFR BLD AUTO: 86.3 % — HIGH (ref 42.2–75.2)
NEUTROPHILS NFR BLD AUTO: 86.5 % — HIGH (ref 42.2–75.2)
NEUTROPHILS NFR BLD AUTO: 86.7 % — HIGH (ref 42.2–75.2)
NEUTROPHILS NFR BLD AUTO: 89.2 % — HIGH (ref 42.2–75.2)
NEUTROPHILS NFR BLD AUTO: 89.5 % — HIGH (ref 42.2–75.2)
NEUTROPHILS NFR BLD AUTO: 89.7 % — HIGH (ref 42.2–75.2)
NEUTROPHILS NFR BLD AUTO: 90.2 % — HIGH (ref 42.2–75.2)
NEUTROPHILS NFR BLD AUTO: 93 % — HIGH (ref 42.2–75.2)
NEUTROPHILS NFR BLD AUTO: 93.3 % — HIGH (ref 42.2–75.2)
NEUTROPHILS NFR BLD AUTO: 94.2 % — HIGH (ref 42.2–75.2)
NITRITE UR-MCNC: NEGATIVE — SIGNIFICANT CHANGE UP
NON HDL CHOLESTEROL: 31 MG/DL — SIGNIFICANT CHANGE UP
NON HDL CHOLESTEROL: 35 MG/DL — SIGNIFICANT CHANGE UP
NOROXYCODONE (OPIATES), UR RESULT: NEGATIVE NG/ML — SIGNIFICANT CHANGE UP
NOROXYCODONE UR CFM-MCNC: NEGATIVE NG/ML — SIGNIFICANT CHANGE UP
NRBC # BLD: 0 /100 WBCS — SIGNIFICANT CHANGE UP (ref 0–0)
NT-PROBNP SERPL-SCNC: 2184 PG/ML — HIGH (ref 0–300)
OPIATES IN-HOUSE INTERPRETATION: POSITIVE
OPIATES UR QL CFM: POSITIVE
OPIATES UR-MCNC: POSITIVE
ORGANISM # SPEC MICROSCOPIC CNT: SIGNIFICANT CHANGE UP
OXYCODONE (OPIATES), UR RESULT: NEGATIVE NG/ML — SIGNIFICANT CHANGE UP
OXYCODONE UR-MCNC: NEGATIVE NG/ML — SIGNIFICANT CHANGE UP
OXYMORPHONE (OPIATES), UR RESULT: NEGATIVE NG/ML — SIGNIFICANT CHANGE UP
OXYMORPHONE UR CFM-MCNC: NEGATIVE NG/ML — SIGNIFICANT CHANGE UP
PCO2 BLDA: 51 MMHG — HIGH (ref 25–48)
PCO2 BLDV: 61 MMHG — HIGH (ref 39–42)
PCO2 BLDV: 69 MMHG — HIGH (ref 39–42)
PCP SPEC-MCNC: SIGNIFICANT CHANGE UP
PF4 HEPARIN CMPLX AB SER-ACNC: NEGATIVE — SIGNIFICANT CHANGE UP
PH BLDA: 7.38 — SIGNIFICANT CHANGE UP (ref 7.35–7.45)
PH BLDV: 7.23 — LOW (ref 7.32–7.43)
PH BLDV: 7.23 — LOW (ref 7.32–7.43)
PH UR: 6.5 — SIGNIFICANT CHANGE UP (ref 5–8)
PHOSPHATE SERPL-MCNC: 1.9 MG/DL — LOW (ref 2.1–4.9)
PHOSPHATE SERPL-MCNC: 2.5 MG/DL — SIGNIFICANT CHANGE UP (ref 2.1–4.9)
PHOSPHATE SERPL-MCNC: 2.6 MG/DL — SIGNIFICANT CHANGE UP (ref 2.1–4.9)
PHOSPHATE SERPL-MCNC: 2.8 MG/DL — SIGNIFICANT CHANGE UP (ref 2.1–4.9)
PHOSPHATE SERPL-MCNC: 3 MG/DL — SIGNIFICANT CHANGE UP (ref 2.1–4.9)
PHOSPHATE SERPL-MCNC: 3.4 MG/DL — SIGNIFICANT CHANGE UP (ref 2.1–4.9)
PHOSPHATE SERPL-MCNC: 3.4 MG/DL — SIGNIFICANT CHANGE UP (ref 2.1–4.9)
PHOSPHATE SERPL-MCNC: 4.1 MG/DL — SIGNIFICANT CHANGE UP (ref 2.1–4.9)
PHOSPHATE SERPL-MCNC: 4.3 MG/DL — SIGNIFICANT CHANGE UP (ref 2.1–4.9)
PHOSPHATE SERPL-MCNC: 4.3 MG/DL — SIGNIFICANT CHANGE UP (ref 2.1–4.9)
PHOSPHATE SERPL-MCNC: 4.5 MG/DL — SIGNIFICANT CHANGE UP (ref 2.1–4.9)
PLATELET # BLD AUTO: 101 K/UL — LOW (ref 130–400)
PLATELET # BLD AUTO: 105 K/UL — LOW (ref 130–400)
PLATELET # BLD AUTO: 105 K/UL — LOW (ref 130–400)
PLATELET # BLD AUTO: 121 K/UL — LOW (ref 130–400)
PLATELET # BLD AUTO: 138 K/UL — SIGNIFICANT CHANGE UP (ref 130–400)
PLATELET # BLD AUTO: 141 K/UL — SIGNIFICANT CHANGE UP (ref 130–400)
PLATELET # BLD AUTO: 143 K/UL — SIGNIFICANT CHANGE UP (ref 130–400)
PLATELET # BLD AUTO: 148 K/UL — SIGNIFICANT CHANGE UP (ref 130–400)
PLATELET # BLD AUTO: 150 K/UL — SIGNIFICANT CHANGE UP (ref 130–400)
PLATELET # BLD AUTO: 152 K/UL — SIGNIFICANT CHANGE UP (ref 130–400)
PLATELET # BLD AUTO: 154 K/UL — SIGNIFICANT CHANGE UP (ref 130–400)
PLATELET # BLD AUTO: 161 K/UL — SIGNIFICANT CHANGE UP (ref 130–400)
PLATELET # BLD AUTO: 168 K/UL — SIGNIFICANT CHANGE UP (ref 130–400)
PLATELET # BLD AUTO: 173 K/UL — SIGNIFICANT CHANGE UP (ref 130–400)
PLATELET # BLD AUTO: 173 K/UL — SIGNIFICANT CHANGE UP (ref 130–400)
PLATELET # BLD AUTO: 177 K/UL — SIGNIFICANT CHANGE UP (ref 130–400)
PLATELET # BLD AUTO: 190 K/UL — SIGNIFICANT CHANGE UP (ref 130–400)
PLATELET # BLD AUTO: 204 K/UL — SIGNIFICANT CHANGE UP (ref 130–400)
PLATELET # BLD AUTO: 225 K/UL — SIGNIFICANT CHANGE UP (ref 130–400)
PLATELET # BLD AUTO: 233 K/UL — SIGNIFICANT CHANGE UP (ref 130–400)
PLATELET # BLD AUTO: 237 K/UL — SIGNIFICANT CHANGE UP (ref 130–400)
PLATELET # BLD AUTO: 244 K/UL — SIGNIFICANT CHANGE UP (ref 130–400)
PLATELET # BLD AUTO: 252 K/UL — SIGNIFICANT CHANGE UP (ref 130–400)
PLATELET # BLD AUTO: 253 K/UL — SIGNIFICANT CHANGE UP (ref 130–400)
PLATELET # BLD AUTO: 284 K/UL — SIGNIFICANT CHANGE UP (ref 130–400)
PLATELET # BLD AUTO: 50 K/UL — LOW (ref 130–400)
PLATELET # BLD AUTO: 59 K/UL — LOW (ref 130–400)
PLATELET # BLD AUTO: 61 K/UL — LOW (ref 130–400)
PLATELET # BLD AUTO: 70 K/UL — LOW (ref 130–400)
PLATELET # BLD AUTO: 76 K/UL — LOW (ref 130–400)
PO2 BLDA: 89 MMHG — SIGNIFICANT CHANGE UP (ref 83–108)
PO2 BLDV: 110 MMHG — SIGNIFICANT CHANGE UP
PO2 BLDV: 31 MMHG — SIGNIFICANT CHANGE UP
POTASSIUM BLDV-SCNC: 17.3 MMOL/L — CRITICAL HIGH (ref 3.5–5.1)
POTASSIUM BLDV-SCNC: 3.5 MMOL/L — SIGNIFICANT CHANGE UP (ref 3.5–5.1)
POTASSIUM SERPL-MCNC: 2.2 MMOL/L — CRITICAL LOW (ref 3.5–5)
POTASSIUM SERPL-MCNC: 2.7 MMOL/L — CRITICAL LOW (ref 3.5–5)
POTASSIUM SERPL-MCNC: 3.1 MMOL/L — LOW (ref 3.5–5)
POTASSIUM SERPL-MCNC: 3.2 MMOL/L — LOW (ref 3.5–5)
POTASSIUM SERPL-MCNC: 3.3 MMOL/L — LOW (ref 3.5–5)
POTASSIUM SERPL-MCNC: 3.4 MMOL/L — LOW (ref 3.5–5)
POTASSIUM SERPL-MCNC: 3.4 MMOL/L — LOW (ref 3.5–5)
POTASSIUM SERPL-MCNC: 3.5 MMOL/L — SIGNIFICANT CHANGE UP (ref 3.5–5)
POTASSIUM SERPL-MCNC: 3.5 MMOL/L — SIGNIFICANT CHANGE UP (ref 3.5–5)
POTASSIUM SERPL-MCNC: 3.6 MMOL/L — SIGNIFICANT CHANGE UP (ref 3.5–5)
POTASSIUM SERPL-MCNC: 3.6 MMOL/L — SIGNIFICANT CHANGE UP (ref 3.5–5)
POTASSIUM SERPL-MCNC: 3.7 MMOL/L — SIGNIFICANT CHANGE UP (ref 3.5–5)
POTASSIUM SERPL-MCNC: 3.9 MMOL/L — SIGNIFICANT CHANGE UP (ref 3.5–5)
POTASSIUM SERPL-MCNC: 4 MMOL/L — SIGNIFICANT CHANGE UP (ref 3.5–5)
POTASSIUM SERPL-MCNC: 4.1 MMOL/L — SIGNIFICANT CHANGE UP (ref 3.5–5)
POTASSIUM SERPL-MCNC: 4.1 MMOL/L — SIGNIFICANT CHANGE UP (ref 3.5–5)
POTASSIUM SERPL-MCNC: 4.2 MMOL/L — SIGNIFICANT CHANGE UP (ref 3.5–5)
POTASSIUM SERPL-MCNC: 4.2 MMOL/L — SIGNIFICANT CHANGE UP (ref 3.5–5)
POTASSIUM SERPL-MCNC: 4.3 MMOL/L — SIGNIFICANT CHANGE UP (ref 3.5–5)
POTASSIUM SERPL-MCNC: 4.4 MMOL/L — SIGNIFICANT CHANGE UP (ref 3.5–5)
POTASSIUM SERPL-MCNC: 4.5 MMOL/L — SIGNIFICANT CHANGE UP (ref 3.5–5)
POTASSIUM SERPL-MCNC: 4.5 MMOL/L — SIGNIFICANT CHANGE UP (ref 3.5–5)
POTASSIUM SERPL-MCNC: 4.7 MMOL/L — SIGNIFICANT CHANGE UP (ref 3.5–5)
POTASSIUM SERPL-MCNC: 4.8 MMOL/L — SIGNIFICANT CHANGE UP (ref 3.5–5)
POTASSIUM SERPL-MCNC: 4.9 MMOL/L — SIGNIFICANT CHANGE UP (ref 3.5–5)
POTASSIUM SERPL-MCNC: 5 MMOL/L — SIGNIFICANT CHANGE UP (ref 3.5–5)
POTASSIUM SERPL-MCNC: 5.2 MMOL/L — HIGH (ref 3.5–5)
POTASSIUM SERPL-MCNC: SIGNIFICANT CHANGE UP MMOL/L (ref 3.5–5)
POTASSIUM SERPL-SCNC: 2.2 MMOL/L — CRITICAL LOW (ref 3.5–5)
POTASSIUM SERPL-SCNC: 2.7 MMOL/L — CRITICAL LOW (ref 3.5–5)
POTASSIUM SERPL-SCNC: 3.1 MMOL/L — LOW (ref 3.5–5)
POTASSIUM SERPL-SCNC: 3.2 MMOL/L — LOW (ref 3.5–5)
POTASSIUM SERPL-SCNC: 3.3 MMOL/L — LOW (ref 3.5–5)
POTASSIUM SERPL-SCNC: 3.4 MMOL/L — LOW (ref 3.5–5)
POTASSIUM SERPL-SCNC: 3.4 MMOL/L — LOW (ref 3.5–5)
POTASSIUM SERPL-SCNC: 3.5 MMOL/L — SIGNIFICANT CHANGE UP (ref 3.5–5)
POTASSIUM SERPL-SCNC: 3.5 MMOL/L — SIGNIFICANT CHANGE UP (ref 3.5–5)
POTASSIUM SERPL-SCNC: 3.6 MMOL/L — SIGNIFICANT CHANGE UP (ref 3.5–5)
POTASSIUM SERPL-SCNC: 3.6 MMOL/L — SIGNIFICANT CHANGE UP (ref 3.5–5)
POTASSIUM SERPL-SCNC: 3.7 MMOL/L — SIGNIFICANT CHANGE UP (ref 3.5–5)
POTASSIUM SERPL-SCNC: 3.9 MMOL/L — SIGNIFICANT CHANGE UP (ref 3.5–5)
POTASSIUM SERPL-SCNC: 4 MMOL/L — SIGNIFICANT CHANGE UP (ref 3.5–5)
POTASSIUM SERPL-SCNC: 4.1 MMOL/L — SIGNIFICANT CHANGE UP (ref 3.5–5)
POTASSIUM SERPL-SCNC: 4.1 MMOL/L — SIGNIFICANT CHANGE UP (ref 3.5–5)
POTASSIUM SERPL-SCNC: 4.2 MMOL/L — SIGNIFICANT CHANGE UP (ref 3.5–5)
POTASSIUM SERPL-SCNC: 4.2 MMOL/L — SIGNIFICANT CHANGE UP (ref 3.5–5)
POTASSIUM SERPL-SCNC: 4.3 MMOL/L — SIGNIFICANT CHANGE UP (ref 3.5–5)
POTASSIUM SERPL-SCNC: 4.4 MMOL/L — SIGNIFICANT CHANGE UP (ref 3.5–5)
POTASSIUM SERPL-SCNC: 4.5 MMOL/L — SIGNIFICANT CHANGE UP (ref 3.5–5)
POTASSIUM SERPL-SCNC: 4.5 MMOL/L — SIGNIFICANT CHANGE UP (ref 3.5–5)
POTASSIUM SERPL-SCNC: 4.7 MMOL/L — SIGNIFICANT CHANGE UP (ref 3.5–5)
POTASSIUM SERPL-SCNC: 4.8 MMOL/L — SIGNIFICANT CHANGE UP (ref 3.5–5)
POTASSIUM SERPL-SCNC: 4.9 MMOL/L — SIGNIFICANT CHANGE UP (ref 3.5–5)
POTASSIUM SERPL-SCNC: 5 MMOL/L — SIGNIFICANT CHANGE UP (ref 3.5–5)
POTASSIUM SERPL-SCNC: 5.2 MMOL/L — HIGH (ref 3.5–5)
POTASSIUM SERPL-SCNC: SIGNIFICANT CHANGE UP MMOL/L (ref 3.5–5)
PREALB SERPL-MCNC: 13 MG/DL — LOW (ref 20–40)
PROCALCITONIN SERPL-MCNC: 0.15 NG/ML — HIGH (ref 0.02–0.1)
PROCALCITONIN SERPL-MCNC: 0.61 NG/ML — HIGH (ref 0.02–0.1)
PROPOXYPHENE QUALITATIVE URINE RESULT: NEGATIVE — SIGNIFICANT CHANGE UP
PROT SERPL-MCNC: 4.1 G/DL — LOW (ref 6–8)
PROT SERPL-MCNC: 4.1 G/DL — LOW (ref 6–8)
PROT SERPL-MCNC: 4.2 G/DL — LOW (ref 6–8)
PROT SERPL-MCNC: 4.3 G/DL — LOW (ref 6–8)
PROT SERPL-MCNC: 4.4 G/DL — LOW (ref 6–8)
PROT SERPL-MCNC: 4.5 G/DL — LOW (ref 6–8)
PROT SERPL-MCNC: 4.6 G/DL — LOW (ref 6–8)
PROT SERPL-MCNC: 4.6 G/DL — LOW (ref 6–8)
PROT SERPL-MCNC: 4.7 G/DL — LOW (ref 6–8)
PROT SERPL-MCNC: 4.8 G/DL — LOW (ref 6–8)
PROT SERPL-MCNC: 4.9 G/DL — LOW (ref 6–8)
PROT SERPL-MCNC: 5 G/DL — LOW (ref 6–8)
PROT SERPL-MCNC: 5 G/DL — LOW (ref 6–8)
PROT SERPL-MCNC: 5.1 G/DL — LOW (ref 6–8)
PROT SERPL-MCNC: 5.1 G/DL — LOW (ref 6–8)
PROT SERPL-MCNC: 5.7 G/DL — LOW (ref 6–8)
PROT SERPL-MCNC: 5.8 G/DL — LOW (ref 6–8)
PROT UR-MCNC: ABNORMAL
PROTHROM AB SERPL-ACNC: 10.9 SEC — SIGNIFICANT CHANGE UP (ref 9.95–12.87)
PROTHROM AB SERPL-ACNC: 11.3 SEC — SIGNIFICANT CHANGE UP (ref 9.95–12.87)
PROTHROM AB SERPL-ACNC: 13 SEC — HIGH (ref 9.95–12.87)
PROTHROM AB SERPL-ACNC: 17.3 SEC — HIGH (ref 9.95–12.87)
QUANT TB PLUS MITOGEN MINUS NIL: 1.68 IU/ML — SIGNIFICANT CHANGE UP
RBC # BLD: 2.61 M/UL — LOW (ref 4.2–5.4)
RBC # BLD: 2.82 M/UL — LOW (ref 4.2–5.4)
RBC # BLD: 2.84 M/UL — LOW (ref 4.2–5.4)
RBC # BLD: 2.87 M/UL — LOW (ref 4.2–5.4)
RBC # BLD: 2.9 M/UL — LOW (ref 4.2–5.4)
RBC # BLD: 2.92 M/UL — LOW (ref 4.2–5.4)
RBC # BLD: 2.92 M/UL — LOW (ref 4.2–5.4)
RBC # BLD: 2.93 M/UL — LOW (ref 4.2–5.4)
RBC # BLD: 2.95 M/UL — LOW (ref 4.2–5.4)
RBC # BLD: 3.01 M/UL — LOW (ref 4.2–5.4)
RBC # BLD: 3.03 M/UL — LOW (ref 4.2–5.4)
RBC # BLD: 3.04 M/UL — LOW (ref 4.2–5.4)
RBC # BLD: 3.1 M/UL — LOW (ref 4.2–5.4)
RBC # BLD: 3.21 M/UL — LOW (ref 4.2–5.4)
RBC # BLD: 3.24 M/UL — LOW (ref 4.2–5.4)
RBC # BLD: 3.31 M/UL — LOW (ref 4.2–5.4)
RBC # BLD: 3.32 M/UL — LOW (ref 4.2–5.4)
RBC # BLD: 3.35 M/UL — LOW (ref 4.2–5.4)
RBC # BLD: 3.35 M/UL — LOW (ref 4.2–5.4)
RBC # BLD: 3.43 M/UL — LOW (ref 4.2–5.4)
RBC # BLD: 3.45 M/UL — LOW (ref 4.2–5.4)
RBC # BLD: 3.47 M/UL — LOW (ref 4.2–5.4)
RBC # BLD: 3.48 M/UL — LOW (ref 4.2–5.4)
RBC # BLD: 3.5 M/UL — LOW (ref 4.2–5.4)
RBC # BLD: 3.52 M/UL — LOW (ref 4.2–5.4)
RBC # BLD: 3.54 M/UL — LOW (ref 4.2–5.4)
RBC # BLD: 3.58 M/UL — LOW (ref 4.2–5.4)
RBC # BLD: 3.59 M/UL — LOW (ref 4.2–5.4)
RBC # BLD: 3.6 M/UL — LOW (ref 4.2–5.4)
RBC # BLD: 3.95 M/UL — LOW (ref 4.2–5.4)
RBC # FLD: 12.8 % — SIGNIFICANT CHANGE UP (ref 11.5–14.5)
RBC # FLD: 13.1 % — SIGNIFICANT CHANGE UP (ref 11.5–14.5)
RBC # FLD: 13.2 % — SIGNIFICANT CHANGE UP (ref 11.5–14.5)
RBC # FLD: 13.3 % — SIGNIFICANT CHANGE UP (ref 11.5–14.5)
RBC # FLD: 13.4 % — SIGNIFICANT CHANGE UP (ref 11.5–14.5)
RBC # FLD: 13.4 % — SIGNIFICANT CHANGE UP (ref 11.5–14.5)
RBC # FLD: 13.5 % — SIGNIFICANT CHANGE UP (ref 11.5–14.5)
RBC # FLD: 13.6 % — SIGNIFICANT CHANGE UP (ref 11.5–14.5)
RBC # FLD: 13.6 % — SIGNIFICANT CHANGE UP (ref 11.5–14.5)
RBC # FLD: 13.7 % — SIGNIFICANT CHANGE UP (ref 11.5–14.5)
RBC # FLD: 13.7 % — SIGNIFICANT CHANGE UP (ref 11.5–14.5)
RBC # FLD: 13.8 % — SIGNIFICANT CHANGE UP (ref 11.5–14.5)
RBC # FLD: 13.9 % — SIGNIFICANT CHANGE UP (ref 11.5–14.5)
RBC # FLD: 14.2 % — SIGNIFICANT CHANGE UP (ref 11.5–14.5)
RBC # FLD: 14.3 % — SIGNIFICANT CHANGE UP (ref 11.5–14.5)
RBC # FLD: 14.3 % — SIGNIFICANT CHANGE UP (ref 11.5–14.5)
RBC # FLD: 14.6 % — HIGH (ref 11.5–14.5)
RBC # FLD: 14.6 % — HIGH (ref 11.5–14.5)
RBC # FLD: 14.8 % — HIGH (ref 11.5–14.5)
RBC # FLD: 14.8 % — HIGH (ref 11.5–14.5)
RBC CASTS # UR COMP ASSIST: 2 /HPF — SIGNIFICANT CHANGE UP (ref 0–4)
RSV RNA NPH QL NAA+NON-PROBE: SIGNIFICANT CHANGE UP
S PNEUM AG UR QL: NEGATIVE — SIGNIFICANT CHANGE UP
SAO2 % BLDA: 95.8 % — SIGNIFICANT CHANGE UP (ref 94–98)
SAO2 % BLDV: 55.7 % — SIGNIFICANT CHANGE UP
SAO2 % BLDV: 99.7 % — SIGNIFICANT CHANGE UP
SARS-COV-2 RNA SPEC QL NAA+PROBE: DETECTED
SARS-COV-2 RNA SPEC QL NAA+PROBE: DETECTED
SARS-COV-2 RNA SPEC QL NAA+PROBE: SIGNIFICANT CHANGE UP
SODIUM SERPL-SCNC: 126 MMOL/L — LOW (ref 135–146)
SODIUM SERPL-SCNC: 126 MMOL/L — LOW (ref 135–146)
SODIUM SERPL-SCNC: 130 MMOL/L — LOW (ref 135–146)
SODIUM SERPL-SCNC: 130 MMOL/L — LOW (ref 135–146)
SODIUM SERPL-SCNC: 131 MMOL/L — LOW (ref 135–146)
SODIUM SERPL-SCNC: 132 MMOL/L — LOW (ref 135–146)
SODIUM SERPL-SCNC: 133 MMOL/L — LOW (ref 135–146)
SODIUM SERPL-SCNC: 134 MMOL/L — LOW (ref 135–146)
SODIUM SERPL-SCNC: 135 MMOL/L — SIGNIFICANT CHANGE UP (ref 135–146)
SODIUM SERPL-SCNC: 135 MMOL/L — SIGNIFICANT CHANGE UP (ref 135–146)
SODIUM SERPL-SCNC: 136 MMOL/L — SIGNIFICANT CHANGE UP (ref 135–146)
SODIUM SERPL-SCNC: 138 MMOL/L — SIGNIFICANT CHANGE UP (ref 135–146)
SODIUM SERPL-SCNC: 139 MMOL/L — SIGNIFICANT CHANGE UP (ref 135–146)
SODIUM SERPL-SCNC: 140 MMOL/L — SIGNIFICANT CHANGE UP (ref 135–146)
SODIUM SERPL-SCNC: 141 MMOL/L — SIGNIFICANT CHANGE UP (ref 135–146)
SODIUM SERPL-SCNC: 142 MMOL/L — SIGNIFICANT CHANGE UP (ref 135–146)
SODIUM SERPL-SCNC: 142 MMOL/L — SIGNIFICANT CHANGE UP (ref 135–146)
SODIUM SERPL-SCNC: 143 MMOL/L — SIGNIFICANT CHANGE UP (ref 135–146)
SODIUM SERPL-SCNC: 144 MMOL/L — SIGNIFICANT CHANGE UP (ref 135–146)
SODIUM SERPL-SCNC: 145 MMOL/L — SIGNIFICANT CHANGE UP (ref 135–146)
SP GR SPEC: >1.05 (ref 1.01–1.03)
SPECIMEN SOURCE: SIGNIFICANT CHANGE UP
T3 SERPL-MCNC: 73 NG/DL — LOW (ref 80–200)
T3FREE SERPL-MCNC: 1.21 PG/ML — LOW (ref 2–4.4)
T3FREE SERPL-MCNC: 1.39 PG/ML — LOW (ref 2–4.4)
T4 AB SER-ACNC: 7.7 UG/DL — SIGNIFICANT CHANGE UP (ref 4.6–12)
T4 FREE SERPL-MCNC: 0.8 NG/DL — LOW (ref 0.9–1.8)
T4 FREE SERPL-MCNC: 1.6 NG/DL — SIGNIFICANT CHANGE UP (ref 0.9–1.8)
TRIGL SERPL-MCNC: 54 MG/DL — SIGNIFICANT CHANGE UP
TRIGL SERPL-MCNC: 72 MG/DL — SIGNIFICANT CHANGE UP
TROPONIN T SERPL-MCNC: 0.04 NG/ML — CRITICAL HIGH
TROPONIN T SERPL-MCNC: 0.04 NG/ML — CRITICAL HIGH
TROPONIN T SERPL-MCNC: 0.05 NG/ML — CRITICAL HIGH
TSH SERPL-MCNC: 7.12 UIU/ML — HIGH (ref 0.27–4.2)
TSH SERPL-MCNC: 7.92 UIU/ML — HIGH (ref 0.27–4.2)
UROBILINOGEN FLD QL: SIGNIFICANT CHANGE UP
VIT B12 SERPL-MCNC: 1371 PG/ML — HIGH (ref 232–1245)
WBC # BLD: 11.71 K/UL — HIGH (ref 4.8–10.8)
WBC # BLD: 12.87 K/UL — HIGH (ref 4.8–10.8)
WBC # BLD: 14.11 K/UL — HIGH (ref 4.8–10.8)
WBC # BLD: 4.02 K/UL — LOW (ref 4.8–10.8)
WBC # BLD: 4.47 K/UL — LOW (ref 4.8–10.8)
WBC # BLD: 4.63 K/UL — LOW (ref 4.8–10.8)
WBC # BLD: 4.65 K/UL — LOW (ref 4.8–10.8)
WBC # BLD: 4.87 K/UL — SIGNIFICANT CHANGE UP (ref 4.8–10.8)
WBC # BLD: 5.18 K/UL — SIGNIFICANT CHANGE UP (ref 4.8–10.8)
WBC # BLD: 5.33 K/UL — SIGNIFICANT CHANGE UP (ref 4.8–10.8)
WBC # BLD: 5.64 K/UL — SIGNIFICANT CHANGE UP (ref 4.8–10.8)
WBC # BLD: 5.98 K/UL — SIGNIFICANT CHANGE UP (ref 4.8–10.8)
WBC # BLD: 6.25 K/UL — SIGNIFICANT CHANGE UP (ref 4.8–10.8)
WBC # BLD: 6.25 K/UL — SIGNIFICANT CHANGE UP (ref 4.8–10.8)
WBC # BLD: 6.3 K/UL — SIGNIFICANT CHANGE UP (ref 4.8–10.8)
WBC # BLD: 6.34 K/UL — SIGNIFICANT CHANGE UP (ref 4.8–10.8)
WBC # BLD: 6.39 K/UL — SIGNIFICANT CHANGE UP (ref 4.8–10.8)
WBC # BLD: 6.44 K/UL — SIGNIFICANT CHANGE UP (ref 4.8–10.8)
WBC # BLD: 6.54 K/UL — SIGNIFICANT CHANGE UP (ref 4.8–10.8)
WBC # BLD: 6.61 K/UL — SIGNIFICANT CHANGE UP (ref 4.8–10.8)
WBC # BLD: 6.92 K/UL — SIGNIFICANT CHANGE UP (ref 4.8–10.8)
WBC # BLD: 6.97 K/UL — SIGNIFICANT CHANGE UP (ref 4.8–10.8)
WBC # BLD: 6.99 K/UL — SIGNIFICANT CHANGE UP (ref 4.8–10.8)
WBC # BLD: 7.28 K/UL — SIGNIFICANT CHANGE UP (ref 4.8–10.8)
WBC # BLD: 8.18 K/UL — SIGNIFICANT CHANGE UP (ref 4.8–10.8)
WBC # BLD: 8.38 K/UL — SIGNIFICANT CHANGE UP (ref 4.8–10.8)
WBC # BLD: 8.39 K/UL — SIGNIFICANT CHANGE UP (ref 4.8–10.8)
WBC # BLD: 8.6 K/UL — SIGNIFICANT CHANGE UP (ref 4.8–10.8)
WBC # BLD: 8.9 K/UL — SIGNIFICANT CHANGE UP (ref 4.8–10.8)
WBC # BLD: 9.14 K/UL — SIGNIFICANT CHANGE UP (ref 4.8–10.8)
WBC # FLD AUTO: 11.71 K/UL — HIGH (ref 4.8–10.8)
WBC # FLD AUTO: 12.87 K/UL — HIGH (ref 4.8–10.8)
WBC # FLD AUTO: 14.11 K/UL — HIGH (ref 4.8–10.8)
WBC # FLD AUTO: 4.02 K/UL — LOW (ref 4.8–10.8)
WBC # FLD AUTO: 4.47 K/UL — LOW (ref 4.8–10.8)
WBC # FLD AUTO: 4.63 K/UL — LOW (ref 4.8–10.8)
WBC # FLD AUTO: 4.65 K/UL — LOW (ref 4.8–10.8)
WBC # FLD AUTO: 4.87 K/UL — SIGNIFICANT CHANGE UP (ref 4.8–10.8)
WBC # FLD AUTO: 5.18 K/UL — SIGNIFICANT CHANGE UP (ref 4.8–10.8)
WBC # FLD AUTO: 5.33 K/UL — SIGNIFICANT CHANGE UP (ref 4.8–10.8)
WBC # FLD AUTO: 5.64 K/UL — SIGNIFICANT CHANGE UP (ref 4.8–10.8)
WBC # FLD AUTO: 5.98 K/UL — SIGNIFICANT CHANGE UP (ref 4.8–10.8)
WBC # FLD AUTO: 6.25 K/UL — SIGNIFICANT CHANGE UP (ref 4.8–10.8)
WBC # FLD AUTO: 6.25 K/UL — SIGNIFICANT CHANGE UP (ref 4.8–10.8)
WBC # FLD AUTO: 6.3 K/UL — SIGNIFICANT CHANGE UP (ref 4.8–10.8)
WBC # FLD AUTO: 6.34 K/UL — SIGNIFICANT CHANGE UP (ref 4.8–10.8)
WBC # FLD AUTO: 6.39 K/UL — SIGNIFICANT CHANGE UP (ref 4.8–10.8)
WBC # FLD AUTO: 6.44 K/UL — SIGNIFICANT CHANGE UP (ref 4.8–10.8)
WBC # FLD AUTO: 6.54 K/UL — SIGNIFICANT CHANGE UP (ref 4.8–10.8)
WBC # FLD AUTO: 6.61 K/UL — SIGNIFICANT CHANGE UP (ref 4.8–10.8)
WBC # FLD AUTO: 6.92 K/UL — SIGNIFICANT CHANGE UP (ref 4.8–10.8)
WBC # FLD AUTO: 6.97 K/UL — SIGNIFICANT CHANGE UP (ref 4.8–10.8)
WBC # FLD AUTO: 6.99 K/UL — SIGNIFICANT CHANGE UP (ref 4.8–10.8)
WBC # FLD AUTO: 7.28 K/UL — SIGNIFICANT CHANGE UP (ref 4.8–10.8)
WBC # FLD AUTO: 8.18 K/UL — SIGNIFICANT CHANGE UP (ref 4.8–10.8)
WBC # FLD AUTO: 8.38 K/UL — SIGNIFICANT CHANGE UP (ref 4.8–10.8)
WBC # FLD AUTO: 8.39 K/UL — SIGNIFICANT CHANGE UP (ref 4.8–10.8)
WBC # FLD AUTO: 8.6 K/UL — SIGNIFICANT CHANGE UP (ref 4.8–10.8)
WBC # FLD AUTO: 8.9 K/UL — SIGNIFICANT CHANGE UP (ref 4.8–10.8)
WBC # FLD AUTO: 9.14 K/UL — SIGNIFICANT CHANGE UP (ref 4.8–10.8)
WBC UR QL: 19 /HPF — HIGH (ref 0–5)
ZINC SERPL-MCNC: 77 UG/DL — SIGNIFICANT CHANGE UP (ref 44–115)

## 2022-01-01 PROCEDURE — 70450 CT HEAD/BRAIN W/O DYE: CPT | Mod: 26,59,MA

## 2022-01-01 PROCEDURE — 99213 OFFICE O/P EST LOW 20 MIN: CPT

## 2022-01-01 PROCEDURE — 99282 EMERGENCY DEPT VISIT SF MDM: CPT

## 2022-01-01 PROCEDURE — 99233 SBSQ HOSP IP/OBS HIGH 50: CPT

## 2022-01-01 PROCEDURE — 74177 CT ABD & PELVIS W/CONTRAST: CPT | Mod: 26,MA

## 2022-01-01 PROCEDURE — 99232 SBSQ HOSP IP/OBS MODERATE 35: CPT

## 2022-01-01 PROCEDURE — 71045 X-RAY EXAM CHEST 1 VIEW: CPT | Mod: 26

## 2022-01-01 PROCEDURE — 99221 1ST HOSP IP/OBS SF/LOW 40: CPT

## 2022-01-01 PROCEDURE — 99291 CRITICAL CARE FIRST HOUR: CPT

## 2022-01-01 PROCEDURE — 97597 DBRDMT OPN WND 1ST 20 CM/<: CPT

## 2022-01-01 PROCEDURE — 76705 ECHO EXAM OF ABDOMEN: CPT | Mod: 26

## 2022-01-01 PROCEDURE — 63688 REV/RMV IMP SP NPG/R DTCH CN: CPT

## 2022-01-01 PROCEDURE — 71260 CT THORAX DX C+: CPT | Mod: 26,MA

## 2022-01-01 PROCEDURE — 93306 TTE W/DOPPLER COMPLETE: CPT | Mod: 26

## 2022-01-01 PROCEDURE — 36556 INSERT NON-TUNNEL CV CATH: CPT

## 2022-01-01 PROCEDURE — 70496 CT ANGIOGRAPHY HEAD: CPT | Mod: 26,MA

## 2022-01-01 PROCEDURE — 99212 OFFICE O/P EST SF 10 MIN: CPT

## 2022-01-01 PROCEDURE — 99283 EMERGENCY DEPT VISIT LOW MDM: CPT | Mod: GC

## 2022-01-01 PROCEDURE — 99212 OFFICE O/P EST SF 10 MIN: CPT | Mod: 25

## 2022-01-01 PROCEDURE — 99223 1ST HOSP IP/OBS HIGH 75: CPT

## 2022-01-01 PROCEDURE — 11045 DBRDMT SUBQ TISS EACH ADDL: CPT

## 2022-01-01 PROCEDURE — 99285 EMERGENCY DEPT VISIT HI MDM: CPT | Mod: 25

## 2022-01-01 PROCEDURE — 73630 X-RAY EXAM OF FOOT: CPT | Mod: 26,LT

## 2022-01-01 PROCEDURE — 12004 RPR S/N/AX/GEN/TRK7.6-12.5CM: CPT

## 2022-01-01 PROCEDURE — 72125 CT NECK SPINE W/O DYE: CPT | Mod: 26,MA

## 2022-01-01 PROCEDURE — 70498 CT ANGIOGRAPHY NECK: CPT | Mod: 26,MA

## 2022-01-01 PROCEDURE — 99283 EMERGENCY DEPT VISIT LOW MDM: CPT | Mod: 25

## 2022-01-01 PROCEDURE — 71045 X-RAY EXAM CHEST 1 VIEW: CPT | Mod: 26,76

## 2022-01-01 PROCEDURE — 93010 ELECTROCARDIOGRAM REPORT: CPT

## 2022-01-01 PROCEDURE — 73701 CT LOWER EXTREMITY W/DYE: CPT | Mod: 26,50,MA

## 2022-01-01 PROCEDURE — 99285 EMERGENCY DEPT VISIT HI MDM: CPT

## 2022-01-01 PROCEDURE — 99214 OFFICE O/P EST MOD 30 MIN: CPT

## 2022-01-01 PROCEDURE — 93925 LOWER EXTREMITY STUDY: CPT | Mod: 26

## 2022-01-01 PROCEDURE — 11042 DBRDMT SUBQ TIS 1ST 20SQCM/<: CPT

## 2022-01-01 PROCEDURE — 93970 EXTREMITY STUDY: CPT | Mod: 26

## 2022-01-01 PROCEDURE — 99497 ADVNCD CARE PLAN 30 MIN: CPT | Mod: 25

## 2022-01-01 PROCEDURE — 99283 EMERGENCY DEPT VISIT LOW MDM: CPT

## 2022-01-01 PROCEDURE — 99441: CPT

## 2022-01-01 PROCEDURE — 93010 ELECTROCARDIOGRAM REPORT: CPT | Mod: 77

## 2022-01-01 PROCEDURE — 99231 SBSQ HOSP IP/OBS SF/LOW 25: CPT

## 2022-01-01 PROCEDURE — 43753 TX GASTRO INTUB W/ASP: CPT

## 2022-01-01 PROCEDURE — 74230 X-RAY XM SWLNG FUNCJ C+: CPT | Mod: 26

## 2022-01-01 PROCEDURE — 70450 CT HEAD/BRAIN W/O DYE: CPT | Mod: 26

## 2022-01-01 PROCEDURE — 99222 1ST HOSP IP/OBS MODERATE 55: CPT

## 2022-01-01 PROCEDURE — 76937 US GUIDE VASCULAR ACCESS: CPT | Mod: 26

## 2022-01-01 PROCEDURE — 31500 INSERT EMERGENCY AIRWAY: CPT

## 2022-01-01 PROCEDURE — 99284 EMERGENCY DEPT VISIT MOD MDM: CPT

## 2022-01-01 PROCEDURE — 71250 CT THORAX DX C-: CPT | Mod: 26

## 2022-01-01 PROCEDURE — 99284 EMERGENCY DEPT VISIT MOD MDM: CPT | Mod: 25

## 2022-01-01 PROCEDURE — 99204 OFFICE O/P NEW MOD 45 MIN: CPT

## 2022-01-01 RX ORDER — MAGNESIUM SULFATE 500 MG/ML
2 VIAL (ML) INJECTION ONCE
Refills: 0 | Status: COMPLETED | OUTPATIENT
Start: 2022-01-01 | End: 2022-01-01

## 2022-01-01 RX ORDER — LANOLIN ALCOHOL/MO/W.PET/CERES
5 CREAM (GRAM) TOPICAL AT BEDTIME
Refills: 0 | Status: DISCONTINUED | OUTPATIENT
Start: 2022-01-01 | End: 2022-01-01

## 2022-01-01 RX ORDER — DEXTROSE 50 % IN WATER 50 %
50 SYRINGE (ML) INTRAVENOUS ONCE
Refills: 0 | Status: COMPLETED | OUTPATIENT
Start: 2022-01-01 | End: 2022-01-01

## 2022-01-01 RX ORDER — VANCOMYCIN HCL 1 G
1000 VIAL (EA) INTRAVENOUS ONCE
Refills: 0 | Status: COMPLETED | OUTPATIENT
Start: 2022-01-01 | End: 2022-01-01

## 2022-01-01 RX ORDER — MORPHINE SULFATE 50 MG/1
2 CAPSULE, EXTENDED RELEASE ORAL ONCE
Refills: 0 | Status: DISCONTINUED | OUTPATIENT
Start: 2022-01-01 | End: 2022-01-01

## 2022-01-01 RX ORDER — POTASSIUM CHLORIDE 20 MEQ
20 PACKET (EA) ORAL ONCE
Refills: 0 | Status: COMPLETED | OUTPATIENT
Start: 2022-01-01 | End: 2022-01-01

## 2022-01-01 RX ORDER — PIPERACILLIN AND TAZOBACTAM 4; .5 G/20ML; G/20ML
3.38 INJECTION, POWDER, LYOPHILIZED, FOR SOLUTION INTRAVENOUS ONCE
Refills: 0 | Status: COMPLETED | OUTPATIENT
Start: 2022-01-01 | End: 2022-01-01

## 2022-01-01 RX ORDER — LEVOTHYROXINE SODIUM 125 MCG
400 TABLET ORAL AT BEDTIME
Refills: 0 | Status: DISCONTINUED | OUTPATIENT
Start: 2022-01-01 | End: 2022-01-01

## 2022-01-01 RX ORDER — MAGNESIUM SULFATE 500 MG/ML
2 VIAL (ML) INJECTION
Refills: 0 | Status: COMPLETED | OUTPATIENT
Start: 2022-01-01 | End: 2022-01-01

## 2022-01-01 RX ORDER — MAGNESIUM SULFATE 500 MG/ML
2 VIAL (ML) INJECTION EVERY 4 HOURS
Refills: 0 | Status: COMPLETED | OUTPATIENT
Start: 2022-01-01 | End: 2022-01-01

## 2022-01-01 RX ORDER — DEXMEDETOMIDINE HYDROCHLORIDE IN 0.9% SODIUM CHLORIDE 4 UG/ML
0.05 INJECTION INTRAVENOUS
Qty: 400 | Refills: 0 | Status: DISCONTINUED | OUTPATIENT
Start: 2022-01-01 | End: 2022-01-01

## 2022-01-01 RX ORDER — GABAPENTIN 400 MG/1
1 CAPSULE ORAL
Qty: 0 | Refills: 0 | DISCHARGE

## 2022-01-01 RX ORDER — HYDROMORPHONE HYDROCHLORIDE 2 MG/ML
0.5 INJECTION INTRAMUSCULAR; INTRAVENOUS; SUBCUTANEOUS
Refills: 0 | Status: DISCONTINUED | OUTPATIENT
Start: 2022-01-01 | End: 2022-01-01

## 2022-01-01 RX ORDER — SILVER SULFADIAZINE 10 G/1000G
1 CREAM TOPICAL DAILY
Qty: 1 | Refills: 0 | Status: ACTIVE | COMMUNITY
Start: 2022-01-01 | End: 1900-01-01

## 2022-01-01 RX ORDER — AZTREONAM 2 G
2 VIAL (EA) INJECTION
Qty: 28 | Refills: 0
Start: 2022-01-01 | End: 2022-01-01

## 2022-01-01 RX ORDER — POTASSIUM CHLORIDE 20 MEQ
20 PACKET (EA) ORAL
Refills: 0 | Status: COMPLETED | OUTPATIENT
Start: 2022-01-01 | End: 2022-01-01

## 2022-01-01 RX ORDER — ACETAMINOPHEN 500 MG
650 TABLET ORAL EVERY 6 HOURS
Refills: 0 | Status: DISCONTINUED | OUTPATIENT
Start: 2022-01-01 | End: 2022-01-01

## 2022-01-01 RX ORDER — HYDROCORTISONE 20 MG
25 TABLET ORAL
Refills: 0 | Status: DISCONTINUED | OUTPATIENT
Start: 2022-01-01 | End: 2022-01-01

## 2022-01-01 RX ORDER — CHLORHEXIDINE GLUCONATE 213 G/1000ML
15 SOLUTION TOPICAL
Refills: 0 | Status: DISCONTINUED | OUTPATIENT
Start: 2022-01-01 | End: 2022-01-01

## 2022-01-01 RX ORDER — ENOXAPARIN SODIUM 100 MG/ML
40 INJECTION SUBCUTANEOUS EVERY 24 HOURS
Refills: 0 | Status: DISCONTINUED | OUTPATIENT
Start: 2022-01-01 | End: 2022-01-01

## 2022-01-01 RX ORDER — VANCOMYCIN HCL 1 G
900 VIAL (EA) INTRAVENOUS ONCE
Refills: 0 | Status: DISCONTINUED | OUTPATIENT
Start: 2022-01-01 | End: 2022-01-01

## 2022-01-01 RX ORDER — CHLORHEXIDINE GLUCONATE 213 G/1000ML
1 SOLUTION TOPICAL
Refills: 0 | Status: DISCONTINUED | OUTPATIENT
Start: 2022-01-01 | End: 2022-01-01

## 2022-01-01 RX ORDER — NOREPINEPHRINE BITARTRATE/D5W 8 MG/250ML
0.05 PLASTIC BAG, INJECTION (ML) INTRAVENOUS
Qty: 16 | Refills: 0 | Status: DISCONTINUED | OUTPATIENT
Start: 2022-01-01 | End: 2022-01-01

## 2022-01-01 RX ORDER — LOPERAMIDE HCL 2 MG
2 TABLET ORAL ONCE
Refills: 0 | Status: COMPLETED | OUTPATIENT
Start: 2022-01-01 | End: 2022-01-01

## 2022-01-01 RX ORDER — MIDAZOLAM HYDROCHLORIDE 1 MG/ML
2 INJECTION, SOLUTION INTRAMUSCULAR; INTRAVENOUS ONCE
Refills: 0 | Status: DISCONTINUED | OUTPATIENT
Start: 2022-01-01 | End: 2022-01-01

## 2022-01-01 RX ORDER — ACETAMINOPHEN 500 MG
650 TABLET ORAL ONCE
Refills: 0 | Status: DISCONTINUED | OUTPATIENT
Start: 2022-01-01 | End: 2022-01-01

## 2022-01-01 RX ORDER — SODIUM CHLORIDE 9 MG/ML
500 INJECTION, SOLUTION INTRAVENOUS ONCE
Refills: 0 | Status: COMPLETED | OUTPATIENT
Start: 2022-01-01 | End: 2022-01-01

## 2022-01-01 RX ORDER — SERTRALINE 25 MG/1
1 TABLET, FILM COATED ORAL
Qty: 0 | Refills: 0 | DISCHARGE

## 2022-01-01 RX ORDER — SODIUM CHLORIDE 9 MG/ML
1000 INJECTION INTRAMUSCULAR; INTRAVENOUS; SUBCUTANEOUS
Refills: 0 | Status: DISCONTINUED | OUTPATIENT
Start: 2022-01-01 | End: 2022-01-01

## 2022-01-01 RX ORDER — MULTIVIT-MIN/FERROUS GLUCONATE 9 MG/15 ML
15 LIQUID (ML) ORAL DAILY
Refills: 0 | Status: DISCONTINUED | OUTPATIENT
Start: 2022-01-01 | End: 2022-01-01

## 2022-01-01 RX ORDER — METRONIDAZOLE 500 MG
500 TABLET ORAL EVERY 8 HOURS
Refills: 0 | Status: DISCONTINUED | OUTPATIENT
Start: 2022-01-01 | End: 2022-01-01

## 2022-01-01 RX ORDER — SERTRALINE 25 MG/1
50 TABLET, FILM COATED ORAL DAILY
Refills: 0 | Status: DISCONTINUED | OUTPATIENT
Start: 2022-01-01 | End: 2022-01-01

## 2022-01-01 RX ORDER — SODIUM CHLORIDE 9 MG/ML
1000 INJECTION, SOLUTION INTRAVENOUS
Refills: 0 | Status: DISCONTINUED | OUTPATIENT
Start: 2022-01-01 | End: 2022-01-01

## 2022-01-01 RX ORDER — MIDODRINE HYDROCHLORIDE 2.5 MG/1
10 TABLET ORAL EVERY 8 HOURS
Refills: 0 | Status: DISCONTINUED | OUTPATIENT
Start: 2022-01-01 | End: 2022-01-01

## 2022-01-01 RX ORDER — ROCURONIUM BROMIDE 10 MG/ML
50 VIAL (ML) INTRAVENOUS ONCE
Refills: 0 | Status: COMPLETED | OUTPATIENT
Start: 2022-01-01 | End: 2022-01-01

## 2022-01-01 RX ORDER — OXYCODONE AND ACETAMINOPHEN 5; 325 MG/1; MG/1
2 TABLET ORAL EVERY 6 HOURS
Refills: 0 | Status: DISCONTINUED | OUTPATIENT
Start: 2022-01-01 | End: 2022-01-01

## 2022-01-01 RX ORDER — OXYCODONE AND ACETAMINOPHEN 5; 325 MG/1; MG/1
2 TABLET ORAL
Qty: 0 | Refills: 0 | DISCHARGE
Start: 2022-01-01

## 2022-01-01 RX ORDER — MULTIVIT-MIN/FERROUS GLUCONATE 9 MG/15 ML
1 LIQUID (ML) ORAL DAILY
Refills: 0 | Status: DISCONTINUED | OUTPATIENT
Start: 2022-01-01 | End: 2022-01-01

## 2022-01-01 RX ORDER — LOPERAMIDE HCL 2 MG
2 TABLET ORAL DAILY
Refills: 0 | Status: DISCONTINUED | OUTPATIENT
Start: 2022-01-01 | End: 2022-01-01

## 2022-01-01 RX ORDER — ALPRAZOLAM 0.25 MG
1 TABLET ORAL
Qty: 0 | Refills: 0 | DISCHARGE

## 2022-01-01 RX ORDER — PANTOPRAZOLE SODIUM 20 MG/1
40 TABLET, DELAYED RELEASE ORAL ONCE
Refills: 0 | Status: COMPLETED | OUTPATIENT
Start: 2022-01-01 | End: 2022-01-01

## 2022-01-01 RX ORDER — THIAMINE MONONITRATE (VIT B1) 100 MG
100 TABLET ORAL DAILY
Refills: 0 | Status: COMPLETED | OUTPATIENT
Start: 2022-01-01 | End: 2022-01-01

## 2022-01-01 RX ORDER — VANCOMYCIN HCL 1 G
1000 VIAL (EA) INTRAVENOUS EVERY 12 HOURS
Refills: 0 | Status: DISCONTINUED | OUTPATIENT
Start: 2022-01-01 | End: 2022-01-01

## 2022-01-01 RX ORDER — ROSUVASTATIN CALCIUM 5 MG/1
1 TABLET ORAL
Qty: 0 | Refills: 0 | DISCHARGE

## 2022-01-01 RX ORDER — LOPERAMIDE HCL 2 MG
1 TABLET ORAL
Qty: 0 | Refills: 0 | DISCHARGE
Start: 2022-01-01

## 2022-01-01 RX ORDER — ATROPINE SULFATE 0.1 MG/ML
0.5 SYRINGE (ML) INJECTION ONCE
Refills: 0 | Status: DISCONTINUED | OUTPATIENT
Start: 2022-01-01 | End: 2022-01-01

## 2022-01-01 RX ORDER — OXYCODONE HYDROCHLORIDE 5 MG/1
10 TABLET ORAL ONCE
Refills: 0 | Status: DISCONTINUED | OUTPATIENT
Start: 2022-01-01 | End: 2022-01-01

## 2022-01-01 RX ORDER — CEFEPIME 1 G/1
2000 INJECTION, POWDER, FOR SOLUTION INTRAMUSCULAR; INTRAVENOUS EVERY 12 HOURS
Refills: 0 | Status: DISCONTINUED | OUTPATIENT
Start: 2022-01-01 | End: 2022-01-01

## 2022-01-01 RX ORDER — LEVOTHYROXINE SODIUM 125 MCG
25 TABLET ORAL DAILY
Refills: 0 | Status: DISCONTINUED | OUTPATIENT
Start: 2022-01-01 | End: 2022-01-01

## 2022-01-01 RX ORDER — CHLORHEXIDINE GLUCONATE 213 G/1000ML
15 SOLUTION TOPICAL EVERY 12 HOURS
Refills: 0 | Status: DISCONTINUED | OUTPATIENT
Start: 2022-01-01 | End: 2022-01-01

## 2022-01-01 RX ORDER — SODIUM CHLORIDE 9 MG/ML
1000 INJECTION, SOLUTION INTRAVENOUS ONCE
Refills: 0 | Status: COMPLETED | OUTPATIENT
Start: 2022-01-01 | End: 2022-01-01

## 2022-01-01 RX ORDER — DULOXETINE HYDROCHLORIDE 30 MG/1
1 CAPSULE, DELAYED RELEASE ORAL
Qty: 0 | Refills: 0 | DISCHARGE

## 2022-01-01 RX ORDER — POTASSIUM CHLORIDE 20 MEQ
20 PACKET (EA) ORAL
Refills: 0 | Status: DISCONTINUED | OUTPATIENT
Start: 2022-01-01 | End: 2022-01-01

## 2022-01-01 RX ORDER — CELECOXIB 200 MG/1
200 CAPSULE ORAL ONCE
Refills: 0 | Status: COMPLETED | OUTPATIENT
Start: 2022-01-01 | End: 2022-01-01

## 2022-01-01 RX ORDER — HYDROCORTISONE 20 MG
100 TABLET ORAL EVERY 8 HOURS
Refills: 0 | Status: DISCONTINUED | OUTPATIENT
Start: 2022-01-01 | End: 2022-01-01

## 2022-01-01 RX ORDER — ATORVASTATIN CALCIUM 80 MG/1
20 TABLET, FILM COATED ORAL AT BEDTIME
Refills: 0 | Status: DISCONTINUED | OUTPATIENT
Start: 2022-01-01 | End: 2022-01-01

## 2022-01-01 RX ORDER — ALPRAZOLAM 0.25 MG
TABLET ORAL
Refills: 0 | Status: DISCONTINUED | OUTPATIENT
Start: 2022-01-01 | End: 2022-01-01

## 2022-01-01 RX ORDER — MOXIFLOXACIN HYDROCHLORIDE TABLETS, 400 MG 400 MG/1
1 TABLET, FILM COATED ORAL
Qty: 20 | Refills: 0
Start: 2022-01-01 | End: 2022-01-01

## 2022-01-01 RX ORDER — MAGNESIUM SULFATE 500 MG/ML
4 VIAL (ML) INJECTION ONCE
Refills: 0 | Status: DISCONTINUED | OUTPATIENT
Start: 2022-01-01 | End: 2022-01-01

## 2022-01-01 RX ORDER — VANCOMYCIN HCL 1 G
VIAL (EA) INTRAVENOUS
Refills: 0 | Status: DISCONTINUED | OUTPATIENT
Start: 2022-01-01 | End: 2022-01-01

## 2022-01-01 RX ORDER — POTASSIUM PHOSPHATE, MONOBASIC POTASSIUM PHOSPHATE, DIBASIC 236; 224 MG/ML; MG/ML
15 INJECTION, SOLUTION INTRAVENOUS ONCE
Refills: 0 | Status: COMPLETED | OUTPATIENT
Start: 2022-01-01 | End: 2022-01-01

## 2022-01-01 RX ORDER — NOREPINEPHRINE BITARTRATE/D5W 8 MG/250ML
0.02 PLASTIC BAG, INJECTION (ML) INTRAVENOUS
Qty: 8 | Refills: 0 | Status: DISCONTINUED | OUTPATIENT
Start: 2022-01-01 | End: 2022-01-01

## 2022-01-01 RX ORDER — CHLORHEXIDINE GLUCONATE 213 G/1000ML
4 SOLUTION TOPICAL
Qty: 1 | Refills: 6 | Status: ACTIVE | COMMUNITY
Start: 2022-01-01 | End: 1900-01-01

## 2022-01-01 RX ORDER — SODIUM CHLORIDE 9 MG/ML
1850 INJECTION, SOLUTION INTRAVENOUS ONCE
Refills: 0 | Status: COMPLETED | OUTPATIENT
Start: 2022-01-01 | End: 2022-01-01

## 2022-01-01 RX ORDER — PANTOPRAZOLE SODIUM 20 MG/1
40 TABLET, DELAYED RELEASE ORAL DAILY
Refills: 0 | Status: DISCONTINUED | OUTPATIENT
Start: 2022-01-01 | End: 2022-01-01

## 2022-01-01 RX ORDER — MAGNESIUM SULFATE 500 MG/ML
2 VIAL (ML) INJECTION
Refills: 0 | Status: DISCONTINUED | OUTPATIENT
Start: 2022-01-01 | End: 2022-01-01

## 2022-01-01 RX ORDER — DAPTOMYCIN 500 MG/10ML
320 INJECTION, POWDER, LYOPHILIZED, FOR SOLUTION INTRAVENOUS EVERY 24 HOURS
Refills: 0 | Status: DISCONTINUED | OUTPATIENT
Start: 2022-01-01 | End: 2022-01-01

## 2022-01-01 RX ORDER — CHLORHEXIDINE GLUCONATE 213 G/1000ML
1 SOLUTION TOPICAL DAILY
Refills: 0 | Status: DISCONTINUED | OUTPATIENT
Start: 2022-01-01 | End: 2022-01-01

## 2022-01-01 RX ORDER — LEVOTHYROXINE SODIUM 125 MCG
50 TABLET ORAL DAILY
Refills: 0 | Status: DISCONTINUED | OUTPATIENT
Start: 2022-01-01 | End: 2022-01-01

## 2022-01-01 RX ORDER — LOPERAMIDE HCL 2 MG
2 TABLET ORAL ONCE
Refills: 0 | Status: DISCONTINUED | OUTPATIENT
Start: 2022-01-01 | End: 2022-01-01

## 2022-01-01 RX ORDER — NABUMETONE 750 MG
2 TABLET ORAL
Qty: 0 | Refills: 0 | DISCHARGE

## 2022-01-01 RX ORDER — PIPERACILLIN AND TAZOBACTAM 4; .5 G/20ML; G/20ML
3.38 INJECTION, POWDER, LYOPHILIZED, FOR SOLUTION INTRAVENOUS EVERY 8 HOURS
Refills: 0 | Status: COMPLETED | OUTPATIENT
Start: 2022-01-01 | End: 2022-01-01

## 2022-01-01 RX ORDER — CEPHALEXIN 500 MG
1 CAPSULE ORAL
Qty: 28 | Refills: 0
Start: 2022-01-01 | End: 2022-01-01

## 2022-01-01 RX ORDER — QUETIAPINE FUMARATE 200 MG/1
50 TABLET, FILM COATED ORAL EVERY 12 HOURS
Refills: 0 | Status: DISCONTINUED | OUTPATIENT
Start: 2022-01-01 | End: 2022-01-01

## 2022-01-01 RX ORDER — DILTIAZEM HCL 120 MG
1 CAPSULE, EXT RELEASE 24 HR ORAL
Qty: 0 | Refills: 0 | DISCHARGE

## 2022-01-01 RX ORDER — GABAPENTIN 400 MG/1
600 CAPSULE ORAL THREE TIMES A DAY
Refills: 0 | Status: DISCONTINUED | OUTPATIENT
Start: 2022-01-01 | End: 2022-01-01

## 2022-01-01 RX ORDER — CALCIUM CARBONATE 500(1250)
1 TABLET ORAL
Refills: 0 | Status: DISCONTINUED | OUTPATIENT
Start: 2022-01-01 | End: 2022-01-01

## 2022-01-01 RX ORDER — MEROPENEM 1 G/30ML
1000 INJECTION INTRAVENOUS ONCE
Refills: 0 | Status: COMPLETED | OUTPATIENT
Start: 2022-01-01 | End: 2022-01-01

## 2022-01-01 RX ORDER — ATROPINE SULFATE 0.1 MG/ML
0.5 SYRINGE (ML) INJECTION ONCE
Refills: 0 | Status: COMPLETED | OUTPATIENT
Start: 2022-01-01 | End: 2022-01-01

## 2022-01-01 RX ORDER — DEXTROSE 10 % IN WATER 10 %
250 INTRAVENOUS SOLUTION INTRAVENOUS ONCE
Refills: 0 | Status: DISCONTINUED | OUTPATIENT
Start: 2022-01-01 | End: 2022-01-01

## 2022-01-01 RX ORDER — NIFEDIPINE 30 MG
1 TABLET, EXTENDED RELEASE 24 HR ORAL
Qty: 0 | Refills: 0 | DISCHARGE

## 2022-01-01 RX ORDER — CILOSTAZOL 100 MG/1
1 TABLET ORAL
Qty: 0 | Refills: 0 | DISCHARGE

## 2022-01-01 RX ORDER — ALPRAZOLAM 0.25 MG
0.25 TABLET ORAL
Refills: 0 | Status: DISCONTINUED | OUTPATIENT
Start: 2022-01-01 | End: 2022-01-01

## 2022-01-01 RX ORDER — VANCOMYCIN HCL 1 G
900 VIAL (EA) INTRAVENOUS EVERY 12 HOURS
Refills: 0 | Status: DISCONTINUED | OUTPATIENT
Start: 2022-01-01 | End: 2022-01-01

## 2022-01-01 RX ORDER — VANCOMYCIN HCL 1 G
750 VIAL (EA) INTRAVENOUS EVERY 12 HOURS
Refills: 0 | Status: DISCONTINUED | OUTPATIENT
Start: 2022-01-01 | End: 2022-01-01

## 2022-01-01 RX ORDER — KETAMINE HYDROCHLORIDE 100 MG/ML
50 INJECTION INTRAMUSCULAR; INTRAVENOUS ONCE
Refills: 0 | Status: DISCONTINUED | OUTPATIENT
Start: 2022-01-01 | End: 2022-01-01

## 2022-01-01 RX ORDER — GABAPENTIN 400 MG/1
300 CAPSULE ORAL ONCE
Refills: 0 | Status: COMPLETED | OUTPATIENT
Start: 2022-01-01 | End: 2022-01-01

## 2022-01-01 RX ORDER — POLYETHYLENE GLYCOL 3350 17 G/17G
17 POWDER, FOR SOLUTION ORAL DAILY
Refills: 0 | Status: DISCONTINUED | OUTPATIENT
Start: 2022-01-01 | End: 2022-01-01

## 2022-01-01 RX ORDER — SENNA PLUS 8.6 MG/1
2 TABLET ORAL AT BEDTIME
Refills: 0 | Status: DISCONTINUED | OUTPATIENT
Start: 2022-01-01 | End: 2022-01-01

## 2022-01-01 RX ORDER — LEVOTHYROXINE SODIUM 125 MCG
1 TABLET ORAL
Qty: 30 | Refills: 0
Start: 2022-01-01 | End: 2023-01-01

## 2022-01-01 RX ORDER — MORPHINE SULFATE 50 MG/1
4 CAPSULE, EXTENDED RELEASE ORAL ONCE
Refills: 0 | Status: DISCONTINUED | OUTPATIENT
Start: 2022-01-01 | End: 2022-01-01

## 2022-01-01 RX ORDER — ALPRAZOLAM 0.25 MG
0.25 TABLET ORAL ONCE
Refills: 0 | Status: DISCONTINUED | OUTPATIENT
Start: 2022-01-01 | End: 2022-01-01

## 2022-01-01 RX ORDER — POTASSIUM CHLORIDE 20 MEQ
40 PACKET (EA) ORAL ONCE
Refills: 0 | Status: COMPLETED | OUTPATIENT
Start: 2022-01-01 | End: 2022-01-01

## 2022-01-01 RX ORDER — ENOXAPARIN SODIUM 100 MG/ML
30 INJECTION SUBCUTANEOUS EVERY 24 HOURS
Refills: 0 | Status: DISCONTINUED | OUTPATIENT
Start: 2022-01-01 | End: 2022-01-01

## 2022-01-01 RX ORDER — SILVER SULFADIAZINE 10 MG/G
1 CREAM TOPICAL TWICE DAILY
Qty: 1 | Refills: 1 | Status: ACTIVE | COMMUNITY
Start: 2022-01-01 | End: 1900-01-01

## 2022-01-01 RX ORDER — FENTANYL CITRATE 50 UG/ML
0.5 INJECTION INTRAVENOUS
Qty: 2500 | Refills: 0 | Status: DISCONTINUED | OUTPATIENT
Start: 2022-01-01 | End: 2022-01-01

## 2022-01-01 RX ORDER — HYDROCORTISONE 20 MG
50 TABLET ORAL
Refills: 0 | Status: DISCONTINUED | OUTPATIENT
Start: 2022-01-01 | End: 2022-01-01

## 2022-01-01 RX ORDER — ACETAMINOPHEN 500 MG
1000 TABLET ORAL ONCE
Refills: 0 | Status: COMPLETED | OUTPATIENT
Start: 2022-01-01 | End: 2022-01-01

## 2022-01-01 RX ORDER — PHENYLEPHRINE HYDROCHLORIDE 10 MG/ML
300 INJECTION INTRAVENOUS ONCE
Refills: 0 | Status: COMPLETED | OUTPATIENT
Start: 2022-01-01 | End: 2022-01-01

## 2022-01-01 RX ORDER — MAGNESIUM OXIDE 400 MG ORAL TABLET 241.3 MG
1 TABLET ORAL
Qty: 30 | Refills: 0
Start: 2022-01-01 | End: 2023-01-01

## 2022-01-01 RX ORDER — HYDROCORTISONE 20 MG
100 TABLET ORAL
Refills: 0 | Status: DISCONTINUED | OUTPATIENT
Start: 2022-01-01 | End: 2022-01-01

## 2022-01-01 RX ORDER — SILVER SULFADIAZINE 10 MG/G
1 CREAM TOPICAL TWICE DAILY
Qty: 1 | Refills: 0 | Status: ACTIVE | COMMUNITY
Start: 2022-01-01 | End: 1900-01-01

## 2022-01-01 RX ORDER — DEXTROSE 50 % IN WATER 50 %
100 SYRINGE (ML) INTRAVENOUS ONCE
Refills: 0 | Status: COMPLETED | OUTPATIENT
Start: 2022-01-01 | End: 2022-01-01

## 2022-01-01 RX ADMIN — Medication 25 GRAM(S): at 05:27

## 2022-01-01 RX ADMIN — Medication 20 MILLIEQUIVALENT(S): at 19:05

## 2022-01-01 RX ADMIN — PIPERACILLIN AND TAZOBACTAM 25 GRAM(S): 4; .5 INJECTION, POWDER, LYOPHILIZED, FOR SOLUTION INTRAVENOUS at 16:49

## 2022-01-01 RX ADMIN — Medication 1 TABLET(S): at 11:24

## 2022-01-01 RX ADMIN — Medication 1 TABLET(S): at 12:07

## 2022-01-01 RX ADMIN — Medication 1 APPLICATION(S): at 05:28

## 2022-01-01 RX ADMIN — Medication 0.25 MILLIGRAM(S): at 05:27

## 2022-01-01 RX ADMIN — Medication 100 MILLIGRAM(S): at 23:47

## 2022-01-01 RX ADMIN — PIPERACILLIN AND TAZOBACTAM 25 GRAM(S): 4; .5 INJECTION, POWDER, LYOPHILIZED, FOR SOLUTION INTRAVENOUS at 05:30

## 2022-01-01 RX ADMIN — CHLORHEXIDINE GLUCONATE 1 APPLICATION(S): 213 SOLUTION TOPICAL at 13:54

## 2022-01-01 RX ADMIN — Medication 50 MILLIEQUIVALENT(S): at 11:30

## 2022-01-01 RX ADMIN — Medication 650 MILLIGRAM(S): at 23:16

## 2022-01-01 RX ADMIN — CHLORHEXIDINE GLUCONATE 15 MILLILITER(S): 213 SOLUTION TOPICAL at 05:19

## 2022-01-01 RX ADMIN — PIPERACILLIN AND TAZOBACTAM 25 GRAM(S): 4; .5 INJECTION, POWDER, LYOPHILIZED, FOR SOLUTION INTRAVENOUS at 21:28

## 2022-01-01 RX ADMIN — SODIUM CHLORIDE 50 MILLILITER(S): 9 INJECTION, SOLUTION INTRAVENOUS at 04:28

## 2022-01-01 RX ADMIN — Medication 1 TABLET(S): at 11:48

## 2022-01-01 RX ADMIN — Medication 1.88 MICROGRAM(S)/KG/MIN: at 05:04

## 2022-01-01 RX ADMIN — CHLORHEXIDINE GLUCONATE 1 APPLICATION(S): 213 SOLUTION TOPICAL at 12:08

## 2022-01-01 RX ADMIN — PHENYLEPHRINE HYDROCHLORIDE 300 MICROGRAM(S): 10 INJECTION INTRAVENOUS at 04:44

## 2022-01-01 RX ADMIN — Medication 1 APPLICATION(S): at 19:05

## 2022-01-01 RX ADMIN — Medication 100 MILLIGRAM(S): at 13:44

## 2022-01-01 RX ADMIN — Medication 20 MILLIEQUIVALENT(S): at 19:12

## 2022-01-01 RX ADMIN — Medication 25 GRAM(S): at 10:52

## 2022-01-01 RX ADMIN — Medication 25 GRAM(S): at 10:08

## 2022-01-01 RX ADMIN — SODIUM CHLORIDE 50 MILLILITER(S): 9 INJECTION, SOLUTION INTRAVENOUS at 22:44

## 2022-01-01 RX ADMIN — Medication 50 MICROGRAM(S): at 05:25

## 2022-01-01 RX ADMIN — Medication 50 MILLIEQUIVALENT(S): at 11:43

## 2022-01-01 RX ADMIN — CHLORHEXIDINE GLUCONATE 15 MILLILITER(S): 213 SOLUTION TOPICAL at 17:59

## 2022-01-01 RX ADMIN — PIPERACILLIN AND TAZOBACTAM 25 GRAM(S): 4; .5 INJECTION, POWDER, LYOPHILIZED, FOR SOLUTION INTRAVENOUS at 14:02

## 2022-01-01 RX ADMIN — SERTRALINE 50 MILLIGRAM(S): 25 TABLET, FILM COATED ORAL at 12:22

## 2022-01-01 RX ADMIN — SODIUM CHLORIDE 1000 MILLILITER(S): 9 INJECTION, SOLUTION INTRAVENOUS at 12:06

## 2022-01-01 RX ADMIN — Medication 25 GRAM(S): at 14:18

## 2022-01-01 RX ADMIN — DEXMEDETOMIDINE HYDROCHLORIDE IN 0.9% SODIUM CHLORIDE 0.5 MICROGRAM(S)/KG/HR: 4 INJECTION INTRAVENOUS at 14:38

## 2022-01-01 RX ADMIN — Medication 250 MILLIGRAM(S): at 05:06

## 2022-01-01 RX ADMIN — PIPERACILLIN AND TAZOBACTAM 25 GRAM(S): 4; .5 INJECTION, POWDER, LYOPHILIZED, FOR SOLUTION INTRAVENOUS at 13:44

## 2022-01-01 RX ADMIN — Medication 100 MILLIGRAM(S): at 05:09

## 2022-01-01 RX ADMIN — Medication 1.88 MICROGRAM(S)/KG/MIN: at 11:53

## 2022-01-01 RX ADMIN — PIPERACILLIN AND TAZOBACTAM 25 GRAM(S): 4; .5 INJECTION, POWDER, LYOPHILIZED, FOR SOLUTION INTRAVENOUS at 05:50

## 2022-01-01 RX ADMIN — Medication 20 MILLIEQUIVALENT(S): at 05:32

## 2022-01-01 RX ADMIN — SODIUM CHLORIDE 50 MILLILITER(S): 9 INJECTION INTRAMUSCULAR; INTRAVENOUS; SUBCUTANEOUS at 12:02

## 2022-01-01 RX ADMIN — Medication 1 APPLICATION(S): at 06:22

## 2022-01-01 RX ADMIN — Medication 50 MILLILITER(S): at 20:09

## 2022-01-01 RX ADMIN — Medication 400 MICROGRAM(S): at 01:25

## 2022-01-01 RX ADMIN — Medication 650 MILLIGRAM(S): at 00:16

## 2022-01-01 RX ADMIN — GABAPENTIN 600 MILLIGRAM(S): 400 CAPSULE ORAL at 05:09

## 2022-01-01 RX ADMIN — ENOXAPARIN SODIUM 40 MILLIGRAM(S): 100 INJECTION SUBCUTANEOUS at 00:30

## 2022-01-01 RX ADMIN — Medication 1 TABLET(S): at 11:21

## 2022-01-01 RX ADMIN — Medication 100 MILLIGRAM(S): at 17:58

## 2022-01-01 RX ADMIN — CHLORHEXIDINE GLUCONATE 1 APPLICATION(S): 213 SOLUTION TOPICAL at 05:20

## 2022-01-01 RX ADMIN — Medication 1 APPLICATION(S): at 18:08

## 2022-01-01 RX ADMIN — ENOXAPARIN SODIUM 30 MILLIGRAM(S): 100 INJECTION SUBCUTANEOUS at 12:22

## 2022-01-01 RX ADMIN — Medication 5 MILLIGRAM(S): at 22:02

## 2022-01-01 RX ADMIN — Medication 1 APPLICATION(S): at 05:32

## 2022-01-01 RX ADMIN — Medication 1 APPLICATION(S): at 17:43

## 2022-01-01 RX ADMIN — Medication 100 MILLIGRAM(S): at 23:22

## 2022-01-01 RX ADMIN — Medication 0.25 MILLIGRAM(S): at 17:30

## 2022-01-01 RX ADMIN — Medication 20 MILLIEQUIVALENT(S): at 05:15

## 2022-01-01 RX ADMIN — Medication 5 MILLIGRAM(S): at 21:46

## 2022-01-01 RX ADMIN — Medication 100 MILLIGRAM(S): at 05:46

## 2022-01-01 RX ADMIN — Medication 50 MILLIEQUIVALENT(S): at 22:00

## 2022-01-01 RX ADMIN — ATORVASTATIN CALCIUM 20 MILLIGRAM(S): 80 TABLET, FILM COATED ORAL at 22:34

## 2022-01-01 RX ADMIN — SERTRALINE 50 MILLIGRAM(S): 25 TABLET, FILM COATED ORAL at 12:57

## 2022-01-01 RX ADMIN — CHLORHEXIDINE GLUCONATE 1 APPLICATION(S): 213 SOLUTION TOPICAL at 11:32

## 2022-01-01 RX ADMIN — CHLORHEXIDINE GLUCONATE 1 APPLICATION(S): 213 SOLUTION TOPICAL at 11:48

## 2022-01-01 RX ADMIN — CHLORHEXIDINE GLUCONATE 1 APPLICATION(S): 213 SOLUTION TOPICAL at 12:02

## 2022-01-01 RX ADMIN — Medication 25 GRAM(S): at 00:08

## 2022-01-01 RX ADMIN — Medication 50 MILLIEQUIVALENT(S): at 09:38

## 2022-01-01 RX ADMIN — Medication 650 MILLIGRAM(S): at 21:40

## 2022-01-01 RX ADMIN — Medication 5 MILLIGRAM(S): at 21:11

## 2022-01-01 RX ADMIN — Medication 100 MILLIGRAM(S): at 05:52

## 2022-01-01 RX ADMIN — PIPERACILLIN AND TAZOBACTAM 3.38 GRAM(S): 4; .5 INJECTION, POWDER, LYOPHILIZED, FOR SOLUTION INTRAVENOUS at 12:06

## 2022-01-01 RX ADMIN — POLYETHYLENE GLYCOL 3350 17 GRAM(S): 17 POWDER, FOR SOLUTION ORAL at 12:03

## 2022-01-01 RX ADMIN — Medication 1.88 MICROGRAM(S)/KG/MIN: at 05:12

## 2022-01-01 RX ADMIN — PIPERACILLIN AND TAZOBACTAM 25 GRAM(S): 4; .5 INJECTION, POWDER, LYOPHILIZED, FOR SOLUTION INTRAVENOUS at 21:12

## 2022-01-01 RX ADMIN — POLYETHYLENE GLYCOL 3350 17 GRAM(S): 17 POWDER, FOR SOLUTION ORAL at 11:55

## 2022-01-01 RX ADMIN — Medication 1 APPLICATION(S): at 06:23

## 2022-01-01 RX ADMIN — CHLORHEXIDINE GLUCONATE 1 APPLICATION(S): 213 SOLUTION TOPICAL at 11:54

## 2022-01-01 RX ADMIN — Medication 1 TABLET(S): at 12:13

## 2022-01-01 RX ADMIN — SERTRALINE 50 MILLIGRAM(S): 25 TABLET, FILM COATED ORAL at 12:13

## 2022-01-01 RX ADMIN — Medication 100 MILLIGRAM(S): at 05:18

## 2022-01-01 RX ADMIN — Medication 0.25 MILLIGRAM(S): at 18:07

## 2022-01-01 RX ADMIN — Medication 150 GRAM(S): at 16:35

## 2022-01-01 RX ADMIN — FENTANYL CITRATE 2 MICROGRAM(S)/KG/HR: 50 INJECTION INTRAVENOUS at 18:48

## 2022-01-01 RX ADMIN — MORPHINE SULFATE 2 MILLIGRAM(S): 50 CAPSULE, EXTENDED RELEASE ORAL at 21:17

## 2022-01-01 RX ADMIN — PIPERACILLIN AND TAZOBACTAM 25 GRAM(S): 4; .5 INJECTION, POWDER, LYOPHILIZED, FOR SOLUTION INTRAVENOUS at 05:58

## 2022-01-01 RX ADMIN — Medication 1 APPLICATION(S): at 17:51

## 2022-01-01 RX ADMIN — Medication 1 APPLICATION(S): at 19:26

## 2022-01-01 RX ADMIN — MORPHINE SULFATE 2 MILLIGRAM(S): 50 CAPSULE, EXTENDED RELEASE ORAL at 18:09

## 2022-01-01 RX ADMIN — Medication 1 TABLET(S): at 13:47

## 2022-01-01 RX ADMIN — Medication 100 MILLIGRAM(S): at 14:02

## 2022-01-01 RX ADMIN — SODIUM CHLORIDE 75 MILLILITER(S): 9 INJECTION, SOLUTION INTRAVENOUS at 04:54

## 2022-01-01 RX ADMIN — Medication 1 TABLET(S): at 12:00

## 2022-01-01 RX ADMIN — Medication 1 APPLICATION(S): at 17:39

## 2022-01-01 RX ADMIN — Medication 50 MILLIEQUIVALENT(S): at 15:12

## 2022-01-01 RX ADMIN — SERTRALINE 50 MILLIGRAM(S): 25 TABLET, FILM COATED ORAL at 11:33

## 2022-01-01 RX ADMIN — SERTRALINE 50 MILLIGRAM(S): 25 TABLET, FILM COATED ORAL at 12:07

## 2022-01-01 RX ADMIN — SODIUM CHLORIDE 50 MILLILITER(S): 9 INJECTION, SOLUTION INTRAVENOUS at 03:08

## 2022-01-01 RX ADMIN — Medication 1.88 MICROGRAM(S)/KG/MIN: at 18:49

## 2022-01-01 RX ADMIN — SODIUM CHLORIDE 50 MILLILITER(S): 9 INJECTION, SOLUTION INTRAVENOUS at 21:40

## 2022-01-01 RX ADMIN — PIPERACILLIN AND TAZOBACTAM 25 GRAM(S): 4; .5 INJECTION, POWDER, LYOPHILIZED, FOR SOLUTION INTRAVENOUS at 12:07

## 2022-01-01 RX ADMIN — CHLORHEXIDINE GLUCONATE 1 APPLICATION(S): 213 SOLUTION TOPICAL at 11:21

## 2022-01-01 RX ADMIN — PIPERACILLIN AND TAZOBACTAM 25 GRAM(S): 4; .5 INJECTION, POWDER, LYOPHILIZED, FOR SOLUTION INTRAVENOUS at 21:15

## 2022-01-01 RX ADMIN — Medication 1 APPLICATION(S): at 18:10

## 2022-01-01 RX ADMIN — PIPERACILLIN AND TAZOBACTAM 25 GRAM(S): 4; .5 INJECTION, POWDER, LYOPHILIZED, FOR SOLUTION INTRAVENOUS at 05:08

## 2022-01-01 RX ADMIN — Medication 50 MILLIGRAM(S): at 17:32

## 2022-01-01 RX ADMIN — MORPHINE SULFATE 2 MILLIGRAM(S): 50 CAPSULE, EXTENDED RELEASE ORAL at 21:14

## 2022-01-01 RX ADMIN — HYDROMORPHONE HYDROCHLORIDE 0.5 MILLIGRAM(S): 2 INJECTION INTRAMUSCULAR; INTRAVENOUS; SUBCUTANEOUS at 09:32

## 2022-01-01 RX ADMIN — Medication 1 APPLICATION(S): at 19:08

## 2022-01-01 RX ADMIN — Medication 1 APPLICATION(S): at 18:35

## 2022-01-01 RX ADMIN — Medication 5 MILLIGRAM(S): at 21:28

## 2022-01-01 RX ADMIN — CHLORHEXIDINE GLUCONATE 1 APPLICATION(S): 213 SOLUTION TOPICAL at 05:45

## 2022-01-01 RX ADMIN — Medication 1 TABLET(S): at 17:58

## 2022-01-01 RX ADMIN — ENOXAPARIN SODIUM 30 MILLIGRAM(S): 100 INJECTION SUBCUTANEOUS at 12:38

## 2022-01-01 RX ADMIN — MIDAZOLAM HYDROCHLORIDE 2 MILLIGRAM(S): 1 INJECTION, SOLUTION INTRAMUSCULAR; INTRAVENOUS at 04:15

## 2022-01-01 RX ADMIN — Medication 1 TABLET(S): at 12:02

## 2022-01-01 RX ADMIN — Medication 1 TABLET(S): at 12:57

## 2022-01-01 RX ADMIN — PIPERACILLIN AND TAZOBACTAM 25 GRAM(S): 4; .5 INJECTION, POWDER, LYOPHILIZED, FOR SOLUTION INTRAVENOUS at 11:10

## 2022-01-01 RX ADMIN — Medication 1 TABLET(S): at 12:23

## 2022-01-01 RX ADMIN — Medication 25 MICROGRAM(S): at 06:13

## 2022-01-01 RX ADMIN — ENOXAPARIN SODIUM 30 MILLIGRAM(S): 100 INJECTION SUBCUTANEOUS at 13:39

## 2022-01-01 RX ADMIN — CHLORHEXIDINE GLUCONATE 1 APPLICATION(S): 213 SOLUTION TOPICAL at 05:26

## 2022-01-01 RX ADMIN — ENOXAPARIN SODIUM 30 MILLIGRAM(S): 100 INJECTION SUBCUTANEOUS at 12:57

## 2022-01-01 RX ADMIN — FENTANYL CITRATE 2 MICROGRAM(S)/KG/HR: 50 INJECTION INTRAVENOUS at 18:49

## 2022-01-01 RX ADMIN — Medication 1 APPLICATION(S): at 05:54

## 2022-01-01 RX ADMIN — Medication 50 MILLIEQUIVALENT(S): at 09:44

## 2022-01-01 RX ADMIN — OXYCODONE AND ACETAMINOPHEN 2 TABLET(S): 5; 325 TABLET ORAL at 13:25

## 2022-01-01 RX ADMIN — Medication 0.25 MILLIGRAM(S): at 05:57

## 2022-01-01 RX ADMIN — Medication 1 APPLICATION(S): at 05:20

## 2022-01-01 RX ADMIN — CHLORHEXIDINE GLUCONATE 1 APPLICATION(S): 213 SOLUTION TOPICAL at 05:08

## 2022-01-01 RX ADMIN — Medication 50 MILLILITER(S): at 06:12

## 2022-01-01 RX ADMIN — Medication 1 TABLET(S): at 12:03

## 2022-01-01 RX ADMIN — Medication 25 GRAM(S): at 18:15

## 2022-01-01 RX ADMIN — Medication 0.25 MILLIGRAM(S): at 05:48

## 2022-01-01 RX ADMIN — Medication 100 MILLIGRAM(S): at 05:50

## 2022-01-01 RX ADMIN — SODIUM CHLORIDE 50 MILLILITER(S): 9 INJECTION INTRAMUSCULAR; INTRAVENOUS; SUBCUTANEOUS at 06:07

## 2022-01-01 RX ADMIN — Medication 2 MILLIGRAM(S): at 22:02

## 2022-01-01 RX ADMIN — Medication 25 GRAM(S): at 13:10

## 2022-01-01 RX ADMIN — Medication 0.25 MILLIGRAM(S): at 12:01

## 2022-01-01 RX ADMIN — Medication 2 MILLIGRAM(S): at 14:14

## 2022-01-01 RX ADMIN — Medication 25 GRAM(S): at 11:47

## 2022-01-01 RX ADMIN — Medication 250 MILLIGRAM(S): at 17:59

## 2022-01-01 RX ADMIN — Medication 1 APPLICATION(S): at 06:05

## 2022-01-01 RX ADMIN — Medication 50 MILLILITER(S): at 16:18

## 2022-01-01 RX ADMIN — Medication 1 APPLICATION(S): at 05:41

## 2022-01-01 RX ADMIN — Medication 1 APPLICATION(S): at 21:02

## 2022-01-01 RX ADMIN — Medication 1 TABLET(S): at 12:35

## 2022-01-01 RX ADMIN — MEROPENEM 100 MILLIGRAM(S): 1 INJECTION INTRAVENOUS at 17:59

## 2022-01-01 RX ADMIN — Medication 100 MILLIGRAM(S): at 14:03

## 2022-01-01 RX ADMIN — Medication 50 MICROGRAM(S): at 05:32

## 2022-01-01 RX ADMIN — Medication 25 GRAM(S): at 12:15

## 2022-01-01 RX ADMIN — Medication 400 MICROGRAM(S): at 06:22

## 2022-01-01 RX ADMIN — Medication 0.25 MILLIGRAM(S): at 17:32

## 2022-01-01 RX ADMIN — Medication 5 MILLIGRAM(S): at 22:10

## 2022-01-01 RX ADMIN — Medication 25 MILLIGRAM(S): at 18:31

## 2022-01-01 RX ADMIN — PIPERACILLIN AND TAZOBACTAM 25 GRAM(S): 4; .5 INJECTION, POWDER, LYOPHILIZED, FOR SOLUTION INTRAVENOUS at 22:02

## 2022-01-01 RX ADMIN — Medication 100 MILLIGRAM(S): at 12:00

## 2022-01-01 RX ADMIN — Medication 25 MILLIGRAM(S): at 17:34

## 2022-01-01 RX ADMIN — Medication 25 MICROGRAM(S): at 05:31

## 2022-01-01 RX ADMIN — Medication 5 MILLIGRAM(S): at 22:35

## 2022-01-01 RX ADMIN — Medication 100 MILLIGRAM(S): at 22:03

## 2022-01-01 RX ADMIN — Medication 25 GRAM(S): at 21:35

## 2022-01-01 RX ADMIN — Medication 100 MILLIGRAM(S): at 21:08

## 2022-01-01 RX ADMIN — Medication 100 MILLIGRAM(S): at 05:31

## 2022-01-01 RX ADMIN — Medication 100 MILLIGRAM(S): at 18:08

## 2022-01-01 RX ADMIN — Medication 1 APPLICATION(S): at 05:09

## 2022-01-01 RX ADMIN — Medication 1 TABLET(S): at 12:12

## 2022-01-01 RX ADMIN — Medication 5 MILLIGRAM(S): at 21:13

## 2022-01-01 RX ADMIN — PIPERACILLIN AND TAZOBACTAM 25 GRAM(S): 4; .5 INJECTION, POWDER, LYOPHILIZED, FOR SOLUTION INTRAVENOUS at 05:09

## 2022-01-01 RX ADMIN — CHLORHEXIDINE GLUCONATE 1 APPLICATION(S): 213 SOLUTION TOPICAL at 07:55

## 2022-01-01 RX ADMIN — PIPERACILLIN AND TAZOBACTAM 25 GRAM(S): 4; .5 INJECTION, POWDER, LYOPHILIZED, FOR SOLUTION INTRAVENOUS at 13:09

## 2022-01-01 RX ADMIN — Medication 1 TABLET(S): at 11:12

## 2022-01-01 RX ADMIN — Medication 1 APPLICATION(S): at 05:25

## 2022-01-01 RX ADMIN — PIPERACILLIN AND TAZOBACTAM 25 GRAM(S): 4; .5 INJECTION, POWDER, LYOPHILIZED, FOR SOLUTION INTRAVENOUS at 06:23

## 2022-01-01 RX ADMIN — Medication 0.25 MILLIGRAM(S): at 05:33

## 2022-01-01 RX ADMIN — SODIUM CHLORIDE 50 MILLILITER(S): 9 INJECTION, SOLUTION INTRAVENOUS at 11:53

## 2022-01-01 RX ADMIN — MIDODRINE HYDROCHLORIDE 10 MILLIGRAM(S): 2.5 TABLET ORAL at 14:02

## 2022-01-01 RX ADMIN — SERTRALINE 50 MILLIGRAM(S): 25 TABLET, FILM COATED ORAL at 12:00

## 2022-01-01 RX ADMIN — Medication 0.25 MILLIGRAM(S): at 06:41

## 2022-01-01 RX ADMIN — Medication 1 APPLICATION(S): at 06:06

## 2022-01-01 RX ADMIN — Medication 5 MILLIGRAM(S): at 21:20

## 2022-01-01 RX ADMIN — Medication 50 MILLILITER(S): at 00:47

## 2022-01-01 RX ADMIN — Medication 0.25 MILLIGRAM(S): at 07:01

## 2022-01-01 RX ADMIN — Medication 1 APPLICATION(S): at 05:12

## 2022-01-01 RX ADMIN — Medication 25 MILLIGRAM(S): at 06:10

## 2022-01-01 RX ADMIN — Medication 50 MILLILITER(S): at 19:18

## 2022-01-01 RX ADMIN — Medication 50 MILLIGRAM(S): at 06:11

## 2022-01-01 RX ADMIN — Medication 1 TABLET(S): at 11:30

## 2022-01-01 RX ADMIN — Medication 1000 MILLIGRAM(S): at 07:13

## 2022-01-01 RX ADMIN — ENOXAPARIN SODIUM 30 MILLIGRAM(S): 100 INJECTION SUBCUTANEOUS at 12:23

## 2022-01-01 RX ADMIN — PIPERACILLIN AND TAZOBACTAM 25 GRAM(S): 4; .5 INJECTION, POWDER, LYOPHILIZED, FOR SOLUTION INTRAVENOUS at 21:08

## 2022-01-01 RX ADMIN — Medication 50 MILLIEQUIVALENT(S): at 13:18

## 2022-01-01 RX ADMIN — SODIUM CHLORIDE 50 MILLILITER(S): 9 INJECTION, SOLUTION INTRAVENOUS at 08:51

## 2022-01-01 RX ADMIN — Medication 1 APPLICATION(S): at 18:22

## 2022-01-01 RX ADMIN — Medication 5 MILLIGRAM(S): at 21:48

## 2022-01-01 RX ADMIN — CHLORHEXIDINE GLUCONATE 1 APPLICATION(S): 213 SOLUTION TOPICAL at 12:32

## 2022-01-01 RX ADMIN — SERTRALINE 50 MILLIGRAM(S): 25 TABLET, FILM COATED ORAL at 11:12

## 2022-01-01 RX ADMIN — SERTRALINE 50 MILLIGRAM(S): 25 TABLET, FILM COATED ORAL at 13:14

## 2022-01-01 RX ADMIN — Medication 0.25 MILLIGRAM(S): at 05:15

## 2022-01-01 RX ADMIN — CHLORHEXIDINE GLUCONATE 1 APPLICATION(S): 213 SOLUTION TOPICAL at 12:07

## 2022-01-01 RX ADMIN — SODIUM CHLORIDE 1000 MILLILITER(S): 9 INJECTION, SOLUTION INTRAVENOUS at 11:34

## 2022-01-01 RX ADMIN — Medication 1 APPLICATION(S): at 05:31

## 2022-01-01 RX ADMIN — PIPERACILLIN AND TAZOBACTAM 200 GRAM(S): 4; .5 INJECTION, POWDER, LYOPHILIZED, FOR SOLUTION INTRAVENOUS at 06:21

## 2022-01-01 RX ADMIN — Medication 650 MILLIGRAM(S): at 21:42

## 2022-01-01 RX ADMIN — Medication 1 APPLICATION(S): at 05:21

## 2022-01-01 RX ADMIN — Medication 50 MILLIEQUIVALENT(S): at 11:14

## 2022-01-01 RX ADMIN — SERTRALINE 50 MILLIGRAM(S): 25 TABLET, FILM COATED ORAL at 12:14

## 2022-01-01 RX ADMIN — DAPTOMYCIN 112.8 MILLIGRAM(S): 500 INJECTION, POWDER, LYOPHILIZED, FOR SOLUTION INTRAVENOUS at 00:30

## 2022-01-01 RX ADMIN — Medication 2 MILLIGRAM(S): at 12:13

## 2022-01-01 RX ADMIN — CHLORHEXIDINE GLUCONATE 1 APPLICATION(S): 213 SOLUTION TOPICAL at 05:41

## 2022-01-01 RX ADMIN — Medication 20 MILLIEQUIVALENT(S): at 06:04

## 2022-01-01 RX ADMIN — Medication 50 MILLIGRAM(S): at 05:57

## 2022-01-01 RX ADMIN — Medication 1 APPLICATION(S): at 18:45

## 2022-01-01 RX ADMIN — CEFEPIME 100 MILLIGRAM(S): 1 INJECTION, POWDER, FOR SOLUTION INTRAMUSCULAR; INTRAVENOUS at 22:33

## 2022-01-01 RX ADMIN — Medication 40 MILLIEQUIVALENT(S): at 10:10

## 2022-01-01 RX ADMIN — Medication 25 MICROGRAM(S): at 06:20

## 2022-01-01 RX ADMIN — MORPHINE SULFATE 2 MILLIGRAM(S): 50 CAPSULE, EXTENDED RELEASE ORAL at 00:45

## 2022-01-01 RX ADMIN — Medication 0.25 MILLIGRAM(S): at 21:10

## 2022-01-01 RX ADMIN — SODIUM CHLORIDE 50 MILLILITER(S): 9 INJECTION INTRAMUSCULAR; INTRAVENOUS; SUBCUTANEOUS at 21:13

## 2022-01-01 RX ADMIN — CELECOXIB 200 MILLIGRAM(S): 200 CAPSULE ORAL at 07:13

## 2022-01-01 RX ADMIN — Medication 1 APPLICATION(S): at 05:44

## 2022-01-01 RX ADMIN — ENOXAPARIN SODIUM 30 MILLIGRAM(S): 100 INJECTION SUBCUTANEOUS at 14:19

## 2022-01-01 RX ADMIN — Medication 100 MILLIGRAM(S): at 11:54

## 2022-01-01 RX ADMIN — ENOXAPARIN SODIUM 30 MILLIGRAM(S): 100 INJECTION SUBCUTANEOUS at 11:24

## 2022-01-01 RX ADMIN — ENOXAPARIN SODIUM 30 MILLIGRAM(S): 100 INJECTION SUBCUTANEOUS at 12:59

## 2022-01-01 RX ADMIN — Medication 1 APPLICATION(S): at 07:02

## 2022-01-01 RX ADMIN — CHLORHEXIDINE GLUCONATE 1 APPLICATION(S): 213 SOLUTION TOPICAL at 12:22

## 2022-01-01 RX ADMIN — Medication 50 MILLIGRAM(S): at 04:44

## 2022-01-01 RX ADMIN — ENOXAPARIN SODIUM 40 MILLIGRAM(S): 100 INJECTION SUBCUTANEOUS at 18:48

## 2022-01-01 RX ADMIN — Medication 100 MILLIGRAM(S): at 13:12

## 2022-01-01 RX ADMIN — GABAPENTIN 600 MILLIGRAM(S): 400 CAPSULE ORAL at 13:11

## 2022-01-01 RX ADMIN — Medication 1 APPLICATION(S): at 05:36

## 2022-01-01 RX ADMIN — Medication 1 APPLICATION(S): at 17:27

## 2022-01-01 RX ADMIN — Medication 25 MICROGRAM(S): at 05:49

## 2022-01-01 RX ADMIN — Medication 25 GRAM(S): at 09:37

## 2022-01-01 RX ADMIN — PIPERACILLIN AND TAZOBACTAM 200 GRAM(S): 4; .5 INJECTION, POWDER, LYOPHILIZED, FOR SOLUTION INTRAVENOUS at 23:23

## 2022-01-01 RX ADMIN — Medication 1 APPLICATION(S): at 18:18

## 2022-01-01 RX ADMIN — SERTRALINE 50 MILLIGRAM(S): 25 TABLET, FILM COATED ORAL at 11:21

## 2022-01-01 RX ADMIN — Medication 1 TABLET(S): at 12:14

## 2022-01-01 RX ADMIN — Medication 25 MICROGRAM(S): at 05:46

## 2022-01-01 RX ADMIN — Medication 100 MILLIGRAM(S): at 05:19

## 2022-01-01 RX ADMIN — PIPERACILLIN AND TAZOBACTAM 25 GRAM(S): 4; .5 INJECTION, POWDER, LYOPHILIZED, FOR SOLUTION INTRAVENOUS at 05:18

## 2022-01-01 RX ADMIN — PANTOPRAZOLE SODIUM 40 MILLIGRAM(S): 20 TABLET, DELAYED RELEASE ORAL at 12:02

## 2022-01-01 RX ADMIN — Medication 50 MILLIEQUIVALENT(S): at 21:35

## 2022-01-01 RX ADMIN — Medication 50 MILLIEQUIVALENT(S): at 13:38

## 2022-01-01 RX ADMIN — Medication 100 MILLIGRAM(S): at 14:24

## 2022-01-01 RX ADMIN — OXYCODONE AND ACETAMINOPHEN 2 TABLET(S): 5; 325 TABLET ORAL at 19:11

## 2022-01-01 RX ADMIN — Medication 50 MICROGRAM(S): at 06:04

## 2022-01-01 RX ADMIN — Medication 1 APPLICATION(S): at 18:06

## 2022-01-01 RX ADMIN — Medication 650 MILLIGRAM(S): at 13:11

## 2022-01-01 RX ADMIN — Medication 50 MICROGRAM(S): at 05:06

## 2022-01-01 RX ADMIN — PIPERACILLIN AND TAZOBACTAM 25 GRAM(S): 4; .5 INJECTION, POWDER, LYOPHILIZED, FOR SOLUTION INTRAVENOUS at 21:50

## 2022-01-01 RX ADMIN — Medication 25 GRAM(S): at 17:51

## 2022-01-01 RX ADMIN — Medication 50 MILLIEQUIVALENT(S): at 05:20

## 2022-01-01 RX ADMIN — CHLORHEXIDINE GLUCONATE 1 APPLICATION(S): 213 SOLUTION TOPICAL at 12:00

## 2022-01-01 RX ADMIN — Medication 100 MILLIGRAM(S): at 21:16

## 2022-01-01 RX ADMIN — CHLORHEXIDINE GLUCONATE 1 APPLICATION(S): 213 SOLUTION TOPICAL at 05:31

## 2022-01-01 RX ADMIN — CHLORHEXIDINE GLUCONATE 1 APPLICATION(S): 213 SOLUTION TOPICAL at 11:46

## 2022-01-01 RX ADMIN — Medication 650 MILLIGRAM(S): at 21:15

## 2022-01-01 RX ADMIN — SODIUM CHLORIDE 1000 MILLILITER(S): 9 INJECTION, SOLUTION INTRAVENOUS at 16:50

## 2022-01-01 RX ADMIN — SODIUM CHLORIDE 50 MILLILITER(S): 9 INJECTION, SOLUTION INTRAVENOUS at 20:13

## 2022-01-01 RX ADMIN — FENTANYL CITRATE 2 MICROGRAM(S)/KG/HR: 50 INJECTION INTRAVENOUS at 05:12

## 2022-01-01 RX ADMIN — CHLORHEXIDINE GLUCONATE 15 MILLILITER(S): 213 SOLUTION TOPICAL at 17:24

## 2022-01-01 RX ADMIN — Medication 20 MILLIEQUIVALENT(S): at 06:20

## 2022-01-01 RX ADMIN — Medication 25 GRAM(S): at 07:00

## 2022-01-01 RX ADMIN — Medication 50 MILLILITER(S): at 04:44

## 2022-01-01 RX ADMIN — Medication 1.88 MICROGRAM(S)/KG/MIN: at 14:38

## 2022-01-01 RX ADMIN — PIPERACILLIN AND TAZOBACTAM 25 GRAM(S): 4; .5 INJECTION, POWDER, LYOPHILIZED, FOR SOLUTION INTRAVENOUS at 15:16

## 2022-01-01 RX ADMIN — CHLORHEXIDINE GLUCONATE 15 MILLILITER(S): 213 SOLUTION TOPICAL at 05:03

## 2022-01-01 RX ADMIN — ENOXAPARIN SODIUM 30 MILLIGRAM(S): 100 INJECTION SUBCUTANEOUS at 12:50

## 2022-01-01 RX ADMIN — CHLORHEXIDINE GLUCONATE 1 APPLICATION(S): 213 SOLUTION TOPICAL at 05:32

## 2022-01-01 RX ADMIN — ENOXAPARIN SODIUM 40 MILLIGRAM(S): 100 INJECTION SUBCUTANEOUS at 01:25

## 2022-01-01 RX ADMIN — Medication 20 MILLIGRAM(S): at 05:38

## 2022-01-01 RX ADMIN — Medication 0.25 MILLIGRAM(S): at 05:30

## 2022-01-01 RX ADMIN — Medication 1 TABLET(S): at 11:56

## 2022-01-01 RX ADMIN — Medication 0.5 MILLIGRAM(S): at 03:54

## 2022-01-01 RX ADMIN — Medication 1 TABLET(S): at 11:32

## 2022-01-01 RX ADMIN — CHLORHEXIDINE GLUCONATE 1 APPLICATION(S): 213 SOLUTION TOPICAL at 05:03

## 2022-01-01 RX ADMIN — Medication 100 MILLIGRAM(S): at 17:59

## 2022-01-01 RX ADMIN — CEFEPIME 100 MILLIGRAM(S): 1 INJECTION, POWDER, FOR SOLUTION INTRAMUSCULAR; INTRAVENOUS at 08:55

## 2022-01-01 RX ADMIN — ENOXAPARIN SODIUM 30 MILLIGRAM(S): 100 INJECTION SUBCUTANEOUS at 12:13

## 2022-01-01 RX ADMIN — Medication 100 MILLIGRAM(S): at 14:37

## 2022-01-01 RX ADMIN — CHLORHEXIDINE GLUCONATE 1 APPLICATION(S): 213 SOLUTION TOPICAL at 06:04

## 2022-01-01 RX ADMIN — CHLORHEXIDINE GLUCONATE 1 APPLICATION(S): 213 SOLUTION TOPICAL at 12:13

## 2022-01-01 RX ADMIN — Medication 0.25 MILLIGRAM(S): at 05:10

## 2022-01-01 RX ADMIN — SODIUM CHLORIDE 50 MILLILITER(S): 9 INJECTION, SOLUTION INTRAVENOUS at 12:12

## 2022-01-01 RX ADMIN — PIPERACILLIN AND TAZOBACTAM 25 GRAM(S): 4; .5 INJECTION, POWDER, LYOPHILIZED, FOR SOLUTION INTRAVENOUS at 22:57

## 2022-01-01 RX ADMIN — Medication 50 MILLILITER(S): at 13:09

## 2022-01-01 RX ADMIN — ENOXAPARIN SODIUM 30 MILLIGRAM(S): 100 INJECTION SUBCUTANEOUS at 13:12

## 2022-01-01 RX ADMIN — Medication 50 MICROGRAM(S): at 05:15

## 2022-01-01 RX ADMIN — Medication 25 MILLIGRAM(S): at 06:41

## 2022-01-01 RX ADMIN — Medication 100 MILLIGRAM(S): at 13:43

## 2022-01-01 RX ADMIN — DAPTOMYCIN 112.8 MILLIGRAM(S): 500 INJECTION, POWDER, LYOPHILIZED, FOR SOLUTION INTRAVENOUS at 02:04

## 2022-01-01 RX ADMIN — KETAMINE HYDROCHLORIDE 50 MILLIGRAM(S): 100 INJECTION INTRAMUSCULAR; INTRAVENOUS at 04:48

## 2022-01-01 RX ADMIN — Medication 5 MILLIGRAM(S): at 21:17

## 2022-01-01 RX ADMIN — Medication 25 GRAM(S): at 12:25

## 2022-01-01 RX ADMIN — Medication 0.25 MILLIGRAM(S): at 05:35

## 2022-01-01 RX ADMIN — PIPERACILLIN AND TAZOBACTAM 25 GRAM(S): 4; .5 INJECTION, POWDER, LYOPHILIZED, FOR SOLUTION INTRAVENOUS at 07:00

## 2022-01-01 RX ADMIN — PIPERACILLIN AND TAZOBACTAM 25 GRAM(S): 4; .5 INJECTION, POWDER, LYOPHILIZED, FOR SOLUTION INTRAVENOUS at 21:59

## 2022-01-01 RX ADMIN — Medication 1 APPLICATION(S): at 17:30

## 2022-01-01 RX ADMIN — SENNA PLUS 2 TABLET(S): 8.6 TABLET ORAL at 23:24

## 2022-01-01 RX ADMIN — Medication 2 MILLIGRAM(S): at 05:31

## 2022-01-01 RX ADMIN — PIPERACILLIN AND TAZOBACTAM 25 GRAM(S): 4; .5 INJECTION, POWDER, LYOPHILIZED, FOR SOLUTION INTRAVENOUS at 01:26

## 2022-01-01 RX ADMIN — Medication 25 MICROGRAM(S): at 09:44

## 2022-01-01 RX ADMIN — MORPHINE SULFATE 4 MILLIGRAM(S): 50 CAPSULE, EXTENDED RELEASE ORAL at 12:06

## 2022-01-01 RX ADMIN — Medication 20 MILLIEQUIVALENT(S): at 18:45

## 2022-01-01 RX ADMIN — Medication 1 APPLICATION(S): at 17:25

## 2022-01-01 RX ADMIN — Medication 25 GRAM(S): at 17:19

## 2022-01-01 RX ADMIN — PIPERACILLIN AND TAZOBACTAM 25 GRAM(S): 4; .5 INJECTION, POWDER, LYOPHILIZED, FOR SOLUTION INTRAVENOUS at 14:04

## 2022-01-01 RX ADMIN — Medication 100 MILLIGRAM(S): at 05:04

## 2022-01-01 RX ADMIN — SODIUM CHLORIDE 1850 MILLILITER(S): 9 INJECTION, SOLUTION INTRAVENOUS at 06:30

## 2022-01-01 RX ADMIN — Medication 20 MILLIGRAM(S): at 12:24

## 2022-01-01 RX ADMIN — Medication 100 MILLIGRAM(S): at 21:15

## 2022-01-01 RX ADMIN — SODIUM CHLORIDE 50 MILLILITER(S): 9 INJECTION, SOLUTION INTRAVENOUS at 21:21

## 2022-01-01 RX ADMIN — DAPTOMYCIN 112.8 MILLIGRAM(S): 500 INJECTION, POWDER, LYOPHILIZED, FOR SOLUTION INTRAVENOUS at 01:26

## 2022-01-01 RX ADMIN — Medication 1 TABLET(S): at 13:12

## 2022-01-01 RX ADMIN — PANTOPRAZOLE SODIUM 40 MILLIGRAM(S): 20 TABLET, DELAYED RELEASE ORAL at 11:55

## 2022-01-01 RX ADMIN — Medication 0.25 MILLIGRAM(S): at 05:44

## 2022-01-01 RX ADMIN — Medication 50 MICROGRAM(S): at 05:38

## 2022-01-01 RX ADMIN — PIPERACILLIN AND TAZOBACTAM 25 GRAM(S): 4; .5 INJECTION, POWDER, LYOPHILIZED, FOR SOLUTION INTRAVENOUS at 06:10

## 2022-01-01 RX ADMIN — Medication 1 APPLICATION(S): at 17:14

## 2022-01-01 RX ADMIN — SERTRALINE 50 MILLIGRAM(S): 25 TABLET, FILM COATED ORAL at 12:02

## 2022-01-01 RX ADMIN — SERTRALINE 50 MILLIGRAM(S): 25 TABLET, FILM COATED ORAL at 12:03

## 2022-01-01 RX ADMIN — Medication 1 TABLET(S): at 18:10

## 2022-01-01 RX ADMIN — DEXMEDETOMIDINE HYDROCHLORIDE IN 0.9% SODIUM CHLORIDE 0.5 MICROGRAM(S)/KG/HR: 4 INJECTION INTRAVENOUS at 20:13

## 2022-01-01 RX ADMIN — POTASSIUM PHOSPHATE, MONOBASIC POTASSIUM PHOSPHATE, DIBASIC 62.5 MILLIMOLE(S): 236; 224 INJECTION, SOLUTION INTRAVENOUS at 21:07

## 2022-01-01 RX ADMIN — Medication 0.5 MILLIGRAM(S): at 03:53

## 2022-01-01 RX ADMIN — Medication 50 MICROGRAM(S): at 05:31

## 2022-01-01 RX ADMIN — Medication 50 MILLIGRAM(S): at 17:16

## 2022-01-01 RX ADMIN — SODIUM CHLORIDE 50 MILLILITER(S): 9 INJECTION, SOLUTION INTRAVENOUS at 19:48

## 2022-01-01 RX ADMIN — Medication 25 GRAM(S): at 18:06

## 2022-01-01 RX ADMIN — PIPERACILLIN AND TAZOBACTAM 25 GRAM(S): 4; .5 INJECTION, POWDER, LYOPHILIZED, FOR SOLUTION INTRAVENOUS at 15:05

## 2022-01-01 RX ADMIN — Medication 20 MILLIEQUIVALENT(S): at 12:22

## 2022-01-01 RX ADMIN — Medication 1 APPLICATION(S): at 05:08

## 2022-01-01 RX ADMIN — Medication 50 MICROGRAM(S): at 07:01

## 2022-01-01 RX ADMIN — Medication 100 MILLIGRAM(S): at 21:59

## 2022-01-01 RX ADMIN — Medication 25 GRAM(S): at 15:36

## 2022-01-01 RX ADMIN — Medication 50 MICROGRAM(S): at 05:44

## 2022-01-01 RX ADMIN — MORPHINE SULFATE 2 MILLIGRAM(S): 50 CAPSULE, EXTENDED RELEASE ORAL at 00:15

## 2022-01-01 RX ADMIN — Medication 1 APPLICATION(S): at 05:49

## 2022-01-01 RX ADMIN — Medication 650 MILLIGRAM(S): at 00:20

## 2022-01-01 RX ADMIN — GABAPENTIN 600 MILLIGRAM(S): 400 CAPSULE ORAL at 22:34

## 2022-01-01 RX ADMIN — ENOXAPARIN SODIUM 30 MILLIGRAM(S): 100 INJECTION SUBCUTANEOUS at 13:13

## 2022-01-01 RX ADMIN — Medication 5 MILLIGRAM(S): at 00:08

## 2022-01-01 RX ADMIN — Medication 25 GRAM(S): at 12:03

## 2022-01-01 RX ADMIN — Medication 1 APPLICATION(S): at 17:29

## 2022-01-01 RX ADMIN — Medication 50 MILLILITER(S): at 09:59

## 2022-01-01 RX ADMIN — Medication 650 MILLIGRAM(S): at 20:12

## 2022-01-01 RX ADMIN — MORPHINE SULFATE 4 MILLIGRAM(S): 50 CAPSULE, EXTENDED RELEASE ORAL at 11:34

## 2022-01-01 RX ADMIN — SODIUM CHLORIDE 50 MILLILITER(S): 9 INJECTION, SOLUTION INTRAVENOUS at 10:42

## 2022-01-01 RX ADMIN — Medication 50 MILLILITER(S): at 13:00

## 2022-01-01 RX ADMIN — Medication 15 MILLILITER(S): at 11:13

## 2022-01-01 RX ADMIN — Medication 25 GRAM(S): at 20:14

## 2022-01-01 RX ADMIN — ENOXAPARIN SODIUM 30 MILLIGRAM(S): 100 INJECTION SUBCUTANEOUS at 14:25

## 2022-01-01 RX ADMIN — PIPERACILLIN AND TAZOBACTAM 25 GRAM(S): 4; .5 INJECTION, POWDER, LYOPHILIZED, FOR SOLUTION INTRAVENOUS at 14:37

## 2022-01-01 RX ADMIN — Medication 0.25 MILLIGRAM(S): at 06:11

## 2022-01-01 RX ADMIN — Medication 100 MILLIGRAM(S): at 05:03

## 2022-01-01 RX ADMIN — DEXMEDETOMIDINE HYDROCHLORIDE IN 0.9% SODIUM CHLORIDE 0.5 MICROGRAM(S)/KG/HR: 4 INJECTION INTRAVENOUS at 23:23

## 2022-01-01 RX ADMIN — Medication 25 GRAM(S): at 16:38

## 2022-01-01 RX ADMIN — Medication 250 MILLIGRAM(S): at 04:44

## 2022-01-01 RX ADMIN — CHLORHEXIDINE GLUCONATE 1 APPLICATION(S): 213 SOLUTION TOPICAL at 05:07

## 2022-01-01 RX ADMIN — Medication 1 APPLICATION(S): at 05:07

## 2022-01-01 RX ADMIN — Medication 25 GRAM(S): at 16:30

## 2022-01-01 RX ADMIN — PIPERACILLIN AND TAZOBACTAM 200 GRAM(S): 4; .5 INJECTION, POWDER, LYOPHILIZED, FOR SOLUTION INTRAVENOUS at 11:45

## 2022-01-01 RX ADMIN — Medication 20 MILLIEQUIVALENT(S): at 18:10

## 2022-01-01 RX ADMIN — Medication 1 TABLET(S): at 11:55

## 2022-01-01 RX ADMIN — Medication 1 APPLICATION(S): at 17:34

## 2022-01-01 RX ADMIN — PIPERACILLIN AND TAZOBACTAM 25 GRAM(S): 4; .5 INJECTION, POWDER, LYOPHILIZED, FOR SOLUTION INTRAVENOUS at 22:00

## 2022-01-01 RX ADMIN — Medication 650 MILLIGRAM(S): at 12:15

## 2022-01-01 RX ADMIN — DEXMEDETOMIDINE HYDROCHLORIDE IN 0.9% SODIUM CHLORIDE 0.5 MICROGRAM(S)/KG/HR: 4 INJECTION INTRAVENOUS at 11:53

## 2022-01-01 RX ADMIN — OXYCODONE AND ACETAMINOPHEN 2 TABLET(S): 5; 325 TABLET ORAL at 18:47

## 2022-01-01 RX ADMIN — Medication 0.25 MILLIGRAM(S): at 06:20

## 2022-01-01 RX ADMIN — Medication 1 APPLICATION(S): at 17:33

## 2022-01-01 RX ADMIN — CHLORHEXIDINE GLUCONATE 1 APPLICATION(S): 213 SOLUTION TOPICAL at 06:22

## 2022-01-01 RX ADMIN — MORPHINE SULFATE 2 MILLIGRAM(S): 50 CAPSULE, EXTENDED RELEASE ORAL at 11:58

## 2022-01-01 RX ADMIN — Medication 25 GRAM(S): at 14:00

## 2022-01-01 RX ADMIN — Medication 100 MILLIGRAM(S): at 17:31

## 2022-01-16 ENCOUNTER — EMERGENCY (EMERGENCY)
Facility: HOSPITAL | Age: 67
LOS: 0 days | Discharge: HOME | End: 2022-01-16
Attending: STUDENT IN AN ORGANIZED HEALTH CARE EDUCATION/TRAINING PROGRAM | Admitting: STUDENT IN AN ORGANIZED HEALTH CARE EDUCATION/TRAINING PROGRAM
Payer: COMMERCIAL

## 2022-01-16 VITALS
WEIGHT: 130.07 LBS | RESPIRATION RATE: 18 BRPM | HEART RATE: 75 BPM | OXYGEN SATURATION: 98 % | HEIGHT: 67 IN | TEMPERATURE: 98 F | SYSTOLIC BLOOD PRESSURE: 127 MMHG | DIASTOLIC BLOOD PRESSURE: 74 MMHG

## 2022-01-16 DIAGNOSIS — M19.90 UNSPECIFIED OSTEOARTHRITIS, UNSPECIFIED SITE: ICD-10-CM

## 2022-01-16 DIAGNOSIS — M54.2 CERVICALGIA: ICD-10-CM

## 2022-01-16 DIAGNOSIS — M54.40 LUMBAGO WITH SCIATICA, UNSPECIFIED SIDE: Chronic | ICD-10-CM

## 2022-01-16 DIAGNOSIS — E78.5 HYPERLIPIDEMIA, UNSPECIFIED: ICD-10-CM

## 2022-01-16 DIAGNOSIS — F41.9 ANXIETY DISORDER, UNSPECIFIED: ICD-10-CM

## 2022-01-16 DIAGNOSIS — F32.A DEPRESSION, UNSPECIFIED: ICD-10-CM

## 2022-01-16 DIAGNOSIS — Z20.822 CONTACT WITH AND (SUSPECTED) EXPOSURE TO COVID-19: ICD-10-CM

## 2022-01-16 DIAGNOSIS — M79.7 FIBROMYALGIA: ICD-10-CM

## 2022-01-16 DIAGNOSIS — M25.511 PAIN IN RIGHT SHOULDER: ICD-10-CM

## 2022-01-16 LAB — SARS-COV-2 RNA SPEC QL NAA+PROBE: SIGNIFICANT CHANGE UP

## 2022-01-16 PROCEDURE — 99284 EMERGENCY DEPT VISIT MOD MDM: CPT

## 2022-01-16 RX ORDER — DEXAMETHASONE 0.5 MG/5ML
10 ELIXIR ORAL ONCE
Refills: 0 | Status: COMPLETED | OUTPATIENT
Start: 2022-01-16 | End: 2022-01-16

## 2022-01-16 RX ORDER — KETOROLAC TROMETHAMINE 30 MG/ML
30 SYRINGE (ML) INJECTION ONCE
Refills: 0 | Status: COMPLETED | OUTPATIENT
Start: 2022-01-16 | End: 2022-01-16

## 2022-01-16 RX ORDER — DEXAMETHASONE 0.5 MG/5ML
10 ELIXIR ORAL ONCE
Refills: 0 | Status: DISCONTINUED | OUTPATIENT
Start: 2022-01-16 | End: 2022-01-16

## 2022-01-16 RX ORDER — METHOCARBAMOL 500 MG/1
1500 TABLET, FILM COATED ORAL ONCE
Refills: 0 | Status: COMPLETED | OUTPATIENT
Start: 2022-01-16 | End: 2022-01-16

## 2022-01-16 RX ORDER — DIAZEPAM 5 MG
5 TABLET ORAL ONCE
Refills: 0 | Status: COMPLETED | OUTPATIENT
Start: 2022-01-16 | End: 2022-01-16

## 2022-01-16 RX ADMIN — METHOCARBAMOL 1500 MILLIGRAM(S): 500 TABLET, FILM COATED ORAL at 12:14

## 2022-01-16 RX ADMIN — Medication 10 MILLIGRAM(S): at 12:14

## 2022-01-16 NOTE — ED PROVIDER NOTE - PROVIDER TOKENS
FREE:[LAST:[Your pain management doctor],PHONE:[(   )    -],FAX:[(   )    -],FOLLOWUP:[1-3 Days]],PROVIDER:[TOKEN:[[id number]],FOLLOWUP:[1-3 Days]],PROVIDER:[TOKEN:[[id number]],FOLLOWUP:[1-3 Days]]

## 2022-01-16 NOTE — ED PROVIDER NOTE - OBJECTIVE STATEMENT
66 yo F with pmhx of HLD, chronic back pain/neck pain/leg pain has neurostimulator, RA, OA, peripheral neuropathy, anxiety, depression presenting with right sided upper neck pain and shoulder pain ever since fall 1 year ago. Symptoms are moderate. Pain is worse with movement and better with rest. Pt was not able to get MRI because of neurostimulator and is awaiting approval for CT. No cp, sob, fever, chills, abdominal pain, nausea, vomiting, diarrhea, urinary symptoms, headache, dizziness, paresthesias, or weakness.

## 2022-01-16 NOTE — ED PROVIDER NOTE - PHYSICAL EXAMINATION
VITAL SIGNS: I have reviewed nursing notes and confirm.  CONSTITUTIONAL: Well-developed; well-nourished; in no acute distress.  SKIN: Skin exam is warm and dry, no acute rash.  HEAD: Normocephalic; atraumatic.  EYES: PERRL, EOM intact; conjunctiva and sclera clear.  ENT: No nasal discharge; airway clear.   NECK: Supple; +tenderness to right paraspinal of cervical region.   CARD: S1, S2 normal; no murmurs, gallops, or rubs. Regular rate and rhythm.  RESP: Clear to auscultation bilaterally. No wheezes, rales or rhonchi.  ABD: Normal bowel sounds; soft; non-distended; non-tender.   EXT: Normal ROM. No edema.  LYMPH: No acute cervical adenopathy.  NEURO: Alert, oriented. Grossly unremarkable. No focal deficits.  PSYCH: Cooperative, appropriate.

## 2022-01-16 NOTE — ED PROVIDER NOTE - PATIENT PORTAL LINK FT
You can access the FollowMyHealth Patient Portal offered by [facility] by registering at the following website: [email or web address]. By joining [employer]’s FollowMyHealth portal, you will also be able to view your health information using other applications (apps) compatible with our system.

## 2022-01-16 NOTE — ED PROVIDER NOTE - CLINICAL SUMMARY MEDICAL DECISION MAKING FREE TEXT BOX
66 y/o F, HX of hyperlipidemia, chronic back and leg pain, has a neuro stimulator, RA, OA, peripheral neuropathy, anxiety, depression, here with right sided upper neck and shoulder pain. Pt states she sustained a fall 1 year ago and has 2 known rib fractures and chronic right humoral head fracture. Pt went to [facility] where she received a XR of her shoulder which showed a chronic fracture to the right humoral head. Pt is c/o pain in the right neck and shoulder area. Pt did not get a MRI because of the nerve simulator. Pt states she is currently waiting for a CT of her neck. Pt is taking gabapentin and hydrocodone with minimal relief. VS reviewed. Pain medication provided. CT ordered however patient refused and wanted to be discharged. Strict return precautions given.

## 2022-01-16 NOTE — ED ADULT NURSE NOTE - OBJECTIVE STATEMENT
Pt with C/O neck pain on and off for 1  not helpful with pain medication that  was  given to take at home ,pt denies fever or chills

## 2022-01-16 NOTE — ED PROVIDER NOTE - NS ED ROS FT
Review of Systems:  	•	CONSTITUTIONAL - no fever, no diaphoresis, no chills  	•	SKIN - no rash  	•	HEMATOLOGIC - no bleeding, no bruising  	•	EYES - no eye pain, no blurry vision  	•	ENT - no congestion  	•	RESPIRATORY - no shortness of breath, no cough  	•	CARDIAC - no chest pain, no palpitations  	•	GI - no abd pain, no nausea, no vomiting, no diarrhea, no constipation  	•	GENITO-URINARY - no dysuria; no hematuria, no increased urinary frequency  	•	MUSCULOSKELETAL - +neck pain, + joint paint, no swelling, no redness  	•	NEUROLOGIC - no weakness, no headache, no paresthesias, no LOC  	•	PSYCH - no anxiety, no depression  	All other ROS are negative except as documented in HPI.

## 2022-01-16 NOTE — ED PROVIDER NOTE - CARE PROVIDERS DIRECT ADDRESSES
,DirectAddress_Unknown,DirectAddress_Unknown,[email or web address]

## 2022-01-16 NOTE — ED PROVIDER NOTE - PROGRESS NOTE DETAILS
Patient refusing meds and CT wants to be discharged ATTENDING NOTE:  68 y/o F, HX of hyperlipidemia, chronic back and leg pain, has a neuro stimulator, RA, OA, peripheral neuropathy, anxiety, depression, here with right sided upper neck and shoulder pain. Pt states she sustained a fall 1 year ago and has 2 known rib fractures and chronic right humoral head fracture. Pt went to [facility] where she received a XR of her shoulder which showed a chronic fracture to the right humoral head. Pt is c/o pain in the right neck and shoulder area. Pt did not get a MRI because of the nerve simulator. Pt states she is currently waiting for a CT of her neck. Pt is taking gabapentin and hydrocodone with minimal relief.    CONSTITUTIONAL: WA / WN / NAD. HEAD: NCAT. EYES: PERRL; EOMI; anicteric. ENT: Normal pharynx; mucous membranes pink/moist, no erythema. NECK: (+) right para-spinal muscle TTP, (+) right trapezius TTP, Supple; no meningeal signs MSK/EXT: No gross deformities; full range of motion. SKIN: Warm and dry;  NEURO: AAOx3, PSYCH: Memory Intact, Normal Affect.

## 2022-01-16 NOTE — ED PROVIDER NOTE - NSFOLLOWUPINSTRUCTIONS_ED_ALL_ED_FT
Chronic Neck Pain    WHAT YOU NEED TO KNOW:    Chronic neck pain may start to build slowly over time. Neck pain is chronic if it lasts longer than 3 months. The pain may come and go, or be worse with certain movements. The pain may be only in your neck, or it may move to your arms, back, or shoulders. You may have pain that starts in another body area and moves to your neck. You may have neck pain for years. Some types of neck pain can be permanent.     Vertebral Column         DISCHARGE INSTRUCTIONS:    Call your doctor if:   •You have neck pain and shooting pain down your arms or legs.      •Your neck pain suddenly becomes severe.      •You have neck pain along with numbness, tingling, or weakness in your arms or legs.      •You have a stiff neck, a headache, and a fever.      •You have new or worsening symptoms.      •Your symptoms continue even after treatment.      •You have questions or concerns about your condition or care.      Medicines: You may need any of the following:   •Acetaminophen decreases pain and fever. It is available without a doctor's order. Ask how much to take and how often to take it. Follow directions. Read the labels of all other medicines you are using to see if they also contain acetaminophen, or ask your doctor or pharmacist. Acetaminophen can cause liver damage if not taken correctly. Do not use more than 4 grams (4,000 milligrams) total of acetaminophen in one day.       •NSAIDs, such as ibuprofen, help decrease swelling, pain, and fever. This medicine is available with or without a doctor's order. NSAIDs can cause stomach bleeding or kidney problems in certain people. If you take blood thinner medicine, always ask your healthcare provider if NSAIDs are safe for you. Always read the medicine label and follow directions.      •Prescription pain medicine called narcotics or opioids may be given for certain types of chronic pain. Ask your healthcare provider how to take this medicine safely.      •Anesthetics can be rubbed on your skin or injected into a nerve or muscle to numb an area.      •Other medicines may reduce pain, anxiety, muscle tension, or swelling.      •Take your medicine as directed. Contact your healthcare provider if you think your medicine is not helping or if you have side effects. Tell him of her if you are allergic to any medicine. Keep a list of the medicines, vitamins, and herbs you take. Include the amounts, and when and why you take them. Bring the list or the pill bottles to follow-up visits. Carry your medicine list with you in case of an emergency.      Manage or prevent chronic neck pain:   •Rest your neck as directed. Do not make sudden movements, such as turning your head quickly. Your healthcare provider may recommend you wear a cervical collar for a short time. The collar will prevent you from moving your head. This will give your neck time to heal if an injury is causing your neck pain. Ask your healthcare provider when you can return to sports or other normal daily activities.      •Apply ice for 15 to 20 minutes every hour, or as directed. Use an ice pack, or put crushed ice in a plastic bag. Cover it with a towel before you apply it to your skin. Ice decreases pain and helps prevent tissue damage.      •Apply heat for 20 to 30 minutes every 2 hours, or as directed. Heat helps decrease pain and muscle spasms.      •Do neck exercises as directed. Neck exercises help strengthen the muscles and increase range of motion. Your healthcare provider will tell you which exercises are right for you. He or she may give you instructions, or he or she may recommend that you work with a physical therapist. Your healthcare provider or therapist can make sure you are doing the exercises correctly.      •Maintain good posture. Keep your head and shoulders lifted when you sit. If you work in front of a computer, put the monitor at eye level. You should not need to look up or down to see the screen. You should also not have to lean forward to read what is on the screen. Keep your keyboard, mouse, and other computer items where you do not have to reach for them. Get up often if you work in front of a computer or sit for long periods of time. Stretch or walk around to keep your neck muscles loose.      •Ask about acupuncture for pain relief. Neck pain is sometimes relieved with acupuncture. Talk to your healthcare provider before you get this treatment to make sure it is safe for you.      Follow up with your healthcare provider as directed: Your healthcare provider may refer you to a specialist if your pain does not get better with treatment. Write down your questions so you remember to ask them during your visits.

## 2022-01-16 NOTE — ED PROVIDER NOTE - CARE PROVIDER_API CALL
Your pain management doctor,   Phone: (   )    -  Fax: (   )    -  Follow Up Time: 1-3 Days    Fabiola August)  [facility]  [address] [place] [address]  Phone: [phone]  Fax: [phone]  Follow Up Time: 1-3 Days    Junito Wyman)  [facility]  [address] [place] [id number]  Phone: [phone]  Fax: [phone]  Follow Up Time: 1-3 Days

## 2022-03-16 NOTE — ED ADULT NURSE NOTE - NSICDXPASTMEDICALHX_GEN_ALL_CORE_FT
PAST MEDICAL HISTORY:  Anxiety     Depression     Kidney cysts     OA (osteoarthritis)     Osteoporosis     Peripheral neuropathy     RA (rheumatoid arthritis)

## 2022-03-16 NOTE — ED PROVIDER NOTE - CLINICAL SUMMARY MEDICAL DECISION MAKING FREE TEXT BOX
67yoF here with L knee laceration, sustained after fell off chair earlier. despite triage note, pt adamantly denies that she fell down stairs. states dozed off on chair and fell off from that. denies HA or head trauma. no neck pain. pain to L knee skin. no knee pain with ROM. no cp or sob. on exam, nontoxic, vss   Gen: Alert, NAD  Skin: Warm, dry, intact  Head: NCAT, no signs of basilar skull fx  ENT: Mucous membranes moist  Neck: Supple, FROM, no midline TTP, sensation intact and strength 5/5 in radial, ulnar and median distribtions bilaterally.   CV: RRR, normal S1, S2, no m/r/g  Resp: Non labored respirations, Lungs CTA b/l, no wheezes, rales, rhonchi  Abdomen: Soft, nondistended, nontender  Extremities: Moving all extremities, no edema  Neuro: No focal neuro deficits  L knee: Large approx 12cm superifical anterior flap lac, with no active bleeding, clean. no fb. does not go deep enouhg to violate joint. no bone seen. otherwise, No obvious deformity, no swelling, no effusion, nontender, no warmth or redness, full ROM and full strength in flexion, extension, neg ant/post drawer and no valgus/varus stress, neg mcmurrays, neurovascularly intact distally with 2+DP and SILT   Psych: Cooperative     repaired by PA, complex lac. post, able to ambulate with no pain and normal ROM. no ttp over patella and able to fully extend against gravity. nv intact. clicnially does not need xr but offered given age and pt refused. In my opinion, based on current evaluation and results, an acute medical or surgical emergency does not appear to be occurring at this time and I feel that the pt is stable for further outpatient work up and/or treatment. Return precautions discussed.

## 2022-03-16 NOTE — ED PROVIDER NOTE - MUSCULOSKELETAL, MLM
Spine appears normal, range of motion is not limited, no midline or paraspinal tenderness, no muscle or joint tenderness, FROM X4

## 2022-03-16 NOTE — ED PROVIDER NOTE - OBJECTIVE STATEMENT
67 y.o. female with a PMH of chronic low back pain and PVD presented to the ER c/o laceration to the Left knee.  Pt states that she fell asleep at the kitchen table last night and fell directly onto her knee from seated position.  Pt denies LOC, head trauma, dizziness, chest pain, SOB.  No other complaints.

## 2022-03-16 NOTE — ED PROVIDER NOTE - PATIENT PORTAL LINK FT
You can access the FollowMyHealth Patient Portal offered by [facility] by registering at the following website: [email or web address]. By joining [employer]’s FollowMyHealth portal, you will also be able to view your health information using other applications (apps) compatible with our system.

## 2022-03-16 NOTE — ED PROVIDER NOTE - NSFOLLOWUPINSTRUCTIONS_ED_ALL_ED_FT
****Suture removal 14 days****      Wound Care    Taking care of your wound properly can help to prevent pain and infection. It can also help your wound to heal more quickly.     HOW TO CARE FOR YOUR WOUND   Take or apply over-the-counter and prescription medicines only as told by your health care provider.  If you were prescribed antibiotic medicine, take or apply it as told by your health care provider. Do not stop using the antibiotic even if your condition improves.  Clean the wound each day or as told by your health care provider.  Wash the wound with mild soap and water.  Rinse the wound with water to remove all soap.  Pat the wound dry with a clean towel. Do not rub it.  There are many different ways to close and cover a wound. For example, a wound can be covered with stitches (sutures), skin glue, or adhesive strips. Follow instructions from your health care provider about:  How to take care of your wound.  When and how you should change your bandage (dressing).  When you should remove your dressing.  Removing whatever was used to close your wound.  Check your wound every day for signs of infection. Watch for:  Redness, swelling, or pain.  Fluid, blood, or pus.  Keep the dressing dry until your health care provider says it can be removed. Do not take baths, swim, use a hot tub, or do anything that would put your wound underwater until your health care provider approves.  Raise (elevate) the injured area above the level of your heart while you are sitting or lying down.  Do not scratch or pick at the wound.  Keep all follow-up visits as told by your health care provider. This is important.    SEEK MEDICAL CARE IF:  You received a tetanus shot and you have swelling, severe pain, redness, or bleeding at the injection site.  You have a fever.  Your pain is not controlled with medicine.  You have increased redness, swelling, or pain at the site of your wound.  You have fluid, blood, or pus coming from your wound.  You notice a bad smell coming from your wound or your dressing.    SEEK IMMEDIATE MEDICAL CARE IF:  You have a red streak going away from your wound.

## 2022-03-16 NOTE — ED PROVIDER NOTE - PROVIDER TOKENS
FREE:[LAST:[[facility]],PHONE:[(   )    -],FAX:[(   )    -],ADDRESS:[2 days for owund check]]

## 2022-03-16 NOTE — ED PROVIDER NOTE - SKIN, MLM
(+) 12 cm flap laceration to anterior Left knee, only subcutaneous fat exposed, FROM, pt ambulatory (see procedure note)

## 2022-03-16 NOTE — ED ADULT NURSE NOTE - NSIMPLEMENTINTERV_GEN_ALL_ED
Implemented All Fall Risk Interventions:  [place] to call system. Call bell, personal items and telephone within reach. Instruct patient to call for assistance. Room bathroom lighting operational. Non-slip footwear when patient is off stretcher. Physically safe environment: no spills, clutter or unnecessary equipment. Stretcher in lowest position, wheels locked, appropriate side rails in place. Provide visual cue, wrist band, yellow gown, etc. Monitor gait and stability. Monitor for mental status changes and reorient to person, place, and time. Review medications for side effects contributing to fall risk. Reinforce activity limits and safety measures with patient and family.

## 2022-03-16 NOTE — ED PROVIDER NOTE - NS ED ATTENDING STATEMENT MOD
This was a shared visit with the [facility]. I reviewed and verified the documentation and independently performed the documented:

## 2022-03-16 NOTE — ED PROVIDER NOTE - CARE PROVIDER_API CALL
[facility] ,   2 days for owund check  Phone: (   )    -  Fax: (   )    -  Follow Up Time:

## 2022-03-16 NOTE — ED ADULT TRIAGE NOTE - CHIEF COMPLAINT QUOTE
s/p fall down one flight of stairs, denies head injury LOC, use of AC. Noted skin avulsion on left knee, applied dressing to site, no bleeding noted at this time.

## 2022-03-21 NOTE — REVIEW OF SYSTEMS
[As Noted in HPI] : as noted in HPI [Arthralgias] : arthralgias [Joint Pain] : joint pain [Joint Swelling] : joint swelling [Fever] : no fever [Eye Pain] : no eye pain [Red Eyes] : eyes not red [Chest Pain] : no chest pain [Palpitations] : no palpitations

## 2022-03-21 NOTE — HISTORY OF PRESENT ILLNESS
[de-identified] : Ms Roberts comes to the office for evaluation of replacement of SCS battery for her pain. Her SCS ([place]) was placed by Dr. yeh in 2015. It helps her chornic pain however the IPG is approaching end of life with difficulty charging and maintaining charge. She is currently charging 2 hours a day to hit one bar of charge. \par \par Patient has a PMH including Raynaud's Syndrome, Hepatitis, hypercholesterolemia, migraines, arthritis and anxiety. States she also has frequent falls and most recently the fall resulted in multiple sutures in her LEFT leg. Admits the pain is present "everywhere", but admits the pain in her LEFT leg is worse than the right. Pain radiates along the entire leg to the feet and toes with associated numbness and tingling.\par \par Also complains bilateral swelling in her legs and frequent falls. Has been having issues with recharging her battery for quite some time now. Admits she must recharge it just about "every other day". \par \par \par IMAGING: none\par \par PE:\par \par Constitutional: frail in appearance, no distress sitting on the chair in mild discomfort\par HEENT: Normocephalic Atraumatic, sclerae anicteric, mucus membranes moist, trachea midline\par Psychiatric: Alert and oriented to all spheres, normal mood\par Pulmonary: No respiratory distress or accessory muscle use\par \par Neurologic:\par CN II-XII grossly intact \par Palpation: no pain to palpation\par Strength: Full strength in all major muscle groups, no atrophy\par Sensation: Full sensation to light touch in all extremities\par \par Extremities Erythematous digits with rubor \par Reflexes:\par 2+ patellar\par 2+ biceps\par 2+ ankle jerk\par No Sheffield's\par No clonus\par No babinski\par \par ROM intact through all planes with passive movement\par \par Signs:\par SLR negative; Jon's maneuver negative\par \par Gait: Antalgic\par \par

## 2022-04-04 NOTE — ED PROVIDER NOTE - PATIENT PORTAL LINK FT
You can access the FollowMyHealth Patient Portal offered by [facility] by registering at the following website: [email or web address]. By joining [employer]’s FollowMyHealth portal, you will also be able to view your health information using other applications (apps) compatible with our system.

## 2022-04-04 NOTE — ED PROVIDER NOTE - NS ED ROS FT
Constitutional:  see HPI  Head:  no headache, dizziness, loss of consciousness  Eyes:  no visual changes; no eye pain, redness, or discharge  ENMT:  no ear pain or discharge; no hearing problems; no mouth or throat sores or lesions; no throat pain  Cardiac: no chest pain, tachycardia or palpitations  Respiratory: no cough, wheezing, shortness of breath, chest tightness, or trouble breathing  GI: no nausea, vomiting, diarrhea or abdominal pain  :  no dysuria, frequency, or burning with urination; no change in urine output  MS: no myalgias, muscle weakness, joint pain,or  injury; no joint swelling  Neuro: no weakness; no numbness or tingling; no seizure  Skin: wound

## 2022-04-04 NOTE — ED PROVIDER NOTE - NSFOLLOWUPINSTRUCTIONS_ED_ALL_ED_FT
Cellulitis is a skin infection caused by bacteria. Cellulitis may go away on its own or you may need treatment. Your healthcare provider may draw a [place] around the outside edges of your cellulitis. If your cellulitis spreads, your healthcare provider will see it outside of the [place].      Cellulitis   DISCHARGE INSTRUCTIONS:  Call 911 if:  You have sudden trouble breathing or chest pain.  Seek care immediately if:  Your wound gets larger and more painful.  You feel a crackling under your skin when you touch it.  You have purple dots or bumps on your skin, or you see bleeding under your skin.  You have new swelling and pain in your legs.  The red, warm, swollen area gets larger.  You see red streaks coming from the infected area.  Contact your healthcare provider if:  You have a fever.  Your fever or pain does not go away or gets worse.  The area does not get smaller after 2 days of antibiotics.  Your skin is flaking or peeling off.  You have questions or concerns about your condition or care.  Medicines:  Antibiotics help treat the bacterial infection.  NSAIDs , such as ibuprofen, help decrease swelling, pain, and fever. NSAIDs can cause stomach bleeding or kidney problems in certain people. If you take blood thinner medicine, always ask if NSAIDs are safe for you. Always read the medicine label and follow directions. Do not give these medicines to children under 6 months of age without direction from your child's healthcare provider.  Acetaminophen decreases pain and fever. It is available without a doctor's order. Ask how much to take and how often to take it. Follow directions. Read the labels of all other medicines you are using to see if they also contain acetaminophen, or ask your doctor or pharmacist. Acetaminophen can cause liver damage if not taken correctly. Do not use more than 4 grams (4,000 milligrams) total of acetaminophen in one day.  Take your medicine as directed. Contact your healthcare provider if you think your medicine is not helping or if you have side effects. Tell him or her if you are allergic to any medicine. Keep a list of the medicines, vitamins, and herbs you take. Include the amounts, and when and why you take them. Bring the list or the pill bottles to follow-up visits. Carry your medicine list with you in case of an emergency.    Treatment options  The following list of medications are in some way related to or used in the treatment of this condition.    Bactrim  Bactrim DS  sulfamethoxazole/trimethoprim  Neosporin  Triple Antibi  Self-care:  Elevate the area above the level of your heart as often as you can. This will help decrease swelling and pain. Prop the area on pillows or blankets to keep it elevated comfortably.  Clean the area daily until the wound scabs over. Gently wash the area with soap and water. Pat dry. Use dressings as directed.  Place cool or warm, wet cloths on the area as directed. Use clean cloths and clean water. Leave it on the area until the cloth is room temperature. Pat the area dry with a clean, dry cloth. The cloths may help decrease pain.  Prevent cellulitis:  Do not scratch bug bites or areas of injury. You increase your risk for cellulitis by scratching these areas.  Do not share personal items, such as towels, clothing, and razors.  Clean exercise equipment with germ-killing  before and after you use it.  Wash your hands often. Use soap and water. Wash your hands after you use the bathroom, change a child's diapers, or sneeze. Wash your hands before you prepare or eat food. Use lotion to prevent dry, cracked skin.  Handwashing  Wear pressure stockings as directed. You may be told to wear the stockings if you have peripheral edema. The stockings improve blood flow and decrease swelling.  Treat athlete's foot. This can help prevent the spread of a bacterial skin infection.  Follow up with your healthcare provider within 3 days, or as directed:

## 2022-04-04 NOTE — ED PROVIDER NOTE - CARE PROVIDER_API CALL
Truman Bueno (DO)  Infectious Disease; Internal Medicine  [address] [place] [address]  Phone: [phone]  Fax: [phone]  Follow Up Time: 1-3 Days

## 2022-04-04 NOTE — ED PROVIDER NOTE - PROGRESS NOTE DETAILS
WF: The patient wishes to leave against medical advice.  I have discussed the risks, benefits and alternatives (including the possibility of worsening of disease, pain, permanent disability, and/or death) with the patient and his/her family (if available).  The patient voices understanding of these risks, benefits, and alternatives and still wishes to sign out against medical advice.  The patient is awake, alert, oriented  x 3 and has demonstrated capacity to refuse/direct care.  I have advised the patient that they can and should return immediately should they develop any worse/different/additional symptoms, or if they change their mind and want to continue their care. WF: The patient wishes to leave against medical advice.  I have discussed the risks, benefits and alternatives (including the possibility of worsening of disease, pain, permanent disability, and/or death) with the patient and his/her family (if available).  The patient voices understanding of these risks, benefits, and alternatives and still wishes to sign out against medical advice.  The patient is awake, alert, oriented  x 3 and has demonstrated capacity to refuse/direct care.  I have advised the patient that they can and should return immediately should they develop any worse/different/additional symptoms, or if they change their mind and want to continue their care. Pt informed that abx sent to pharmacy and told to return if worsening symptoms.

## 2022-04-04 NOTE — ED PROVIDER NOTE - OBJECTIVE STATEMENT
68 yo female hx of pvd presenting with suture removal and worsening pain, erythema and discharge from L knee over the last few days, was repaired about 20 days prior after fall. no fever. seen by pmd and given 2 days of doxycycline with minimal improvement. able to ambulate.

## 2022-04-04 NOTE — ED PROVIDER NOTE - CLINICAL SUMMARY MEDICAL DECISION MAKING FREE TEXT BOX
67F with PMH PVD, laceration of L knee s/p repair 20d ago who presents with erythema, pain to site. She was started on doxy by PMD 2d ago w/o improvement. Denies fever, vomiting, cp, sob. Ambulatory. VS noted, triage note reviewed. Exam as above and consistent with cellulitis of laceration of knee. Pt refusing labs, IV abx, or any additional w/u. She was urged regarding concern of infection overlying joint and possibility of septic joint, further morbidity/mortality and she verbalizes understanding. The patient wishes to leave against medical advice.  I have discussed the risks, benefits and alternatives (including the possibility of worsening of disease, pain, permanent disability, and/or death) with the patient and his/her family (if available).  The patient voices understanding of these risks, benefits, and alternatives and still wishes to sign out against medical advice.  The patient is awake, alert, oriented x 3 and has demonstrated capacity to refuse/direct care.  I have advised the patient that they can and should return immediately should they develop any worse/different/additional symptoms, or if they change their mind and want to continue their care. Patient has full capacity and has verbalized understanding.  Given detailed returned precautions.

## 2022-04-04 NOTE — ED PROVIDER NOTE - PHYSICAL EXAMINATION
CONSTITUTIONAL: Well-developed; well-nourished; in no acute distress.   SKIN: wound as below  HEAD: Normocephalic; atraumatic.  EYES: PERRL, EOMI, normal sclera and conjunctiva   ENT: No nasal discharge; airway clear.  NECK: Supple; non tender.  CARD: S1, S2 normal; no murmurs, gallops, or rubs. Regular rate and rhythm.   RESP: No wheezes, rales or rhonchi.  ABD: soft ntnd  EXT: 12 cm laceration on anterior left knee, mild purulent discharge, sutures in place, surrounding erythema and swelling, mild TTP, full ROM in knee, pulses and sensation intact  LYMPH: No acute cervical adenopathy.  NEURO: Alert, oriented, grossly unremarkable  PSYCH: Cooperative, appropriate.

## 2022-04-27 NOTE — ED PROVIDER NOTE - PROGRESS NOTE DETAILS
Patient eloped from the Emergency room after being seen by Burn Team. Sent antibiotics to pharmacy and verbally advised her to follow up with Dr. Ansari outpatient. Burn team debrided the wound at bedside. Patient eloped from the Emergency room after being seen by Burn Team. Sent antibiotics and silvadene to pharmacy and verbally advised her to follow up with Dr. Ansari outpatient.

## 2022-04-27 NOTE — H&P PST ADULT - REASON FOR ADMISSION
Case Type: OP  Suite: [facility]  Proceduralist: Praveen Dominguez  Confirmed Surgery Date: 05-  Procedure: Battery replacement of spinal cord stimulator  Laterality: Spinal  Length of Procedure: 90 Minutes  Anesthesia Type: General  Covid Testing 05/2/2022 0900

## 2022-04-27 NOTE — H&P PST ADULT - VENOUS THROMBOEMBOLISM
no Patient seen on rounds, resting in bed. The patient endorses improved sleep last night. He describes his mood and energy levels as "so-so," but reports improvement in intrusive thoughts. He denies suicidal/homicidal ideation, and visual/audio hallucinations. Patient was encouraged to attend groups and be more active on the unit. Patient endorsed feeling that be may be ready to return home next week. Patient seen on rounds, resting in bed. He endorses improved sleep last night. He describes his mood and energy levels as "so-so," but reports improvement in intrusive thoughts. He denies suicidal/homicidal ideation, and visual/audio hallucinations. Patient was encouraged to attend groups and be more active on the unit. Patient endorsed feeling that be may be ready to return home next week.

## 2022-04-27 NOTE — ED PROVIDER NOTE - ATTENDING APP SHARED VISIT CONTRIBUTION OF CARE
67-year-old female with history of chronic back pain, PVD, fibromyalgia, anxiety, sent in by presurgical testing due to noted left knee erythema. Per review of EMR, patient fell onto the left knee March 16 and had 24 sutures placed, then returned 20 days later and 21 sutures removed with noted erythema at the time despite being on 2 days of doxycycline and patient left AMA. Patient herself is not concerned about the need but this was noted at presurgical testing for nerve stimulator battery replacement and sent here with instructions to contact Dr. Ansari of burn. Patient denies fever, chills, discharge, and all other symptoms. On exam, afebrile, hemodynamically stable, saturating well, NAD, well appearing, laying comfortably in bed, no WOB, speaking full sentences, head NCAT, EOMI grossly, anicteric, breathing comfortably on room air, AAO, CN's 3-12 grossly intact, DUARTE spontaneously, full knee flexion/extension, noted anterior knee erythema without induration/fluctuance/TTP, no leg cyanosis or edema, skin warm, well perfused. Appearance concerning for cellulitis. Character and exam low suspicion for septic arthritis or for systemic spread. Patient on arrival and on multiple discussions during ED stay is refusing lab work, IV, or IV abx. 67-year-old female with history of chronic back pain, PVD, fibromyalgia, anxiety, sent in by presurgical testing due to noted left knee erythema. Per review of EMR, patient fell onto the left knee March 16 and had 24 sutures placed, then returned 20 days later and 21 sutures removed with noted erythema at the time despite being on 2 days of doxycycline and patient left AMA. Patient herself is not concerned about the need but this was noted at presurgical testing for nerve stimulator battery replacement and sent here with instructions to contact Dr. Ansari of burn. Patient denies fever, chills, discharge, and all other symptoms. On exam, afebrile, hemodynamically stable, saturating well, NAD, well appearing, laying comfortably in bed, no WOB, speaking full sentences, head NCAT, EOMI grossly, anicteric, breathing comfortably on room air, AAO, CN's 3-12 grossly intact, DUARTE spontaneously, full knee flexion/extension, noted anterior knee erythema without induration/fluctuance/TTP, no leg cyanosis or edema, skin warm, well perfused. Appearance concerning for cellulitis. Character and exam low suspicion for septic arthritis or for systemic spread. Patient on arrival and on multiple discussions during ED stay is refusing lab work, IV, or IV abx. Seen by burn and debrided at bedside and dressed. Pt left AMA though eloped prior to paperwork. I had extensive discussion of risks and benefits of pursuing further medical evaluation and care with patient; patient still electing to leave against medical advice. Patient is awake, alert, oriented and demonstrates full capacity and insight into illness. Patient aware and encouraged to return immediately to ED or nearest ED if patient decides to change mind regarding care or if patient experiences any new, worsening, or concerning symptoms.

## 2022-04-27 NOTE — ED PROVIDER NOTE - NS ED ROS FT
Constitutional: (-) fever  Eyes/ENT: (-) blurry vision, (-) epistaxis  Cardiovascular: (-) chest pain, (-) syncope  Respiratory: (-) cough, (-) shortness of breath  Gastrointestinal: (-) vomiting, (-) diarrhea  Musculoskeletal: (-) neck pain, (-) back pain, (+) left knee wound erythema, discharge and tenderness  Integumentary: (-) rash, (-) edema  Neurological: (-) headache, (-) altered mental status  Psychiatric: (-) hallucinations  Allergic/Immunologic: (-) pruritus

## 2022-04-27 NOTE — CONSULT NOTE ADULT - SUBJECTIVE AND OBJECTIVE BOX
Pt is 68 yo female with PMH of Arthritis, Hepatitis, Fibromyalgia, PVD, Varicose veins, neuropathy, was seen in [facility] ED on 03/16/2022 for left knee laceration, repaired in ED and discharged home. Pt came back to ED in two weeks for worsening pain, erythema and discharge from L knee repaired laceration. Sutures removed in ED, given IV abx, and discharged home on keflex and Doxy. Then pt was seen by ID as outpatient and recommended to follow up with Dr Ansari in [facility] for wound management. Patient had an appointment on Tuesday and it missed. Called Dr. Ansari's office and informed about the wound conditions. Staff directed to send the patient to ED for an Eval and possible IV antibiotics.      Allergies  No Known Allergies    PAST MEDICAL & SURGICAL HISTORY:  Anxiety  Depression  Osteoporosis  Kidney cysts  Peripheral neuropathy  RA (rheumatoid arthritis)  OA (osteoarthritis)  Low back pain with radiation  s/p &quot;nerve cutting&quot; 2015      FAMILY HISTORY:  Family history of stroke (Mother)      Vital Signs Last 24 Hrs  T(C): 35.6 (27 Apr 2022 11:20), Max: 35.9 (27 Apr 2022 09:51)  T(F): 96 (27 Apr 2022 11:20), Max: 96.7 (27 Apr 2022 09:51)  HR: 69 (27 Apr 2022 11:20) (56 - 69)  BP: 119/67 (27 Apr 2022 11:20) (103/64 - 119/67)  BP(mean): 77 (27 Apr 2022 09:51) (77 - 77)  RR: 16 (27 Apr 2022 11:20) (16 - 18)  SpO2: 100% (27 Apr 2022 11:20) (96% - 100%)      PHYSICAL EXAM:  GENERAL: seen on bedside in ED, alert, oriented, cooperative, not in distress  Left knee necrotic wound with dry crusted adherent exudate and brown devitalized tissue. Debridement done on bedside deep to subcutaneous tissue;   Dressing with silvadene, xeroform, and kerlix applied. Pt tolerated well .

## 2022-04-27 NOTE — CONSULT NOTE ADULT - ASSESSMENT
Pt is 68 yo female with PMH of Arthritis, Hepatitis, Fibromyalgia, PVD, Varicose veins, neuropathy, presented with Left knee infected wound.     Plan:   - Wound Cultures sent to lab -> please follow up  - wound debrided on bedside   - continue dressing changes to Left knee bid: wash wound with soap and water, apply silvadene cream, then cover with xeroform, and wrap with kerlix   - recommended admission for IV abx and wound care.

## 2022-04-27 NOTE — H&P PST ADULT - HISTORY OF PRESENT ILLNESS
Patient denies any cp, sob, palpitations, fever, cough, URI, abdominal pains, N/V, UTI, Rashes or open wounds.  As per patient exercise tolerance of 1-2 fos walks with out sob  Patient denies any s/s covid 19 and reports no contact with known positive people. Patient has appointment for repeat covid testing pre op and instructed to continue to self monitor and report any concerns to MD. Pt will continue to practice self isolation and  exposure control measures pre op  Anesthesia Alert  NO--Difficult Airway  NO--History of neck surgery or radiation  NO--Limited ROM of neck  NO--History of Malignant hyperthermia  NO--Personal or family history of Pseudocholinesterase deficiency  NO--Prior Anesthesia Complication  NO--Latex Allergy  NO--Loose teeth  NO--History of Rheumatoid Arthritis  NO--CESAR  NO--Other_____   Melissa Roberts a 68 yo female with PMH of fall with lacerations on left knee with 24 sutures on 03/16/2022 and wound didn't heel well and  infected ( treated with antibiotics at ER) , Arthritis, Hepatitis, Fibromyalgia, PVD, Varicose veins who presents to [facility] for the above procedure due to battery expirations nerve stimulator.     Left knee wound looks like infected with reddened and some sutures remains with big crest. PVD discoloration remains on BLE. Patient c/o scale 10/10 generalized pains. Patient was seen by ID doctor and consulted to burn doctor. Patient had an appointment on Tuesday and it missed. Called Dr. Ansari's office and informed about the wound conditions. Staff directed to send the patient to ED for an Eval and possible IV antibiotics. Patient refused ER visits.  Dr Villalobos seen and examined the patient, after a lengthy conversations patient  agreed to go to ER.  Transported via W/C.     Patient denies any cp, sob, palpitations, fever, cough, URI, abdominal pains, N/V, UTI, Rashes or open wounds.  As per patient exercise tolerance of 1/2 fos walks with out sob  Patient denies any s/s covid 19 and reports no contact with known positive people. Patient has appointment for repeat covid testing pre op and instructed to continue to self monitor and report any concerns to MD. Pt will continue to practice self isolation and  exposure control measures pre op  Anesthesia Alert  YES Class IV--Difficult Airway  NO--History of neck surgery or radiation  YES--Limited ROM of neck  NO--History of Malignant hyperthermia  NO--Personal or family history of Pseudocholinesterase deficiency  NO--Prior Anesthesia Complication  NO--Latex Allergy  NO-Loose teeth. Partial dentures  NO--History of Rheumatoid Arthritis  NO--CESAR  NO-Risk of bleeding Melissa Roberts a 66 yo female with PMH of fall with lacerations on left knee with 24 sutures on 03/16/2022 and wound didn't heel well and  infected ( treated with antibiotics at ER) , Arthritis, Hepatitis, Fibromyalgia, PVD, Varicose veins who presents to [facility] for the above procedure due to battery expirations nerve stimulator.     Left knee wound looks like infected with reddened and some sutures remains with big crest. PVD discoloration remains on BLE. Patient c/o scale 10/10 generalized pains. Patient was seen by ID doctor and consulted to burn doctor. Patient had an appointment on Tuesday and it missed. Called Dr. Ansari's office and informed about the wound conditions. Staff directed to send the patient to ED for an Eval and possible IV antibiotics. Patient refused ER visits.  Dr Villalobos seen and examined the patient, after a lengthy conversations patient  agreed to go to ER.  Transported via W/C. Notified Dr. Dominguez's office    Patient denies any cp, sob, palpitations, fever, cough, URI, abdominal pains, N/V, UTI, Rashes or open wounds.  As per patient exercise tolerance of 1/2 fos walks with out sob  Patient denies any s/s covid 19 and reports no contact with known positive people. Patient has appointment for repeat covid testing pre op and instructed to continue to self monitor and report any concerns to MD. Pt will continue to practice self isolation and  exposure control measures pre op  Anesthesia Alert  YES Class IV--Difficult Airway  NO--History of neck surgery or radiation  YES--Limited ROM of neck  NO--History of Malignant hyperthermia  NO--Personal or family history of Pseudocholinesterase deficiency  NO--Prior Anesthesia Complication  NO--Latex Allergy  NO-Loose teeth. Partial dentures  NO--History of Rheumatoid Arthritis  NO--CESAR  NO-Risk of bleeding

## 2022-04-27 NOTE — ED PROVIDER NOTE - PHYSICAL EXAMINATION
Physical Exam    Vital Signs: I have reviewed the initial vital signs.  Constitutional: well-nourished, appears stated age, no acute distress  Eyes: Conjunctiva pink, Sclera clear, PERRLA, EOMI.  Cardiovascular: S1 and S2, regular rate, regular rhythm  Respiratory: unlabored respiratory effort, clear to auscultation bilaterally no wheezing, rales and rhonchi  Gastrointestinal: soft, non-tender abdomen, no pulsatile mass, normal bowl sounds  Musculoskeletal: supple neck, no midline tenderness  Extremities: Left knee wound with surrounding erythema. White yellow and black granulation tissue overlying wound with serous drainage.   Integumentary: warm, dry, no rash  Neurologic: awake, alert, cranial nerves II-XII grossly intact, extremities’ motor and sensory functions grossly intact  Psychiatric: appropriate mood, appropriate affect Physical Exam    Vital Signs: I have reviewed the initial vital signs.  Constitutional: well-nourished, appears stated age, no acute distress  Eyes: Conjunctiva pink, Sclera clear, PERRLA, EOMI.  Cardiovascular: S1 and S2, regular rate, regular rhythm  Respiratory: unlabored respiratory effort, clear to auscultation bilaterally no wheezing, rales and rhonchi  Gastrointestinal: soft, non-tender abdomen, no pulsatile mass, normal bowel sounds  Musculoskeletal: supple neck, no midline tenderness  Extremities: Left knee wound 4-5cm irregular borders with surrounding erythema. White yellow and black granulation tissue overlying wound with serous drainage.   Neurologic: awake, alert, cranial nerves II-XII grossly intact, extremities’ motor and sensory functions grossly intact  Psychiatric: appropriate mood, appropriate affect

## 2022-04-27 NOTE — ED PROVIDER NOTE - CLINICAL SUMMARY MEDICAL DECISION MAKING FREE TEXT BOX
67-year-old female with history of chronic back pain, PVD, fibromyalgia, anxiety, sent in by presurgical testing due to noted left knee erythema. Per review of EMR, patient fell onto the left knee March 16 and had 24 sutures placed, then returned 20 days later and 21 sutures removed with noted erythema at the time despite being on 2 days of doxycycline and patient left AMA. Patient herself is not concerned about the need but this was noted at presurgical testing for nerve stimulator battery replacement and sent here with instructions to contact Dr. Ansari of burn. Patient denies fever, chills, discharge, and all other symptoms. On exam, afebrile, hemodynamically stable, saturating well, NAD, well appearing, laying comfortably in bed, no WOB, speaking full sentences, head NCAT, EOMI grossly, anicteric, breathing comfortably on room air, AAO, CN's 3-12 grossly intact, DUARTE spontaneously, full knee flexion/extension, noted anterior knee erythema without induration/fluctuance/TTP, no leg cyanosis or edema, skin warm, well perfused. Appearance concerning for cellulitis. Character and exam low suspicion for septic arthritis or for systemic spread. Patient on arrival and on multiple discussions during ED stay is refusing lab work, IV, or IV abx. Seen by burn and debrided at bedside and dressed. Pt left AMA though eloped prior to paperwork. I had extensive discussion of risks and benefits of pursuing further medical evaluation and care with patient; patient still electing to leave against medical advice. Patient is awake, alert, oriented and demonstrates full capacity and insight into illness. Patient aware and encouraged to return immediately to ED or nearest ED if patient decides to change mind regarding care or if patient experiences any new, worsening, or concerning symptoms.

## 2022-04-27 NOTE — ED PROVIDER NOTE - OBJECTIVE STATEMENT
67 year old female with a history of PVD and left knee laceration on 03/16 presents to the ED for wound check. Upon suture removal patient was noted to have a wound infection and given an extended course of antibiotics including Bactrim and Keflex. She returns to the ED today with worsening left knee wound erythema, pus discharge, and tenderness. 67 year old female with a history of PVD and left knee laceration on 03/16 presents to the ED for wound check. Upon suture removal patient was noted to have a wound infection and given an extended course of antibiotics Bactrim and Keflex. She returns to the ED today with worsening left knee wound erythema, pus discharge, and tenderness.

## 2022-04-27 NOTE — ED ADULT NURSE NOTE - NSFALLRSKHMRSKTYOTFT_ED_ALL_ED
Requested Prescriptions   Pending Prescriptions Disp Refills     phentermine (ADIPEX-P) 37.5 MG tablet [Pharmacy Med Name: Phentermine HCl 37.5 MG Oral Tablet] 30 tablet 0     Sig: TAKE 1 TABLET BY MOUTH IN THE MORNING BEFORE BREAKFAST       There is no refill protocol information for this order      Last Written Prescription Date:  1/12/22  Last Fill Quantity: 30,  # refills: 2   Last office visit: 1/12/2022 with prescribing provider:     Future Office Visit:            
dx/hx

## 2022-04-27 NOTE — ED PROVIDER NOTE - NSFOLLOWUPINSTRUCTIONS_ED_ALL_ED_FT
Wound Infection    WHAT YOU NEED TO KNOW:    A wound infection occurs when bacteria enters a break in the skin. The infection may involve just the skin, or affect deeper tissues or organs close to the wound.     DISCHARGE INSTRUCTIONS:    Return to the emergency department if:     You feel short of breath.       Your heart is beating faster than usual.       You feel confused.       Blood soaks through your bandages.      Your wound comes apart or feels like it is ripping.       You have severe pain.      You see red streaks coming from the infected area.    Contact your healthcare provider if:     You have a fever or chills.       You have more pain, redness, or swelling near your wound.      Your symptoms do not improve.       The skin around your wound feels numb.      You have questions or concerns about your condition or care.    Medicines: You may need any of the following:     NSAIDs, such as ibuprofen, help decrease swelling, pain, and fever. This medicine is available with or without a doctor's order. NSAIDs can cause stomach bleeding or kidney problems in certain people. If you take blood thinner medicine, always ask your healthcare provider if NSAIDs are safe for you. Always read the medicine label and follow directions.      Antibiotics help treat a bacterial infection.       Take your medicine as directed. Contact your healthcare provider if you think your medicine is not helping or if you have side effects. Tell him or her if you are allergic to any medicine. Keep a list of the medicines, vitamins, and herbs you take. Include the amounts, and when and why you take them. Bring the list or the pill bottles to follow-up visits. Carry your medicine list with you in case of an emergency.    Care for your wound as directed: Keep your wound clean and dry. You may need to cover your wound when you bathe so it does not get wet. Clean your wound as directed with soap and water or wound . Put on new, clean bandages as directed. Change your bandages when they get wet or dirty.    Help your wound heal:     Eat a variety of healthy foods. Examples include fruits, vegetables, whole-grain breads, low-fat dairy products, beans, lean meats, and fish. Healthy foods may help you heal faster. You may also need to take vitamins and minerals. Ask if you need to be on a special diet.      Manage other health conditions. Follow your healthcare provider's directions to manage health conditions that can cause slow wound healing. Examples include high blood pressure and diabetes.      Do not smoke. Nicotine and other chemicals in cigarettes and cigars can cause slow wound healing. Ask your healthcare provider for information if you currently smoke and need help to quit. E-cigarettes or smokeless tobacco still contain nicotine. Talk to your healthcare provider before you use these products.     Follow up with your healthcare provider in 1 to 2 days: Write down your questions so you remember to ask them during your visits.       © Copyright [employer] 2019 All illustrations and images included in CareNotes are the copyrighted property of [employer] or [employer]

## 2022-04-27 NOTE — ED ADULT NURSE REASSESSMENT NOTE - NS ED NURSE REASSESS COMMENT FT1
Pt refused IV hep loc, states take blood only. Pt was advised by both RN and ER Attending the need for IV Hep to give antibiotics. Pt stated shes going to leave anyway and refused. ER Attending and notified.

## 2022-05-04 NOTE — ASU PATIENT PROFILE, ADULT - FALL HARM RISK - UNIVERSAL INTERVENTIONS
Bed in lowest position, wheels locked, appropriate side rails in place/Call bell, personal items and telephone in reach/Instruct patient to call for assistance before getting out of bed or chair/Non-slip footwear when patient is out of bed/[place] to call system/Physically safe environment - no spills, clutter or unnecessary equipment/Purposeful Proactive Rounding/Room/bathroom lighting operational, light cord in reach

## 2022-05-05 NOTE — CHART NOTE - NSCHARTNOTEFT_GEN_A_CORE
PACU ANESTHESIA ADMISSION NOTE      Procedure: Revision, spinal pulse generator      Post op diagnosis:  Chronic pain disorder        ____  Intubated  TV:______       Rate: ______      FiO2: ______    __x__  Patent Airway    __x__  Full return of protective reflexes    __x__  Full recovery from anesthesia / back to baseline status    Vitals:  T(C): 36.4 (05-05-22 @ 07:14), Max: 36.4 (05-05-22 @ 07:14)  HR: 74 (05-05-22 @ 07:14) (74 - 74)  BP: 136/80 (05-05-22 @ 07:14) (136/80 - 136/80)  RR: 17 (05-05-22 @ 07:14) (17 - 17)  SpO2: 100% (05-05-22 @ 07:14) (100% - 100%)    Mental Status:  __x__ Awake   ___x__ Alert   _____ Drowsy   _____ Sedated    Nausea/Vomiting:  __x__ NO  ______Yes,   See Post - Op Orders          Pain Scale (0-10):  _____    Treatment: ____ None    __x__ See Post - Op/PCA Orders    Post - Operative Fluids:   ____ Oral   __x__ See Post - Op Orders    Plan: Discharge:   _x___Home       _____Floor     _____Critical Care    _____  Other:_________________    Comments: Patient had smooth intraoperative event, no anesthesia complication.

## 2022-05-05 NOTE — ASU PREOP CHECKLIST - VIA
Assessment/Plan:       Diagnoses and all orders for this visit:    Type 2 diabetes mellitus with diabetic polyneuropathy, with long-term current use of insulin (HCC)  -     insulin glulisine (APIDRA SOLOSTAR) 100 units/mL injection pen; Inject 26 Units under the skin 3 (three) times a day with meals  -     Discontinue: insulin glargine (LANTUS SOLOSTAR) 100 units/mL injection pen; Inject 60 Units under the skin daily at bedtime  -     insulin glargine (LANTUS SOLOSTAR) 100 units/mL injection pen; Inject 70 Units under the skin daily at bedtime  -     Comprehensive metabolic panel; Future  -     HEMOGLOBIN A1C W/ EAG ESTIMATION; Future    Hypertension, essential  -     Comprehensive metabolic panel; Future    Neuropathy    Gastroesophageal reflux disease without esophagitis    Right calf pain  -     VAS lower limb venous duplex study, unilateral/limited; Future  -     celecoxib (CeleBREX) 200 mg capsule; Take 1 capsule (200 mg total) by mouth 2 (two) times a day    Other orders  -     BD PEN NEEDLE DEV U/F 32G X 4 MM MISC;   -     AVALIDE 300-12 5 MG per tablet;   -     omeprazole (PriLOSEC) 40 MG capsule;   -     verapamil (VERELAN PM) 360 MG 24 hr capsule;   -     glucose blood test strip; 1 each by Other route as needed Use as instructed  -     Blood Glucose Monitoring Suppl SHAJI; by Does not apply route            Subjective:      Patient ID: Francesco Lopez is a 55 y o  male  Patient is here to establish  He is concerned about his right leg pain that he has had off and on for the past 2 weeks  He states that he was told by another physician that it was tendinitis  The pain is not improving even though he has been taking ibuprofen  He states he has had to stop the ibuprofen due to abdominal pain  Patient has insulin-dependent diabetes and his blood sugars are in the 200 range  He states he stands at work all day and once he is home he does not do very much due to the leg pain   He has had multiple surgeries on both feet in sees a podiatrist on a regular basis  He does have diabetic neuropathy but has not been able to find a medication that helps  Patient had lab work approximately 3 months ago and had his Apidra adjusted  We will have him increase his Lantus to see if that will help with his blood sugars  We will have him follow up in 1 month with labs prior to his visit  I am ordering a venous Doppler study on his right calf since it is edematous and very painful  Leg Pain    The incident occurred more than 1 week ago  There was no injury mechanism  The pain is present in the right leg  The pain is at a severity of 8/10  The pain has been constant since onset  Associated symptoms include an inability to bear weight, a loss of motion, muscle weakness and numbness  He reports no foreign bodies present  The symptoms are aggravated by weight bearing  He has tried acetaminophen and NSAIDs for the symptoms  The treatment provided no relief  The following portions of the patient's history were reviewed and updated as appropriate:   He has no past medical history on file  ,   does not have any pertinent problems on file  ,   has a past surgical history that includes Foot surgery (Right)  ,  family history includes Cancer in his mother; Hypertension in his father  ,   reports that he has never smoked  He has never used smokeless tobacco  He reports that he drinks about 4 8 oz of alcohol per week   He reports that he does not use drugs  ,  has No Known Allergies     Current Outpatient Prescriptions   Medication Sig Dispense Refill    AVALIDE 300-12 5 MG per tablet       BD PEN NEEDLE DEV U/F 32G X 4 MM MISC       Blood Glucose Monitoring Suppl SHAJI by Does not apply route      glucose blood test strip 1 each by Other route as needed Use as instructed      insulin glargine (LANTUS SOLOSTAR) 100 units/mL injection pen Inject 70 Units under the skin daily at bedtime 5 pen 0    insulin glulisine (APIDRA SOLOSTAR) 100 units/mL injection pen Inject 26 Units under the skin 3 (three) times a day with meals 5 pen 0    omeprazole (PriLOSEC) 40 MG capsule       verapamil (VERELAN PM) 360 MG 24 hr capsule       celecoxib (CeleBREX) 200 mg capsule Take 1 capsule (200 mg total) by mouth 2 (two) times a day 60 capsule 1     No current facility-administered medications for this visit  Review of Systems   Constitutional: Positive for activity change  Negative for fever  HENT: Negative for nosebleeds and trouble swallowing  Respiratory: Negative for cough, chest tightness and shortness of breath  Cardiovascular: Positive for leg swelling  Gastrointestinal: Positive for abdominal pain  Endocrine: Negative  Musculoskeletal: Positive for myalgias  Skin: Negative for color change  Neurological: Positive for weakness and numbness  Negative for dizziness and headaches  Hematological: Negative  Objective:  Vitals:    09/24/18 1450   BP: 110/68   Pulse: 79   Temp: 98 6 °F (37 °C)   SpO2: 96%   Weight: 113 kg (249 lb)   Height: 6' 3" (1 905 m)     Body mass index is 31 12 kg/m²  Physical Exam   Constitutional: He is oriented to person, place, and time  He appears well-developed and well-nourished  HENT:   Head: Normocephalic  Right Ear: External ear normal    Left Ear: External ear normal    Mouth/Throat: Oropharynx is clear and moist    Eyes: Conjunctivae are normal    Neck: Neck supple  No thyromegaly present  Cardiovascular: Normal rate, regular rhythm and normal heart sounds  Pulmonary/Chest: Effort normal and breath sounds normal    Abdominal: Soft  Bowel sounds are normal    Musculoskeletal: He exhibits edema and tenderness  Lymphadenopathy:     He has no cervical adenopathy  Neurological: He is alert and oriented to person, place, and time  Skin: Skin is dry  Psychiatric: He has a normal mood and affect   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

## 2022-05-05 NOTE — ASU DISCHARGE PLAN (ADULT/PEDIATRIC) - CARE PROVIDER_API CALL
Praveen Dominguez)  Surgery  Neurosurgery  [address] [place] [address], [address]  [place] [address]  Phone: [phone]  Fax: [phone]  Follow Up Time:

## 2022-05-05 NOTE — ASU DISCHARGE PLAN (ADULT/PEDIATRIC) - CALL YOUR DOCTOR IF YOU HAVE ANY OF THE FOLLOWING:
101/Bleeding that does not stop/Swelling that gets worse/Pain not relieved by Medications/Fever greater than (need to indicate Fahrenheit or Celsius)/Wound/Surgical Site with redness, or foul smelling discharge or pus

## 2022-05-05 NOTE — ASU DISCHARGE PLAN (ADULT/PEDIATRIC) - NS MD DC FALL RISK RISK
For information on Fall & Injury Prevention, visit: [email or web address] OR  [email or web address] OR  https://www.cdc.gov/steadi/patient.html

## 2022-05-12 NOTE — ED PROVIDER NOTE - PATIENT PORTAL LINK FT
You can access the FollowMyHealth Patient Portal offered by [facility] by registering at the following website: [email or web address]. By joining [employer]’s FollowMyHealth portal, you will also be able to view your health information using other applications (apps) compatible with our system.

## 2022-05-12 NOTE — CONSULT NOTE ADULT - ASSESSMENT
Pt is 68 yo female with PMH of Arthritis, Hepatitis, Fibromyalgia, PVD, Varicose veins, neuropathy, recent fall with left knee laceration, s/p repair in ED on 03/16/2022, in two weeks came back to ED for infected wound, sutures was  removed and pt discharged home on keflex and Doxy with recommendations to follow up with Dr [facility] in [facility] for wound management. Patient states she was not able to get an appointment for this week and came to ED for follow up. Pt denies fever, chills, pain, discharge or bleeding form left knee wound.       Plan:   - continue wound care daily: wash wound with soap and water, apply hydrogel ointment, then cover with xeroform, and wrap with kelrix daily  - Please call [phone] to make a follow up appointment within 1 week with Dr. Ansari or Dr. Rossi. Clinic is located at [address] [place] [address] on Tuesdays (2-4pm) or Thursdays (9am-1pm).

## 2022-05-12 NOTE — ED PROVIDER NOTE - OBJECTIVE STATEMENT
67 y f, pmh of anxiety, depression, s/p debridement by burn, sent in for eval. Endorses wound in left knee healing well, minimum pain, Dr. Ansari's office told her to come to ED for check up. Denies f/c, cp, sob, n/v, abd pain, dysuria.

## 2022-05-12 NOTE — ED PROVIDER NOTE - NSFOLLOWUPINSTRUCTIONS_ED_ALL_ED_FT
Cellulitis    Cellulitis is a skin infection caused by bacteria. This condition occurs most often in the arms and lower legs but can occur anywhere over the body. Symptoms include redness, swelling, warm skin, tenderness, and chills/fever. If you were prescribed an antibiotic medicine, take it as told by your health care provider. Do not stop taking the antibiotic even if you start to feel better.    SEEK IMMEDIATE MEDICAL CARE IF YOU HAVE THE FOLLOWING SYMPTOMS: worsening fever, red streaks coming from affected area, vomiting or diarrhea, or dizziness/lightheadedness.     Please follow up with Dr. Ansari

## 2022-05-12 NOTE — CONSULT NOTE ADULT - SUBJECTIVE AND OBJECTIVE BOX
Pt is 66 yo female with PMH of Arthritis, Hepatitis, Fibromyalgia, PVD, Varicose veins, neuropathy, recent fall with left knee laceration, s/p repair in ED on 03/16/2022, in two weeks came back to ED for infected wound, sutures was  removed and pt discharged home on keflex and Doxy with recommendations to follow up with Dr [facility] in [facility] for wound management. Patient states she was not able to get an appointment for this week and came to ED for follow up. Pt denies fever, chills, pain, discharge or bleeding form left knee wound.       Allergie:  No Known Allergies    PAST MEDICAL & SURGICAL HISTORY:  Anxiety  Depression  Osteoporosis  Kidney cysts  Peripheral neuropathy  RA (rheumatoid arthritis)    FAMILY HISTORY:  Family history of stroke (Mother)    Vital Signs Last 24 Hrs  T(C): 36.2 (12 May 2022 11:04), Max: 36.2 (12 May 2022 11:04)  T(F): 97.1 (12 May 2022 11:04), Max: 97.1 (12 May 2022 11:04)  HR: 88 (12 May 2022 11:04) (88 - 88)  BP: 114/70 (12 May 2022 11:04) (114/70 - 114/70)  SpO2: 99% (12 May 2022 11:04) (99% - 99%)      PHYSICAL EXAM  General: seen on bedside in ED, alert, oriented, not in distress  Skin/Wound: left knee with laceration, approximately 1cm x 7cm, healing, with central area of yellowish eschar, and granulation tissue at periphery, no drainage, no bleeding. Noted three sutures that removed on bedside. Dressing with hydrogel, xeroform, and Kerlix apply. Pt tolerated well.              Ass/ Rec:  Venous stasis ulcer  LE   Infected  Wound care -   IV abx as indicated  Surgical debridement   Elevation and compression   Will follow.

## 2022-05-12 NOTE — ED PROVIDER NOTE - PHYSICAL EXAMINATION
CONSTITUTIONAL: NAD  SKIN: Warm dry  HEAD: NCAT  EYES: NL inspection  ENT: MMM  NECK: Supple; non tender.  CARD: RRR  RESP: CTAB  ABD: S/NT no R/G  EXT: left knee +ulcer, well healing.   NEURO: Grossly unremarkable  PSYCH: Cooperative, appropriate.

## 2022-05-12 NOTE — ED PROVIDER NOTE - CLINICAL SUMMARY MEDICAL DECISION MAKING FREE TEXT BOX
66 yo F presenting for wound evaluation.   Was supposed to go to clinic but had difficulty making appointment.  no new medical complaints.   Burn surgery consult appreciated.  Follow up outpatient.   return precautions discussed.

## 2022-05-12 NOTE — ED PROVIDER NOTE - CARE PROVIDER_API CALL
Aldair Ansari)  Plastic Surgery  [address] [place] [address]  Phone: [phone]  Fax: [phone]  Follow Up Time: 1-3 Days

## 2022-05-12 NOTE — ED PROVIDER NOTE - ATTENDING CONTRIBUTION TO CARE
66 yo F p/w wound evaluation to the left knee after extensive debridement by Dr Ansari recently. denies fevers. states she tried to make an appointment at the clinic where she was instructed to follow up but  told her to come to ED as she could not make an appointment.     well healing left knee wound. no erythema, warmth, or fluctuance.  neurovascularly intact NHAN.     plan for burn consult.

## 2022-05-18 NOTE — HISTORY OF PRESENT ILLNESS
[FreeTextEntry1] : Ms. Roberts, s/p battery replacement on 5/5. Doing well. Is here to be program. \par Asher Tilley is here to assist with this. \par Surgical is clean, dry, and intact.\par \par She would like a cervical SCS, but has not done the trial with Dr. Mcgill.

## 2022-05-20 NOTE — ED PROVIDER NOTE - NS ED NOTE AC HIGH RISK COUNTRIES
The following are the instructions for home care, to inform you about your treatment and to help you with comfort and to control the pain and symptoms.    Please proceed to Emergency Room at any time if your medical condition would worsen significantly.    Resting of the affected area is the main principle of treatment.     Maximum dose of Tylenol (acetaminophen) is 500 mg OTC every 6 hours taken orally for pain control, as needed, for the next few days.    Please consider application of over-the-counter OTC Lidocaine-containing antipain ointment. Wash your hands after application of lidocaine.  Biofreeze with Lidocaine OTC or Icy Hot with Lidocaine OTC applied every few hours on the skin at the pain area can be helpful.   Cooling with ice packs (while awake) may help.  Warm compresses (while awake) later on may be considered.    Please contact primary care provider to reevaluate the recovery process. Consider treatment by chiropractor.  I have placed a referral to  Physical Therapy.    Please call back with any additional questions to make sure that your needs are attended and all of your questions have been answered.   If any tests have been performed and the test results are not available at this time, we will notify you about the test results as soon as they become available.  Please share this information with any of your family members if you prefer.       No

## 2022-06-09 NOTE — HISTORY OF PRESENT ILLNESS
[FreeTextEntry1] : I am seeing Melissa in f/u her SCS is workign well and providing pain relief. IPG site well healed. She is now concerned for her severe neck pain radiating into the right hsoulder. Of notes she has had humeral fracture in the past. It is unclear if her pain is due to her previous fracture or primary neck pathology

## 2022-06-12 NOTE — ED PROVIDER NOTE - ATTENDING CONTRIBUTION TO CARE
I have reviewed the scribes entries and confirmed accuracy.  I agree with the scribes documentation.  For my attending attestation and note, please see my progress note. [id number]

## 2022-07-27 NOTE — ASU DISCHARGE PLAN (ADULT/PEDIATRIC) - MODE OF TRANSPORTATION
Wheelchair/Stroller Bactrim Counseling:  I discussed with the patient the risks of sulfa antibiotics including but not limited to GI upset, allergic reaction, drug rash, diarrhea, dizziness, photosensitivity, and yeast infections.  Rarely, more serious reactions can occur including but not limited to aplastic anemia, agranulocytosis, methemoglobinemia, blood dyscrasias, liver or kidney failure, lung infiltrates or desquamative/blistering drug rashes.

## 2022-08-06 PROBLEM — S81.002A OPEN WOUND OF LEFT KNEE: Status: ACTIVE | Noted: 2022-01-01

## 2022-08-09 PROBLEM — M25.511 RIGHT SHOULDER PAIN: Status: ACTIVE | Noted: 2022-01-01

## 2022-08-09 PROBLEM — G90.523 COMPLEX REGIONAL PAIN SYNDROME TYPE 1 OF BOTH LOWER EXTREMITIES: Status: ACTIVE | Noted: 2017-06-15

## 2022-08-09 PROBLEM — S32.020A COMPRESSION FRACTURE OF L2: Status: ACTIVE | Noted: 2017-06-14

## 2022-08-09 NOTE — HISTORY OF PRESENT ILLNESS
[FreeTextEntry1] : Ms. Roberts, who had humerus fx more than 2 years, and persistent chronic neck pain since Dec 2021 radiating to right upper extremity down her fingers, worse on the 3rd digit, presents today to review mri imaging and to discuss cervical SCS. \par Unfortunately, cervical MRI was not done, as per patient, requires authorization.\par \par Today she continues to have neck pain radiating to the RUE. Reports of numbness, tingling, weakness, and gait imbalance. SHE is under the care of Dr. Mcgill, and has been given CSEI which temporary helps. \par Symptom is aggravated with movements and holding object. Nothing alleviate her pain. \par Currently, on gabapentin 600mg TID and hydrocodone  mg which she reports does not help with her symptom. \par The worse pain is in the posterior neck, underneath the arm, and scapular region \par \par MRI of the shoulder w/o contrast done at [facility] on  7/19 showed HEALED PROXIMAL HUMERAL FRACTURE WITH DEFORMITY AND CHONDRAL WEAR. THERE ARE FOCAL TEARS OF THE CUFF INSERTION WITH SUGGESTION FOR FULL WIDTH UNDERSURFACE INVOLVEMENT OF CRANIAL FIBERS OF THE SUBSCAPULARIS FOOTPRINT.

## 2022-09-28 PROBLEM — M54.2 NECK PAIN: Status: ACTIVE | Noted: 2022-01-01

## 2022-09-28 NOTE — ASSESSMENT
[FreeTextEntry1] : I am ordering an MRI of the cervical spine w/o contrast due to worsening progressive symptoms despite doing daily cervical home exercises. \par SHe will follow up once MRI is completed to delineate a plan of care with Dr. Dominguez. \par \par \par My phone encounter with this patient was 5mins

## 2022-09-28 NOTE — HISTORY OF PRESENT ILLNESS
[FreeTextEntry1] : I am following up with Ms. Roberts, after doing 7 weeks for cervical home exercises, for neck pain radiating to the right upper extremities, which has worsens since her visit back in August- She was doing the exercises daily, which includes stretching, ROM, weights of 5 pounds or less, warm/cold compress, ambulating everyday. Continues to have paresthesia and gait imbalance.\par \par She does not have a recent MRI

## 2022-09-28 NOTE — REASON FOR VISIT
[Home] : at home, [unfilled] , at the time of the visit. [Medical Office: ([facility])___] : at the medical office located in  [Verbal consent obtained from patient] : the patient, [unfilled]

## 2022-10-27 NOTE — PHYSICAL EXAM
[Healing] : healing [Size%: ______] : Size: [unfilled]% [Infected?] : Infected: No [3] : 3 out of 10 [Abnormal] : abnormal [Large] : medium [] : no [de-identified] : bilateral lower leg wounds 10x5cm and 10x5cm --> open wounds-. local wound care \par follow up 2 - 4  weeks \par \par  [TWNoteComboBox1] : ABD pad [TWNoteComboBox2] : SSD

## 2022-10-27 NOTE — HISTORY OF PRESENT ILLNESS
[Did you have an operation on your burn/wound injury?] : Did you have an operation on your burn/wound injury? No [Did this injury occur on the job?] : Did this injury occur on the job? No [de-identified] : home [de-identified] : open wounds and edema bilateral lower legs [de-identified] : healing

## 2022-10-27 NOTE — REASON FOR VISIT
[Revisit] : revisit [Were you seen in the Emergency Room?] : seen in the emergency room [Family Member] : family member

## 2022-10-27 NOTE — ASSESSMENT
[FreeTextEntry1] : bilateral lower leg wounds 10x5cm and 10x5cm --> open wounds-. local wound care \par follow up 2 - 4  weeks \par \par  [Wound Care] : wound care

## 2022-11-10 PROBLEM — S91.302A WOUND OF LEFT FOOT: Status: ACTIVE | Noted: 2022-01-01

## 2022-11-10 NOTE — ED PROVIDER NOTE - NS ED ROS FT
Review of Systems    Constitutional: (-) fever  Cardiovascular: (-) chest pain, (-) syncope  Respiratory: (-) cough, (-) shortness of breath  Gastrointestinal: (-) vomiting, (-) diarrhea, (-) abdominal pain  Musculoskeletal: (-) neck pain, (-) back pain, (+) BLE pain  Integumentary: (+) BLE wounds (-) edema  Neurological: (-) headache, (-) altered mental status    Except as documented in the HPI, all other systems are negative.

## 2022-11-10 NOTE — H&P ADULT - NSHPPHYSICALEXAM_GEN_ALL_CORE
ICU Vital Signs Last 24 Hrs  T(C): 36.6 (10 Nov 2022 10:45), Max: 36.6 (10 Nov 2022 10:45)  T(F): 97.8 (10 Nov 2022 10:45), Max: 97.8 (10 Nov 2022 10:45)  HR: 81 (10 Nov 2022 10:45) (81 - 81)  BP: 110/58 (10 Nov 2022 10:45) (110/58 - 110/58)  BP(mean): --  ABP: --  ABP(mean): --  RR: 20 (10 Nov 2022 10:45) (20 - 20)  SpO2: 98% (10 Nov 2022 10:45) (98% - 98%)    Constitutional; No acute distress  Head: Atraumatic, normocephalic  Eyes: non-icteric  Lungs: Clear to auscultation bilaterally; no wheeze/crackles/rales  Heart: S1, S2+; no murmurs  Abd: Soft non tender non distended  Ext: wrapped in bandage; PT REFUSED TO GET EXAMINED AS BOTH THE FEET ARE DEBRIDED BY PODIATRY JUST BEFORE MY ENCOUNTER  Neuro: AOX4; REFUSED NEUROLOGICAL EXAM IN LOWER EXTREMITIES  Skin: ecchymotic swelling on Rt forehead; REFUSED TO EXAMINE LOWER EXTREMITY SKIN

## 2022-11-10 NOTE — H&P ADULT - NSHPLABSRESULTS_GEN_ALL_CORE
(11-10 @ 11:55)                      12.0  9.14 )-----------( 168                 35.2    Neutrophils = 6.88 (75.2%)  Lymphocytes = 1.67 (18.3%)  Eosinophils = 0.07 (0.8%)  Basophils = 0.06 (0.7%)  Monocytes = 0.43 (4.7%)  Bands = --%    11-10    136  |  100  |  22<H>  ----------------------------<  35<LL>  HEMOLYZED   |  29  |  0.6<L>    Ca    9.1      10 Nov 2022 11:55    TPro  5.7<L>  /  Alb  3.1<L>  /  TBili  <0.2  /  DBili  x   /  AST  128<H>  /  ALT  66<H>  /  AlkPhos  141<H>  11-10    ( 10 Nov 2022 11:55 )   PT: 11.30 sec;   INR: 0.99 ratio;       PTT:40.2 sec      RVP:    Venous Blood Gas:  11-10 @ 13:57  7.23/69/31/29/55.7  VBG Lactate: 0.80        Tox:         < from: Xray Chest 1 View-PORTABLE IMMEDIATE (11.10.22 @ 12:15) >    Impression:    Hazy right upper lobe opacities. Recommend follow-up.        --- End of Report ---    < end of copied text >

## 2022-11-10 NOTE — ED PROVIDER NOTE - PHYSICAL EXAMINATION
Vital Signs: I have reviewed the initial vital signs.  Constitutional: frail appearing, thin, no acute distress.  HEENT: Airway patent, poor oral hygene  CV: regular rate, regular rhythm, faint BLE pulses  Lungs: Clear to ascultation bilaterally, no increased work of breathing.  ABD: Non-tender, Non-distended, no hernias, no flank pain.   MSK: Neck supple, nontender, BLE wounds examined and debrided by podiatry at bedside.   NEURO: A&Ox3, moving all extremities, normal speech  PSYCH: Calm, cooperative, normal affect and interaction.

## 2022-11-10 NOTE — ED PROVIDER NOTE - CARE PLAN
1 Principal Discharge DX:	Multiple open wounds of left lower extremity  Secondary Diagnosis:	Hypoglycemia

## 2022-11-10 NOTE — PATIENT PROFILE ADULT - FALL HARM RISK - HARM RISK INTERVENTIONS

## 2022-11-10 NOTE — ED PROVIDER NOTE - OBJECTIVE STATEMENT
Patioent is 67y F pmhx Anxiety, depression, peripheral neuropathy, Rheumatoid arthritis sent in by Dr. Ansari for evaluation of BLE wounds and plantar ulcers of the LLE. Patient denies fevers, chills, chest pain, shortness of breath. Patient believes she stepped on glass last month which caused the LLE ulcers. Patient has been following wound care directions at home.

## 2022-11-10 NOTE — H&P ADULT - ATTENDING COMMENTS
66 YO F w/ a PMH of anxiety/depression, peripheral neuropathy, HLD, and RA who was [facility] in [facility] for eval of B/L LE ulcerations that have progressed over the past x 1 month. Associated with redness and swelling. Denies any fevers/chills, numbness of distal extremity, joint pain, ABD pain, N/V/D, CP, SOB, sore throat, cough, palpitations, or headaches.     In the ED, XRs of left foot are pending official read. Arterial dopplers of LEs are preliminary negative. Cultures obtained, pt was started on IVFs (LR) and IV ABXs (Zosyn).     FMHx: Reviewed, not relevant    Physical exam shows pt in NAD. VSS, afebrile, not hypoxic on RA. A&Ox3. Non-focal neuro exam. Muscle strength/sensation intact. CTA B/L with no W/C/R. RRR, no M/G/R. ABD is soft and non-tender, normoactive BSs. Multiple B/L LE ulcers w/ surrounding erythema and swelling. Labs and radiology as above.    B/L LE venous stasis ulcerations w/ superimposed non-purulent cellulitis; no sepsis present on admission. IV ABXs (Vanc/Cefe/Flagyl). ID consult. A1c/Lipids. Elevation of LEs. Compression wraps with ACE bandages. Topical CSs. Silver Sulfadiazine on open ulcerations. PRN pain meds. Wash LEs daily with soap and water. Elevate LEs. FU official LE doppler results.     Hypoalbuminemia, from poor oral intake. Nutrition eval.     Hx of anxiety/depression, peripheral neuropathy, HLD, and RA. Restart home meds, except as stated above. DVT PPX. Inform PCP of pt's admission to hospital. My note supersedes the residents note.     Date seen by Attendin/10/22

## 2022-11-10 NOTE — CONSULT NOTE ADULT - SUBJECTIVE AND OBJECTIVE BOX
Podiatry Consult Note    Subjective:  ANA LILIA VERMA  Seen Bedside 67y Female  .   Patient is a 67y old  Female who presents with a chief complaint of BLE ulcers  HPI:  Pt recalls stepping on a glass the last month that might have caused her the left foot plantar ulcer.     Past Medical History and Surgical History  PAST MEDICAL & SURGICAL HISTORY:  Anxiety      Depression      Osteoporosis      Kidney cysts      Peripheral neuropathy      RA (rheumatoid arthritis)      OA (osteoarthritis)      Low back pain with radiation  s/p &quot;nerve cutting&quot; 2015           Review of Systems:  [X] Ten point review of systems is otherwise negative except as noted     Objective:  Vital Signs Last 24 Hrs  T(C): 36.6 (10 Nov 2022 10:45), Max: 36.6 (10 Nov 2022 10:45)  T(F): 97.8 (10 Nov 2022 10:45), Max: 97.8 (10 Nov 2022 10:45)  HR: 81 (10 Nov 2022 10:45) (81 - 81)  BP: 110/58 (10 Nov 2022 10:45) (110/58 - 110/58)  BP(mean): --  RR: 20 (10 Nov 2022 10:45) (20 - 20)  SpO2: 98% (10 Nov 2022 10:45) (98% - 98%)                            12.0   9.14  )-----------( 168      ( 10 Nov 2022 11:55 )             35.2                           Physical Exam - Lower Extremity Focused:   Derm:  Left foot; ulcer sub 5th met head surrounding erythema and hyperkeratosis, probes to bone tunneling at 9 oclock, another plantar medial ulcer at the midfoot more superficial doesnot probe. ulcer on the dorsal aspect of the 2nd digit, probes to soft tissue, fibrogranular base, no drainage noted.   Right foot; ulcer at the tip of the 2nd digit on the plantar surface, 0.3 cm in diameter with granular base and surrounding hyperkeratosis, doesnot probe.   Vascular: DP and PT Pulses Diminished; Foot is Warm to Warm to the touch; Capillary Refill Time < 3 Seconds;    Neuro: Protective Sensation Diminished / Moderately Neuropathic   MSK: No Pain On Palpation on the wound site.   Assessment:   - BLE ulcers    Plan:  Chart reviewed and Patient evaluated. All Questions and Concerns Addressed and Answered  XR Imaging Foot; Pending Results  - Debrided sub 5th met ulcer, left foot to the subcutaneous tissue with #15 blade.   Local Wound Care; Wound Flushed w/ NS; Wound Packed w/ Xeroform / Gauze / DSD / Kerlix   Weight Bearing Status; WBAT w/ Heel Touch w/ Surgical Shoe;   Recommend; Lower Extremity Arterial Duplex B/L; ESR/CRP; MRI- Foot;   Request ID Consult;   Possible Surgical Debridement; Pending Arterial Duplex, ESR/CRP and XR Imaging  Discussed Plan w/     Podiatry

## 2022-11-10 NOTE — ED PROVIDER NOTE - ATTENDING CONTRIBUTION TO CARE
67 y.o. F, PMHx anxiety, depression, peripheral neuropathy, HLD, RA sent from burn clinic for evaluation of ulcers on bilateral LE. Pt says she has stepped on some tiny glass pieces around a month ago which slowly led to the development of ulcers on both of her feet. She complains that she has chronic pain all the time in her legs for a long time, associated with numbness and tingling and follows with pain management (has a spinal stimulator and takes gabapentin and hydrocodone). She says she has no sensations in half of her left foot over the past 15 years and no one has helped her. Also there was a noticed ecchymotic swelling on her right side of the forehead, for which explained that she hit kitchen cabinet two days ago but did not fall or felt dizzy or lose consciousness.  Denies fever, chills, chest pain, SOB. On exam, pt in NAD, AAOx3, head NC/AT, CN II-XII intact, PEERL, EOMi, neck (-) midline tenderness, lungs CTA B/L, CV S1S2 regular, abdomen soft/NT/ND/(+)BS, ext (+) non-tender B/L LE wounds, motor 5/5x4, sensation intact. Labs/XR done and reviewed. Will admit for b/l feet ulcers. Pt found to be hypoglycemic. Food given. Dextrose given.

## 2022-11-10 NOTE — ED PROVIDER NOTE - WR ORDER ID 2
Patient is stable for discharge at this time, anticipatory guidance provided, close follow-up is encouraged, and ED return instructions have been detailed. Patient is both agreeable to the disposition and plan and discharged home in ambulatory state.  
[id number]

## 2022-11-10 NOTE — H&P ADULT - ASSESSMENT
66 yo F with PMHx anxiety, depression, peripheral neuropathy, HLD, RA sent from burn clinic for evaluation of ulcers on bilateral LE. Pt says she has stepped on some tiny glass pieces around a month ago which slowly led to the development of ulcers on both of her feet.    #Multiple B/L LE ulcers  - broad differential: Ischemic ulcer Vs Neuropathic ulcer Vs Traumatic Vs infectious  - hemodynamically stable  - active smoker and hx chronic peripheral neuropathy  - pt refused physical exam of foot; unable to assess pedal pulses  - Ulcer characteristics noted from podiatry evaluation:    Left foot: ulcer over plantar surface of 5th metatarsal head, medial plantar surface of midfoot, dorsal surface of 2nd digit    Right foot: ulcer at the tip of the 2nd digit on the plantar surface  - s/p Wound debridement by podiatry in ED  - c/w wound care recs as per podiatry  - F/u Xray and MR foot to r/o osteomyelitis  - F/u arterial duplex  - F/u ESR, CRP  - F/u a1c, lipid profile  - empiric abx with broad spectrum coverage: vancomycin iv 1g q12hr, cefepime iv 2g q12hr, flagyl iv 500mg q8hr  - F/u BCx  - F/u ID consult    #Peripheral neuropathy  - c/w gabapentin 600mg TID  - takes hydrocodone at home  - c/w percocet 2 tabs q6hr prn here  - has spinal stimulator  - follows with pain management OP    #RUL lung opacities  - noticed incidentally on CXR  - denies cough, SOB  - F/u QuantiFeron TB  - Consider CT chest if required    #HLD  - takes rosuvastatin 5mg daily at home  - will c/w lipitor 20mg daily here  - F/u lipid profile    CMP is grossly hemolyzed and lytes, HgB are incongruent with those in VBG (VBG might be a lab error)  Will repeat CMP for 4PM    DVT px - lovenox  Diet - regular  Activity - WBAT  Dispo - acute  *MED REC - confirmed with pt  Pharmacy - [address]

## 2022-11-10 NOTE — H&P ADULT - HISTORY OF PRESENT ILLNESS
66 yo F with PMHx anxiety, depression, peripheral neuropathy, HLD, RA sent from burn clinic for evaluation of ulcers on bilateral LE. Pt says she has stepped on some tiny glass pieces around a month ago which slowly led to the development of ulcers on both of her feet. She complains that she has chronic pain all the time in her legs for so long associated with numbness and tingling and follows with pain management (has a spinal stimulator and takes gabapentin and hydrocodone). She says she has no sensations in half of her left foot over the past 15 years and no one has helped her. Also there was a noticed ecchymotic swelling on her right side of the forehead, for which explained that she hit kitchen cabinet two days ago but did not fall or felt dizzy or lose consciousness.  Denies fever, chills, chest pain, SOB.    In ED, pt is HDS  Labs show normal WBC.  CMP is grossly hemolyzed and lytes, HgB are incongruent with those in VBG (VBG might be a lab error)  CXR shows RUL opacities  Admitted to medicine for further evaluation and management of bilateral LE ulcers

## 2022-11-10 NOTE — ED ADULT NURSE NOTE - OBJECTIVE STATEMENT
pt sent in by Dr. Ansari for evaluation of BLE wounds and plantar ulcers of the LLE. Patient denies fevers, chills, chest pain, shortness of breath

## 2022-11-11 NOTE — CONSULT NOTE ADULT - ATTENDING COMMENTS
ulcer left foot submetatarsal head 5, probes to bone, undermining @ 9 o'clock. no active drainage  xrays negative for OM  suspicious for OM-->recommend MRI  Arterial duplex  will follow  no surgical plan for now    Gary Nieto DPM
I have personally seen and examined this patient.  I have fully participated in the care of this patient.  I have reviewed all pertinent clinical information, including history, physical exam, plan and note.  I have reviewed all pertinent clinical information and reviewed all relevant imaging and diagnostic studies personally.  Corrections and edits were made wherever needed.       #Rule out osteomyelitis , +PTB on Podiatry exam which is clinically + for OM   - plan as above  - hold antibiotics  - ideally bone bx  #Sepsis ruled out on admission     If any questions, please call or send a message on [employer] Teams  Please continue to update ID with any pertinent new laboratory or radiographic findings  Spectra [id number]

## 2022-11-11 NOTE — PROGRESS NOTE ADULT - ASSESSMENT
68 yo F with PMHx anxiety, depression, peripheral neuropathy, HLD, RA sent from burn clinic for evaluation of ulcers on bilateral LE. Pt says she has stepped on some tiny glass pieces around a month ago which slowly led to the development of ulcers on both of her feet.    #Multiple B/L LE ulcers 2//2 neuropathy with possible OM   - hemodynamically stable  - active smoker and hx chronic peripheral neuropathy  - Left foot: ulcer over plantar surface of 5th metatarsal head, medial plantar surface of midfoot, dorsal surface of 2nd digit  - Right foot: ulcer at the tip of the 2nd digit on the plantar surface  - VA arterial duplex shows normal flow   - s/p debridement by podiatry   - c/w wound care recs as per podiatry  - X-ray of the foot is limited due to positioning  - patient was not able to do the MRI due to spinal stimulator, -  Spinal stimulator [id number], Serial # [id number]  - Patient reports that spinal stimulator is not compatible with MRI   - Will order CT-foot with IV contrast if  MRI incompatible to r/o OM  - ALP elevated possible OM cw Abx   - ESR 25 , CRP 9.6  - a1c 5.1,  lipid profile wnl  - cw vancomycin iv 1g q12hr, cefepime iv 2g q12hr, flagyl iv 500mg q8hr for possible OM   - F/u BCx  - F/u ID consult    # hypomagnesemia:  - Mg 1.4 will give 4 g IV magnesium today,     #Peripheral neuropathy  - c/w gabapentin 600mg TID  - takes hydrocodone at home  - c/w percocet 2 tabs q6hr prn here  - has spinal stimulator  - follows with pain management OP    #RUL lung opacities  - CT chest showed Right upper lobe nodular opacifications with additional branching nodules, possibly inflammatory in nature, repeat CT of the chest should be done in 4-6 weeks as OP to make sure nodules are stable and r/o malignancy   - denies cough, SOB  - F/u QuantiFeron TB (ordered in ED)   - CXR showed hyperinflated lung, patient is a heavy smoker (possible emphysema)   - FU ABG   - VBG 7.2, ordered ABG to FU    #HLD  - takes rosuvastatin 5mg daily at home  - will c/w lipitor 20mg daily here  - lipid profile wnl       DVT px - lovenox  Diet - regular  Activity - WBAT  [facility] [address]

## 2022-11-11 NOTE — CONSULT NOTE ADULT - SUBJECTIVE AND OBJECTIVE BOX
ANA LILIA VERMA  67y, Female  Allergy: No Known Allergies    CHIEF COMPLAINT: LE ulcers (11 Nov 2022 13:45)    HPI:  68 yo F with PMHx anxiety, depression, peripheral neuropathy, HLD, RA sent from burn clinic for evaluation of ulcers on bilateral LE. Pt says she has stepped on some tiny glass pieces around a month ago which slowly led to the development of ulcers on both of her feet. She complains that she has chronic pain all the time in her legs for so long associated with numbness and tingling and follows with pain management (has a spinal stimulator and takes gabapentin and hydrocodone). She says she has no sensations in half of her left foot over the past 15 years and no one has helped her. Also there was a noticed ecchymotic swelling on her right side of the forehead, for which explained that she hit kitchen cabinet two days ago but did not fall or felt dizzy or lose consciousness.  Denies fever, chills, chest pain, SOB.    In ED, pt is HDS  Labs show normal WBC.  CMP is grossly hemolyzed and lytes, HgB are incongruent with those in VBG (VBG might be a lab error)  CXR shows RUL opacities  Admitted to medicine for further evaluation and management of bilateral LE ulcers      Infectious Diseases History:  Old Micro Data/Cultures:     FAMILY HISTORY:  Family history of stroke (Mother)      PAST MEDICAL & SURGICAL HISTORY:  Anxiety      Depression      Osteoporosis      Kidney cysts      Peripheral neuropathy      RA (rheumatoid arthritis)      OA (osteoarthritis)      Low back pain with radiation  s/p &quot;nerve cutting&quot; 2015          SOCIAL HISTORY  Social History:  smokes 1ppd for so many years.  Does not drink alcohol or use recreational drugs (10 Nov 2022 14:26)      Recent Travel:  Other Exposures:     ROS  General: Denies rigors, nightsweats  HEENT: Denies headache, rhinorrhea, sore throat, eye pain  CV: Denies CP, palpitations  PULM: Denies wheezing, hemoptysis  GI: Denies hematemesis, hematochezia, melena  : Denies discharge, hematuria  SKIN: Denies rash, lesions    VITALS:  T(F): 96.9, Max: 96.9 (11-11-22 @ 07:35)  HR: 62  BP: 110/71  RR: 18Vital Signs Last 24 Hrs  T(C): 36.1 (11 Nov 2022 07:35), Max: 36.1 (11 Nov 2022 07:35)  T(F): 96.9 (11 Nov 2022 07:35), Max: 96.9 (11 Nov 2022 07:35)  HR: 62 (11 Nov 2022 06:00) (62 - 62)  BP: 110/71 (11 Nov 2022 06:00) (110/71 - 110/71)  BP(mean): --  RR: 18 (11 Nov 2022 06:00) (18 - 18)  SpO2: --    PHYSICAL EXAM:  CNS, RS, CVS and abdomen  - Normal   Extremities - Chronic inflammatory changes in all limbs, Ulcers noted - doesn't look infected    TESTS & MEASUREMENTS:                        11.9   8.38  )-----------( 152      ( 11 Nov 2022 06:13 )             35.4     11-11    140  |  104  |  17  ----------------------------<  47<LL>  4.3   |  30  |  0.5<L>    Ca    8.6      11 Nov 2022 06:13  Mg     1.4     11-11    TPro  4.6<L>  /  Alb  2.7<L>  /  TBili  <0.2  /  DBili  x   /  AST  47<H>  /  ALT  40  /  AlkPhos  130<H>  11-11      LIVER FUNCTIONS - ( 11 Nov 2022 06:13 )  Alb: 2.7 g/dL / Pro: 4.6 g/dL / ALK PHOS: 130 U/L / ALT: 40 U/L / AST: 47 U/L / GGT: x             Blood Gas Venous - Lactate: 0.80 mmol/L (11-10-22 @ 13:57)      INFECTIOUS DISEASES TESTING  MRSA PCR Result.: Negative (04-27-22 @ 09:51)      RADIOLOGY & ADDITIONAL TESTS:  I have personally reviewed the last available Chest xray  CXR      CT  CT Chest No Cont:   ACC: [id number] EXAM:  CT CHEST                          PROCEDURE DATE:  11/11/2022      INTERPRETATION:  Reason for Exam:  Abnormality seen on plain film imaging.  CT of the chest was performed from the thoracic inlet to the level of the   adrenal glands without contrast injection. Coronal and sagittal images   have been submitted.    Comparison: Concurrent plain film imaging.    Findings:    Tubes/Lines: Spinal stimulator    Lungs, Pleura, and Airways:The tracheobronchial tree is patent.    Right upper lung field ill-defined consolidative opacification with   separate areas of tree-in-bud opacities. This structure measures 1.2 cm   posteriorly and 2 cm anteriorly.    Right-sided pleural effusion with compressive atelectasis. Smaller   left-sided effusion with compressive atelectasis.    Mediastinum/Lymph Nodes:There is no axillary lymphadenopathy. Within the   limitation of this noncontrast study, there is no mediastinal or hilar   lymphadenopathy.    Heart/Great Vessels: Heart size is normal.    Abdomen: Limited evaluation the upper abdomen reveals mild ascites.    Bones and soft tissues: Again identified is spinal stimulator.    IMPRESSION:    Right upper lobe nodular opacifications with additional branching   nodules. While most of this may in fact be inflammatory in nature,   follow-up CT scan in 4-6 weeks should BE obtained for resolution.    --- End of Report ---    STACI XIE MD; Attending Interventional Radiologist  This document has been electronically signed. Nov 11 2022 11:57AM (11-11-22 @ 10:57)      CARDIOLOGY TESTING      All available historical records have been reviewed    MEDICATIONS  atorvastatin 20  cefepime   IVPB 2000  chlorhexidine 4% Liquid 1  enoxaparin Injectable 40  gabapentin 600  magnesium sulfate  IVPB 2  metroNIDAZOLE  IVPB 500  vancomycin  IVPB 1000      ANTIBIOTICS:  cefepime   IVPB 2000 milliGRAM(s) IV Intermittent every 12 hours  metroNIDAZOLE  IVPB 500 milliGRAM(s) IV Intermittent every 8 hours  vancomycin  IVPB 1000 milliGRAM(s) IV Intermittent every 12 hours      All available historical data has been reviewed    ASSESSMENT  68 yo F with PMHx anxiety, depression, peripheral neuropathy, HLD, RA sent from burn clinic for evaluation of ulcers on bilateral LE    IMPRESSION  # Ulcers noted at the plantar surface of the left foot and toes b/l - Not infected; No systemic signs of infection   # Evaluate for osteomyelitis of the Lt foot 5th metatarsal - needs bone biopsy and MRI foot       RECOMMENDATIONS  - f/u pending cultures  - Pt can be off antibiotics until Bone biopsy or MRI foot to confirm osteomyelitis   - If podiatry and primary team decide to discharge before the biopsy/Mri, Pt can be discharged with Doxycycline 100mg BID for 3 weeks and followed as outpatient.      This is a pended note. All final recommendations to follow pending discussion with ID Attending    ANA LILIA VERMA  67y, Female  Allergy: No Known Allergies    CHIEF COMPLAINT: LE ulcers (11 Nov 2022 13:45)    HPI:  66 yo F with PMHx anxiety, depression, peripheral neuropathy, HLD, RA sent from burn clinic for evaluation of ulcers on bilateral LE. Pt says she has stepped on some tiny glass pieces around a month ago which slowly led to the development of ulcers on both of her feet. She complains that she has chronic pain all the time in her legs for so long associated with numbness and tingling and follows with pain management (has a spinal stimulator and takes gabapentin and hydrocodone). She says she has no sensations in half of her left foot over the past 15 years and no one has helped her. Also there was a noticed ecchymotic swelling on her right side of the forehead, for which explained that she hit kitchen cabinet two days ago but did not fall or felt dizzy or lose consciousness.  Denies fever, chills, chest pain, SOB.    In ED, pt is HDS  Labs show normal WBC.  CMP is grossly hemolyzed and lytes, HgB are incongruent with those in VBG (VBG might be a lab error)  CXR shows RUL opacities  Admitted to medicine for further evaluation and management of bilateral LE ulcers      Infectious Diseases History:  Old Micro Data/Cultures:     FAMILY HISTORY:  Family history of stroke (Mother)      PAST MEDICAL & SURGICAL HISTORY:  Anxiety      Depression      Osteoporosis      Kidney cysts      Peripheral neuropathy      RA (rheumatoid arthritis)      OA (osteoarthritis)      Low back pain with radiation  s/p &quot;nerve cutting&quot; 2015          SOCIAL HISTORY  Social History:  smokes 1ppd for so many years.  Does not drink alcohol or use recreational drugs (10 Nov 2022 14:26)      Recent Travel:  Other Exposures:     ROS  General: Denies rigors, nightsweats  HEENT: Denies headache, rhinorrhea, sore throat, eye pain  CV: Denies CP, palpitations  PULM: Denies wheezing, hemoptysis  GI: Denies hematemesis, hematochezia, melena  : Denies discharge, hematuria  SKIN: Denies rash, lesions    VITALS:  T(F): 96.9, Max: 96.9 (11-11-22 @ 07:35)  HR: 62  BP: 110/71  RR: 18Vital Signs Last 24 Hrs  T(C): 36.1 (11 Nov 2022 07:35), Max: 36.1 (11 Nov 2022 07:35)  T(F): 96.9 (11 Nov 2022 07:35), Max: 96.9 (11 Nov 2022 07:35)  HR: 62 (11 Nov 2022 06:00) (62 - 62)  BP: 110/71 (11 Nov 2022 06:00) (110/71 - 110/71)  BP(mean): --  RR: 18 (11 Nov 2022 06:00) (18 - 18)  SpO2: --    PHYSICAL EXAM:  Gen: NAD, resting in bed chronically ill appearing   HEENT: Normocephalic, atraumatic  Neck: supple, no lymphadenopathy  CV: Regular rate & regular rhythm  Lungs: decreased BS at bases, no fremitus  Abdomen: Soft, BS present  Ext: Warm, well perfused  Neuro: non focal, awake  Skin: chronic arthritis, erythematous changes to b/l hands, toes, L plantar ulcer clean, R 2nd digit ulcer clean  Lines: no phlebitis     TESTS & MEASUREMENTS:                        11.9   8.38  )-----------( 152      ( 11 Nov 2022 06:13 )             35.4     11-11    140  |  104  |  17  ----------------------------<  47<LL>  4.3   |  30  |  0.5<L>    Ca    8.6      11 Nov 2022 06:13  Mg     1.4     11-11    TPro  4.6<L>  /  Alb  2.7<L>  /  TBili  <0.2  /  DBili  x   /  AST  47<H>  /  ALT  40  /  AlkPhos  130<H>  11-11      LIVER FUNCTIONS - ( 11 Nov 2022 06:13 )  Alb: 2.7 g/dL / Pro: 4.6 g/dL / ALK PHOS: 130 U/L / ALT: 40 U/L / AST: 47 U/L / GGT: x             Blood Gas Venous - Lactate: 0.80 mmol/L (11-10-22 @ 13:57)      INFECTIOUS DISEASES TESTING  MRSA PCR Result.: Negative (04-27-22 @ 09:51)      RADIOLOGY & ADDITIONAL TESTS:  I have personally reviewed the last available Chest xray  CXR      CT  CT Chest No Cont:   ACC: [id number] EXAM:  CT CHEST                          PROCEDURE DATE:  11/11/2022      INTERPRETATION:  Reason for Exam:  Abnormality seen on plain film imaging.  CT of the chest was performed from the thoracic inlet to the level of the   adrenal glands without contrast injection. Coronal and sagittal images   have been submitted.    Comparison: Concurrent plain film imaging.    Findings:    Tubes/Lines: Spinal stimulator    Lungs, Pleura, and Airways:The tracheobronchial tree is patent.    Right upper lung field ill-defined consolidative opacification with   separate areas of tree-in-bud opacities. This structure measures 1.2 cm   posteriorly and 2 cm anteriorly.    Right-sided pleural effusion with compressive atelectasis. Smaller   left-sided effusion with compressive atelectasis.    Mediastinum/Lymph Nodes:There is no axillary lymphadenopathy. Within the   limitation of this noncontrast study, there is no mediastinal or hilar   lymphadenopathy.    Heart/Great Vessels: Heart size is normal.    Abdomen: Limited evaluation the upper abdomen reveals mild ascites.    Bones and soft tissues: Again identified is spinal stimulator.    IMPRESSION:    Right upper lobe nodular opacifications with additional branching   nodules. While most of this may in fact be inflammatory in nature,   follow-up CT scan in 4-6 weeks should BE obtained for resolution.    --- End of Report ---    STACI XIE MD; Attending Interventional Radiologist  This document has been electronically signed. Nov 11 2022 11:57AM (11-11-22 @ 10:57)      CARDIOLOGY TESTING      All available historical records have been reviewed    MEDICATIONS  atorvastatin 20  cefepime   IVPB 2000  chlorhexidine 4% Liquid 1  enoxaparin Injectable 40  gabapentin 600  magnesium sulfate  IVPB 2  metroNIDAZOLE  IVPB 500  vancomycin  IVPB 1000      ANTIBIOTICS:  cefepime   IVPB 2000 milliGRAM(s) IV Intermittent every 12 hours  metroNIDAZOLE  IVPB 500 milliGRAM(s) IV Intermittent every 8 hours  vancomycin  IVPB 1000 milliGRAM(s) IV Intermittent every 12 hours      All available historical data has been reviewed

## 2022-11-11 NOTE — PROGRESS NOTE ADULT - ATTENDING COMMENTS
left plantar lateral foot ulcer (submet head 5)  some decrease in erythema   excisional debridement of ulcer with 15 blade down to subcutaneous tissue  patient unable to get MRI due to spinal implant  ESR 25  Consider bone scan to rule out OM, alternatively patient can be followed up as outpatient for serial xrays to evaluate bone changes    patient can follow up as outpatient at [address] [phone]   Thank you for allowing me to participate in your patient care.    My notes supersedes the above resident note in case of discrepancy.     Gary Nieto, JEO, FACFAS

## 2022-11-11 NOTE — PROGRESS NOTE ADULT - ASSESSMENT
bilateral LE- decreased edema and erythema, healing chronic wounds    rec: local wound care, no surgery

## 2022-11-11 NOTE — PROGRESS NOTE ADULT - SUBJECTIVE AND OBJECTIVE BOX
68 yo F with PMHx anxiety, depression, peripheral neuropathy, HLD, RA sent from burn clinic for evaluation of ulcers on bilateral LE. Pt says she has stepped on some tiny glass pieces around a month ago which slowly led to the development of ulcers on both of her feet. She was consulted by podiatry, underwent bedside debridement by podiatry, awaiting for burn evaluation and ID consult.   Pt is very anxious to leave the hospital, noncompliant  wanted to leave AMA, c/o left leg pain, very debilitated with advance RA.     VITALS /  EXAM  T(C): 36.1 (11 Nov 2022 07:35), Max: 36.1 (11 Nov 2022 07:35)  T(F): 96.9 (11 Nov 2022 07:35), Max: 96.9 (11 Nov 2022 07:35)  HR: 62 (11 Nov 2022 06:00) (62 - 62)  BP: 110/71 (11 Nov 2022 06:00) (110/71 - 110/71)  BP(mean): --  RR: 18 (11 Nov 2022 06:00) (18 - 18)    GENERAL: NAD, anxious, frail, cachectic with temporal muscle waisting   CHEST/LUNG: decreased BS at bases   HEART: Regular rate and rhythm; No murmurs, rubs, or gallops  ABDOMEN: Soft, Nontender, Nondistended; Bowel sounds present, no masses.  EXTREMITIES: muscle waisting on LE,  multiple skin excoriations note on LE, dressing on both feet, discoloration and grass deformities on toes and fingers/ wrists       LABS        Blood Gas Venous - Lactate: 0.80 mmol/L (11-10-22 @ 13:57)      MICRO / IMAGING / CARDIOLOGY  Telemetry: Reviewed   EKG: Reviewed    CULTURES    IMAGING  PACS Image:  (11-11-22 @ 10:57)  PACS Image:  (11-10-22 @ 14:55)  PACS Image:  (11-10-22 @ 12:18)  PACS Image:  (11-10-22 @ 12:15)    CARDIOLOGY   68 yo F with PMHx anxiety, depression, peripheral neuropathy, HLD, RA sent from burn clinic for evaluation of ulcers on bilateral LE. Pt says she has stepped on some tiny glass pieces around a month ago which slowly led to the development of ulcers on both of her feet. She was consulted by podiatry, underwent bedside debridement by podiatry, awaiting for burn evaluation and ID consult.   Pt is very anxious to leave the hospital, noncompliant  wanted to leave AMA, c/o left leg pain, very debilitated with advance RA.     VITALS /  EXAM  T(C): 36.1 (11 Nov 2022 07:35), Max: 36.1 (11 Nov 2022 07:35)  T(F): 96.9 (11 Nov 2022 07:35), Max: 96.9 (11 Nov 2022 07:35)  HR: 62 (11 Nov 2022 06:00) (62 - 62)  BP: 110/71 (11 Nov 2022 06:00) (110/71 - 110/71)  BP(mean): --  RR: 18 (11 Nov 2022 06:00) (18 - 18)    GENERAL: NAD, anxious, frail, cachectic with temporal muscle waisting   CHEST/LUNG: decreased BS at bases   HEART: Regular rate and rhythm; No murmurs, rubs, or gallops  ABDOMEN: Soft, Nontender, Nondistended; Bowel sounds present, no masses.  EXTREMITIES: muscle waisting on LE,  multiple skin excoriations note on LE, dressing on both feet, discoloration and grass deformities on toes and fingers/ wrists       LABS                          11.9   8.38  )-----------( 152      ( 11 Nov 2022 06:13 )             35.4   11-11    140  |  104  |  17  ----------------------------<  47<LL>  4.3   |  30  |  0.5<L>    Ca    8.6      11 Nov 2022 06:13  Mg     1.4     11-11    TPro  4.6<L>  /  Alb  2.7<L>  /  TBili  <0.2  /  DBili  x   /  AST  47<H>  /  ALT  40  /  AlkPhos  130<H>  11-11    RADIOLOGY:   < from: CT Chest No Cont (11.11.22 @ 10:57) >  IMPRESSION:    Right upper lobe nodular opacifications with additional branching   nodules. While most of this may in fact be inflammatory in nature,   follow-up CT scan in 4-6 weeks should BE obtained for resolution.    < end of copied text >  < from: VA Duplex Lower Extrem Arterial, Bilat (11.10.22 @ 14:55) >    Impression:    Normal arterial flow in the bilateral lower extremities.    < end of copied text >  < from: Xray Foot AP + Lateral + Oblique, Left (11.10.22 @ 12:18) >  impression:    Very limited study due to difficulty in positioning for exam. Additional   patient osteopenia limits evaluation. Question of soft tissue defect over   the lateral foot - overlying dressing. Study is limited for the   assessment of osteitis.    MEDICATIONS  (STANDING):  atorvastatin 20 milliGRAM(s) Oral at bedtime  cefepime   IVPB 2000 milliGRAM(s) IV Intermittent every 12 hours  chlorhexidine 4% Liquid 1 Application(s) Topical daily  enoxaparin Injectable 40 milliGRAM(s) SubCutaneous every 24 hours  gabapentin 600 milliGRAM(s) Oral three times a day  magnesium sulfate  IVPB 2 Gram(s) IV Intermittent once  metroNIDAZOLE  IVPB 500 milliGRAM(s) IV Intermittent every 8 hours  vancomycin  IVPB 1000 milliGRAM(s) IV Intermittent every 12 hours    MEDICATIONS  (PRN):  oxycodone    5 mG/acetaminophen 325 mG 2 Tablet(s) Oral every 6 hours PRN Moderate Pain (4 - 6)

## 2022-11-11 NOTE — PROGRESS NOTE ADULT - SUBJECTIVE AND OBJECTIVE BOX
SUBJECTIVE / OVERNIGHT EVENTS  Patient is AOx3, agitated, in pain at the debridement site and wants to Leave AMA.     MEDICATIONS  atorvastatin 20 milliGRAM(s) Oral at bedtime  cefepime   IVPB 2000 milliGRAM(s) IV Intermittent every 12 hours  chlorhexidine 4% Liquid 1 Application(s) Topical daily  enoxaparin Injectable 40 milliGRAM(s) SubCutaneous every 24 hours  gabapentin 600 milliGRAM(s) Oral three times a day  magnesium sulfate  IVPB 2 Gram(s) IV Intermittent once  magnesium sulfate  IVPB 2 Gram(s) IV Intermittent once  metroNIDAZOLE  IVPB 500 milliGRAM(s) IV Intermittent every 8 hours  vancomycin  IVPB 1000 milliGRAM(s) IV Intermittent every 12 hours    oxycodone    5 mG/acetaminophen 325 mG 2 Tablet(s) Oral every 6 hours PRN Moderate Pain (4 - 6)    VITALS /  EXAM    T(C): 36.1 (11-11-22 @ 07:35), Max: 36.1 (11-11-22 @ 07:35)  HR: 62 (11-11-22 @ 06:00) (62 - 62)  BP: 110/71 (11-11-22 @ 06:00) (110/71 - 110/71)  RR: 18 (11-11-22 @ 06:00) (18 - 18)  SpO2: --  POCT Blood Glucose.: 105 mg/dL (11-11-22 @ 07:40)  POCT Blood Glucose.: 155 mg/dL (11-10-22 @ 21:36)  POCT Blood Glucose.: 171 mg/dL (11-10-22 @ 20:42)  POCT Blood Glucose.: 48 mg/dL (11-10-22 @ 19:27)  POCT Blood Glucose.: 197 mg/dL (11-10-22 @ 13:27)    GENERAL: NAD, well-developed, patient is agitated and threatening to leave AMA.   CHEST/LUNG: Clear to auscultation bilaterally; No wheezes, rales or rhonchi  HEART: Regular rate and rhythm; No murmurs, rubs, or gallops  ABDOMEN: Soft, Nontender, Nondistended; Bowel sounds present, no masses.  EXTREMITIES:  Multiple Shallow ulcer s/p debridement in both legs and feet     I's & O's     LABS             11.9   8.38  )-----------( 152      ( 11-11-22 @ 06:13 )             35.4     140  |  104  |  17  -------------------------<  47   11-11-22 @ 06:13  4.3  |  30  |  0.5    Ca      8.6     11-11-22 @ 06:13  Mg     1.4     11-11-22 @ 06:13    TPro  4.6  /  Alb  2.7  /  TBili  <0.2  /  DBili  x   /  AST  47  /  ALT  40  /  AlkPhos  130  /  GGT  x     11-11-22 @ 06:13    PT/INR - ( 11-10-22 @ 11:55 )   PT: 11.30 sec;   INR: 0.99 ratio  PTT - ( 11-10-22 @ 11:55 )  PTT:40.2 sec                Blood Gas Venous - Lactate: 0.80 mmol/L (11-10-22 @ 13:57)      MICRO / IMAGING / CARDIOLOGY  Telemetry: Reviewed   EKG: Reviewed    CULTURES    IMAGING  PACS Image:  (11-11-22 @ 10:57)  PACS Image:  (11-10-22 @ 14:55)  PACS Image:  (11-10-22 @ 12:18)  PACS Image:  (11-10-22 @ 12:15)    CARDIOLOGY

## 2022-11-11 NOTE — CONSULT NOTE ADULT - ASSESSMENT
ASSESSMENT  68 yo F with PMHx anxiety, depression, peripheral neuropathy, HLD, RA sent from burn clinic for evaluation of ulcers on bilateral LE    IMPRESSION  # Ulcers noted at the plantar surface of the left foot and toes b/l - Not infected; No systemic signs of infection  +PTB per Podiatry   # Evaluate for osteomyelitis of the Lt foot 5th metatarsal - needs bone biopsy and MRI foot       RECOMMENDATIONS  - Pt can be off antibiotics until Bone biopsy  - ESR, CRP   - Pt cannot get an MRI bc of battery  - Pt asking to go home, ideally would have a bone biopsy. If no plan, can D/C on PO doxy 100mg BID x 3-6 weeks and obtain bone scan as outpatient

## 2022-11-11 NOTE — DISCHARGE NOTE PROVIDER - PROVIDER TOKENS
PROVIDER:[TOKEN:[[id number]],FOLLOWUP:[1-3 days]],PROVIDER:[TOKEN:[[id number]],FOLLOWUP:[1 week]]

## 2022-11-11 NOTE — PROGRESS NOTE ADULT - ASSESSMENT
66 yo F with PMHx anxiety, depression, peripheral neuropathy, HLD, RA sent from burn clinic for evaluation of ulcers on bilateral LE. Pt says she has stepped on some tiny glass pieces around a month ago which slowly led to the development of ulcers on both of her feet.    #Multiple B/L LE ulcers 2//2 neuropathy with possible OM   - hemodynamically stable  - active smoker and hx chronic peripheral neuropathy  - Left foot: ulcer over plantar surface of 5th metatarsal head, medial plantar surface of midfoot, dorsal surface of 2nd digit  - Right foot: ulcer at the tip of the 2nd digit on the plantar surface  - VA arterial duplex shows normal flow   - s/p debridement by podiatry   - c/w wound care recs as per podiatry  - X-ray of the foot is limited due to positioning  - patient was not able to do the MRI due to spinal stimulator, -  Spinal stimulator [id number], Serial # [id number]  - Patient reports that spinal stimulator is not compatible with MRI   - Will order CT-foot with IV contrast if  MRI incompatible to r/o OM  - ALP elevated possible OM cw Abx   - ESR 25 , CRP 9.6  - a1c 5.1,  lipid profile wnl  - cw vancomycin iv 1g q12hr, cefepime iv 2g q12hr, flagyl iv 500mg q8hr for possible OM   - F/u BCx  - F/u ID consult    # hypomagnesemia:  - Mg 1.4 will give 4 g IV magnesium today,     #Peripheral neuropathy  - c/w gabapentin 600mg TID  - takes hydrocodone at home  - c/w percocet 2 tabs q6hr prn here  - has spinal stimulator  - follows with pain management OP    #RUL lung opacities  - CT chest showed Right upper lobe nodular opacifications with additional branching nodules, possibly inflammatory in nature, repeat CT of the chest should be done in 4-6 weeks as OP to make sure nodules are stable and r/o malignancy   - denies cough, SOB  - F/u QuantiFeron TB (ordered in ED)   - CXR showed hyperinflated lung, patient is a heavy smoker (possible emphysema)   - FU ABG   - VBG 7.2, ordered ABG to FU    #HLD  - takes rosuvastatin 5mg daily at home  - will c/w lipitor 20mg daily here  - lipid profile wnl       DVT px - lovenox  Diet - regular  Activity - WBAT  Pharmacy - [facility] [address]   68 yo F with PMHx anxiety, depression, peripheral neuropathy, HLD, RA sent from burn clinic for evaluation of ulcers on bilateral LE. Pt says she has stepped on some tiny glass pieces around a month ago which slowly led to the development of ulcers on both of her feet.    #  B/L LE ulcers/peripheral neuropathy   - pt was sent to ER by burn team, consulted by podiatry here, underwent surgical debridement  - awaiting for burn and ID evaluation   - c/w local wound and skin care, pt has a poor hygiene noncompliant,  wanted to leave AMA  - foot MRI recommended to r/o OM ( unlikely: inflammatory markers are not high)   -  arterial Doppler is negatvie  for PAD  - pt was started on  empiric abx with broad spectrum coverage: vancomycin iv 1g q12hr, cefepime iv 2g q12hr, flagyl iv 500mg q8hr  - F/u BCx, send nasal swab for MRSA  - check Vancomycin thought before 4th dose   - F/e  and burn recommendations   - c/w gabapentin 600mg TID  - takes hydrocodone at home  - c/w percocet 2 tabs q6hr prn for now   - has spinal stimulator ( as per pt it is comparable with an MRI)   - follows with pain management OP    #RUL lung opacities in current smoker   - noticed incidentally on CXR  - denies cough, SOB  - F/u QuantiFeron TB  - CT chest noted, repeat CT in 3 months  - encourage smoking cessation     # HLD  - c/w lipitor 20mg daily here  - F/u lipid profile    # RA  - not in treatement  - c/w pain medications   - OP follow up with rheumatology    # Hypomagnesemia  - replete Mg      DVT px - lovenox  Diet - regular  Activity - WBAT    #Progress Note Handoff  Pending (specify): left foot MRI, ID and burn consults for wound care Abx, PT eval.   Family discussion: I spoke with pt, she wanted to leave AMA  Disposition: Home___/SNF___/Other________/Unknown at this time___x _____

## 2022-11-11 NOTE — DISCHARGE NOTE PROVIDER - HOSPITAL COURSE
68 yo F with Past medical history of anxiety, depression, peripheral neuropathy, dyslipidemia, RA sent from burn clinic for evaluation of ulcers on bilateral Lower extremities . Patient says she has stepped on some tiny glass pieces around a month ago which slowly led to the development of ulcers on both of her feet. She complains that she has chronic pain all the time in her legs for so long associated with numbness and tingling and follows with pain management (has a spinal stimulator and takes gabapentin and hydrocodone). She says she has no sensations in half of her left foot over the past 15 years and no one has helped her. Also there was a noticed ecchymotic swelling on her right side of the forehead, for which explained that she hit kitchen cabinet two days ago but did not fall or felt dizzy or lose consciousness.  Denies fever, chills, chest pain, SOB.    - limited foot Xray study; could not rule out osteomyelitis   - arterial duplex : normal arterial flow bilateral   - received intravenous antibiotic   - ESR 25; CRP 9 66 yo F with Past medical history of anxiety, depression, peripheral neuropathy, dyslipidemia, RA sent from burn clinic for evaluation of ulcers on bilateral Lower extremities . Patient says she has stepped on some tiny glass pieces around a month ago which slowly led to the development of ulcers on both of her feet. She complains that she has chronic pain all the time in her legs for so long associated with numbness and tingling and follows with pain management (has a spinal stimulator and takes gabapentin and hydrocodone). She says she has no sensations in half of her left foot over the past 15 years and no one has helped her. Also there was a noticed ecchymotic swelling on her right side of the forehead, for which explained that she hit kitchen cabinet two days ago but did not fall or felt dizzy or lose consciousness.  Denies fever, chills, chest pain, SOB.    - limited foot Xray study; could not rule out osteomyelitis   - arterial duplex : normal arterial flow bilateral   - received intravenous antibiotic   - ESR 25; CRP 9   -patient needs MRI or bone biopsy  - patient is requesting to leave  - will discharge on doxycycline 100 mg BID for 4 weeks as per ID rec

## 2022-11-11 NOTE — DISCHARGE NOTE NURSING/CASE MANAGEMENT/SOCIAL WORK - PATIENT PORTAL LINK FT
You can access the FollowMyHealth Patient Portal offered by [facility] by registering at the following website: [email or web address]. By joining [employer]’s FollowMyHealth portal, you will also be able to view your health information using other applications (apps) compatible with our system.

## 2022-11-11 NOTE — PROGRESS NOTE ADULT - SUBJECTIVE AND OBJECTIVE BOX
pt well known to burn service with bilateral leg wounds, chronic--> new foot wound plantar surface 1st metatarsus--> bedside debridement by podiatry     PE: bilateral LE- decreased edema and erythema, healing chronic wounds

## 2022-11-11 NOTE — PROGRESS NOTE ADULT - SUBJECTIVE AND OBJECTIVE BOX
Podiatry Progress Note    Subjective:  ANA LILIA VERMA is a  67y Female.   Seen bedside.   Patient is a 67y old  Female who presents with a chief complaint of Lower extremity ulcers (11 Nov 2022 11:10)      Past Medical History and Surgical History  PAST MEDICAL & SURGICAL HISTORY:  Anxiety      Depression      Osteoporosis      Kidney cysts      Peripheral neuropathy      RA (rheumatoid arthritis)      OA (osteoarthritis)      Low back pain with radiation  s/p &quot;nerve cutting&quot; 2015           Objective:  Vital Signs Last 24 Hrs  T(C): 36.1 (11 Nov 2022 07:35), Max: 36.1 (11 Nov 2022 07:35)  T(F): 96.9 (11 Nov 2022 07:35), Max: 96.9 (11 Nov 2022 07:35)  HR: 62 (11 Nov 2022 06:00) (62 - 62)  BP: 110/71 (11 Nov 2022 06:00) (110/71 - 110/71)  BP(mean): --  RR: 18 (11 Nov 2022 06:00) (18 - 18)  SpO2: --                            11.9   8.38  )-----------( 152      ( 11 Nov 2022 06:13 )             35.4                 11-11    140  |  104  |  17  ----------------------------<  47<LL>  4.3   |  30  |  0.5<L>    Ca    8.6      11 Nov 2022 06:13  Mg     1.4     11-11    TPro  4.6<L>  /  Alb  2.7<L>  /  TBili  <0.2  /  DBili  x   /  AST  47<H>  /  ALT  40  /  AlkPhos  130<H>  11-11        Physical Exam - Lower Extremity Focused:   Derm: Left foot; ulcer sub 5th met head surrounding erythema and hyperkeratosis, probes to bone tunneling at 9 oclock, another plantar medial ulcer at the midfoot more superficial doesnot probe. ulcer on the dorsal aspect of the 2nd digit, probes to soft tissue, fibrogranular base, no drainage noted.   Right foot; ulcer at the tip of the 2nd digit on the plantar surface, 0.3 cm in diameter with granular base and surrounding hyperkeratosis, doesnot probe.   Vascular: DP and PT Pulses Diminished; Foot is Warm to Warm to the touch; Capillary Refill Time < 3 Seconds;    Neuro: Protective Sensation Diminished / Moderately Neuropathic   MSK: Pain On Palpation at Wound Site     Assessment:  - BLE ulcers    Plan:  Chart reviewed and Patient evaluated. All Questions and Concerns Addressed and Answered  XR Imaging Foot; Pending Results  - Debrided sub 5th met ulcer, left foot to the subcutaneous tissue with #15 blade.   Local Wound Care; Wound Flushed w/ NS; Wound Packed w/ Xeroform / Gauze / DSD / Kerlix   Weight Bearing Status; WBAT w/ Heel Touch w/ Surgical Shoe;   Recommend; Lower Extremity Arterial Duplex B/L; ESR/CRP; MRI- Foot;   Request ID Consult;     Discussed Plan w/ Dr. Nieto      Podiatry

## 2022-11-11 NOTE — DISCHARGE NOTE PROVIDER - NSDCMRMEDTOKEN_GEN_ALL_CORE_FT
gabapentin 600 mg oral tablet: 1 tab(s) orally 3 times a day  HYDROcodone-acetaminophen 10 mg-325 mg oral tablet: 1 tab(s) orally 4 times a day  rosuvastatin 5 mg oral tablet: 1 tab(s) orally once a day   doxycycline monohydrate 100 mg oral capsule: 1 cap(s) orally 2 times a day   gabapentin 600 mg oral tablet: 1 tab(s) orally 3 times a day  HYDROcodone-acetaminophen 10 mg-325 mg oral tablet: 1 tab(s) orally 4 times a day  rosuvastatin 5 mg oral tablet: 1 tab(s) orally once a day

## 2022-11-11 NOTE — DISCHARGE NOTE NURSING/CASE MANAGEMENT/SOCIAL WORK - NSDCPEFALRISK_GEN_ALL_CORE
For information on Fall & Injury Prevention, visit: [email or web address] OR  [email or web address] OR  https://www.cdc.gov/steadi/patient.html

## 2022-11-11 NOTE — DISCHARGE NOTE PROVIDER - NSDCCPCAREPLAN_GEN_ALL_CORE_FT
PRINCIPAL DISCHARGE DIAGNOSIS  Diagnosis: Multiple open wounds of left lower extremity  Assessment and Plan of Treatment: You came to the hospital for ulcer debridement in the lower legs. debridement was done in the hospital and you were given antibiotics. you will be discharged on antibiotics. the recommendation is that you do bone biopsy or MRI of the feet to rule out infection known as osteomyelitis. The infectious disease team saw you and they recmmended antibiotics for 4 weeks. they also recommended doing a biopsy of the bone or MRI to rule out infection of the bone.   Take the prescribed antibiotic medicine you are given as directed until it is gone. Take it even if you feel better. It treats the infection and stops it from  Keep the infected area clean. Apply clean bandages as advised. Wash your hands often to prevent spreading the infection. In the future, wash your hands before and after you touch cuts, scratches, or bandages. This will help prevent infection.   Call your doctor or returnt o the emergency room or call 911 immediately if you have any of the following: Difficulty or pain when moving the joints above or below the infected area, Discharge or pus draining from the area, rever of 100.4°F (38°C) or higher, or as directed by your healthcare provider, pain that gets worse in or around the infected site, redness that gets worse in or around the infected area, particularly if the area of redness expands to a wider area, shaking chills, swelling of the infected area, vomiting.        SECONDARY DISCHARGE DIAGNOSES  Diagnosis: Hypoglycemia  Assessment and Plan of Treatment:

## 2022-11-11 NOTE — DISCHARGE NOTE PROVIDER - NSDCFUADDAPPT_GEN_ALL_CORE_FT
Please followup with the doctors as instructed   Please get an MRI of the foot. make sure that your spinal stimulator is MRI compatible  You wanted to leave the facility, although further imaging and intervention is recommended.

## 2022-11-11 NOTE — DISCHARGE NOTE NURSING/CASE MANAGEMENT/SOCIAL WORK - NSDCVIVACCINE_GEN_ALL_CORE_FT
Tdap; 24-Jul-2020 12:54; Lyla Rhodes); [employer]; [id number] (Exp. Date: 04-Apr-2022); IntraMuscular; Deltoid Right.; 0.5 milliLiter(s); VIS (VIS Published: 09-May-2013, VIS Presented: 24-Jul-2020);

## 2022-11-27 NOTE — ED PROCEDURE NOTE - CPROC ED TIME OUT STATEMENT1
“Patient's name, , procedure and correct site were confirmed during the [place] Timeout.”
“Patient's name, , procedure and correct site were confirmed during the [place] Timeout.”
“Patient's name, , procedure and correct site were confirmed during the [place] Timeout.”

## 2022-11-27 NOTE — ED PROVIDER NOTE - CARE PLAN
1 Principal Discharge DX:	Acute respiratory failure with hypercapnia  Secondary Diagnosis:	Hypothermia, induced  Secondary Diagnosis:	C6 cervical fracture   Principal Discharge DX:	Acute respiratory failure with hypercapnia  Secondary Diagnosis:	Hypothermia, induced  Secondary Diagnosis:	C6 cervical fracture  Secondary Diagnosis:	Hypoglycemia   Principal Discharge DX:	Acute respiratory failure with hypercapnia  Secondary Diagnosis:	Hypothermia, induced  Secondary Diagnosis:	C6 cervical fracture  Secondary Diagnosis:	Hypoglycemia  Secondary Diagnosis:	Sepsis  Secondary Diagnosis:	Osteomyelitis

## 2022-11-27 NOTE — ED PROVIDER NOTE - PHYSICAL EXAMINATION
CONSTITUTIONAL: hypothermic, minimally responsive, cachectic  SKIN: cool, dry  HEAD: hold hematoma to forehead  EYES: No conjunctival injection. EOMI. PERRLA  ENT: No nasal discharge; oropharynx nonerythematous; airway clear; Dry mucous membranes  NECK: Supple; non tender  CARD:  bradycardic  RESP: CTAB  ABD: Soft NTND; cachectic  EXT: Normal ROM.  No clubbing or cyanosis.  No edema. No calf tenderness  NEURO: A&O x0, moving all extremities, confused  *Chaperone MD Quintero was used during the encounter. A professional environment was maintained and discussed with patient

## 2022-11-27 NOTE — H&P ADULT - ATTENDING COMMENTS
my notes supersedes resident note    67 years old presented for AMS. sp fall, trauma work up, C6 fx, ? necrotizing fasciitis/ sepsis POA, hypoglycemia, hx of RA/ anorexia nervosa    - Neuro : fentanyl/ precedex/ head CT -, T collar per neuroSX, ammonia level  - pul : 400/18/40/8 7.39/ 42/ 195/ 0.7 la  - CV: tapers pressors, ECHO  - GI: start FEEDING/PPI  - ID: ABX per ID, procal, fup pancx  - renal wise: fup lytes  - Endo: hydrocortisone/ TSH  - burn eval  - palliative care eval    RIJOVANNY 11/27  GILBERTO 11/27

## 2022-11-27 NOTE — CONSULT NOTE ADULT - ASSESSMENT
67yF w/ PMHx of anxiety, depression, kidney cysts, peripheral neuropathy, RA, OA, osteoporosis, low back pain seen as Trauma Consult s/p found down by  ?HT, ?LOC, -AC with external signs of trauma including right forehead hematoma and R knee wound/abrasion. Trauma assessment in ED: intubated, vented and on pressors, GCS 3 , AAOx 0.     Injuries identified:   - Fracture of anterior and posterior tubercle of left C6 transverse foramen    PLAN:   - Trauma Labs: (CBC, BMP, Coags, T&S, UA, EtOH level)  Additional studies:  EKG  Utox    Trauma Imaging to include the following:  - CXR, Pelvic Xray  - CT Head,  CT C-spine, CT Chest, CT Abd/Pelvis  - CTA Head, CTA Neck, CT LE b/l   - Extremity films: None    Additional consultations:  -   -    Disposition pending results of above labs and imaging  Above plan discussed with Trauma attending, Dr. Waite, patient, patient family, and ED team  --------------------------------------------------------------------------------------  11-27-22 @ 07:3 67yF w/ PMHx of anxiety, depression, kidney cysts, peripheral neuropathy, RA, OA, osteoporosis, low back pain seen as Trauma Consult s/p unwitnessed fall, found down by  +HT, ?LOC, -AC with external signs of trauma including right forehead hematoma and R knee wound/abrasion. Trauma assessment in ED: intubated, vented and on pressors, GCS 3 , AAOx 0.     Injuries identified:   - Fracture of anterior and posterior tubercle of left C6 transverse foramen    PLAN:   - Trauma Labs: (CBC, BMP, Coags, T&S, UA, EtOH level)  Additional studies:  EKG  Utox    Trauma Imaging to include the following:  - CXR, Pelvic Xray  - CT Head,  CT C-spine, CT Chest, CT Abd/Pelvis  - CTA Head, CTA Neck, CT LE b/l   - Extremity films: None    Additional consultations:  -   -    Disposition pending results of above labs and imaging  Above plan discussed with Trauma attending, Dr. Waite, patient, patient family, and ED team  --------------------------------------------------------------------------------------  11-27-22 @ 07:3 67yF w/ PMHx of anxiety, depression, kidney cysts, peripheral neuropathy, RA, OA, osteoporosis, low back pain seen as Trauma Consult s/p unwitnessed fall, found down by  +HT, ?LOC, -AC with external signs of trauma including right forehead hematoma and R knee wound/abrasion. Trauma assessment in ED: intubated, vented and on pressors, GCS 3 , AAOx 0.     Injuries identified:   - Fracture of anterior and posterior tubercle of left C6 transverse foramen    PLAN:   - Trauma Labs: (CBC, BMP, Coags, T&S, UA, EtOH level)  Additional studies:  EKG  Utox    Trauma Imaging to include the following:  - CXR, Pelvic Xray  - CT Head,  CT C-spine, CT Chest, CT Abd/Pelvis  - CTA Head, CTA Neck, CT LE b/l   - Extremity films: None    Traumatic injuries identified as above, no acute surgical intervention at this time  - patient with subq air noted on CTLE of the LLE, unlikely to be due to necrotizing fascitis given normal lactate, normal WBC and chronic appearing wounds  - recommended burn surgery consult and podiatry consult who have managed patient's wounds in the past  Above plan discussed with Trauma attending, Dr. Waite, patient, patient family, and ED team  --------------------------------------------------------------------------------------  11-27-22 @ 07:3

## 2022-11-27 NOTE — ED PROCEDURE NOTE - CPROC ED INDICATIONS1
critical patient/mental status change/respiratory failure
emergency venous access/volume resuscitation

## 2022-11-27 NOTE — ED PROVIDER NOTE - OBJECTIVE STATEMENT
Home
67-year-old female with PMH of anorexia, depression, anxiety, osteoporosis, RA who was brought in by EMS for hypotension, bradycardia, hypothermia and minimally responsive.  Per , patient was found at home on tiles on the floor.  Unwitnessed fall.  Unknown LOC.  No anticoagulation.  Limited ROS secondary to AMS.  Patient arrived hypothermic and hypoglycemic, given dextrose.

## 2022-11-27 NOTE — H&P ADULT - NSHPPHYSICALEXAM_GEN_ALL_CORE
VITALS  T(C): 37.9 (11-27-22 @ 11:00), Max: 38 (11-27-22 @ 10:00)  HR: 80 (11-27-22 @ 11:00) (39 - 89)  BP: 140/85 (11-27-22 @ 11:00) (72/45 - 147/71)  RR: 16 (11-27-22 @ 11:00) (16 - 18)  SpO2: 99% (11-27-22 @ 11:00) (97% - 100%)    PHYSICAL EXAM  General: intubated, not responding to commands or pain   HEENT: Normocephalic, no scalp lacerations, right forehead hematoma   Neck: Soft, midline trachea. no c-spine tenderness  Chest: No chest wall tenderness, no subcutaneous emphysema   Cardiac: S1, S2, RRR  Respiratory: vented 40/5  Abdomen: Soft, non-distended, non-tender, no rebound, no guarding.  Groin: Normal appearing, pelvis stable   Ext:  Moving b/l upper and lower extremities. Palpable pulses in UE b/l and RLE; dopplerable DP in LLE; b/l LE ulcers present with edema and blanchable erythema, kerlix dressing placed on LE b/l. R knee wound/abrasion present  Back: No T/L/S spine tenderness, No palpable runoff/stepoff/deformity

## 2022-11-27 NOTE — CONSULT NOTE ADULT - SUBJECTIVE AND OBJECTIVE BOX
ANA LILIA VERMA  67y, Female  Allergy: No Known Allergies      CHIEF COMPLAINT:     LOS      HPI:  67F w/ h/o anorexia, anxiety, depression, kidney cysts, peripheral neuropathy, RA, OA, osteoporosis, low back pain s/p unwitnessed fall, found down by  +HT, ?LOC, -AC. Contacted patient's  who was able to provide some history and states he returned home from work around 11pm when he found the patient down on the tiled kitchen floor. States the patient was awake and alert when he found her on the floor, but she didn't recall how she fell down.  speculates that she might've been sitting in chair and fallen out of it as this has happened before in the past.  states that the patient said she hit her head, and he noticed blood on the patient's knees, but no other injuries. Patient was complaining of left-sided neck pain after the fall, but  states she has been having this pain for about 1 month. About 1 hour after the fall, patient became lethargic and was unable to stay up on her feet, so  called for an ambulance.     On arrival to ED, patient unresponsive, hypotensive, bradycardic, and hypothermic. Patient also hypoglycemic (FS 24) and given dextrose immediately. Subsequently given Emiliana hugger, given 2L of warm IV fluids, started on levophed, and given broad-spectrum abx, and started on levothyroxine for possible myxedema coma (although no documentation in eMAR, so unclear if actually given). VBG noted respiratory acidosis. Pt unable to tolerate BiPAP due to current mental status, so patient intubated for airway protection. CT spine demonstrated fx of anterior and posterior tubercle of left C6 transverse foramen. NSGY consulted, but no surgical intervention offered. Subsequently admitted to MICU for presumed septic shock. (27 Nov 2022 16:09)      INFECTIOUS DISEASE HISTORY:  History as above.    PAST MEDICAL & SURGICAL HISTORY:  Anxiety      Depression      Osteoporosis      Kidney cysts      Peripheral neuropathy      RA (rheumatoid arthritis)      OA (osteoarthritis)      Low back pain with radiation  s/p &quot;nerve cutting&quot; 2015          FAMILY HISTORY  Family history of stroke (Mother)        SOCIAL HISTORY  Social History:  smokes 1ppd for so many years.  Does not drink alcohol or use recreational drugs (10 Nov 2022 14:26)        ROS  unable to obtain history secondary to patient's mental status and/or sedation      VITALS:  T(F): 100.2, Max: 100.4 (11-27-22 @ 10:00)  HR: 80  BP: 140/85  RR: 16Vital Signs Last 24 Hrs  T(C): 37.9 (27 Nov 2022 11:00), Max: 38 (27 Nov 2022 10:00)  T(F): 100.2 (27 Nov 2022 11:00), Max: 100.4 (27 Nov 2022 10:00)  HR: 80 (27 Nov 2022 11:00) (39 - 89)  BP: 140/85 (27 Nov 2022 11:00) (72/45 - 147/71)  BP(mean): 100 (27 Nov 2022 07:40) (100 - 100)  RR: 16 (27 Nov 2022 11:00) (16 - 18)  SpO2: 99% (27 Nov 2022 11:00) (97% - 100%)    Parameters below as of 27 Nov 2022 11:00  Patient On (Oxygen Delivery Method): ventilator  O2 Flow (L/min): 40      PHYSICAL EXAM:  Gen: NAD, resting in bed  HEENT: Normocephalic, atraumatic  Neck: supple, no lymphadenopathy  CV: Regular rate & regular rhythm  Lungs: decreased BS at bases, no fremitus  Abdomen: Soft, BS present  Ext: Warm, well perfused  Neuro: non focal, awake  Skin: no rash, no erythema  Lines: no phlebitis    TESTS & MEASUREMENTS:                        11.6   8.18  )-----------( 105      ( 27 Nov 2022 01:44 )             34.5     11-27    143  |  106  |  52<H>  ----------------------------<  20<LL>  4.7   |  18  |  0.9    Ca    9.1      27 Nov 2022 01:44    TPro  4.9<L>  /  Alb  2.8<L>  /  TBili  <0.2  /  DBili  x   /  AST  100<H>  /  ALT  59<H>  /  AlkPhos  157<H>  11-27      LIVER FUNCTIONS - ( 27 Nov 2022 01:44 )  Alb: 2.8 g/dL / Pro: 4.9 g/dL / ALK PHOS: 157 U/L / ALT: 59 U/L / AST: 100 U/L / GGT: x               Culture - Blood (collected 11-10-22 @ 12:12)  Source: .Blood Blood-Peripheral  Final Report (11-15-22 @ 23:00):    No Growth Final    Culture - Blood (collected 11-10-22 @ 12:12)  Source: .Blood Blood-Peripheral  Final Report (11-15-22 @ 23:00):    No Growth Final    Culture - Surgical Swab (collected 04-27-22 @ 13:10)  Source: .Surgical Swab left knee wound  Final Report (05-02-22 @ 19:49):    Moderate Pseudomonas aeruginosa  Organism: Pseudomonas aeruginosa (05-02-22 @ 19:49)  Organism: Pseudomonas aeruginosa (05-02-22 @ 19:49)      -  Amikacin: S <=16      -  Aztreonam: S <=4      -  Cefepime: S <=2      -  Ceftazidime: S 4      -  Ciprofloxacin: S <=0.25      -  Gentamicin: S <=2      -  Imipenem: S 2      -  Levofloxacin: S <=0.5      -  Meropenem: S <=1      -  Piperacillin/Tazobactam: S <=8      -  Tobramycin: S <=2      Method Type: SEPIDEH        Blood Gas Venous - Lactate: 0.60 mmol/L (11-27-22 @ 02:31)      INFECTIOUS DISEASES TESTING  MRSA PCR Result.: Negative (11-11-22 @ 14:30)  COVID-19 PCR: NotDetec (11-10-22 @ 12:25)  MRSA PCR Result.: Negative (04-27-22 @ 09:51)  COVID-19 PCR: NotDetec (01-16-22 @ 12:40)      RADIOLOGY & ADDITIONAL TESTS:  I have personally reviewed the last Chest xray  CXR  Xray Chest 1 View- PORTABLE-Urgent:   ACC: [id number] EXAM:  XR CHEST PORTABLE URGENT 1V                          PROCEDURE DATE:  11/27/2022          INTERPRETATION:  Clinical History / Reason for exam: Central line   placement.    Comparison : Chest radiograph dated November 27, 2022 at 3:04 AM.    Technique/Positioning: Portable frontal.    Findings:    Support devices: Interval placement of right IJ central venous catheter,   in appropriate position. Endotracheal and nasogastric tubes are in stable   position. Overlying EKG leads.    Cardiac/mediastinum/hilum: Stable.    Lung parenchyma/Pleura: Unchanged patchy right upper lobe opacities. No   pneumothorax.    Skeleton/soft tissues: Stable.    Impression:    1. Unchanged patchy right upper lobe opacities.    2. Lines and tubes as above.        --- End of Report ---            SUSU LAM MD; Attending Radiologist  This document has been electronically signed. Nov 27 2022  1:37PM (11-27-22 @ 06:04)      CT  CT Chest w/ IV Cont:   ACC: [id number] EXAM:  CT ABDOMEN AND PELVIS IC                        ACC: [id number] EXAM:  CT CHEST IC                          PROCEDURE DATE:  11/27/2022          INTERPRETATION:  CLINICAL HISTORY / REASON FOR EXAM: Trauma to chest,   abdomen and pelvis    TECHNIQUE: Contiguous axial CT images were obtained from the thoracic   inlet to the pubic symphysis following administration of 95 mL Omnipaque   350 intravenous contrast, 5 mL discarded. Oral contrast was not   administered. Coronal, sagittal and 3D/MIP reformatted images are also   submitted.    COMPARISON CT: CT chest 11/11/2022. CT abdomen and pelvis 10/15/2015.   Note is also made of CT lumbar spine dated 9/6/2017 and MRI abdomen dated   3/25/2020    FINDINGS:    CHEST:    TUBES/LINES: Endotracheal tube with tip terminating 2 cm superior to the   hemanth. Enteric tube with tip visualized within the stomach. Spinal   stimulator. Distended urinary bladder despite the presence of Hughes   catheter.    LUNGS, PLEURA, AND AIRWAYS: No pleural effusion or pneumothorax.   Emphysematous changes. No short interval change to a tubular   consolidative opacity in the right upper lobe and additional right upper   lobe tree-in-bud nodules.    MEDIASTINUM/THORACIC NODES: No enlarged thoraciclymph nodes are   identified.    HEART/GREAT VESSELS: No pericardial effusion. Heart size is unremarkable.   No aneurysmal dilation of the thoracic aorta.      ABDOMEN/PELVIS:    HEPATOBILIARY: Small right hepatic lobe cyst and additional hepatic   hypodensity too small to characterize. Moderate periportal edema..    SPLEEN: Unremarkable.    PANCREAS: Unremarkable.    ADRENAL GLANDS: Unremarkable.    KIDNEYS: Symmetric enhancement bilaterally. No evidence of   hydronephrosis. Renal cysts and hypodensities too small to characterize.    ABDOMINOPELVIC NODES: Unremarkable.    PELVIC ORGANS: The urinary bladder is distended despite Hughes catheter   balloon appearing within the urinary bladder.    PERITONEUM/MESENTERY/BOWEL: No evidence for bowel obstruction. Moderate   colonic stool burden. Rectal suture/anastomosis. Diffuse edema throughout   the mesentery limits evaluation for subtle ascites. No pneumoperitoneum..    BONES/SOFT TISSUES: Chronic fractures of the right superior and inferior   pubic ramus and left iliac bone. Chronic L2 deformity. Diffuse edema of   the subcutaneous soft tissues.    VASCULAR: Calcified atherosclerotic disease of the abdominal aorta.      IMPRESSION:    No CT evidence for acute traumatic injury to the chest, abdomen or pelvis.    No short interval change to pulmonary findings which may be   infectious/inflammatory and for which follow-up chest CT is again   recommended.    The urinary bladder is moderately distended despite Hughes catheter   balloon appearing within the bladder lumen.    Moderate colonic stool burden.    Additional incidental findings as above.    --- End of Report ---          WAYNE LORENZANA MD; Resident Radiologist  This document has been electronically signed.  CRISTOPHER JEFFERSON MD; Attending Radiologist  This document has been electronically signed. Nov 27 2022  6:45AM (11-27-22 @ 04:24)      CARDIOLOGY TESTING      MEDICATIONS  chlorhexidine 0.12% Liquid 15 Oral Mucosa every 12 hours  chlorhexidine 2% Cloths 1 Topical <User Schedule>  clindamycin IVPB 600 IV Intermittent every 8 hours  dextrose 50% Injectable 50 IV Push once  enoxaparin Injectable 40 SubCutaneous every 24 hours  fentaNYL   Infusion. 0.5 IV Continuous <Continuous>  lactated ringers Bolus 500 IV Bolus once  levothyroxine Injectable 400 IV Push at bedtime  meropenem  IVPB 1000 IV Intermittent once  norepinephrine Infusion 0.05 IV Continuous <Continuous>  pantoprazole   Suspension 40 Oral daily  polyethylene glycol 3350 17 Oral daily  senna 2 Oral at bedtime  vancomycin  IVPB 750 IV Intermittent every 12 hours      Weight  Weight (kg): 40 (11-27-22 @ 04:47)    ANTIBIOTICS:  clindamycin IVPB 600 milliGRAM(s) IV Intermittent every 8 hours  meropenem  IVPB 1000 milliGRAM(s) IV Intermittent once  vancomycin  IVPB 750 milliGRAM(s) IV Intermittent every 12 hours      ALLERGIES:  No Known Allergies      ASSESSMENT  67F w/ h/o anorexia, anxiety, depression, kidney cysts, peripheral neuropathy, RA, OA, osteoporosis, low back pain s/p unwitnessed fall, found down by     IMPRESSION  #s/p Fall  #Septic Shock   #Necrotizing Fasciitis   -  CT Lower Extremity w/ IV Cont, Bilateral (11.27.22 @ 11:02): 1) Bilateral lower extremity swelling and marked skin thickening, left  greater than right, compatible with cellulitis in the correct clinical  setting. Multiple rounded locules of gas are also seen tracking within the soft  tissues along the predominantly posterior calf and anterolateral/dorsal  ankle and foot. These are likely related to known multiple abrasions of  the left foot/leg and appear to partially contact the posterior calf skin  abrasion. However, given the burden of soft tissue disease it is   difficult to completely exclude early necrotizing fasciitis on imaging,  and this was discussed with the team at the time of dictation.    #Corticol ersions of left ankle/foot (Osteopenia vs OM)  #Bilateral Knee Joint effusions   #Abx allergy: NKDA    RECOMMENDATIONS  - surgical evaluation of left lower extremity for fasciitis   - change vancomycin to daptomycin 8 mg/kg q 24 hours   -- check CK for baseline   - change meropenem to zosyn 4.5 g q 8 hours extended infusion   - continue clindamycin -- increase to 900 mg q 8 hours   - follow-up blood cx  - trend creatinine closely     Please call or message on [employer] Teams if with any questions.  Spectra [id number]     ANA LILIA VERMA  67y, Female  Allergy: No Known Allergies      CHIEF COMPLAINT:     LOS      HPI:  67F w/ h/o anorexia, anxiety, depression, kidney cysts, peripheral neuropathy, RA, OA, osteoporosis, low back pain s/p unwitnessed fall, found down by  +HT, ?LOC, -AC. Contacted patient's  who was able to provide some history and states he returned home from work around 11pm when he found the patient down on the tiled kitchen floor. States the patient was awake and alert when he found her on the floor, but she didn't recall how she fell down.  speculates that she might've been sitting in chair and fallen out of it as this has happened before in the past.  states that the patient said she hit her head, and he noticed blood on the patient's knees, but no other injuries. Patient was complaining of left-sided neck pain after the fall, but  states she has been having this pain for about 1 month. About 1 hour after the fall, patient became lethargic and was unable to stay up on her feet, so  called for an ambulance.     On arrival to ED, patient unresponsive, hypotensive, bradycardic, and hypothermic. Patient also hypoglycemic (FS 24) and given dextrose immediately. Subsequently given Emiliana hugger, given 2L of warm IV fluids, started on levophed, and given broad-spectrum abx, and started on levothyroxine for possible myxedema coma (although no documentation in eMAR, so unclear if actually given). VBG noted respiratory acidosis. Pt unable to tolerate BiPAP due to current mental status, so patient intubated for airway protection. CT spine demonstrated fx of anterior and posterior tubercle of left C6 transverse foramen. NSGY consulted, but no surgical intervention offered. Subsequently admitted to MICU for presumed septic shock. (27 Nov 2022 16:09)      INFECTIOUS DISEASE HISTORY:  History as above.  Currently intubated  On pressors.   Bilateral Lower extremities are erythematous/red/purple -- per , has been like this chronically with blistering occaisionally.   Discussed with     PAST MEDICAL & SURGICAL HISTORY:  Anxiety      Depression      Osteoporosis      Kidney cysts      Peripheral neuropathy      RA (rheumatoid arthritis)      OA (osteoarthritis)      Low back pain with radiation  s/p &quot;nerve cutting&quot; 2015          FAMILY HISTORY  Family history of stroke (Mother)        SOCIAL HISTORY  Social History:  smokes 1ppd for so many years.  Does not drink alcohol or use recreational drugs (10 Nov 2022 14:26)        ROS  unable to obtain history secondary to patient's mental status and/or sedation      VITALS:  T(F): 100.2, Max: 100.4 (11-27-22 @ 10:00)  HR: 80  BP: 140/85  RR: 16Vital Signs Last 24 Hrs  T(C): 37.9 (27 Nov 2022 11:00), Max: 38 (27 Nov 2022 10:00)  T(F): 100.2 (27 Nov 2022 11:00), Max: 100.4 (27 Nov 2022 10:00)  HR: 80 (27 Nov 2022 11:00) (39 - 89)  BP: 140/85 (27 Nov 2022 11:00) (72/45 - 147/71)  BP(mean): 100 (27 Nov 2022 07:40) (100 - 100)  RR: 16 (27 Nov 2022 11:00) (16 - 18)  SpO2: 99% (27 Nov 2022 11:00) (97% - 100%)    Parameters below as of 27 Nov 2022 11:00  Patient On (Oxygen Delivery Method): ventilator  O2 Flow (L/min): 40      PHYSICAL EXAM:  Gen: NAD, resting in bed  HEENT: Normocephalic, atraumatic  Neck: supple, no lymphadenopathy  CV: Regular rate & regular rhythm  Lungs: decreased BS at bases, no fremitus  Abdomen: Soft, BS present  Ext: Warm, well perfused  Neuro: non focal, awake  Skin: Lower extremities erythematous/red - blister with superficial skin uclers  Lines: no phlebitis    TESTS & MEASUREMENTS:                        11.6   8.18  )-----------( 105      ( 27 Nov 2022 01:44 )             34.5     11-27    143  |  106  |  52<H>  ----------------------------<  20<LL>  4.7   |  18  |  0.9    Ca    9.1      27 Nov 2022 01:44    TPro  4.9<L>  /  Alb  2.8<L>  /  TBili  <0.2  /  DBili  x   /  AST  100<H>  /  ALT  59<H>  /  AlkPhos  157<H>  11-27      LIVER FUNCTIONS - ( 27 Nov 2022 01:44 )  Alb: 2.8 g/dL / Pro: 4.9 g/dL / ALK PHOS: 157 U/L / ALT: 59 U/L / AST: 100 U/L / GGT: x               Culture - Blood (collected 11-10-22 @ 12:12)  Source: .Blood Blood-Peripheral  Final Report (11-15-22 @ 23:00):    No Growth Final    Culture - Blood (collected 11-10-22 @ 12:12)  Source: .Blood Blood-Peripheral  Final Report (11-15-22 @ 23:00):    No Growth Final    Culture - Surgical Swab (collected 04-27-22 @ 13:10)  Source: .Surgical Swab left knee wound  Final Report (05-02-22 @ 19:49):    Moderate Pseudomonas aeruginosa  Organism: Pseudomonas aeruginosa (05-02-22 @ 19:49)  Organism: Pseudomonas aeruginosa (05-02-22 @ 19:49)      -  Amikacin: S <=16      -  Aztreonam: S <=4      -  Cefepime: S <=2      -  Ceftazidime: S 4      -  Ciprofloxacin: S <=0.25      -  Gentamicin: S <=2      -  Imipenem: S 2      -  Levofloxacin: S <=0.5      -  Meropenem: S <=1      -  Piperacillin/Tazobactam: S <=8      -  Tobramycin: S <=2      Method Type: SEPIDEH        Blood Gas Venous - Lactate: 0.60 mmol/L (11-27-22 @ 02:31)      INFECTIOUS DISEASES TESTING  MRSA PCR Result.: Negative (11-11-22 @ 14:30)  COVID-19 PCR: NotDetec (11-10-22 @ 12:25)  MRSA PCR Result.: Negative (04-27-22 @ 09:51)  COVID-19 PCR: NotDetec (01-16-22 @ 12:40)      RADIOLOGY & ADDITIONAL TESTS:  I have personally reviewed the last Chest xray  CXR  Xray Chest 1 View- PORTABLE-Urgent:   ACC: [id number] EXAM:  XR CHEST PORTABLE URGENT 1V                          PROCEDURE DATE:  11/27/2022          INTERPRETATION:  Clinical History / Reason for exam: Central line   placement.    Comparison : Chest radiograph dated November 27, 2022 at 3:04 AM.    Technique/Positioning: Portable frontal.    Findings:    Support devices: Interval placement of right IJ central venous catheter,   in appropriate position. Endotracheal and nasogastric tubes are in stable   position. Overlying EKG leads.    Cardiac/mediastinum/hilum: Stable.    Lung parenchyma/Pleura: Unchanged patchy right upper lobe opacities. No   pneumothorax.    Skeleton/soft tissues: Stable.    Impression:    1. Unchanged patchy right upper lobe opacities.    2. Lines and tubes as above.        --- End of Report ---            SUSU LAM MD; Attending Radiologist  This document has been electronically signed. Nov 27 2022  1:37PM (11-27-22 @ 06:04)      CT  CT Chest w/ IV Cont:   ACC: [id number] EXAM:  CT ABDOMEN AND PELVIS IC                        ACC: [id number] EXAM:  CT CHEST IC                          PROCEDURE DATE:  11/27/2022          INTERPRETATION:  CLINICAL HISTORY / REASON FOR EXAM: Trauma to chest,   abdomen and pelvis    TECHNIQUE: Contiguous axial CT images were obtained from the thoracic   inlet to the pubic symphysis following administration of 95 mL Omnipaque   350 intravenous contrast, 5 mL discarded. Oral contrast was not   administered. Coronal, sagittal and 3D/MIP reformatted images are also   submitted.    COMPARISON CT: CT chest 11/11/2022. CT abdomen and pelvis 10/15/2015.   Note is also made of CT lumbar spine dated 9/6/2017 and MRI abdomen dated   3/25/2020    FINDINGS:    CHEST:    TUBES/LINES: Endotracheal tube with tip terminating 2 cm superior to the   hemanth. Enteric tube with tip visualized within the stomach. Spinal   stimulator. Distended urinary bladder despite the presence of Hughes   catheter.    LUNGS, PLEURA, AND AIRWAYS: No pleural effusion or pneumothorax.   Emphysematous changes. No short interval change to a tubular   consolidative opacity in the right upper lobe and additional right upper   lobe tree-in-bud nodules.    MEDIASTINUM/THORACIC NODES: No enlarged thoraciclymph nodes are   identified.    HEART/GREAT VESSELS: No pericardial effusion. Heart size is unremarkable.   No aneurysmal dilation of the thoracic aorta.      ABDOMEN/PELVIS:    HEPATOBILIARY: Small right hepatic lobe cyst and additional hepatic   hypodensity too small to characterize. Moderate periportal edema..    SPLEEN: Unremarkable.    PANCREAS: Unremarkable.    ADRENAL GLANDS: Unremarkable.    KIDNEYS: Symmetric enhancement bilaterally. No evidence of   hydronephrosis. Renal cysts and hypodensities too small to characterize.    ABDOMINOPELVIC NODES: Unremarkable.    PELVIC ORGANS: The urinary bladder is distended despite Hughes catheter   balloon appearing within the urinary bladder.    PERITONEUM/MESENTERY/BOWEL: No evidence for bowel obstruction. Moderate   colonic stool burden. Rectal suture/anastomosis. Diffuse edema throughout   the mesentery limits evaluation for subtle ascites. No pneumoperitoneum..    BONES/SOFT TISSUES: Chronic fractures of the right superior and inferior   pubic ramus and left iliac bone. Chronic L2 deformity. Diffuse edema of   the subcutaneous soft tissues.    VASCULAR: Calcified atherosclerotic disease of the abdominal aorta.      IMPRESSION:    No CT evidence for acute traumatic injury to the chest, abdomen or pelvis.    No short interval change to pulmonary findings which may be   infectious/inflammatory and for which follow-up chest CT is again   recommended.    The urinary bladder is moderately distended despite Hughes catheter   balloon appearing within the bladder lumen.    Moderate colonic stool burden.    Additional incidental findings as above.    --- End of Report ---          WAYNE LORENZANA MD; Resident Radiologist  This document has been electronically signed.  CRISTOPHER JEFFERSON MD; Attending Radiologist  This document has been electronically signed. Nov 27 2022  6:45AM (11-27-22 @ 04:24)      CARDIOLOGY TESTING      MEDICATIONS  chlorhexidine 0.12% Liquid 15 Oral Mucosa every 12 hours  chlorhexidine 2% Cloths 1 Topical <User Schedule>  clindamycin IVPB 600 IV Intermittent every 8 hours  dextrose 50% Injectable 50 IV Push once  enoxaparin Injectable 40 SubCutaneous every 24 hours  fentaNYL   Infusion. 0.5 IV Continuous <Continuous>  lactated ringers Bolus 500 IV Bolus once  levothyroxine Injectable 400 IV Push at bedtime  meropenem  IVPB 1000 IV Intermittent once  norepinephrine Infusion 0.05 IV Continuous <Continuous>  pantoprazole   Suspension 40 Oral daily  polyethylene glycol 3350 17 Oral daily  senna 2 Oral at bedtime  vancomycin  IVPB 750 IV Intermittent every 12 hours      Weight  Weight (kg): 40 (11-27-22 @ 04:47)    ANTIBIOTICS:  clindamycin IVPB 600 milliGRAM(s) IV Intermittent every 8 hours  meropenem  IVPB 1000 milliGRAM(s) IV Intermittent once  vancomycin  IVPB 750 milliGRAM(s) IV Intermittent every 12 hours      ALLERGIES:  No Known Allergies

## 2022-11-27 NOTE — PROCEDURAL SAFETY CHECKLIST WITH OR WITHOUT SEDATION - NSTIMEOUTDATE_GEN_ALL_CORE
HR=71 bpm, NYGQ=460/69 mmhg, SpO2=96.0 %, Resp=19 B/min, Pain=0, Martha=10, Narvaez=2 27-Nov-2022 05:10

## 2022-11-27 NOTE — CONSULT NOTE ADULT - SUBJECTIVE AND OBJECTIVE BOX
TRAUMA ACTIVATION LEVEL: CONSULT  ACTIVATED BY: ED  INTUBATED: YES      MECHANISM OF INJURY:   [] Blunt     [] MVC	  [x] Fall	  [] Pedestrian Struck	  [] Motorcycle     [] Assault     [] Bicycle collision    [] Sports injury    [] Penetrating    [] Gun Shot Wound      [] Stab Wound    GCS: 3  	E: 1	V: 1	M: 1    HPI:  67yF w/ PMHx of anorexia, anxiety, depression, kidney cysts, peripheral neuropathy, RA, OA, osteoporosis, low back pain seen as Trauma Consult s/p found down by  +HT, ?LOC, -AC. Contacted patient's  who was able to provide some history and states he returned home from work around 11pm when he found the patient down on the tiled kitchen floor. States the patient was awake and alert when he found her on the floor, but she didn't recall how she fell down.  speculates that she might've been sitting in chair and fallen out of it as this has happened before in the past.  states that the patient said she hit her head, and he noticed blood on the patient's knees, but no other injuries. Patient was complaining of left-sided neck pain after the fall, but  states she has been having this pain for about 1 month. About 1 hour after the fall, patient became lethargic and was unable to stay up on her feet, so  called for an ambulance.     On arrival to the ED, patient was unresponsive, hypotensive, bradycardic, and hypothermic. Patient was also hypoglycemic with blood glucose of 24 and given dextrose immediately. While in the ED, patient was given Emiliana hugger and 2L of warm IV fluids, started on levophed and broad-spectrum abx, and started on levothyroxine for possible myxedema coma. VBG was done and noted to have respiratory acidosis, so patient was intubated for airway protection since she would not tolerate BiPAP due to current mental status. CT spine demonstrated fracture of anterior and posterior tubercle of left C6 transverse foramen.   Trauma assessment in ED: intubated and vented, on pressors , GCS 3 , AAOx 0.    PAST MEDICAL & SURGICAL HISTORY:  Anxiety  Depression  Osteoporosis  Kidney cysts  Peripheral neuropathy  RA (rheumatoid arthritis)  OA (osteoarthritis)  Low back pain with radiation  s/p "nerve cutting" 2015      No Known Allergies      Home Medications:  gabapentin 600 mg oral tablet: 1 tab(s) orally 3 times a day (05 May 2022 07:08)  HYDROcodone-acetaminophen 10 mg-325 mg oral tablet: 1 tab(s) orally 4 times a day (05 May 2022 07:08)  rosuvastatin 5 mg oral tablet: 1 tab(s) orally once a day (10 Nov 2022 14:45)    ROS: 10-system review is otherwise negative except HPI above.      Primary Survey:    A - intubated  B - vented 40/5  C - palpable pulses in UE and RLE, dopplerable DP in LLE  D - GCS 3 on arrival  Exposure obtained    Vital Signs Last 24 Hrs  T(C): --  T(F): --  HR: 74 (27 Nov 2022 06:45) (39 - 89)  BP: 128/98 (27 Nov 2022 06:45) (72/45 - 130/87)  BP(mean): --  RR: 18 (27 Nov 2022 06:45) (16 - 18)  SpO2: 100% (27 Nov 2022 06:45) (100% - 100%)    Parameters below as of 27 Nov 2022 06:45  Patient On (Oxygen Delivery Method): ventilator  O2 Flow (L/min): 40      Secondary Survey:   General: intubated, not responding to commands or pain   HEENT: Normocephalic, no scalp lacerations, right forehead hematoma   Neck: Soft, midline trachea. no c-spine tenderness  Chest: No chest wall tenderness, no subcutaneous emphysema   Cardiac: S1, S2, RRR  Respiratory: vented 40/5  Abdomen: Soft, non-distended, non-tender, no rebound, no guarding.  Groin: Normal appearing, pelvis stable   Ext:  Moving b/l upper and lower extremities. Palpable pulses in UE b/l and RLE; dopplerable DP in LLE; b/l LE ulcers present with edema and blanchable erythema, kerlix dressing placed on LE b/l. R knee wound/abrasion present  Back: No T/L/S spine tenderness, No palpable runoff/stepoff/deformity    ACCESS / DEVICES:  [x] Peripheral IV  [x] Central Venous Line	[x] R	[ ] L	[x] IJ	[ ] Fem	[ ] SC	Placed: 11/27  [x] Urinary Catheter,  Date Placed: 11/27    Labs:  CAPILLARY BLOOD GLUCOSE  POCT Blood Glucose.: 164 mg/dL (27 Nov 2022 02:34)  POCT Blood Glucose.: 24 mg/dL (27 Nov 2022 02:22)               11.6   8.18  )-----------( 105      ( 27 Nov 2022 01:44 )             34.5       Auto Neutrophil %: 93.3 % (11-27-22 @ 01:44)  Auto Immature Granulocyte %: 0.4 % (11-27-22 @ 01:44)    11-27    143  |  106  |  52<H>  ----------------------------<  20<LL>  4.7   |  18  |  0.9      Calcium, Total Serum: 9.1 mg/dL (11-27-22 @ 01:44)    LFTs:             4.9  | <0.2 | 100      ------------------[157     ( 27 Nov 2022 01:44 )  2.8  | x    | 59          Blood Gas Venous - Lactate: 0.60 mmol/L (11-27-22 @ 02:31)    Coags:    CARDIAC MARKERS ( 27 Nov 2022 06:19 )  x     / x     / 330 U/L / x     / x      CARDIAC MARKERS ( 27 Nov 2022 01:44 )  x     / 0.04 ng/mL / x     / x     / x          RADIOLOGY & ADDITIONAL STUDIES:   < from: CT Head No Cont (11.27.22 @ 04:11) >  < from: CT Cervical Spine No Cont (11.27.22 @ 04:14) >  IMPRESSION:    CT HEAD:    No acute intracranial findings.    CT CERVICAL SPINE:    Fracture of the anterior and posterior tubercle of the left C6 transverse   foramen, new since prior remote study. Consider CTA to ensure no   underlying vascular injury.    Appearance of destructive process at the C6-C7 disc space with erosive   change to the adjacent endplates. Findings new from 2018, however no more  recent priors. Findings may reflect acute or chronic   discitis-osteomyelitis.      < from: CT Abdomen and Pelvis w/ IV Cont (11.27.22 @ 04:24) >  < from: CT Chest w/ IV Cont (11.27.22 @ 04:24) >  IMPRESSION:    No CT evidence for acute traumatic injury to the chest, abdomen or pelvis.    No short interval change to pulmonary findings which may be   infectious/inflammatory and for which follow-up chest CT is again   recommended.    The urinary bladder is moderately distended despite Hughes catheter   balloon appearing within the bladder lumen.    Moderate colonic stool burden.    Additional incidental findings as above.      ***PENDING IMAGING****  --------------------------------------------------------------------------------------- TRAUMA ACTIVATION LEVEL: CONSULT  ACTIVATED BY: ED  INTUBATED: YES      MECHANISM OF INJURY:   [] Blunt     [] MVC	  [x] Fall	  [] Pedestrian Struck	  [] Motorcycle     [] Assault     [] Bicycle collision    [] Sports injury    [] Penetrating    [] Gun Shot Wound      [] Stab Wound    GCS: 3  	E: 1	V: 1	M: 1    HPI:  67yF w/ PMHx of anorexia, anxiety, depression, kidney cysts, peripheral neuropathy, RA, OA, osteoporosis, low back pain seen as Trauma Consult s/p unwitnessed fall, found down by  +HT, ?LOC, -AC. Contacted patient's  who was able to provide some history and states he returned home from work around 11pm when he found the patient down on the tiled kitchen floor. States the patient was awake and alert when he found her on the floor, but she didn't recall how she fell down.  speculates that she might've been sitting in chair and fallen out of it as this has happened before in the past.  states that the patient said she hit her head, and he noticed blood on the patient's knees, but no other injuries. Patient was complaining of left-sided neck pain after the fall, but  states she has been having this pain for about 1 month. About 1 hour after the fall, patient became lethargic and was unable to stay up on her feet, so  called for an ambulance.     On arrival to the ED, patient was unresponsive, hypotensive, bradycardic, and hypothermic. Patient was also hypoglycemic with blood glucose of 24 and given dextrose immediately. While in the ED, patient was given Emiliana hugger and 2L of warm IV fluids, started on levophed and broad-spectrum abx, and started on levothyroxine for possible myxedema coma. VBG was done and noted to have respiratory acidosis, so patient was intubated for airway protection since she would not tolerate BiPAP due to current mental status. CT spine demonstrated fracture of anterior and posterior tubercle of left C6 transverse foramen.   Trauma assessment in ED: intubated and vented, on pressors , GCS 3 , AAOx 0.    PAST MEDICAL & SURGICAL HISTORY:  Anxiety  Depression  Osteoporosis  Kidney cysts  Peripheral neuropathy  RA (rheumatoid arthritis)  OA (osteoarthritis)  Low back pain with radiation  s/p "nerve cutting" 2015      No Known Allergies      Home Medications:  gabapentin 600 mg oral tablet: 1 tab(s) orally 3 times a day (05 May 2022 07:08)  HYDROcodone-acetaminophen 10 mg-325 mg oral tablet: 1 tab(s) orally 4 times a day (05 May 2022 07:08)  rosuvastatin 5 mg oral tablet: 1 tab(s) orally once a day (10 Nov 2022 14:45)    ROS: 10-system review is otherwise negative except HPI above.      Primary Survey:    A - intubated  B - vented 40/5  C - palpable pulses in UE and RLE, dopplerable DP in LLE  D - GCS 3 on arrival  Exposure obtained    Vital Signs Last 24 Hrs  T(C): --  T(F): --  HR: 74 (27 Nov 2022 06:45) (39 - 89)  BP: 128/98 (27 Nov 2022 06:45) (72/45 - 130/87)  BP(mean): --  RR: 18 (27 Nov 2022 06:45) (16 - 18)  SpO2: 100% (27 Nov 2022 06:45) (100% - 100%)    Parameters below as of 27 Nov 2022 06:45  Patient On (Oxygen Delivery Method): ventilator  O2 Flow (L/min): 40      Secondary Survey:   General: intubated, not responding to commands or pain   HEENT: Normocephalic, no scalp lacerations, right forehead hematoma   Neck: Soft, midline trachea. no c-spine tenderness  Chest: No chest wall tenderness, no subcutaneous emphysema   Cardiac: S1, S2, RRR  Respiratory: vented 40/5  Abdomen: Soft, non-distended, non-tender, no rebound, no guarding.  Groin: Normal appearing, pelvis stable   Ext:  Moving b/l upper and lower extremities. Palpable pulses in UE b/l and RLE; dopplerable DP in LLE; b/l LE ulcers present with edema and blanchable erythema, kerlix dressing placed on LE b/l. R knee wound/abrasion present  Back: No T/L/S spine tenderness, No palpable runoff/stepoff/deformity    ACCESS / DEVICES:  [x] Peripheral IV  [x] Central Venous Line	[x] R	[ ] L	[x] IJ	[ ] Fem	[ ] SC	Placed: 11/27  [x] Urinary Catheter,  Date Placed: 11/27    Labs:  CAPILLARY BLOOD GLUCOSE  POCT Blood Glucose.: 164 mg/dL (27 Nov 2022 02:34)  POCT Blood Glucose.: 24 mg/dL (27 Nov 2022 02:22)               11.6   8.18  )-----------( 105      ( 27 Nov 2022 01:44 )             34.5       Auto Neutrophil %: 93.3 % (11-27-22 @ 01:44)  Auto Immature Granulocyte %: 0.4 % (11-27-22 @ 01:44)    11-27    143  |  106  |  52<H>  ----------------------------<  20<LL>  4.7   |  18  |  0.9      Calcium, Total Serum: 9.1 mg/dL (11-27-22 @ 01:44)    LFTs:             4.9  | <0.2 | 100      ------------------[157     ( 27 Nov 2022 01:44 )  2.8  | x    | 59          Blood Gas Venous - Lactate: 0.60 mmol/L (11-27-22 @ 02:31)    Coags:    CARDIAC MARKERS ( 27 Nov 2022 06:19 )  x     / x     / 330 U/L / x     / x      CARDIAC MARKERS ( 27 Nov 2022 01:44 )  x     / 0.04 ng/mL / x     / x     / x          RADIOLOGY & ADDITIONAL STUDIES:   < from: CT Head No Cont (11.27.22 @ 04:11) >  < from: CT Cervical Spine No Cont (11.27.22 @ 04:14) >  IMPRESSION:    CT HEAD:    No acute intracranial findings.    CT CERVICAL SPINE:    Fracture of the anterior and posterior tubercle of the left C6 transverse   foramen, new since prior remote study. Consider CTA to ensure no   underlying vascular injury.    Appearance of destructive process at the C6-C7 disc space with erosive   change to the adjacent endplates. Findings new from 2018, however no more  recent priors. Findings may reflect acute or chronic   discitis-osteomyelitis.      < from: CT Abdomen and Pelvis w/ IV Cont (11.27.22 @ 04:24) >  < from: CT Chest w/ IV Cont (11.27.22 @ 04:24) >  IMPRESSION:    No CT evidence for acute traumatic injury to the chest, abdomen or pelvis.    No short interval change to pulmonary findings which may be   infectious/inflammatory and for which follow-up chest CT is again   recommended.    The urinary bladder is moderately distended despite Hughes catheter   balloon appearing within the bladder lumen.    Moderate colonic stool burden.    Additional incidental findings as above.      ***PENDING IMAGING****  ---------------------------------------------------------------------------------------

## 2022-11-27 NOTE — ED PROVIDER NOTE - CLINICAL SUMMARY MEDICAL DECISION MAKING FREE TEXT BOX
67-year-old female presented today unresponsive via ambulance.  Patient on arrival is hypotensive, bradycardic and minimally responsive to physical or verbal stimuli.  Patient also noted to be hypothermic.  Patient noted to be hypoglycemic with blood sugar of 24 and immediately given dextrose.  Patient started on Emiliana hugger and given warm IV fluids of approximately 2 L.  Patient also started on peripheral Levophed for presumed sepsis.  Patient started on broad-spectrum antibiotics.  After patient improved hemodynamically, she was intubated for airway protection as she was noted to have respiratory acidosis on VBG and current mental status would not tolerate BiPAP nor was patient protecting her airway.  Patient was successfully intubated and taken to CAT scan for further evaluation.  Patient was signed out to Dr. Valverde for further disposition pending CT scan results.

## 2022-11-27 NOTE — CONSULT NOTE ADULT - SUBJECTIVE AND OBJECTIVE BOX
HPI: 67-year-old female with PMH of anorexia, depression, anxiety, osteoporosis, RA who was brought in by EMS for hypotension, bradycardia, hypothermia and minimally responsive.  Per , patient was found at home on tiles on the floor.  Unwitnessed fall.  Unknown LOC.  No anticoagulation.  Limited ROS secondary to AMS.  Patient arrived hypothermic and hypoglycemic, given dextrose.    PAST MEDICAL & SURGICAL HISTORY:  Anxiety    Depression    Osteoporosis    Kidney cysts    Peripheral neuropathy    RA (rheumatoid arthritis)    OA (osteoarthritis)    Low back pain with radiation  s/p &quot;nerve cutting&quot; 2015    HEALTH ISSUES - PROBLEM Dx:    FAMILY HISTORY:  Family history of stroke (Mother)    Allergies    No Known Allergies    Intolerances    REVIEW OF SYSTEMS : As listed in HPI  Head and Neck:   Cardio :   Pum :  GI:  Neuro:     MEDICATIONS  (STANDING):  fentaNYL   Infusion. 0.5 MICROgram(s)/kG/Hr (2 mL/Hr) IV Continuous <Continuous>  levothyroxine Injectable 400 MICROGram(s) IV Push at bedtime  norepinephrine Infusion 0.05 MICROgram(s)/kG/Min (1.88 mL/Hr) IV Continuous <Continuous>  piperacillin/tazobactam IVPB.. 3.375 Gram(s) IV Intermittent once    MEDICATIONS  (PRN):    Vital Signs Last 24 Hrs  T(C): --  T(F): --  HR: 74 (27 Nov 2022 06:45) (39 - 89)  BP: 128/98 (27 Nov 2022 06:45) (72/45 - 130/87)  BP(mean): --  RR: 18 (27 Nov 2022 06:45) (16 - 18)  SpO2: 100% (27 Nov 2022 06:45) (100% - 100%)    Parameters below as of 27 Nov 2022 06:45  Patient On (Oxygen Delivery Method): ventilator  O2 Flow (L/min): 40    Physical Exam :  General :   A&O x  Occular :   PERRLA  Motor :   MAEx  Sensory :      LABS:                        11.6   8.18  )-----------( 105      ( 27 Nov 2022 01:44 )             34.5     11-27    143  |  106  |  52<H>  ----------------------------<  20<LL>  4.7   |  18  |  0.9    Ca    9.1      27 Nov 2022 01:44    TPro  4.9<L>  /  Alb  2.8<L>  /  TBili  <0.2  /  DBili  x   /  AST  100<H>  /  ALT  59<H>  /  AlkPhos  157<H>  11-27    RADIOLOGY & ADDITIONAL STUDIES:  < from: CT Head No Cont (11.27.22 @ 04:11) >  CT HEAD:    No acute intracranial findings.    CT CERVICAL SPINE:    Fracture of the anterior and posterior tubercle of the left C6 transverse   foramen, new since prior remote study. Consider CTA to ensure no   underlying vascular injury.    Appearance of destructive process at the C6-C7 disc space with erosive   change to the adjacent endplates. Findings new from 2018, however no more  recent priors. Findings may reflect acute or chronic   discitis-osteomyelitis.    Findings discussed with Dr. Grace at 6:19 AM.    --- End of Report ---      WAYNE LORENZANA MD; Resident Radiologist  This document has been electronically signed.  CRISTOPHER JEFFERSON MD; Attending Radiologist  This document has been electronically signed. Nov 27 2022  6:20AM    < end of copied text >    Assessment / Plan:   - CTA Head and Neck r/o vascular injury   - Hold AC/AP  - Maintain C collar   - Spinal precautions   - MRI C spine wwo when stable

## 2022-11-27 NOTE — CONSULT NOTE ADULT - ASSESSMENT
ASSESSMENT  67F w/ h/o anorexia, anxiety, depression, kidney cysts, peripheral neuropathy, RA, OA, osteoporosis, low back pain s/p unwitnessed fall, found down by     IMPRESSION  #s/p Fall  #Shock, possible septic    #Necrotizing Fasciitis?  -  CT Lower Extremity w/ IV Cont, Bilateral (11.27.22 @ 11:02): 1) Bilateral lower extremity swelling and marked skin thickening, left  greater than right, compatible with cellulitis in the correct clinical  setting. Multiple rounded locules of gas are also seen tracking within the soft  tissues along the predominantly posterior calf and anterolateral/dorsal  ankle and foot. These are likely related to known multiple abrasions of  the left foot/leg and appear to partially contact the posterior calf skin  abrasion. However, given the burden of soft tissue disease it is difficult to completely exclude early necrotizing fasciitis on imaging,  and this was discussed with the team at the time of dictation.    #Chronic Lower Extremity Ulcers  - Arterial US 11/10 with normal arterial flow     #Cortical erosions of left ankle/foot (Osteopenia vs OM)  #Bilateral Knee Joint effusions   #Abx allergy: NKDA    RECOMMENDATIONS  - has chronic lower extremity ulcers with erythema, unclear if  consistent with fasciitis-- will need to monitor skin exam serially -- but given shock and fevers, can continue with broad spectrum antibiotics in case of necrotizing infection   - follow-up vascular surgery evaluation of left lower extremity for fasciitis   - change vancomycin to daptomycin 8 mg/kg q 24 hours   -- check CK for baseline   - change meropenem to zosyn 4.5 g q 8 hours extended infusion   - continue clindamycin -- increase to 900 mg q 8 hours   - follow-up blood cx  - trend creatinine closely     Please call or message on [employer] Teams if with any questions.  Spectra [id number]

## 2022-11-27 NOTE — H&P ADULT - ASSESSMENT
ASSESSMENT      PLAN    CNS  - c/w fentanyl  - avoid oversedation    HEENT  - Oral care    PULMONARY:   - HOB at 45  - Aspiration precautions    CARDIOVASCULAR  - obtain TTE  - trend CE  - c/w Levophed (wean as tolerated)    GI  - Diet: NPO  - GI ppx: pantoprazole 40mg PO QD   - bowel regimen    RENAL  - strict I&Os, daily weight, and monitor volume status  - Follow up renal function and lytes, correct as needed (K>4; Mg>2)    INFECTIOUS DISEASE  - f/u BCx and UCx  - f/u CT B/L LE (r/o nec fasc vs OM)  - obtain US RUQ (r/o cholecystitis)    HEMATOLOGICAL  - DVT ppx: Lovenox 40mg SQ QD     ENDOCRINE  - Monitor FS  - insulin protocol if needed  - obtain thyroid panel (s/p levothyroxine on presentation)  - obtain A1c    DISPOSITION: MICU ASSESSMENT  #Septic Shock  #Toxic-Metabolic Encephalopathy  #Necrotizing Fasciitis of bilateral LE (suspected)  #Osteomyelitis, suspected  #Left C6 Transverse Foramen Fracture    PLAN    CNS  - c/w fentanyl  - avoid oversedation    HEENT  - Oral care    PULMONARY:   - HOB at 45  - Aspiration precautions    CARDIOVASCULAR  - obtain TTE  - trend CE  - c/w Levophed (wean as tolerated)  - avoid volume overload    GI  - Diet: NPO  - GI ppx: pantoprazole 40mg PO QD   - bowel regimen    RENAL  - strict I&Os, daily weight, and monitor volume status  - Follow up renal function and lytes, correct as needed (K>4; Mg>2)    INFECTIOUS DISEASE  - f/u BCx and UCx  - f/u CT B/L LE (r/o nec fasc vs OM)  - obtain US RUQ (r/o cholecystitis)  - obtain MRSA PCR  - start vanc, meropenem, and clindamycin for empiric nec fasc coverage  - ID consulted; f/u recs    HEMATOLOGICAL  - DVT ppx: Lovenox 40mg SQ QD     ENDOCRINE  - Monitor FS (A1c 5.1% in Nov 2022)  - insulin protocol if needed  - obtain thyroid panel (r/o myxedema coma; s/p levothyroxine on presentation)  - check AM cortisol    MSK  - c/w C-Collar as per NSGY  - f/u Trauma for TTS    DISPOSITION: MICU ASSESSMENT  #Septic Shock  #Toxic-Metabolic Encephalopathy  #Necrotizing Fasciitis of bilateral LE (suspected)  #Osteomyelitis, suspected  #Left C6 Transverse Foramen Fracture  #Anorexia, h/o    PLAN    CNS  - c/w fentanyl  - avoid oversedation  - f/u UDS    HEENT  - Oral care    PULMONARY:   - HOB at 45  - Aspiration precautions    CARDIOVASCULAR  - obtain TTE  - trend CE  - c/w Levophed (wean as tolerated)  - avoid volume overload    GI  - Diet: NPO  - GI ppx: pantoprazole 40mg PO QD   - bowel regimen    RENAL  - strict I&Os, daily weight, and monitor volume status  - Follow up renal function and lytes, correct as needed (K>4; Mg>2)    INFECTIOUS DISEASE  - f/u BCx and UCx  - f/u CT B/L LE (r/o nec fasc vs OM)  - obtain US RUQ (r/o cholecystitis)  - obtain MRSA PCR  - start vanc, meropenem, and clindamycin for empiric nec fasc coverage  - ID consulted; f/u recs    HEMATOLOGICAL  - DVT ppx: Lovenox 40mg SQ QD     ENDOCRINE  - Monitor FS (A1c 5.1% in Nov 2022)  - insulin protocol if needed  - obtain thyroid panel (r/o myxedema coma; s/p levothyroxine on presentation)  - check AM cortisol  - obtain prealbumin, vitamin D, and phos  - nutrition consulted; f/u recs    MSK  - c/w C-Collar as per NSGY  - f/u Trauma for TTS    DISPOSITION: MICU ASSESSMENT    #Septic Shock  #Toxic-Metabolic Encephalopathy  #Necrotizing Fasciitis of bilateral LE (suspected)  #Osteomyelitis, suspected  #Left C6 Transverse Foramen Fracture  #Anorexia, h/o    PLAN    CNS  - c/w fentanyl  - avoid oversedation  - f/u UDS    HEENT  - Oral care    PULMONARY:   - HOB at 46  - Aspiration precautions    CARDIOVASCULAR  - obtain TTE  - trend CE  - c/w Levophed (wean as tolerated)  - avoid volume overload    GI  - Diet: NPO  - GI ppx: pantoprazole 40mg PO QD   - bowel regimen    RENAL  - strict I&Os, daily weight, and monitor volume status  - Follow up renal function and lytes, correct as needed (K>4; Mg>2)    INFECTIOUS DISEASE  - f/u BCx and UCx  - f/u CT B/L LE (r/o nec fasc vs OM)  - obtain US RUQ (r/o cholecystitis)  - obtain MRSA PCR  - start vanc, meropenem, and clindamycin for empiric nec fasc coverage  - ID consulted; f/u recs    HEMATOLOGICAL  - DVT ppx: Lovenox 40mg SQ QD     ENDOCRINE  - Monitor FS (A1c 5.1% in Nov 2022)  - insulin protocol if needed  - obtain thyroid panel (r/o myxedema coma; s/p levothyroxine on presentation)  - check AM cortisol  - obtain prealbumin, vitamin D, and phos  - nutrition consulted; f/u recs    MSK  - c/w C-Collar as per NSGY  - f/u Trauma for TTS    DISPOSITION: MICU

## 2022-11-27 NOTE — ED PROVIDER NOTE - PROGRESS NOTE DETAILS
Authored by Jesi Valverde DO: Pt signed out to me from Dr. Quintero. Pending CT at this time, reassessment and admission to ICU. AH - Patient arrived hypothermic, 88 degrees per Hughes temperature.  Emiliana hugger immediately placed.  Temperature has risen to 94.5.  Patient arrived hypoglycemic to 24, dextrose given, blood sugar improved.  Patient was intubated for airway protection and hypercapnic respiratory failure.  Mental status change.  Central line placed.  Levothyroxine given for possible myxedema coma.  Treating for sepsis.  Pending CT scans. AH - CT showing left C6 transverse foramen fracture.  Obtaining CT Angio, Trauma consult placed.  Neurosurgery consulted, will see patient.  C-collar placed. CP: trauma at bedside evaluating patient Authored by Jesi Valverde DO: Pending dispo , signed out to Dr. Archer CP: trauma recommends utox and b/l LE CT to evaluate for osteo and r/o nec fasc. pending ns eval and dispo MARIE: Case endorsed to me by Dr. Archer pending CT leg read, final trauma recs, dispo. ccruz - pt signed out to Dr Armstrong pending CT reads and f/u trauma/NS recs, admit to icu CP: discussed with icu fellow. patient accepted to ICU. consulted burn for wound care. pending CT read. cleared from trauma otherwise.

## 2022-11-27 NOTE — CHART NOTE - NSCHARTNOTEFT_GEN_A_CORE
Pt known by Neurosurgery.    CTA brain and neck completed.  < from: CT Angio Head w/ IV Cont (11.27.22 @ 10:37) >    IMPRESSION:  CTA HEAD:  No evidence of flow-limiting stenosis, occlusion or aneurysm.  CTA NECK:  No carotid, vertebral artery stenosis or evidence of vascular injury.  < end of copied text >    Pt should get MRI C-spine w/wo gado if not contraindicated when stable.  Recommend ID consult.  Pt is not a surgical candidate.   Above d/w Dr Dominguez

## 2022-11-27 NOTE — ED ADULT TRIAGE NOTE - CHIEF COMPLAINT QUOTE
Pt BIBA from home for syncope and collapse in her kitchen tonight, unknown if she hit her head   EMS states O2 Sat on scene 88% room air, improved to 90% with NRB mask   unable to obtain temp in triage   DTR crit

## 2022-11-27 NOTE — ED PROVIDER NOTE - ATTENDING CONTRIBUTION TO CARE
67-year-old female presented today unresponsive via ambulance.  Patient on arrival is hypotensive, bradycardic and minimally responsive to physical or verbal stimuli.  Patient also noted to be hypothermic.  Patient noted to be hypoglycemic with blood sugar of 24 and immediately given dextrose.  Patient started on Emiliana hugger and given warm IV fluids of approximately 2 L.  Patient also started on peripheral Levophed for presumed sepsis. Patient started on levothyroxine for coverage of possible myxedema coma.  Patient started on broad-spectrum antibiotics.  After patient improved hemodynamically, she was intubated for airway protection as she was noted to have respiratory acidosis on VBG and current mental status would not tolerate BiPAP nor was patient protecting her airway.  Patient was successfully intubated and taken to CAT scan for further evaluation.  Patient was signed out to Dr. Valverde for further disposition pending CT scan results.

## 2022-11-27 NOTE — ED PROVIDER NOTE - EMPLOYMENT
[facility]  [address] [address]  [phone]      9/21/2021       Claude D [address] [address]        Dear Claude,  You are overdue for a follow up before your prescriptions can be approved for refills. Please call to schedule at the number listed above.   Due to your Primary Physician (Dr Rick Uriostegui) retiring, you will need to schedule and establish care with one of his Fellow Partners either in person, by telephone or by video.       Sincerely,      [facility]   Internal Medicine            
N/A

## 2022-11-27 NOTE — ED PROCEDURE NOTE - ATTENDING CONTRIBUTION TO CARE
I was present for and supervised the key and critical aspects of the procedures performed during the care of the patient.
I was present for and supervised the key/critical aspects of the procedures performed during the care of the patient.
I was present for and supervised the key and critical aspects of the procedures performed during the care of the patient.

## 2022-11-27 NOTE — H&P ADULT - HISTORY OF PRESENT ILLNESS
67F w/ h/o anorexia, anxiety, depression, kidney cysts, peripheral neuropathy, RA, OA, osteoporosis, low back pain s/p unwitnessed fall, found down by  +HT, ?LOC, -AC. Contacted patient's  who was able to provide some history and states he returned home from work around 11pm when he found the patient down on the tiled kitchen floor. States the patient was awake and alert when he found her on the floor, but she didn't recall how she fell down.  speculates that she might've been sitting in chair and fallen out of it as this has happened before in the past.  states that the patient said she hit her head, and he noticed blood on the patient's knees, but no other injuries. Patient was complaining of left-sided neck pain after the fall, but  states she has been having this pain for about 1 month. About 1 hour after the fall, patient became lethargic and was unable to stay up on her feet, so  called for an ambulance.     On arrival to ED, patient unresponsive, hypotensive, bradycardic, and hypothermic. Patient also hypoglycemic (FS 24) and given dextrose immediately. Subsequently given Emiliana hugger, given 2L of warm IV fluids, started on levophed, and given broad-spectrum abx, and started on levothyroxine for possible myxedema coma (although no documentation in eMAR, so unclear if actually given). VBG noted respiratory acidosis. Pt unable to tolerate BiPAP due to current mental status, so patient intubated for airway protection. CT spine demonstrated fx of anterior and posterior tubercle of left C6 transverse foramen. NSGY consulted, but no surgical intervention offered. Subsequently admitted to MICU for presumed septic shock. 67F w/ h/o anorexia, anxiety, depression, kidney cysts, peripheral neuropathy, RA, OA, osteoporosis, low back pain s/p unwitnessed fall, found down by  +HT, ?LOC, -AC. Contacted patient's  who was able to provide some history and states he returned home from work around 11pm when he found the patient down on the tiled kitchen floor. States the patient was awake and alert when he found her on the floor, but she didn't recall how she fell down.  speculates that she might've been sitting in chair and fallen out of it as this has happened before in the past.  states that the patient said she hit her head, and he noticed blood on the patient's knees, but no other injuries. Patient was complaining of left-sided neck pain after the fall, but  states she has been having this pain for about 1 month. About 1 hour after the fall, patient became lethargic and was unable to stay up on her feet, so  called for an ambulance.     Of note, pt has long-standing h/o anorexia for several years per . Does not induce vomiting, but frequently uses laxatives for weight loss. Has never consistently followed w/ psychologist/psychiatrist. After recent discharge from hospital two weeks ago, anorexia acutely worsened and pt has had minimal caloric intake.    On arrival to ED, patient unresponsive, hypotensive, bradycardic, and hypothermic. Patient also hypoglycemic (FS 24) and given dextrose immediately. Subsequently given Emiliana hugger, given 2L of warm IV fluids, started on levophed, and given broad-spectrum abx, and started on levothyroxine for possible myxedema coma (although no documentation in eMAR, so unclear if actually given). VBG noted respiratory acidosis. Pt unable to tolerate BiPAP due to current mental status, so patient intubated for airway protection. CT spine demonstrated fx of anterior and posterior tubercle of left C6 transverse foramen. NSGY consulted, but no surgical intervention offered. Subsequently admitted to MICU for presumed septic shock.

## 2022-11-28 NOTE — PATIENT PROFILE ADULT - NSPROSPHOSPCHAPLAINYN_GEN_A_NUR
MIGUEL, pt intubated and sedated. no family at bedside MIGUEL, pt intubated and sedated. no family at bedside/no

## 2022-11-28 NOTE — CONSULT NOTE ADULT - ASSESSMENT
Assessment:  67yF w/ PMHx of anxiety, depression, kidney cysts, peripheral neuropathy, RA, OA, osteoporosis, low back pain seen as wound Consult s/p unwitnessed fall, found down by  +HT, ?LOC, -AC with external signs of trauma including right forehead hematoma and R knee wound/abrasion. Patient is intubated vented and on pressors.      Plan:  - Local wound care (silverdiazine, surgicell, ABDs, Kerlix, ACE BID)  - No acute surgical intervention required at this time  - DVT PPx : SCDs and Lovenox  - Activity as tolerated  - Rest of care per primary team   Assessment:  67yF w/ PMHx of anxiety, depression, kidney cysts, peripheral neuropathy, RA, OA, osteoporosis, low back pain seen as wound Consult s/p unwitnessed fall, found down by  +HT, ?LOC, -AC with external signs of trauma including right forehead hematoma and R knee wound/abrasion. Patient is intubated vented and on pressors.      Plan:  - Local wound care (apply a generous amount of silverdiazine on a large surgicell. Trim the surgicell as deemed appropriate to cover all the wounds. Place ABDs then wrap with Kerlix and ACE BID bilaterally)  - No acute surgical intervention required at this time.  - Culture was sent pending results  - DVT PPx : SCDs and Lovenox  - Activity as tolerated  - Rest of care per primary team   Assessment:  67yF w/ PMHx of anxiety, depression, kidney cysts, peripheral neuropathy, RA, OA, osteoporosis, low back pain seen as wound Consult s/p unwitnessed fall, found down by  +HT, ?LOC, -AC with external signs of trauma including right forehead hematoma and R knee wound/abrasion. Patient is intubated vented and on pressors.      Plan:  - Local wound care (apply a generous amount of silverdiazine on adaptic. Trim the adaptic as deemed appropriate to cover all the wounds. Place ABDs then wrap with Kerlix and ACE BID bilaterally)  - No acute surgical intervention required at this time.  - Culture was sent pending results  - DVT PPx : SCDs and Lovenox  - Activity as tolerated  - Rest of care per primary team

## 2022-11-28 NOTE — CONSULT NOTE ADULT - SUBJECTIVE AND OBJECTIVE BOX
TEAM [ BURN ] Surgery Daily Progress Note  =====================================================    SUBJECTIVE: Patient seen and examined at bedside on AM rounds. Patient is intubated. Bilateral lower extremity wounds examined and dressing was changed by the team.     ALLERGIES:  No Known Allergies  --------------------------------------------------------------------------------------    MEDICATIONS:    Neurologic Medications  dexMEDEtomidine Infusion 0.05 MICROgram(s)/kG/Hr IV Continuous <Continuous>  fentaNYL   Infusion. 0.5 MICROgram(s)/kG/Hr IV Continuous <Continuous>    Respiratory Medications    Cardiovascular Medications  norepinephrine Infusion 0.05 MICROgram(s)/kG/Min IV Continuous <Continuous>    Gastrointestinal Medications  dextrose 5%. 1000 milliLiter(s) IV Continuous <Continuous>  pantoprazole   Suspension 40 milliGRAM(s) Oral daily  polyethylene glycol 3350 17 Gram(s) Oral daily  senna 2 Tablet(s) Oral at bedtime    Genitourinary Medications    Hematologic/Oncologic Medications  enoxaparin Injectable 40 milliGRAM(s) SubCutaneous every 24 hours    Antimicrobial/Immunologic Medications  clindamycin IVPB 900 milliGRAM(s) IV Intermittent every 8 hours  DAPTOmycin IVPB 320 milliGRAM(s) IV Intermittent every 24 hours  piperacillin/tazobactam IVPB.. 3.375 Gram(s) IV Intermittent every 8 hours    Endocrine/Metabolic Medications  hydrocortisone sodium succinate Injectable 100 milliGRAM(s) IV Push every 8 hours    Topical/Other Medications  chlorhexidine 0.12% Liquid 15 milliLiter(s) Oral Mucosa two times a day  chlorhexidine 0.12% Liquid 15 milliLiter(s) Oral Mucosa every 12 hours  chlorhexidine 2% Cloths 1 Application(s) Topical <User Schedule>  chlorhexidine 2% Cloths 1 Application(s) Topical daily    --------------------------------------------------------------------------------------    VITAL SIGNS:    Vital Signs Last 24 Hrs  T(C): 36.6 (28 Nov 2022 12:00), Max: 38.1 (27 Nov 2022 16:00)  T(F): 97.9 (28 Nov 2022 12:00), Max: 100.6 (27 Nov 2022 16:00)  HR: 52 (28 Nov 2022 12:00) (52 - 88)  BP: 137/63 (28 Nov 2022 12:00) (66/44 - 139/63)  BP(mean): 92 (28 Nov 2022 12:00) (51 - 100)  RR: 20 (28 Nov 2022 12:00) (16 - 42)  SpO2: 100% (28 Nov 2022 12:00) (99% - 100%)    Parameters below as of 28 Nov 2022 12:00  Patient On (Oxygen Delivery Method): ventilator    O2 Concentration (%): 40  --------------------------------------------------------------------------------------    EXAM    General: NAD, resting in bed intubated. AOX0  Cardiac: regular rate, warm and well perfused  Respiratory: Nonlabored respirations, normal cw expansion.  Abdomen: soft, nontender, nondistended.   Extremities: normal strength, FROM, no deformities. Right extremity is cyanotic, warm with marked lesions on the anterior chin, posterior lateral heel. Left lower extremity is erythematous with several wounds on the anterior chin. There is no active bleeding, drainage, or foul smell.     --------------------------------------------------------------------------------------    LABS    .  LABS:                         11.9   8.60  )-----------( 105      ( 28 Nov 2022 04:30 )             35.3     11-28    140  |  107  |  31<H>  ----------------------------<  118<H>  3.9   |  24  |  0.7    Ca    8.2<L>      28 Nov 2022 04:30  Phos  2.5     11-28  Mg     1.4     11-28    TPro  4.4<L>  /  Alb  2.4<L>  /  TBili  <0.2  /  DBili  x   /  AST  381<H>  /  ALT  221<H>  /  AlkPhos  362<H>  11-28    PT/INR - ( 28 Nov 2022 04:30 )   PT: 17.30 sec;   INR: 1.50 ratio         PTT - ( 28 Nov 2022 04:30 )  PTT:44.0 sec  Urinalysis Basic - ( 27 Nov 2022 19:15 )    Color: Yellow / Appearance: Clear / SG: >1.050 / pH: x  Gluc: x / Ketone: Trace  / Bili: Negative / Urobili: <2 mg/dL   Blood: x / Protein: 30 mg/dL / Nitrite: Negative   Leuk Esterase: Moderate / RBC: 2 /HPF / WBC 19 /HPF   Sq Epi: x / Non Sq Epi: 3 /HPF / Bacteria: Negative      RADIOLOGY, EKG & ADDITIONAL TESTS: Reviewed.     -------------------------------------------------------------------------------------- TEAM [ BURN ] Surgery Daily Progress Note  =====================================================    SUBJECTIVE: Patient seen and examined at bedside on AM rounds. Patient is intubated. Bilateral lower extremity wounds examined and dressing was changed by the team.     ALLERGIES:  No Known Allergies  --------------------------------------------------------------------------------------    MEDICATIONS:    Neurologic Medications  dexMEDEtomidine Infusion 0.05 MICROgram(s)/kG/Hr IV Continuous <Continuous>  fentaNYL   Infusion. 0.5 MICROgram(s)/kG/Hr IV Continuous <Continuous>    Respiratory Medications    Cardiovascular Medications  norepinephrine Infusion 0.05 MICROgram(s)/kG/Min IV Continuous <Continuous>    Gastrointestinal Medications  dextrose 5%. 1000 milliLiter(s) IV Continuous <Continuous>  pantoprazole   Suspension 40 milliGRAM(s) Oral daily  polyethylene glycol 3350 17 Gram(s) Oral daily  senna 2 Tablet(s) Oral at bedtime    Genitourinary Medications    Hematologic/Oncologic Medications  enoxaparin Injectable 40 milliGRAM(s) SubCutaneous every 24 hours    Antimicrobial/Immunologic Medications  clindamycin IVPB 900 milliGRAM(s) IV Intermittent every 8 hours  DAPTOmycin IVPB 320 milliGRAM(s) IV Intermittent every 24 hours  piperacillin/tazobactam IVPB.. 3.375 Gram(s) IV Intermittent every 8 hours    Endocrine/Metabolic Medications  hydrocortisone sodium succinate Injectable 100 milliGRAM(s) IV Push every 8 hours    Topical/Other Medications  chlorhexidine 0.12% Liquid 15 milliLiter(s) Oral Mucosa two times a day  chlorhexidine 0.12% Liquid 15 milliLiter(s) Oral Mucosa every 12 hours  chlorhexidine 2% Cloths 1 Application(s) Topical <User Schedule>  chlorhexidine 2% Cloths 1 Application(s) Topical daily    --------------------------------------------------------------------------------------    VITAL SIGNS:    Vital Signs Last 24 Hrs  T(C): 36.6 (28 Nov 2022 12:00), Max: 38.1 (27 Nov 2022 16:00)  T(F): 97.9 (28 Nov 2022 12:00), Max: 100.6 (27 Nov 2022 16:00)  HR: 52 (28 Nov 2022 12:00) (52 - 88)  BP: 137/63 (28 Nov 2022 12:00) (66/44 - 139/63)  BP(mean): 92 (28 Nov 2022 12:00) (51 - 100)  RR: 20 (28 Nov 2022 12:00) (16 - 42)  SpO2: 100% (28 Nov 2022 12:00) (99% - 100%)    Parameters below as of 28 Nov 2022 12:00  Patient On (Oxygen Delivery Method): ventilator    O2 Concentration (%): 40  --------------------------------------------------------------------------------------    EXAM    General: NAD, resting in bed intubated. AOX0  Cardiac: regular rate, warm and well perfused  Respiratory: Nonlabored respirations, normal cw expansion.  Abdomen: soft, nontender, nondistended.   Extremities: normal strength, FROM, no deformities. Left lower extremity is cyanotic, warm with marked lesions on the anterior chin, posterior lateral heel. The 2nd toe has an ulcerated (1x1 cm) ulcer and third toe is cyanotic.  Right lower extremity is erythematous with several wounds on the anterior chin. The  There is no active bleeding, drainage, or foul smell.     --------------------------------------------------------------------------------------    LABS    .  LABS:                         11.9   8.60  )-----------( 105      ( 28 Nov 2022 04:30 )             35.3     11-28    140  |  107  |  31<H>  ----------------------------<  118<H>  3.9   |  24  |  0.7    Ca    8.2<L>      28 Nov 2022 04:30  Phos  2.5     11-28  Mg     1.4     11-28    TPro  4.4<L>  /  Alb  2.4<L>  /  TBili  <0.2  /  DBili  x   /  AST  381<H>  /  ALT  221<H>  /  AlkPhos  362<H>  11-28    PT/INR - ( 28 Nov 2022 04:30 )   PT: 17.30 sec;   INR: 1.50 ratio         PTT - ( 28 Nov 2022 04:30 )  PTT:44.0 sec  Urinalysis Basic - ( 27 Nov 2022 19:15 )    Color: Yellow / Appearance: Clear / SG: >1.050 / pH: x  Gluc: x / Ketone: Trace  / Bili: Negative / Urobili: <2 mg/dL   Blood: x / Protein: 30 mg/dL / Nitrite: Negative   Leuk Esterase: Moderate / RBC: 2 /HPF / WBC 19 /HPF   Sq Epi: x / Non Sq Epi: 3 /HPF / Bacteria: Negative      RADIOLOGY, EKG & ADDITIONAL TESTS: Reviewed.     --------------------------------------------------------------------------------------

## 2022-11-28 NOTE — CONSULT NOTE ADULT - SUBJECTIVE AND OBJECTIVE BOX
NUTRITION SUPPORT TEAM  -  CONSULT NOTE -----PRELIMINARY NOTE    ADMISSION HPI:  67F w/ h/o anorexia, anxiety, depression, kidney cysts, peripheral neuropathy, RA, OA, osteoporosis, low back pain s/p unwitnessed fall, found down by  +HT, ?LOC, -AC. Contacted patient's  who was able to provide some history and states he returned home from work around 11pm when he found the patient down on the tiled kitchen floor. States the patient was awake and alert when he found her on the floor, but she didn't recall how she fell down.  speculates that she might've been sitting in chair and fallen out of it as this has happened before in the past.  states that the patient said she hit her head, and he noticed blood on the patient's knees, but no other injuries. Patient was complaining of left-sided neck pain after the fall, but  states she has been having this pain for about 1 month. About 1 hour after the fall, patient became lethargic and was unable to stay up on her feet, so  called for an ambulance.     Of note, pt has long-standing h/o anorexia for several years per . Does not induce vomiting, but frequently uses laxatives for weight loss. Has never consistently followed w/ psychologist/psychiatrist. After recent discharge from hospital two weeks ago, anorexia acutely worsened and pt has had minimal caloric intake.    On arrival to ED, patient unresponsive, hypotensive, bradycardic, and hypothermic. Patient also hypoglycemic (FS 24) and given dextrose immediately. Subsequently given Emiliana hugger, given 2L of warm IV fluids, started on levophed, and given broad-spectrum abx, and started on levothyroxine for possible myxedema coma (although no documentation in eMAR, so unclear if actually given). VBG noted respiratory acidosis. Pt unable to tolerate BiPAP due to current mental status, so patient intubated for airway protection. CT spine demonstrated fx of anterior and posterior tubercle of left C6 transverse foramen. NSGY consulted, but no surgical intervention offered. Subsequently admitted to MICU for presumed septic shock. (27 Nov 2022 16:09)      NUTRITION SUPPORT NOTE:      REVIEW OF SYSTEMS:  Negative except as noted above.     PAST MEDICAL/SURGICAL HISTORY:   Anxiety    Depression    Osteoporosis    Kidney cysts    Peripheral neuropathy    RA (rheumatoid arthritis)    OA (osteoarthritis)    Low back pain with radiation  s/p &quot;nerve cutting&quot; 2015        ALLERGIES:  No Known Allergies      VITALS:  T(F): 98.4 (11-28 @ 08:00), Max: 99.7 (11-27 @ 22:00)  HR: 58 (11-28 @ 08:00) (58 - 74)  BP: 119/57 (11-28 @ 08:00) (95/61 - 123/68)  RR: 23 (11-28 @ 08:00) (16 - 42)  SpO2: 100% (11-28 @ 08:00) (99% - 100%)    HEIGHT/WEIGHT/BMI:   Height (cm): 170.2 (11-28), 170.2 (11-11), 170.2 (05-12), 170.2 (05-05)  Weight (kg): 44.5 (11-28), 49 (11-11), 49 (05-12), 49 (05-05)  BMI (kg/m2): 15.4 (11-28), 16.9 (11-11), 16.9 (05-12), 16.9 (05-05)    I/Os:     11-27-22 @ 07:01  -  11-28-22 @ 07:00  --------------------------------------------------------  IN:    Dexmedetomidine: 17.6 mL    dextrose 5%: 400 mL    FentaNYL: 72 mL    IV PiggyBack: 350 mL    Norepinephrine: 171 mL  Total IN: 1010.6 mL    OUT:    Indwelling Catheter - Urethral (mL): 750 mL  Total OUT: 750 mL    Total NET: 260.6 mL          PHYSICAL EXAM:   GENERAL: NAD, well-groomed, well-developed  HEENT: Moist mucous membranes, Good dentition, No lesions  ABDOMEN: Soft, Nontender, Nondistended  EXTREMITIES:  No clubbing, cyanosis, or edema  SKIN: warm and well perfused; No obvious rashes or lesions  IV ACCESS:   ENTERAL ACCESS:     STANDING MEDICATIONS:   chlorhexidine 0.12% Liquid 15 milliLiter(s) Oral Mucosa two times a day  chlorhexidine 0.12% Liquid 15 milliLiter(s) Oral Mucosa every 12 hours  chlorhexidine 2% Cloths 1 Application(s) Topical <User Schedule>  chlorhexidine 2% Cloths 1 Application(s) Topical daily  clindamycin IVPB 900 milliGRAM(s) IV Intermittent every 8 hours  DAPTOmycin IVPB 320 milliGRAM(s) IV Intermittent every 24 hours  dexMEDEtomidine Infusion 0.05 MICROgram(s)/kG/Hr IV Continuous <Continuous>  dextrose 5%. 1000 milliLiter(s) IV Continuous <Continuous>  enoxaparin Injectable 40 milliGRAM(s) SubCutaneous every 24 hours  fentaNYL   Infusion. 0.5 MICROgram(s)/kG/Hr IV Continuous <Continuous>  hydrocortisone sodium succinate Injectable 100 milliGRAM(s) IV Push every 8 hours  magnesium sulfate  IVPB 2 Gram(s) IV Intermittent every 2 hours  norepinephrine Infusion 0.05 MICROgram(s)/kG/Min IV Continuous <Continuous>  pantoprazole   Suspension 40 milliGRAM(s) Oral daily  piperacillin/tazobactam IVPB.. 3.375 Gram(s) IV Intermittent every 8 hours  polyethylene glycol 3350 17 Gram(s) Oral daily  senna 2 Tablet(s) Oral at bedtime      LABS:                         11.9   8.60  )-----------( 105      ( 28 Nov 2022 04:30 )             35.3     140  |  107  |  31<H>  ----------------------------<  118<H>          (11-28-22 @ 04:30)  3.9   |  24  |  0.7    Ca    8.2<L>          (11-28-22 @ 04:30)  Phos  2.5         (11-28-22 @ 04:30)  Mg     1.4         (11-28-22 @ 04:30)    TPro  4.4<L>  /  Alb  2.4<L>  /  TBili  <0.2  /  DBili  x   /  AST  381<H>  /  ALT  221<H>  /  AlkPhos  362<H>       11-28-22 @ 04:30      Triglycerides, Serum: 54 mg/dL (11-28 @ 04:30)    Prealbumin, Serum:   Vitamin D, 25-Hydroxy:   Folate:   Vitamin B12, Serum:   Zinc Level, Plasma:   CRP:   A1c: 5.1 % (11-10-22 @ 17:16)      Blood Glucose (Past 24 hours):  142 mg/dL (11-28 @ 05:18)  121 mg/dL (11-28 @ 00:57)  128 mg/dL (11-28 @ 00:12)  117 mg/dL (11-27 @ 23:15)  127 mg/dL (11-27 @ 21:09)  153 mg/dL (11-27 @ 20:05)  80 mg/dL (11-27 @ 18:59)  106 mg/dL (11-27 @ 16:27)  133 mg/dL (11-27 @ 14:19)  70 mg/dL (11-27 @ 12:49)  111 mg/dL (11-27 @ 11:13)  74 mg/dL (11-27 @ 09:33)      DIET:   Diet, NPO:   Except Medications (11-28-22 @ 02:32) [Active]          RADIOLOGY:    NUTRITION SUPPORT TEAM  -  CONSULT NOTE     ADMISSION HPI:  67F w/ h/o anorexia, anxiety, depression, kidney cysts, peripheral neuropathy, RA, OA, osteoporosis, low back pain s/p unwitnessed fall, found down by  +HT, ?LOC, -AC. Contacted patient's  who was able to provide some history and states he returned home from work around 11pm when he found the patient down on the tiled kitchen floor. States the patient was awake and alert when he found her on the floor, but she didn't recall how she fell down.  speculates that she might've been sitting in chair and fallen out of it as this has happened before in the past.  states that the patient said she hit her head, and he noticed blood on the patient's knees, but no other injuries. Patient was complaining of left-sided neck pain after the fall, but  states she has been having this pain for about 1 month. About 1 hour after the fall, patient became lethargic and was unable to stay up on her feet, so  called for an ambulance.     Of note, pt has long-standing h/o anorexia for several years per . Does not induce vomiting, but frequently uses laxatives for weight loss. Has never consistently followed w/ psychologist/psychiatrist. After recent discharge from hospital two weeks ago, anorexia acutely worsened and pt has had minimal caloric intake.    On arrival to ED, patient unresponsive, hypotensive, bradycardic, and hypothermic. Patient also hypoglycemic (FS 24) and given dextrose immediately. Subsequently given Emiliana hugger, given 2L of warm IV fluids, started on levophed, and given broad-spectrum abx, and started on levothyroxine for possible myxedema coma (although no documentation in eMAR, so unclear if actually given). VBG noted respiratory acidosis. Pt unable to tolerate BiPAP due to current mental status, so patient intubated for airway protection. CT spine demonstrated fx of anterior and posterior tubercle of left C6 transverse foramen. NSGY consulted, but no surgical intervention offered. Subsequently admitted to MICU for presumed septic shock. (27 Nov 2022 16:09)    NUTRITION SUPPORT NOTE:      REVIEW OF SYSTEMS:  Negative except as noted above.     PAST MEDICAL/SURGICAL HISTORY:   Anxiety  Depression  Osteoporosis  Kidney cysts  Peripheral neuropathy  RA (rheumatoid arthritis)  OA (osteoarthritis)  Low back pain with radiation    ALLERGIES:  No Known Allergies    VITALS:  T(F): 98.4 (11-28 @ 08:00), Max: 99.7 (11-27 @ 22:00)  HR: 58 (11-28 @ 08:00) (58 - 74)  BP: 119/57 (11-28 @ 08:00) (95/61 - 123/68)  RR: 23 (11-28 @ 08:00) (16 - 42)  SpO2: 100% (11-28 @ 08:00) (99% - 100%)    HEIGHT/WEIGHT/BMI:   Height (cm): 170.2 (11-28), 170.2 (11-11), 170.2 (05-12), 170.2 (05-05)  Weight (kg): 44.5 (11-28), 49 (11-11), 49 (05-12), 49 (05-05)  BMI (kg/m2): 15.4 (11-28), 16.9 (11-11), 16.9 (05-12), 16.9 (05-05)    I/Os:   11-27-22 @ 07:01  -  11-28-22 @ 07:00  --------------------------------------------------------  IN:    Dexmedetomidine: 17.6 mL    dextrose 5%: 400 mL    FentaNYL: 72 mL    IV PiggyBack: 350 mL    Norepinephrine: 171 mL  Total IN: 1010.6 mL  OUT:    Indwelling Catheter - Urethral (mL): 750 mL  Total OUT: 750 mL  Total NET: 260.6 mL    PHYSICAL EXAM:   GENERAL: intubated, sedated on only precedex, + levo  HEENT: + facial wasting, skin turgor poor, anicteric, conj pale, + bruise on R forehead  + infraclavicular and between-ribs wasting, loss of LBM throughout, muscle loss >> expected for age, jin as pt reportedly ambulatory  ABDOMEN: Soft, Nontender, Nondistended, scaphod  EXTREMITIES:  No clubbing, cyanosis, or edema, + bruise on L wrist, RLE bandaged from above knee to foot, L knee large abrasion , bilat LE cyanotic and bruised  SKIN: warm and well perfused; No obvious rashes or lesions  IV ACCESS: right IJ TLC c/d/i  ENTERAL ACCESS: OG clamped    STANDING MEDICATIONS:   chlorhexidine 0.12% Liquid 15 milliLiter(s) Oral Mucosa two times a day  chlorhexidine 0.12% Liquid 15 milliLiter(s) Oral Mucosa every 12 hours  chlorhexidine 2% Cloths 1 Application(s) Topical <User Schedule>  chlorhexidine 2% Cloths 1 Application(s) Topical daily  clindamycin IVPB 900 milliGRAM(s) IV Intermittent every 8 hours  DAPTOmycin IVPB 320 milliGRAM(s) IV Intermittent every 24 hours  dexMEDEtomidine Infusion 0.05 MICROgram(s)/kG/Hr IV Continuous <Continuous>  dextrose 5%. 1000 milliLiter(s) IV Continuous <Continuous>  enoxaparin Injectable 40 milliGRAM(s) SubCutaneous every 24 hours  fentaNYL   Infusion. 0.5 MICROgram(s)/kG/Hr IV Continuous <Continuous>  hydrocortisone sodium succinate Injectable 100 milliGRAM(s) IV Push every 8 hours  magnesium sulfate  IVPB 2 Gram(s) IV Intermittent every 2 hours  norepinephrine Infusion 0.05 MICROgram(s)/kG/Min IV Continuous <Continuous>  pantoprazole   Suspension 40 milliGRAM(s) Oral daily  piperacillin/tazobactam IVPB.. 3.375 Gram(s) IV Intermittent every 8 hours  polyethylene glycol 3350 17 Gram(s) Oral daily  senna 2 Tablet(s) Oral at bedtime      LABS:                         11.9   8.60  )-----------( 105      ( 28 Nov 2022 04:30 )  MCV 99  RDW  14.8             35.3     140  |  107  |  31<H>  ----------------------------<  118<H>          (11-28-22 @ 04:30)  3.9   |  24  |  0.7    Ca    8.2<L>          (11-28-22 @ 04:30)  Phos  2.5         (11-28-22 @ 04:30)  Mg     1.4         (11-28-22 @ 04:30)    TPro  4.4<L>  /  Alb  2.4<L>  /  TBili  <0.2  /  DBili  x   /  AST  381<H>  /  ALT  221<H>  /  AlkPhos  362<H>       11-28-22 @ 04:30      Triglycerides, Serum: 54 mg/dL (11-28 @ 04:30)    Prealbumin, Serum:     A1c: 5.1 % (11-10-22 @ 17:16)      Blood Glucose (Past 24 hours):  142 mg/dL (11-28 @ 05:18)  121 mg/dL (11-28 @ 00:57)  128 mg/dL (11-28 @ 00:12)  117 mg/dL (11-27 @ 23:15)  127 mg/dL (11-27 @ 21:09)  153 mg/dL (11-27 @ 20:05)  80 mg/dL (11-27 @ 18:59)  106 mg/dL (11-27 @ 16:27)  133 mg/dL (11-27 @ 14:19)  70 mg/dL (11-27 @ 12:49)  111 mg/dL (11-27 @ 11:13)  74 mg/dL (11-27 @ 09:33)      DIET:   Diet, NPO:   Except Medications (11-28-22 @ 02:32) [Active]    RADIOLOGY:   < from: Xray Chest 1 View- PORTABLE-Routine (Xray Chest 1 View- PORTABLE-Routine in AM.) (11.28.22 @ 06:06) >  Support devices: Tracheal tube in satisfactory position. Right IJ   catheter in satisfactory position. Nasogastric tube with sidehole in the   region of the stomach.    Cardiac/mediastinum/hilum: No change    Lung parenchyma/Pleura: Emphysematous changes. Persistent right upper   lobe opacity    < end of copied text >  < from: US Abdomen Upper Quadrant Right (11.27.22 @ 19:07) >  IMPRESSION: Distended gallbladder. No gallstones or inflammatory changes   seen.  Common bile duct and intrahepatic bile duct dilatation etiology not   apparent on this exam.    < end of copied text >  < from: CT Lower Extremity w/ IV Cont, Bilateral (11.27.22 @ 11:02) >  1) Bilateral lower extremity swelling and marked skin thickening, left   greater than right, compatible with cellulitis in the correct clinical   setting.    2)Multiple rounded locules of gas are also seen tracking within the soft   tissues along the predominantly posterior calf and anterolateral/dorsal   ankle and foot. These are likely related to known multiple abrasions of   the left foot/leg and appear to partially contact the posterior calf skin   abrasion. However, given the burden of soft tissue disease it is   difficult to completely exclude early necrotizing fasciitis on imaging,   and this was discussed with the team at the time of dictation.    3) There are multiple regions of apparent cortical erosion seen within   the bones of the left ankle/foot, as detailed above. These findings are   most likely secondary to osteopenia rather than osteomyelitis. Given   degree of osteopenia, considerMR foot to evaluate extent of disease.    4) Bilateral moderate to large knee joint effusions measuring above   simple fluid, out of proportion to the degree of degenerative change at   the knee joint. Differential includes proteinaceous fluid, or debris   within the joint and may be related to recent trauma versus   inflammatory/infectious condition.    < end of copied text >

## 2022-11-28 NOTE — CONSULT NOTE ADULT - SUBJECTIVE AND OBJECTIVE BOX
Podiatry Consult Note    Subjective:  ANA LILIA VERMA  Seen Bedside 67y Female  .   Patient is a 67y old  Female who presents with a chief complaint of s/p Fall (27 Nov 2022 17:18)    HPI:  67F w/ h/o anorexia, anxiety, depression, kidney cysts, peripheral neuropathy, RA, OA, osteoporosis, low back pain s/p unwitnessed fall, found down by  +HT, ?LOC, -AC. Contacted patient's  who was able to provide some history and states he returned home from work around 11pm when he found the patient down on the tiled kitchen floor. States the patient was awake and alert when he found her on the floor, but she didn't recall how she fell down.  speculates that she might've been sitting in chair and fallen out of it as this has happened before in the past.  states that the patient said she hit her head, and he noticed blood on the patient's knees, but no other injuries. Patient was complaining of left-sided neck pain after the fall, but  states she has been having this pain for about 1 month. About 1 hour after the fall, patient became lethargic and was unable to stay up on her feet, so  called for an ambulance.     Of note, pt has long-standing h/o anorexia for several years per . Does not induce vomiting, but frequently uses laxatives for weight loss. Has never consistently followed w/ psychologist/psychiatrist. After recent discharge from hospital two weeks ago, anorexia acutely worsened and pt has had minimal caloric intake.    On arrival to ED, patient unresponsive, hypotensive, bradycardic, and hypothermic. Patient also hypoglycemic (FS 24) and given dextrose immediately. Subsequently given Emiliana hugger, given 2L of warm IV fluids, started on levophed, and given broad-spectrum abx, and started on levothyroxine for possible myxedema coma (although no documentation in eMAR, so unclear if actually given). VBG noted respiratory acidosis. Pt unable to tolerate BiPAP due to current mental status, so patient intubated for airway protection. CT spine demonstrated fx of anterior and posterior tubercle of left C6 transverse foramen. NSGY consulted, but no surgical intervention offered. Subsequently admitted to MICU for presumed septic shock. (27 Nov 2022 16:09)      Past Medical History and Surgical History  PAST MEDICAL & SURGICAL HISTORY:  Anxiety      Depression      Osteoporosis      Kidney cysts      Peripheral neuropathy      RA (rheumatoid arthritis)      OA (osteoarthritis)      Low back pain with radiation  s/p &quot;nerve cutting&quot; 2015           Review of Systems:  [X] Ten point review of systems is otherwise negative except as noted     Objective:  Vital Signs Last 24 Hrs  T(C): 36.6 (28 Nov 2022 12:00), Max: 38.1 (27 Nov 2022 16:00)  T(F): 97.9 (28 Nov 2022 12:00), Max: 100.6 (27 Nov 2022 16:00)  HR: 52 (28 Nov 2022 12:00) (52 - 88)  BP: 137/63 (28 Nov 2022 12:00) (66/44 - 139/63)  BP(mean): 92 (28 Nov 2022 12:00) (51 - 100)  RR: 20 (28 Nov 2022 12:00) (16 - 42)  SpO2: 100% (28 Nov 2022 12:00) (99% - 100%)    Parameters below as of 28 Nov 2022 12:00  Patient On (Oxygen Delivery Method): ventilator    O2 Concentration (%): 40                        11.9   8.60  )-----------( 105      ( 28 Nov 2022 04:30 )             35.3                 11-28    140  |  107  |  31<H>  ----------------------------<  118<H>  3.9   |  24  |  0.7    Ca    8.2<L>      28 Nov 2022 04:30  Phos  2.5     11-28  Mg     1.4     11-28    TPro  4.4<L>  /  Alb  2.4<L>  /  TBili  <0.2  /  DBili  x   /  AST  381<H>  /  ALT  221<H>  /  AlkPhos  362<H>  11-28        Physical Exam - Lower Extremity Focused:   Derm: Bilateral lower extremity wounds, small ulcer sub 5th met head left foot without drainage or malodor, does not probe to bone, cyanotic changes to dorsal feet b/l   Vascular: DP and PT Pulses Diminished; Foot is cool to touch    Neuro: Gross touch sensation intact   MSK: Pain On Palpation at Wound Site     Assessment:  Right Forefoot Plantar Ulcer 5th Submetatarsal head) without local signs of infection  Cyanosis of bilateral lower extremities      Plan:  Chart reviewed and Patient evaluated. All Questions and Concerns Addressed and Answered  Weight Bearing Status; WBAT   Please continue with local wound care per Burn team recs  Continue to watch for demarcation; podiatry will eval Fri 12/2  No further podiatric intervention required at this time   Discussed Plan w/ Dr. Nieto    Podiatry  Podiatry Consult Note    Subjective:  ANA LILIA VERMA  Seen Bedside 67y Female  .   Patient is a 67y old  Female who presents with a chief complaint of s/p Fall (27 Nov 2022 17:18)    HPI:  67F w/ h/o anorexia, anxiety, depression, kidney cysts, peripheral neuropathy, RA, OA, osteoporosis, low back pain s/p unwitnessed fall, found down by  +HT, ?LOC, -AC. Contacted patient's  who was able to provide some history and states he returned home from work around 11pm when he found the patient down on the tiled kitchen floor. States the patient was awake and alert when he found her on the floor, but she didn't recall how she fell down.  speculates that she might've been sitting in chair and fallen out of it as this has happened before in the past.  states that the patient said she hit her head, and he noticed blood on the patient's knees, but no other injuries. Patient was complaining of left-sided neck pain after the fall, but  states she has been having this pain for about 1 month. About 1 hour after the fall, patient became lethargic and was unable to stay up on her feet, so  called for an ambulance.     Of note, pt has long-standing h/o anorexia for several years per . Does not induce vomiting, but frequently uses laxatives for weight loss. Has never consistently followed w/ psychologist/psychiatrist. After recent discharge from hospital two weeks ago, anorexia acutely worsened and pt has had minimal caloric intake.    On arrival to ED, patient unresponsive, hypotensive, bradycardic, and hypothermic. Patient also hypoglycemic (FS 24) and given dextrose immediately. Subsequently given Emiliana hugger, given 2L of warm IV fluids, started on levophed, and given broad-spectrum abx, and started on levothyroxine for possible myxedema coma (although no documentation in eMAR, so unclear if actually given). VBG noted respiratory acidosis. Pt unable to tolerate BiPAP due to current mental status, so patient intubated for airway protection. CT spine demonstrated fx of anterior and posterior tubercle of left C6 transverse foramen. NSGY consulted, but no surgical intervention offered. Subsequently admitted to MICU for presumed septic shock. (27 Nov 2022 16:09)      Past Medical History and Surgical History  PAST MEDICAL & SURGICAL HISTORY:  Anxiety      Depression      Osteoporosis      Kidney cysts      Peripheral neuropathy      RA (rheumatoid arthritis)      OA (osteoarthritis)      Low back pain with radiation  s/p &quot;nerve cutting&quot; 2015           Review of Systems:  [X] Ten point review of systems is otherwise negative except as noted     Objective:  Vital Signs Last 24 Hrs  T(C): 36.6 (28 Nov 2022 12:00), Max: 38.1 (27 Nov 2022 16:00)  T(F): 97.9 (28 Nov 2022 12:00), Max: 100.6 (27 Nov 2022 16:00)  HR: 52 (28 Nov 2022 12:00) (52 - 88)  BP: 137/63 (28 Nov 2022 12:00) (66/44 - 139/63)  BP(mean): 92 (28 Nov 2022 12:00) (51 - 100)  RR: 20 (28 Nov 2022 12:00) (16 - 42)  SpO2: 100% (28 Nov 2022 12:00) (99% - 100%)    Parameters below as of 28 Nov 2022 12:00  Patient On (Oxygen Delivery Method): ventilator    O2 Concentration (%): 40                        11.9   8.60  )-----------( 105      ( 28 Nov 2022 04:30 )             35.3                 11-28    140  |  107  |  31<H>  ----------------------------<  118<H>  3.9   |  24  |  0.7    Ca    8.2<L>      28 Nov 2022 04:30  Phos  2.5     11-28  Mg     1.4     11-28    TPro  4.4<L>  /  Alb  2.4<L>  /  TBili  <0.2  /  DBili  x   /  AST  381<H>  /  ALT  221<H>  /  AlkPhos  362<H>  11-28        Physical Exam - Lower Extremity Focused:   Derm: Bilateral lower extremity wounds, small ulcer sub 5th met head left foot without drainage or malodor, does not probe to bone, cyanotic changes to dorsal feet b/l    Per burn assessment: normal strength, FROM, no deformities. Right extremity is cyanotic, warm with marked lesions on the anterior chin, posterior lateral heel. Left lower extremity is erythematous with several wounds on the anterior chin. There is no active bleeding, drainage, or foul smell.   Vascular: DP and PT Pulses Diminished; Foot is cool to touch    Neuro: Gross touch sensation intact   MSK: Pain On Palpation at Wound Site     Assessment:  Right Forefoot Plantar Ulcer 5th Submetatarsal head) without local signs of infection  Cyanosis of bilateral lower extremities      Plan:  Chart reviewed and Patient evaluated. All Questions and Concerns Addressed and Answered  Weight Bearing Status; WBAT   Please continue with local wound care per Burn team recs  Continue to watch for demarcation; podiatry will eval Fri 12/2  No further surgical intervention required at this time   Discussed Plan w/ Dr. Nieto    Podiatry

## 2022-11-28 NOTE — PATIENT PROFILE ADULT - FALL HARM RISK - HARM RISK INTERVENTIONS

## 2022-11-28 NOTE — CONSULT NOTE ADULT - ASSESSMENT
IMP:  - septic shock  - acute resp failure  - anorexia  - LE wounds, r/o necrotizing fasciitis - sen by Burn team earlier today  - left C6 transverse foramen fracture  - hypomagnesemia    SUGGEST:  - est REE by PSE now 1237 kcal/d  - start feeds with Replete at 25 ml/h - after tolerated for ~ 4 hours increase to goal 50 ml/h --> 77 gm protein & 1200 kcal/d  - f/u phos and correct to > 3.5, jin before attempting extubation  - check 25 oh vit D level  - expect refeeding syndrome issues -- after feeds tolerated for several days and lytes corrected, will gradually increase caloric intake

## 2022-11-28 NOTE — PROGRESS NOTE ADULT - SUBJECTIVE AND OBJECTIVE BOX
ANA LILIA VERMA 67y Female  MRN#: [id number]   Hospital Day: 1d    HPI:  67F w/ h/o anorexia, anxiety, depression, kidney cysts, peripheral neuropathy, RA, OA, osteoporosis, low back pain s/p unwitnessed fall, found down by  +HT, ?LOC, -AC. Contacted patient's  who was able to provide some history and states he returned home from work around 11pm when he found the patient down on the tiled kitchen floor. States the patient was awake and alert when he found her on the floor, but she didn't recall how she fell down.  speculates that she might've been sitting in chair and fallen out of it as this has happened before in the past.  states that the patient said she hit her head, and he noticed blood on the patient's knees, but no other injuries. Patient was complaining of left-sided neck pain after the fall, but  states she has been having this pain for about 1 month. About 1 hour after the fall, patient became lethargic and was unable to stay up on her feet, so  called for an ambulance.     Of note, pt has long-standing h/o anorexia for several years per . Does not induce vomiting, but frequently uses laxatives for weight loss. Has never consistently followed w/ psychologist/psychiatrist. After recent discharge from hospital two weeks ago, anorexia acutely worsened and pt has had minimal caloric intake.    On arrival to ED, patient unresponsive, hypotensive, bradycardic, and hypothermic. Patient also hypoglycemic (FS 24) and given dextrose immediately. Subsequently given Emiliana hugger, given 2L of warm IV fluids, started on levophed, and given broad-spectrum abx, and started on levothyroxine for possible myxedema coma (although no documentation in eMAR, so unclear if actually given). VBG noted respiratory acidosis. Pt unable to tolerate BiPAP due to current mental status, so patient intubated for airway protection. CT spine demonstrated fx of anterior and posterior tubercle of left C6 transverse foramen. NSGY consulted, but no surgical intervention offered. Subsequently admitted to MICU for presumed septic shock. (27 Nov 2022 16:09)      SUBJECTIVE      OBJECTIVE  PAST MEDICAL & SURGICAL HISTORY  Anxiety    Depression    Osteoporosis    Kidney cysts    Peripheral neuropathy    RA (rheumatoid arthritis)    OA (osteoarthritis)    Low back pain with radiation  s/p &quot;nerve cutting&quot; 2015      ALLERGIES:  No Known Allergies    MEDICATIONS:  STANDING MEDICATIONS  chlorhexidine 0.12% Liquid 15 milliLiter(s) Oral Mucosa two times a day  chlorhexidine 0.12% Liquid 15 milliLiter(s) Oral Mucosa every 12 hours  chlorhexidine 2% Cloths 1 Application(s) Topical <User Schedule>  chlorhexidine 2% Cloths 1 Application(s) Topical daily  clindamycin IVPB 900 milliGRAM(s) IV Intermittent every 8 hours  DAPTOmycin IVPB 320 milliGRAM(s) IV Intermittent every 24 hours  dexMEDEtomidine Infusion 0.05 MICROgram(s)/kG/Hr IV Continuous <Continuous>  dextrose 5%. 1000 milliLiter(s) IV Continuous <Continuous>  enoxaparin Injectable 40 milliGRAM(s) SubCutaneous every 24 hours  fentaNYL   Infusion. 0.5 MICROgram(s)/kG/Hr IV Continuous <Continuous>  hydrocortisone sodium succinate Injectable 100 milliGRAM(s) IV Push every 8 hours  norepinephrine Infusion 0.05 MICROgram(s)/kG/Min IV Continuous <Continuous>  pantoprazole   Suspension 40 milliGRAM(s) Oral daily  piperacillin/tazobactam IVPB.. 3.375 Gram(s) IV Intermittent every 8 hours  polyethylene glycol 3350 17 Gram(s) Oral daily  senna 2 Tablet(s) Oral at bedtime  silver sulfADIAZINE 1% Cream 1 Application(s) Topical two times a day    PRN MEDICATIONS      VITAL SIGNS: Last 24 Hours  T(C): 36.6 (28 Nov 2022 12:00), Max: 38.1 (27 Nov 2022 16:00)  T(F): 97.9 (28 Nov 2022 12:00), Max: 100.6 (27 Nov 2022 16:00)  HR: 52 (28 Nov 2022 12:00) (52 - 88)  BP: 137/63 (28 Nov 2022 12:00) (66/44 - 139/63)  BP(mean): 92 (28 Nov 2022 12:00) (51 - 100)  RR: 20 (28 Nov 2022 12:00) (16 - 42)  SpO2: 100% (28 Nov 2022 12:00) (99% - 100%)    LABS:                        11.9   8.60  )-----------( 105      ( 28 Nov 2022 04:30 )             35.3     11-28    140  |  107  |  31<H>  ----------------------------<  118<H>  3.9   |  24  |  0.7    Ca    8.2<L>      28 Nov 2022 04:30  Phos  2.5     11-28  Mg     1.4     11-28    TPro  4.4<L>  /  Alb  2.4<L>  /  TBili  <0.2  /  DBili  x   /  AST  381<H>  /  ALT  221<H>  /  AlkPhos  362<H>  11-28    PT/INR - ( 28 Nov 2022 04:30 )   PT: 17.30 sec;   INR: 1.50 ratio         PTT - ( 28 Nov 2022 04:30 )  PTT:44.0 sec  Urinalysis Basic - ( 27 Nov 2022 19:15 )    Color: Yellow / Appearance: Clear / SG: >1.050 / pH: x  Gluc: x / Ketone: Trace  / Bili: Negative / Urobili: <2 mg/dL   Blood: x / Protein: 30 mg/dL / Nitrite: Negative   Leuk Esterase: Moderate / RBC: 2 /HPF / WBC 19 /HPF   Sq Epi: x / Non Sq Epi: 3 /HPF / Bacteria: Negative      ABG - ( 28 Nov 2022 04:06 )  pH, Arterial: 7.39  pH, Blood: x     /  pCO2: 41    /  pO2: 195   / HCO3: 25    / Base Excess: -0.2  /  SaO2: 100.0             Creatine Kinase, Serum: 190 U/L (11-28-22 @ 04:30)  Troponin T, Serum: 0.04 ng/mL *HH* (11-28-22 @ 04:30)  Troponin T, Serum: 0.05 ng/mL *HH* (11-27-22 @ 19:15)  Creatine Kinase, Serum: 127 U/L (11-27-22 @ 19:12)      Culture - Blood (collected 27 Nov 2022 06:30)  Source: .Blood Blood-Peripheral  Preliminary Report (28 Nov 2022 13:01):    No growth to date.    Culture - Blood (collected 27 Nov 2022 06:25)  Source: .Blood Blood-Peripheral  Preliminary Report (28 Nov 2022 13:01):    No growth to date.      CARDIAC MARKERS ( 28 Nov 2022 04:30 )  x     / 0.04 ng/mL / 190 U/L / x     / x      CARDIAC MARKERS ( 27 Nov 2022 19:15 )  x     / 0.05 ng/mL / x     / x     / x      CARDIAC MARKERS ( 27 Nov 2022 19:12 )  x     / x     / 127 U/L / x     / x      CARDIAC MARKERS ( 27 Nov 2022 06:19 )  x     / x     / 330 U/L / x     / x      CARDIAC MARKERS ( 27 Nov 2022 01:44 )  x     / 0.04 ng/mL / x     / x     / x            PHYSICAL EXAM:  GENERAL: NAD, well-developed.  HEAD:  Atraumatic, Normocephalic.  EYES: conjunctiva and sclera clear  CHEST/LUNG: GBAE. No wheezing or crackles   HEART: regular rate and rhythm; S1/S2.  ABDOMEN: Soft, Nontender, Nondistended  EXTREMITIES: No edema.   PSYCH: AAOx3.  NEUROLOGY: non-focal; moves all extremities    ASSESSMENT AND PLAN:    Hospital Course:  ASSESSMENT    #Septic Shock  #Toxic-Metabolic Encephalopathy  #Necrotizing Fasciitis of bilateral LE (suspected)  #Osteomyelitis, suspected  #Left C6 Transverse Foramen Fracture  #Anorexia, h/o    PLAN    CNS  - c/w fentanyl  - avoid oversedation  - f/u UDS    HEENT  - Oral care    PULMONARY:   - HOB at 45  - Aspiration precautions    CARDIOVASCULAR  - obtain TTE  - trend CE  - c/w Levophed (wean as tolerated)  - avoid volume overload    GI  - Diet: NPO  - GI ppx: pantoprazole 40mg PO QD   - bowel regimen    RENAL  - strict I&Os, daily weight, and monitor volume status  - Follow up renal function and lytes, correct as needed (K>4; Mg>2)    INFECTIOUS DISEASE  - f/u BCx and UCx  - f/u CT B/L LE (r/o nec fasc vs OM)  - obtain US RUQ (r/o cholecystitis)  - obtain MRSA PCR  - start vanc, meropenem, and clindamycin for empiric nec fasc coverage  - ID consulted; f/u recs    HEMATOLOGICAL  - DVT ppx: Lovenox 40mg SQ QD     ENDOCRINE  - Monitor FS (A1c 5.1% in Nov 2022)  - insulin protocol if needed  - obtain thyroid panel (r/o myxedema coma; s/p levothyroxine on presentation)  - check AM cortisol  - obtain prealbumin, vitamin D, and phos  - nutrition consulted; f/u recs    MSK  - c/w C-Collar as per NSGY  - f/u Trauma for TTS    DISPOSITION: MICU   ANA LILIA VERMA 67y Female  MRN#: [id number]   Hospital Day: 1d    HPI:  67F w/ h/o anorexia, anxiety, depression, kidney cysts, peripheral neuropathy, RA, OA, osteoporosis, low back pain s/p unwitnessed fall, found down by  +HT, ?LOC, -AC. Contacted patient's  who was able to provide some history and states he returned home from work around 11pm when he found the patient down on the tiled kitchen floor. States the patient was awake and alert when he found her on the floor, but she didn't recall how she fell down.  speculates that she might've been sitting in chair and fallen out of it as this has happened before in the past.  states that the patient said she hit her head, and he noticed blood on the patient's knees, but no other injuries. Patient was complaining of left-sided neck pain after the fall, but  states she has been having this pain for about 1 month. About 1 hour after the fall, patient became lethargic and was unable to stay up on her feet, so  called for an ambulance.     Of note, pt has long-standing h/o anorexia for several years per . Does not induce vomiting, but frequently uses laxatives for weight loss. Has never consistently followed w/ psychologist/psychiatrist. After recent discharge from hospital two weeks ago, anorexia acutely worsened and pt has had minimal caloric intake.    On arrival to ED, patient unresponsive, hypotensive, bradycardic, and hypothermic. Patient also hypoglycemic (FS 24) and given dextrose immediately. Subsequently given Emiliana hugger, given 2L of warm IV fluids, started on levophed, and given broad-spectrum abx, and started on levothyroxine for possible myxedema coma (although no documentation in eMAR, so unclear if actually given). VBG noted respiratory acidosis. Pt unable to tolerate BiPAP due to current mental status, so patient intubated for airway protection. CT spine demonstrated fx of anterior and posterior tubercle of left C6 transverse foramen. NSGY consulted, but no surgical intervention offered. Subsequently admitted to MICU for presumed septic shock. (27 Nov 2022 16:09)      SUBJECTIVE      OBJECTIVE  PAST MEDICAL & SURGICAL HISTORY  Anxiety    Depression    Osteoporosis    Kidney cysts    Peripheral neuropathy    RA (rheumatoid arthritis)    OA (osteoarthritis)    Low back pain with radiation  s/p &quot;nerve cutting&quot; 2015      ALLERGIES:  No Known Allergies    MEDICATIONS:  STANDING MEDICATIONS  chlorhexidine 0.12% Liquid 15 milliLiter(s) Oral Mucosa two times a day  chlorhexidine 0.12% Liquid 15 milliLiter(s) Oral Mucosa every 12 hours  chlorhexidine 2% Cloths 1 Application(s) Topical <User Schedule>  chlorhexidine 2% Cloths 1 Application(s) Topical daily  clindamycin IVPB 900 milliGRAM(s) IV Intermittent every 8 hours  DAPTOmycin IVPB 320 milliGRAM(s) IV Intermittent every 24 hours  dexMEDEtomidine Infusion 0.05 MICROgram(s)/kG/Hr IV Continuous <Continuous>  dextrose 5%. 1000 milliLiter(s) IV Continuous <Continuous>  enoxaparin Injectable 40 milliGRAM(s) SubCutaneous every 24 hours  fentaNYL   Infusion. 0.5 MICROgram(s)/kG/Hr IV Continuous <Continuous>  hydrocortisone sodium succinate Injectable 100 milliGRAM(s) IV Push every 8 hours  norepinephrine Infusion 0.05 MICROgram(s)/kG/Min IV Continuous <Continuous>  pantoprazole   Suspension 40 milliGRAM(s) Oral daily  piperacillin/tazobactam IVPB.. 3.375 Gram(s) IV Intermittent every 8 hours  polyethylene glycol 3350 17 Gram(s) Oral daily  senna 2 Tablet(s) Oral at bedtime  silver sulfADIAZINE 1% Cream 1 Application(s) Topical two times a day    PRN MEDICATIONS      VITAL SIGNS: Last 24 Hours  T(C): 36.6 (28 Nov 2022 12:00), Max: 38.1 (27 Nov 2022 16:00)  T(F): 97.9 (28 Nov 2022 12:00), Max: 100.6 (27 Nov 2022 16:00)  HR: 52 (28 Nov 2022 12:00) (52 - 88)  BP: 137/63 (28 Nov 2022 12:00) (66/44 - 139/63)  BP(mean): 92 (28 Nov 2022 12:00) (51 - 100)  RR: 20 (28 Nov 2022 12:00) (16 - 42)  SpO2: 100% (28 Nov 2022 12:00) (99% - 100%)    LABS:                        11.9   8.60  )-----------( 105      ( 28 Nov 2022 04:30 )             35.3     11-28    140  |  107  |  31<H>  ----------------------------<  118<H>  3.9   |  24  |  0.7    Ca    8.2<L>      28 Nov 2022 04:30  Phos  2.5     11-28  Mg     1.4     11-28    TPro  4.4<L>  /  Alb  2.4<L>  /  TBili  <0.2  /  DBili  x   /  AST  381<H>  /  ALT  221<H>  /  AlkPhos  362<H>  11-28    PT/INR - ( 28 Nov 2022 04:30 )   PT: 17.30 sec;   INR: 1.50 ratio         PTT - ( 28 Nov 2022 04:30 )  PTT:44.0 sec  Urinalysis Basic - ( 27 Nov 2022 19:15 )    Color: Yellow / Appearance: Clear / SG: >1.050 / pH: x  Gluc: x / Ketone: Trace  / Bili: Negative / Urobili: <2 mg/dL   Blood: x / Protein: 30 mg/dL / Nitrite: Negative   Leuk Esterase: Moderate / RBC: 2 /HPF / WBC 19 /HPF   Sq Epi: x / Non Sq Epi: 3 /HPF / Bacteria: Negative      ABG - ( 28 Nov 2022 04:06 )  pH, Arterial: 7.39  pH, Blood: x     /  pCO2: 41    /  pO2: 195   / HCO3: 25    / Base Excess: -0.2  /  SaO2: 100.0             Creatine Kinase, Serum: 190 U/L (11-28-22 @ 04:30)  Troponin T, Serum: 0.04 ng/mL *HH* (11-28-22 @ 04:30)  Troponin T, Serum: 0.05 ng/mL *HH* (11-27-22 @ 19:15)  Creatine Kinase, Serum: 127 U/L (11-27-22 @ 19:12)      Culture - Blood (collected 27 Nov 2022 06:30)  Source: .Blood Blood-Peripheral  Preliminary Report (28 Nov 2022 13:01):    No growth to date.    Culture - Blood (collected 27 Nov 2022 06:25)  Source: .Blood Blood-Peripheral  Preliminary Report (28 Nov 2022 13:01):    No growth to date.      CARDIAC MARKERS ( 28 Nov 2022 04:30 )  x     / 0.04 ng/mL / 190 U/L / x     / x      CARDIAC MARKERS ( 27 Nov 2022 19:15 )  x     / 0.05 ng/mL / x     / x     / x      CARDIAC MARKERS ( 27 Nov 2022 19:12 )  x     / x     / 127 U/L / x     / x      CARDIAC MARKERS ( 27 Nov 2022 06:19 )  x     / x     / 330 U/L / x     / x      CARDIAC MARKERS ( 27 Nov 2022 01:44 )  x     / 0.04 ng/mL / x     / x     / x            PHYSICAL EXAM:  General: intubated, not responding to commands or pain   HEENT: Normocephalic, no scalp lacerations, right forehead hematoma   Neck: Soft, midline trachea. no c-spine tenderness  Chest: No chest wall tenderness, no subcutaneous emphysema   Cardiac: S1, S2, RRR  Respiratory: vented   Abdomen: Soft, non-distended, non-tender, no rebound, no guarding.  Groin: Normal appearing, pelvis stable   Ext:  Moving b/l upper and lower extremities. Palpable pulses in UE b/l and RLE; dopplerable DP in LLE; b/l LE ulcers present with edema and blanchable erythema, kerlix dressing placed on LE b/l. R knee wound/abrasion present      ASSESSMENT AND PLAN:    Hospital Course:  ASSESSMENT    #Septic Shock  #Toxic-Metabolic Encephalopathy  #Necrotizing Fasciitis of bilateral LE (suspected)  #Osteomyelitis, suspected  #Left C6 Transverse Foramen Fracture  #Anorexia, h/o    PLAN    #Toxic-Metabolic Encephalopathy  - c/w fentanyl  - avoid oversedation  - f/u UDS      #Osteomylitis  #Septic Shock  - c/w Levophed (wean as tolerated)  - f/u BCx and UCx  - f/u CT B/L LE (r/o nec fasc vs OM)  - obtain US RUQ (r/o cholecystitis)  - obtain MRSA PCR  - start vanc, meropenem, and clindamycin for empiric nec fasc coverage  - obtain TTE  - trend CE  - ID consulted; f/u recs    #Necrotizing Fascitis   - obtain thyroid panel (r/o myxedema coma; s/p levothyroxine on presentation)  - check AM cortisol  - obtain prealbumin, vitamin D, and phos    #Anorexia  - Started trickle feeds  - Per nutrition: start feeds with Replete at 25 ml/h - after tolerated for ~ 4 hours increase to goal 50 ml/h --> 77 gm protein & 1200 kcal/d  - f/u phos and correct to > 3.5, jin before attempting extubation  - check 25 oh vit D level          #Left C6 transverse foramen fracture  - C/w C-collar  - f/u Trauma for TTS      Code Status: Full Code  PPI: Pantoprazole 40mg PO QD   DVT ppx: Lovenox 40mg SQ QD       DISPOSITION: MICU

## 2022-11-29 NOTE — CONSULT NOTE ADULT - ASSESSMENT
67yFemale being evaluated for goals of care and symptom management. Pt intubated, follows simple commands, unable to assess pain/dyspnea due to ET tube. Pt is restrained. Has been on sedation but it had recently been stopped to assess for SBT    Pt was recently at [facility] 11/10-11/11 had been sent from burn clinic re: worsening B/L LE wounds. Patient was ordered for a work up but she wanted to leave to complete outpatient. Now attepmted to call  Henry to introduce palliative care, l/m on voicemail. Of note there is a HCP form in chart that lists other family members - see above in data       MEDD (morphine equivalent daily dose): fentanyl GTT and IV morphine 2mg x 1 in last 24 hrs      See Recs below.    Please call [phone] with questions or concerns 24/7.   We will continue to follow.    67yFemale being evaluated for goals of care and symptom management. Pt intubated, follows simple commands, unable to assess pain/dyspnea due to ET tube. Pt is restrained. Has been on sedation but it had recently been stopped to assess for SBT    Pt was recently at [facility] 11/10-11/11 had been sent from burn clinic re: worsening B/L LE wounds. Patient was ordered for a work up but she wanted to leave to complete outpatient. Now attempted to call  Henry to introduce palliative care, l/m on voicemail. Of note there is a HCP form in chart that lists other family members - see above in data       MEDD (morphine equivalent daily dose): fentanyl GTT and IV morphine 2mg x 1 in last 24 hrs      See Recs below.    Please call [phone] with questions or concerns 24/7.   We will continue to follow.

## 2022-11-29 NOTE — CHART NOTE - NSCHARTNOTEFT_GEN_A_CORE
PALLIATIVE MEDICINE INTERDISCIPLINARY TEAM NOTE    Provider:                                         [ x  ] Initial visit        Met with: [ x  ] Patient  [   ] Family  [   ] Other:    Primary Language: [ x  ] English [   ] Other*:                      *Interpretation provided by:    SUPPORT DIAGNOSES            (Check all that apply)    [   ] EOL issues  [ x  ] Advanced Illness  [   ] Cultural / spiritual concerns  [   ] Pain / suffering  [   ] Dementia / AMS  [   ] Other:  [   ] AD issues  [   ] Grief / loss / sadness  [   ] Discharge issues  [x   ] Distress / coping    PSYCHOSOCIAL ASSESSMENT OF PATIENT         (Check all that apply)    [ x  ] Initial Assessment            [   ] Reassessment          [   ] Not Applicable this visit    Pain/suffering acuity:  [  x ] None to mild (0-3)           [   ] Moderate (4-6)        [   ] High (7-10)    Mental Status:  [   ] Alert/oriented (x3)          [   ] Confused/Altered(x2/x1)         [  x ] Non-resp: awake and alert on ventilator     Functional status:  [   ] Independent w ADLs      [   ] Needs Assistance             [   x] Bedbound/Full Care: currently     Coping:  [   ] Coping well                     [   ] Coping w/difficulty            [   ] Poor coping   [ x ] unable to assess     Support system:  [   ] Strong                              [ x  ] Adequate                        [   ] Inadequate      Past history and medications for:     [ ] Anxiety       [ x ] Depression    [ ] Sleep disorders       SERVICE PROVIDED  [   ]Discharge support / facilitation  [   ]AD / goals of care counseling                                  [   ]EOL / death / bereavement counseling  [ x  ]Counseling / support                                                [   ] Family meeting  [   ]Prayer / sacrament / ritual                                      [   ] Referral   [   ]Other                                                                       NOTE and Plan of Care (PoC):    patient is a 66 y/o with noted pmhx , presenting s/p unwitnessed fall. team visited patient earlier today. patient presently intubated. She was awake and alert at time of visit. no family at bedside at time of visit. will follow up with family. [phone]

## 2022-11-29 NOTE — CONSULT NOTE ADULT - NS ATTEND AMEND GEN_ALL_CORE FT
Pt seen and examined. Pt with gross infection of BLE. Bacteremia, intubated but moving all extremities. Cervical spine with likely OM/discitis due to BLE severe infection. IV abx per ID. Once pt stable recc MRI+/- C spine. Pt in c-collar. Pt is well known to me and an extremely poor surgical candidate.
67F with PMH of anorexia, anxiety, depression, RA, OA, OP, low back pain, here after unwitnessed fall. Found to be in septic shock with encephalopathy, intubated for airway protection, on pressors. Given concern for necrotizing fasciitis, she was started on broad-soectrum abx. Is also on tube feeds, and C-collar for C6 L transverse foramen fracture. Palliative care consulted for GOC.

## 2022-11-29 NOTE — PROGRESS NOTE ADULT - ASSESSMENT
ASSESSMENT  67F w/ h/o anorexia, anxiety, depression, kidney cysts, peripheral neuropathy, RA, OA, osteoporosis, low back pain s/p unwitnessed fall, found down by     IMPRESSION  #s/p Fall  #Shock, possible septic    - Blood Cx   #Necrotizing Fasciitis? vs severe soft tissue infection vs biliary  -  CT Lower Extremity w/ IV Cont, Bilateral (11.27.22 @ 11:02): 1) Bilateral lower extremity swelling and marked skin thickening, left  greater than right, compatible with cellulitis in the correct clinical  setting. Multiple rounded locules of gas are also seen tracking within the soft  tissues along the predominantly posterior calf and anterolateral/dorsal  ankle and foot. These are likely related to known multiple abrasions of  the left foot/leg and appear to partially contact the posterior calf skin  abrasion. However, given the burden of soft tissue disease it is difficult to completely exclude early necrotizing fasciitis on imaging,  and this was discussed with the team at the time of dictation.    #Transaminitis   - RUQ US with distended gallbladder    #Chronic Lower Extremity Ulcers  - Arterial US 11/10 with normal arterial flow     #Cortical erosions of left ankle/foot (Osteopenia vs OM)  #Bilateral Knee Joint effusions   #Abx allergy: NKDA    RECOMMENDATIONS  - stop daptomycin   - appreciate burn evaluation   - continue zosyn 3.5 mg q 8 hours   - unclear if component of toxin induced shock -- continue clindamycin for now while trying to wean off pressors   - trend LFTs and GGT    Please call or message on [employer] Teams if with any questions.  Spectra [phone]

## 2022-11-29 NOTE — PROGRESS NOTE ADULT - SUBJECTIVE AND OBJECTIVE BOX
Over Night Events: events noted, remain critically ill, still intubated, ventilated, on fentanyl, precedex, levophed 0.4, D 5 W, sp burn eval, TSH reviewed    PHYSICAL EXAM    ICU Vital Signs Last 24 Hrs  T(C): 35.6 (2022 04:00), Max: 36.9 (2022 08:00)  T(F): 96 (2022 04:00), Max: 98.4 (2022 08:00)  HR: 50 (2022 07:00) (44 - 110)  BP: 99/65 (:00) (80/50 - 142/71)  BP(mean): 73 (:00) (54 - 103)  RR: 18 (:00) (14 - 33)  SpO2: 100% (:00) (99% - 100%)    O2 Parameters below as of 2022 04:00  Patient On (Oxygen Delivery Method): ventilator    O2 Concentration (%): 40        General: ill looking  HEENT:  ETT  Lungs: dec bs both bases  Cardiovascular: ALEM 2.6  Abdomen: Soft, Positive BS  Extremities: No clubbing   RLE swelling/ redness      22 @ 07:01  -  22 @ 07:00  --------------------------------------------------------  IN:    Dexmedetomidine: 108.2 mL    dextrose 5%: 1200 mL    FentaNYL: 251.4 mL    IV PiggyBack: 800 mL    Miscellaneous Tube Feedin mL    Norepinephrine: 152.3 mL  Total IN: 3111.9 mL    OUT:    Indwelling Catheter - Urethral (mL): 965 mL  Total OUT: 965 mL    Total NET: 2146.9 mL          LABS:                          11.6   11.71 )-----------( 76       ( 2022 04:55 )             34.6                                               11    141  |  109  |  26<H>  ----------------------------<  219<H>  4.0   |  23  |  0.5<L>    Ca    8.0<L>      2022 04:55  Phos  2.8       Mg     1.8         TPro  4.3<L>  /  Alb  2.3<L>  /  TBili  0.2  /  DBili  x   /  AST  165<H>  /  ALT  150<H>  /  AlkPhos  324<H>        PT/INR - ( 2022 04:30 )   PT: 17.30 sec;   INR: 1.50 ratio         PTT - ( 2022 04:30 )  PTT:44.0 sec                                       Urinalysis Basic - ( 2022 19:15 )    Color: Yellow / Appearance: Clear / SG: >1.050 / pH: x  Gluc: x / Ketone: Trace  / Bili: Negative / Urobili: <2 mg/dL   Blood: x / Protein: 30 mg/dL / Nitrite: Negative   Leuk Esterase: Moderate / RBC: 2 /HPF / WBC 19 /HPF   Sq Epi: x / Non Sq Epi: 3 /HPF / Bacteria: Negative        CARDIAC MARKERS ( 2022 04:30 )  x     / 0.04 ng/mL / 190 U/L / x     / x      CARDIAC MARKERS ( 2022 19:15 )  x     / 0.05 ng/mL / x     / x     / x      CARDIAC MARKERS ( 2022 19:12 )  x     / x     / 127 U/L / x     / x                                                LIVER FUNCTIONS - ( 2022 04:55 )  Alb: 2.3 g/dL / Pro: 4.3 g/dL / ALK PHOS: 324 U/L / ALT: 150 U/L / AST: 165 U/L / GGT: x                                                  Culture - Urine (collected 2022 19:15)  Source: Clean Catch Clean Catch (Midstream)  Final Report (2022 23:09):    <10,000 CFU/mL Normal Urogenital Alyssa    Culture - Blood (collected 2022 06:30)  Source: .Blood Blood-Peripheral  Preliminary Report (2022 13:01):    No growth to date.    Culture - Blood (collected 2022 06:25)  Source: .Blood Blood-Peripheral  Preliminary Report (2022 13:01):    No growth to date.                                                   Mode: AC/ CMV (Assist Control/ Continuous Mandatory Ventilation)  RR (machine): 18  TV (machine): 400  FiO2: 40  PEEP: 8  ITime: 1  MAP: 12  PIP: 22                                      ABG - ( 2022 03:14 )  pH, Arterial: 7.37  pH, Blood: x     /  pCO2: 40    /  pO2: 222   / HCO3: 23    / Base Excess: -2.0  /  SaO2: 100.0     LA 0.7          MEDICATIONS  (STANDING):  chlorhexidine 0.12% Liquid 15 milliLiter(s) Oral Mucosa two times a day  chlorhexidine 0.12% Liquid 15 milliLiter(s) Oral Mucosa every 12 hours  chlorhexidine 2% Cloths 1 Application(s) Topical <User Schedule>  chlorhexidine 2% Cloths 1 Application(s) Topical daily  clindamycin IVPB 900 milliGRAM(s) IV Intermittent every 8 hours  DAPTOmycin IVPB 320 milliGRAM(s) IV Intermittent every 24 hours  dexMEDEtomidine Infusion 0.05 MICROgram(s)/kG/Hr (0.5 mL/Hr) IV Continuous <Continuous>  dextrose 5%. 1000 milliLiter(s) (50 mL/Hr) IV Continuous <Continuous>  enoxaparin Injectable 40 milliGRAM(s) SubCutaneous every 24 hours  fentaNYL   Infusion. 0.5 MICROgram(s)/kG/Hr (2 mL/Hr) IV Continuous <Continuous>  hydrocortisone sodium succinate Injectable 100 milliGRAM(s) IV Push every 8 hours  multivitamin/minerals 1 Tablet(s) Oral daily  norepinephrine Infusion 0.05 MICROgram(s)/kG/Min (1.88 mL/Hr) IV Continuous <Continuous>  pantoprazole   Suspension 40 milliGRAM(s) Oral daily  piperacillin/tazobactam IVPB.. 3.375 Gram(s) IV Intermittent every 8 hours  polyethylene glycol 3350 17 Gram(s) Oral daily  senna 2 Tablet(s) Oral at bedtime  silver sulfADIAZINE 1% Cream 1 Application(s) Topical two times a day  thiamine 100 milliGRAM(s) Oral daily      CXR reviewed

## 2022-11-29 NOTE — CONSULT NOTE ADULT - PROBLEM SELECTOR RECOMMENDATION 3
Full code  ICU management  Called spouse and other HCPs numbers  Left messages w Palliative care team number asked for call back Full code  ICU management  Called spouse and other HCPs numbers  Left messages w Palliative care team number asked for call back  Family meeting this week

## 2022-11-29 NOTE — PROGRESS NOTE ADULT - SUBJECTIVE AND OBJECTIVE BOX
ANA LILIA VERMA  67y, Female  Allergy: No Known Allergies      LOS  2d    CHIEF COMPLAINT: sepsis (29 Nov 2022 07:46)      INTERVAL EVENTS/HPI  - No acute events overnight  - T(F): , Max: 98.6 (11-29-22 @ 10:00)  - remains on levophed   - WBC Count: 11.71 (11-29-22 @ 04:55)  WBC Count: 8.60 (11-28-22 @ 04:30)     - Creatinine, Serum: 0.5 (11-29-22 @ 04:55)  Creatinine, Serum: 0.7 (11-28-22 @ 04:30)       ROS  unable to obtain history secondary to patient's mental status and/or sedation    VITALS:  T(F): 98.6, Max: 98.6 (11-29-22 @ 10:00)  HR: 50  BP: 82/54  RR: 18Vital Signs Last 24 Hrs  T(C): 37 (29 Nov 2022 10:00), Max: 37 (29 Nov 2022 10:00)  T(F): 98.6 (29 Nov 2022 10:00), Max: 98.6 (29 Nov 2022 10:00)  HR: 50 (29 Nov 2022 10:00) (44 - 110)  BP: 82/54 (29 Nov 2022 10:00) (80/50 - 142/71)  BP(mean): 62 (29 Nov 2022 10:00) (54 - 103)  RR: 18 (29 Nov 2022 10:00) (14 - 33)  SpO2: 100% (29 Nov 2022 10:00) (99% - 100%)    Parameters below as of 29 Nov 2022 10:00  Patient On (Oxygen Delivery Method): ventilator    O2 Concentration (%): 40    PHYSICAL EXAM:  Gen: NAD, resting in bed  HEENT: Normocephalic, atraumatic  Neck: supple, no lymphadenopathy  CV: Regular rate & regular rhythm  Lungs: decreased BS at bases, no fremitus  Abdomen: Soft, BS present  Ext: Warm, well perfused  Neuro: non focal, awake  Skin: no rash, no erythema  Lines: no phlebitis    FH: Non-contributory  Social Hx: Non-contributory    TESTS & MEASUREMENTS:                        11.6   11.71 )-----------( 76       ( 29 Nov 2022 04:55 )             34.6     11-29    141  |  109  |  26<H>  ----------------------------<  219<H>  4.0   |  23  |  0.5<L>    Ca    8.0<L>      29 Nov 2022 04:55  Phos  2.8     11-29  Mg     1.8     11-29    TPro  4.3<L>  /  Alb  2.3<L>  /  TBili  0.2  /  DBili  x   /  AST  165<H>  /  ALT  150<H>  /  AlkPhos  324<H>  11-29      LIVER FUNCTIONS - ( 29 Nov 2022 04:55 )  Alb: 2.3 g/dL / Pro: 4.3 g/dL / ALK PHOS: 324 U/L / ALT: 150 U/L / AST: 165 U/L / GGT: x           Urinalysis Basic - ( 27 Nov 2022 19:15 )    Color: Yellow / Appearance: Clear / SG: >1.050 / pH: x  Gluc: x / Ketone: Trace  / Bili: Negative / Urobili: <2 mg/dL   Blood: x / Protein: 30 mg/dL / Nitrite: Negative   Leuk Esterase: Moderate / RBC: 2 /HPF / WBC 19 /HPF   Sq Epi: x / Non Sq Epi: 3 /HPF / Bacteria: Negative        Culture - Urine (collected 11-27-22 @ 19:15)  Source: Clean Catch Clean Catch (Midstream)  Final Report (11-28-22 @ 23:09):    <10,000 CFU/mL Normal Urogenital Alyssa    Culture - Blood (collected 11-27-22 @ 06:30)  Source: .Blood Blood-Peripheral  Preliminary Report (11-28-22 @ 13:01):    No growth to date.    Culture - Blood (collected 11-27-22 @ 06:25)  Source: .Blood Blood-Peripheral  Preliminary Report (11-28-22 @ 13:01):    No growth to date.    Culture - Blood (collected 11-10-22 @ 12:12)  Source: .Blood Blood-Peripheral  Final Report (11-15-22 @ 23:00):    No Growth Final    Culture - Blood (collected 11-10-22 @ 12:12)  Source: .Blood Blood-Peripheral  Final Report (11-15-22 @ 23:00):    No Growth Final        Blood Gas Venous - Lactate: 0.60 mmol/L (11-27-22 @ 02:31)      INFECTIOUS DISEASES TESTING  MRSA PCR Result.: Negative (11-29-22 @ 05:00)  Legionella Antigen, Urine: Negative (11-27-22 @ 19:15)  Procalcitonin, Serum: 0.61 (11-27-22 @ 19:12)  MRSA PCR Result.: Negative (11-11-22 @ 14:30)  COVID-19 PCR: NotDetec (11-10-22 @ 12:25)  MRSA PCR Result.: Negative (04-27-22 @ 09:51)  COVID-19 PCR: NotDetec (01-16-22 @ 12:40)      INFLAMMATORY MARKERS  C-Reactive Protein, Serum: 9.6 mg/L (11-10-22 @ 14:17)  Sedimentation Rate, Erythrocyte: 25 mm/Hr (11-10-22 @ 14:17)      RADIOLOGY & ADDITIONAL TESTS:  I have personally reviewed the last available Chest xray  CXR  Xray Chest 1 View- PORTABLE-Urgent:   ACC: [id number] EXAM:  XR CHEST PORTABLE URGENT 1V                          PROCEDURE DATE:  11/27/2022          INTERPRETATION:  Clinical History / Reason for exam: Central line   placement.    Comparison : Chest radiograph dated November 27, 2022 at 3:04 AM.    Technique/Positioning: Portable frontal.    Findings:    Support devices: Interval placement of right IJ central venous catheter,   in appropriate position. Endotracheal and nasogastric tubes are in stable   position. Overlying EKG leads.    Cardiac/mediastinum/hilum: Stable.    Lung parenchyma/Pleura: Unchanged patchy right upper lobe opacities. No   pneumothorax.    Skeleton/soft tissues: Stable.    Impression:    1. Unchanged patchy right upper lobe opacities.    2. Lines and tubes as above.        --- End of Report ---            SUSU LAM MD; Attending Radiologist  This document has been electronically signed. Nov 27 2022  1:37PM (11-27-22 @ 06:04)      CT  CT Chest w/ IV Cont:   ACC: [id number] EXAM:  CT ABDOMEN AND PELVIS IC                        ACC: [id number] EXAM:  CT CHEST IC                          PROCEDURE DATE:  11/27/2022          INTERPRETATION:  CLINICAL HISTORY / REASON FOR EXAM: Trauma to chest,   abdomen and pelvis    TECHNIQUE: Contiguous axial CT images were obtained from the thoracic   inlet to the pubic symphysis following administration of 95 mL Omnipaque   350 intravenous contrast, 5 mL discarded. Oral contrast was not   administered. Coronal, sagittal and 3D/MIP reformatted images are also   submitted.    COMPARISON CT: CT chest 11/11/2022. CT abdomen and pelvis 10/15/2015.   Note is also made of CT lumbar spine dated 9/6/2017 and MRI abdomen dated   3/25/2020    FINDINGS:    CHEST:    TUBES/LINES: Endotracheal tube with tip terminating 2 cm superior to the   hemanth. Enteric tube with tip visualized within the stomach. Spinal   stimulator. Distended urinary bladder despite the presence of Hughes   catheter.    LUNGS, PLEURA, AND AIRWAYS: No pleural effusion or pneumothorax.   Emphysematous changes. No short interval change to a tubular   consolidative opacity in the right upper lobe and additional right upper   lobe tree-in-bud nodules.    MEDIASTINUM/THORACIC NODES: No enlarged thoraciclymph nodes are   identified.    HEART/GREAT VESSELS: No pericardial effusion. Heart size is unremarkable.   No aneurysmal dilation of the thoracic aorta.      ABDOMEN/PELVIS:    HEPATOBILIARY: Small right hepatic lobe cyst and additional hepatic   hypodensity too small to characterize. Moderate periportal edema..    SPLEEN: Unremarkable.    PANCREAS: Unremarkable.    ADRENAL GLANDS: Unremarkable.    KIDNEYS: Symmetric enhancement bilaterally. No evidence of   hydronephrosis. Renal cysts and hypodensities too small to characterize.    ABDOMINOPELVIC NODES: Unremarkable.    PELVIC ORGANS: The urinary bladder is distended despite Hughes catheter   balloon appearing within the urinary bladder.    PERITONEUM/MESENTERY/BOWEL: No evidence for bowel obstruction. Moderate   colonic stool burden. Rectal suture/anastomosis. Diffuse edema throughout   the mesentery limits evaluation for subtle ascites. No pneumoperitoneum..    BONES/SOFT TISSUES: Chronic fractures of the right superior and inferior   pubic ramus and left iliac bone. Chronic L2 deformity. Diffuse edema of   the subcutaneous soft tissues.    VASCULAR: Calcified atherosclerotic disease of the abdominal aorta.      IMPRESSION:    No CT evidence for acute traumatic injury to the chest, abdomen or pelvis.    No short interval change to pulmonary findings which may be   infectious/inflammatory and for which follow-up chest CT is again   recommended.    The urinary bladder is moderately distended despite Hughes catheter   balloon appearing within the bladder lumen.    Moderate colonic stool burden.    Additional incidental findings as above.    --- End of Report ---          WAYNE LORENZANA MD; Resident Radiologist  This document has been electronically signed.  CRISTOPHER JEFFERSON MD; Attending Radiologist  This document has been electronically signed. Nov 27 2022  6:45AM (11-27-22 @ 04:24)      CARDIOLOGY TESTING  12 Lead ECG:   Ventricular Rate 71 BPM    Atrial Rate 71 BPM    QRS Duration 79 ms    Q-T Interval 375 ms    QTC Calculation(Bazett) 408 ms    R Axis 85 degrees    T Axis 110 degrees    Diagnosis Line *** Poor data quality, interpretation may be adversely affected  Undetermined rhythm  Possible Sinus rhythm  Baseline artifact  Abnormal ECG    Confirmed by Nicole Mar MD ([id number]) on 11/28/2022 9:36:24 AM (11-28-22 @ 01:32)      MEDICATIONS  chlorhexidine 0.12% Liquid 15 Oral Mucosa two times a day  chlorhexidine 0.12% Liquid 15 Oral Mucosa every 12 hours  chlorhexidine 2% Cloths 1 Topical <User Schedule>  chlorhexidine 2% Cloths 1 Topical daily  clindamycin IVPB 900 IV Intermittent every 8 hours  DAPTOmycin IVPB 320 IV Intermittent every 24 hours  dexMEDEtomidine Infusion 0.05 IV Continuous <Continuous>  dextrose 5%. 1000 IV Continuous <Continuous>  fentaNYL   Infusion. 0.5 IV Continuous <Continuous>  hydrocortisone sodium succinate Injectable 100 IV Push every 8 hours  multivitamin/minerals 1 Oral daily  norepinephrine Infusion 0.05 IV Continuous <Continuous>  pantoprazole   Suspension 40 Oral daily  piperacillin/tazobactam IVPB.. 3.375 IV Intermittent every 8 hours  polyethylene glycol 3350 17 Oral daily  senna 2 Oral at bedtime  silver sulfADIAZINE 1% Cream 1 Topical two times a day  thiamine 100 Oral daily      WEIGHT  Weight (kg): 44.5 (11-28-22 @ 01:15)  Creatinine, Serum: 0.5 mg/dL (11-29-22 @ 04:55)      ANTIBIOTICS:  clindamycin IVPB 900 milliGRAM(s) IV Intermittent every 8 hours  DAPTOmycin IVPB 320 milliGRAM(s) IV Intermittent every 24 hours  piperacillin/tazobactam IVPB.. 3.375 Gram(s) IV Intermittent every 8 hours      All available historical records have been reviewed

## 2022-11-29 NOTE — PROGRESS NOTE ADULT - SUBJECTIVE AND OBJECTIVE BOX
ANA LILIA VERMA 67y Female  MRN#: [id number]   Hospital Day: 2d    HPI:  67F w/ h/o anorexia, anxiety, depression, kidney cysts, peripheral neuropathy, RA, OA, osteoporosis, low back pain s/p unwitnessed fall, found down by  +HT, ?LOC, -AC. Contacted patient's  who was able to provide some history and states he returned home from work around 11pm when he found the patient down on the tiled kitchen floor. States the patient was awake and alert when he found her on the floor, but she didn't recall how she fell down.  speculates that she might've been sitting in chair and fallen out of it as this has happened before in the past.  states that the patient said she hit her head, and he noticed blood on the patient's knees, but no other injuries. Patient was complaining of left-sided neck pain after the fall, but  states she has been having this pain for about 1 month. About 1 hour after the fall, patient became lethargic and was unable to stay up on her feet, so  called for an ambulance.     Of note, pt has long-standing h/o anorexia for several years per . Does not induce vomiting, but frequently uses laxatives for weight loss. Has never consistently followed w/ psychologist/psychiatrist. After recent discharge from hospital two weeks ago, anorexia acutely worsened and pt has had minimal caloric intake.    On arrival to ED, patient unresponsive, hypotensive, bradycardic, and hypothermic. Patient also hypoglycemic (FS 24) and given dextrose immediately. Subsequently given Emiliana hugger, given 2L of warm IV fluids, started on levophed, and given broad-spectrum abx, and started on levothyroxine for possible myxedema coma (although no documentation in eMAR, so unclear if actually given). VBG noted respiratory acidosis. Pt unable to tolerate BiPAP due to current mental status, so patient intubated for airway protection. CT spine demonstrated fx of anterior and posterior tubercle of left C6 transverse foramen. NSGY consulted, but no surgical intervention offered. Subsequently admitted to MICU for presumed septic shock. (27 Nov 2022 16:09)      SUBJECTIVE      OBJECTIVE  PAST MEDICAL & SURGICAL HISTORY  Anxiety    Depression    Osteoporosis    Kidney cysts    Peripheral neuropathy    RA (rheumatoid arthritis)    OA (osteoarthritis)    Low back pain with radiation  s/p &quot;nerve cutting&quot; 2015      ALLERGIES:  No Known Allergies    MEDICATIONS:  STANDING MEDICATIONS  chlorhexidine 0.12% Liquid 15 milliLiter(s) Oral Mucosa two times a day  chlorhexidine 0.12% Liquid 15 milliLiter(s) Oral Mucosa every 12 hours  chlorhexidine 2% Cloths 1 Application(s) Topical <User Schedule>  chlorhexidine 2% Cloths 1 Application(s) Topical daily  clindamycin IVPB 900 milliGRAM(s) IV Intermittent every 8 hours  DAPTOmycin IVPB 320 milliGRAM(s) IV Intermittent every 24 hours  dexMEDEtomidine Infusion 0.05 MICROgram(s)/kG/Hr IV Continuous <Continuous>  dextrose 5%. 1000 milliLiter(s) IV Continuous <Continuous>  fentaNYL   Infusion. 0.5 MICROgram(s)/kG/Hr IV Continuous <Continuous>  hydrocortisone sodium succinate Injectable 100 milliGRAM(s) IV Push every 8 hours  multivitamin/minerals 1 Tablet(s) Oral daily  norepinephrine Infusion 0.05 MICROgram(s)/kG/Min IV Continuous <Continuous>  pantoprazole   Suspension 40 milliGRAM(s) Oral daily  piperacillin/tazobactam IVPB.. 3.375 Gram(s) IV Intermittent every 8 hours  polyethylene glycol 3350 17 Gram(s) Oral daily  senna 2 Tablet(s) Oral at bedtime  silver sulfADIAZINE 1% Cream 1 Application(s) Topical two times a day  thiamine 100 milliGRAM(s) Oral daily    PRN MEDICATIONS      VITAL SIGNS: Last 24 Hours  T(C): 36.3 (29 Nov 2022 08:00), Max: 36.6 (28 Nov 2022 12:00)  T(F): 97.3 (29 Nov 2022 08:00), Max: 97.9 (28 Nov 2022 12:00)  HR: 52 (29 Nov 2022 07:46) (44 - 110)  BP: 89/57 (29 Nov 2022 07:45) (80/50 - 142/71)  BP(mean): 66 (29 Nov 2022 07:45) (54 - 103)  RR: 17 (29 Nov 2022 08:00) (14 - 33)  SpO2: 100% (29 Nov 2022 08:00) (99% - 100%)    LABS:                        11.6   11.71 )-----------( 76       ( 29 Nov 2022 04:55 )             34.6     11-29    141  |  109  |  26<H>  ----------------------------<  219<H>  4.0   |  23  |  0.5<L>    Ca    8.0<L>      29 Nov 2022 04:55  Phos  2.8     11-29  Mg     1.8     11-29    TPro  4.3<L>  /  Alb  2.3<L>  /  TBili  0.2  /  DBili  x   /  AST  165<H>  /  ALT  150<H>  /  AlkPhos  324<H>  11-29    PT/INR - ( 28 Nov 2022 04:30 )   PT: 17.30 sec;   INR: 1.50 ratio         PTT - ( 28 Nov 2022 04:30 )  PTT:44.0 sec  Urinalysis Basic - ( 27 Nov 2022 19:15 )    Color: Yellow / Appearance: Clear / SG: >1.050 / pH: x  Gluc: x / Ketone: Trace  / Bili: Negative / Urobili: <2 mg/dL   Blood: x / Protein: 30 mg/dL / Nitrite: Negative   Leuk Esterase: Moderate / RBC: 2 /HPF / WBC 19 /HPF   Sq Epi: x / Non Sq Epi: 3 /HPF / Bacteria: Negative      ABG - ( 29 Nov 2022 03:14 )  pH, Arterial: 7.37  pH, Blood: x     /  pCO2: 40    /  pO2: 222   / HCO3: 23    / Base Excess: -2.0  /  SaO2: 100.0                 Culture - Urine (collected 27 Nov 2022 19:15)  Source: Clean Catch Clean Catch (Midstream)  Final Report (28 Nov 2022 23:09):    <10,000 CFU/mL Normal Urogenital Alyssa    Culture - Blood (collected 27 Nov 2022 06:30)  Source: .Blood Blood-Peripheral  Preliminary Report (28 Nov 2022 13:01):    No growth to date.    Culture - Blood (collected 27 Nov 2022 06:25)  Source: .Blood Blood-Peripheral  Preliminary Report (28 Nov 2022 13:01):    No growth to date.      CARDIAC MARKERS ( 28 Nov 2022 04:30 )  x     / 0.04 ng/mL / 190 U/L / x     / x      CARDIAC MARKERS ( 27 Nov 2022 19:15 )  x     / 0.05 ng/mL / x     / x     / x      CARDIAC MARKERS ( 27 Nov 2022 19:12 )  x     / x     / 127 U/L / x     / x            PHYSICAL EXAM:  General: intubated, not responding to commands or pain   HEENT: Normocephalic, no scalp lacerations, right forehead hematoma   Neck: Soft, midline trachea. no c-spine tenderness  Chest: No chest wall tenderness, no subcutaneous emphysema   Cardiac: S1, S2, RRR  Respiratory: vented   Abdomen: Soft, non-distended, non-tender, no rebound, no guarding.  Groin: Normal appearing, pelvis stable   Ext:  Moving b/l upper and lower extremities. Palpable pulses in UE b/l and RLE; dopplerable DP in LLE; b/l LE ulcers present with edema and blanchable erythema, kerlix dressing placed on LE b/l. R knee wound/abrasion present      ASSESSMENT AND PLAN:    Hospital Course:  ASSESSMENT    #Septic Shock  #Toxic-Metabolic Encephalopathy  #Necrotizing Fasciitis of bilateral LE (suspected)  #Osteomyelitis, suspected  #Left C6 Transverse Foramen Fracture  #Anorexia, h/o    PLAN    #Toxic-Metabolic Encephalopathy  - c/w fentanyl  - avoid oversedation  - f/u UDS      #Osteomylitis  #Septic Shock  - c/w Levophed (wean as tolerated)  - f/u BCx and UCx  - f/u CT B/L LE (r/o nec fasc vs OM)  - obtain US RUQ (r/o cholecystitis)  - obtain MRSA PCR  - start vanc, meropenem, and clindamycin for empiric nec fasc coverage  - TTE: EF 50%      #Necrotizing Fascitis   - obtain thyroid panel (r/o myxedema coma; s/p levothyroxine on presentation)  - check AM cortisol  - obtain prealbumin, vitamin D, and phos      #Anorexia  - Started trickle feeds  - Per nutrition: start feeds with Replete at 25 ml/h - after tolerated for ~ 4 hours increase to goal 50 ml/h --> 77 gm protein & 1200 kcal/d  - f/u phos and correct to > 3.5, jin before attempting extubation  - (11/29) Palliative care consulted          #Left C6 transverse foramen fracture  - C/w C-collar  - f/u Trauma for TTS  - Neuro recc MRI C-spine when stable      Code Status: Full Code  PPI: Pantoprazole 40mg PO QD   DVT ppx: Lovenox 40mg SQ QD       DISPOSITION: MICU       ANA LILIA VERMA 67y Female  MRN#: [id number]   Hospital Day: 2d    HPI:  67F w/ h/o anorexia, anxiety, depression, kidney cysts, peripheral neuropathy, RA, OA, osteoporosis, low back pain s/p unwitnessed fall, found down by  +HT, ?LOC, -AC. Contacted patient's  who was able to provide some history and states he returned home from work around 11pm when he found the patient down on the tiled kitchen floor. States the patient was awake and alert when he found her on the floor, but she didn't recall how she fell down.  speculates that she might've been sitting in chair and fallen out of it as this has happened before in the past.  states that the patient said she hit her head, and he noticed blood on the patient's knees, but no other injuries. Patient was complaining of left-sided neck pain after the fall, but  states she has been having this pain for about 1 month. About 1 hour after the fall, patient became lethargic and was unable to stay up on her feet, so  called for an ambulance.     Of note, pt has long-standing h/o anorexia for several years per . Does not induce vomiting, but frequently uses laxatives for weight loss. Has never consistently followed w/ psychologist/psychiatrist. After recent discharge from hospital two weeks ago, anorexia acutely worsened and pt has had minimal caloric intake.    On arrival to ED, patient unresponsive, hypotensive, bradycardic, and hypothermic. Patient also hypoglycemic (FS 24) and given dextrose immediately. Subsequently given Emiliana hugger, given 2L of warm IV fluids, started on levophed, and given broad-spectrum abx, and started on levothyroxine for possible myxedema coma (although no documentation in eMAR, so unclear if actually given). VBG noted respiratory acidosis. Pt unable to tolerate BiPAP due to current mental status, so patient intubated for airway protection. CT spine demonstrated fx of anterior and posterior tubercle of left C6 transverse foramen. NSGY consulted, but no surgical intervention offered. Subsequently admitted to MICU for presumed septic shock. (27 Nov 2022 16:09)      SUBJECTIVE      OBJECTIVE  PAST MEDICAL & SURGICAL HISTORY  Anxiety    Depression    Osteoporosis    Kidney cysts    Peripheral neuropathy    RA (rheumatoid arthritis)    OA (osteoarthritis)    Low back pain with radiation  s/p &quot;nerve cutting&quot; 2015      ALLERGIES:  No Known Allergies    MEDICATIONS:  STANDING MEDICATIONS  chlorhexidine 0.12% Liquid 15 milliLiter(s) Oral Mucosa two times a day  chlorhexidine 0.12% Liquid 15 milliLiter(s) Oral Mucosa every 12 hours  chlorhexidine 2% Cloths 1 Application(s) Topical <User Schedule>  chlorhexidine 2% Cloths 1 Application(s) Topical daily  clindamycin IVPB 900 milliGRAM(s) IV Intermittent every 8 hours  DAPTOmycin IVPB 320 milliGRAM(s) IV Intermittent every 24 hours  dexMEDEtomidine Infusion 0.05 MICROgram(s)/kG/Hr IV Continuous <Continuous>  dextrose 5%. 1000 milliLiter(s) IV Continuous <Continuous>  fentaNYL   Infusion. 0.5 MICROgram(s)/kG/Hr IV Continuous <Continuous>  hydrocortisone sodium succinate Injectable 100 milliGRAM(s) IV Push every 8 hours  multivitamin/minerals 1 Tablet(s) Oral daily  norepinephrine Infusion 0.05 MICROgram(s)/kG/Min IV Continuous <Continuous>  pantoprazole   Suspension 40 milliGRAM(s) Oral daily  piperacillin/tazobactam IVPB.. 3.375 Gram(s) IV Intermittent every 8 hours  polyethylene glycol 3350 17 Gram(s) Oral daily  senna 2 Tablet(s) Oral at bedtime  silver sulfADIAZINE 1% Cream 1 Application(s) Topical two times a day  thiamine 100 milliGRAM(s) Oral daily    PRN MEDICATIONS      VITAL SIGNS: Last 24 Hours  T(C): 36.3 (29 Nov 2022 08:00), Max: 36.6 (28 Nov 2022 12:00)  T(F): 97.3 (29 Nov 2022 08:00), Max: 97.9 (28 Nov 2022 12:00)  HR: 52 (29 Nov 2022 07:46) (44 - 110)  BP: 89/57 (29 Nov 2022 07:45) (80/50 - 142/71)  BP(mean): 66 (29 Nov 2022 07:45) (54 - 103)  RR: 17 (29 Nov 2022 08:00) (14 - 33)  SpO2: 100% (29 Nov 2022 08:00) (99% - 100%)    LABS:                        11.6   11.71 )-----------( 76       ( 29 Nov 2022 04:55 )             34.6     11-29    141  |  109  |  26<H>  ----------------------------<  219<H>  4.0   |  23  |  0.5<L>    Ca    8.0<L>      29 Nov 2022 04:55  Phos  2.8     11-29  Mg     1.8     11-29    TPro  4.3<L>  /  Alb  2.3<L>  /  TBili  0.2  /  DBili  x   /  AST  165<H>  /  ALT  150<H>  /  AlkPhos  324<H>  11-29    PT/INR - ( 28 Nov 2022 04:30 )   PT: 17.30 sec;   INR: 1.50 ratio         PTT - ( 28 Nov 2022 04:30 )  PTT:44.0 sec  Urinalysis Basic - ( 27 Nov 2022 19:15 )    Color: Yellow / Appearance: Clear / SG: >1.050 / pH: x  Gluc: x / Ketone: Trace  / Bili: Negative / Urobili: <2 mg/dL   Blood: x / Protein: 30 mg/dL / Nitrite: Negative   Leuk Esterase: Moderate / RBC: 2 /HPF / WBC 19 /HPF   Sq Epi: x / Non Sq Epi: 3 /HPF / Bacteria: Negative      ABG - ( 29 Nov 2022 03:14 )  pH, Arterial: 7.37  pH, Blood: x     /  pCO2: 40    /  pO2: 222   / HCO3: 23    / Base Excess: -2.0  /  SaO2: 100.0                 Culture - Urine (collected 27 Nov 2022 19:15)  Source: Clean Catch Clean Catch (Midstream)  Final Report (28 Nov 2022 23:09):    <10,000 CFU/mL Normal Urogenital Alyssa    Culture - Blood (collected 27 Nov 2022 06:30)  Source: .Blood Blood-Peripheral  Preliminary Report (28 Nov 2022 13:01):    No growth to date.    Culture - Blood (collected 27 Nov 2022 06:25)  Source: .Blood Blood-Peripheral  Preliminary Report (28 Nov 2022 13:01):    No growth to date.      CARDIAC MARKERS ( 28 Nov 2022 04:30 )  x     / 0.04 ng/mL / 190 U/L / x     / x      CARDIAC MARKERS ( 27 Nov 2022 19:15 )  x     / 0.05 ng/mL / x     / x     / x      CARDIAC MARKERS ( 27 Nov 2022 19:12 )  x     / x     / 127 U/L / x     / x            PHYSICAL EXAM:  General: intubated, not responding to commands or pain   HEENT: Normocephalic, no scalp lacerations, right forehead hematoma   Neck: Soft, midline trachea. no c-spine tenderness  Chest: No chest wall tenderness, no subcutaneous emphysema   Cardiac: S1, S2, RRR  Respiratory: vented   Abdomen: Soft, non-distended, non-tender, no rebound, no guarding.  Groin: Normal appearing, pelvis stable   Ext:  Moving b/l upper and lower extremities. Palpable pulses in UE b/l and RLE; dopplerable DP in LLE; b/l LE ulcers present with edema and blanchable erythema, kerlix dressing placed on LE b/l. R knee wound/abrasion present      ASSESSMENT AND PLAN:    Hospital Course:  ASSESSMENT    #Septic Shock  #Toxic-Metabolic Encephalopathy  #Necrotizing Fasciitis of bilateral LE (suspected)  #Osteomyelitis, suspected  #Left C6 Transverse Foramen Fracture  #Anorexia, h/o    PLAN    #Toxic-Metabolic Encephalopathy  - Wean off sedation  - avoid oversedation  - f/u UDS  - Attempt SBT (11/29)      #Osteomylitis  #Septic Shock  - c/w Levophed (wean as tolerated)  - f/u BCx and UCx  - f/u CT B/L LE (r/o nec fasc vs OM)  - obtain US RUQ (r/o cholecystitis)  - obtain MRSA PCR  - start vanc, meropenem, and clindamycin for empiric nec fasc coverage  - TTE: EF 50%  - F/u HIT panel      #Hypothyroidism  - obtain thyroid panel (r/o myxedema coma; s/p levothyroxine on presentation)  - Start levothyroxine (11/29)       #Anorexia  - Started trickle feeds  - Per nutrition: start feeds with Replete at 25 ml/h - after tolerated for ~ 4 hours increase to goal 50 ml/h --> 77 gm protein & 1200 kcal/d  - f/u phos and correct to > 3.5, jin before attempting extubation  - (11/29) Palliative care consulted          #Left C6 transverse foramen fracture  - C/w C-collar  - f/u Trauma for TTS  - Neuro recc: MRI C-spine when stable      Code Status: Full Code  PPI: Pantoprazole 40mg PO QD   DVT ppx: Lovenox 40mg SQ QD       DISPOSITION: MICU

## 2022-11-29 NOTE — CONSULT NOTE ADULT - PROBLEM SELECTOR RECOMMENDATION 9
On precedex drip  got IV versed x 1 in last 24hrs  * history of Xanax 0.5mg PO Q 12 hrs daily   and Zoloft 50mg daily

## 2022-11-29 NOTE — CONSULT NOTE ADULT - PROBLEM SELECTOR RECOMMENDATION 5
Pt had chronic pain management  On Vicodin 10/325mg PO Q 6 hrs daily for over a year  Gabapentin 600mg Q 8 hrs  Currently on Fent gtt - will continue to assess needs for pain control

## 2022-11-29 NOTE — PROGRESS NOTE ADULT - ASSESSMENT
ASSESSMENT      #Septic Shock  # Acute hypoxemic resp failure sp intubation  #Toxic-Metabolic Encephalopathy  #Necrotizing Fasciitis of bilateral LE / burn noted  #Left C6 Transverse Foramen Fracture  #Anorexia, h/o  # thromocytopenia    PLAN    CNS  - c/w fentanyl  - avoid oversedation  - SAT    HEENT  - Oral care    PULMONARY:   - HOB at 45  - Aspiration precautions  - sbt  - non changes in vent setting    CARDIOVASCULAR  - c/w Levophed (wean as tolerated)  - avoid volume overload    GI  - Diet: NPO  - GI ppx: pantoprazole 40mg PO QD   - bowel regimen    RENAL  - strict I&Os, daily weight, and monitor volume status  - Follow up renal function and lytes, correct as needed (K>4; Mg>2)    INFECTIOUS DISEASE  -abx per ID    HEMATOLOGICAL  - DVT ppx: Lovenox 40mg SQ QD   - HIT abx    ENDOCRINE  - hydrocortisone    MSK  - c/w C-Collar as per NSGY  - f/u Trauma for TTS    DISPOSITION: MICU  palliative care     KHURRAM IRAHETA 11/27  GILBERTO 11/ 27

## 2022-11-29 NOTE — CONSULT NOTE ADULT - SUBJECTIVE AND OBJECTIVE BOX
HPI:  67F w/ h/o anorexia, anxiety, depression, kidney cysts, peripheral neuropathy, RA, OA, osteoporosis, low back pain s/p unwitnessed fall, found down by  +HT, ?LOC, -AC. Contacted patient's  who was able to provide some history and states he returned home from work around 11pm when he found the patient down on the tiled kitchen floor. States the patient was awake and alert when he found her on the floor, but she didn't recall how she fell down.  speculates that she might've been sitting in chair and fallen out of it as this has happened before in the past.  states that the patient said she hit her head, and he noticed blood on the patient's knees, but no other injuries. Patient was complaining of left-sided neck pain after the fall, but  states she has been having this pain for about 1 month. About 1 hour after the fall, patient became lethargic and was unable to stay up on her feet, so  called for an ambulance.     Of note, pt has long-standing h/o anorexia for several years per . Does not induce vomiting, but frequently uses laxatives for weight loss. Has never consistently followed w/ psychologist/psychiatrist. After recent discharge from hospital two weeks ago, anorexia acutely worsened and pt has had minimal caloric intake.    On arrival to ED, patient unresponsive, hypotensive, bradycardic, and hypothermic. Patient also hypoglycemic (FS 24) and given dextrose immediately. Subsequently given Emiliana hugger, given 2L of warm IV fluids, started on levophed, and given broad-spectrum abx, and started on levothyroxine for possible myxedema coma (although no documentation in eMAR, so unclear if actually given). VBG noted respiratory acidosis. Pt unable to tolerate BiPAP due to current mental status, so patient intubated for airway protection. CT spine demonstrated fx of anterior and posterior tubercle of left C6 transverse foramen. NSGY consulted, but no surgical intervention offered. Subsequently admitted to MICU for presumed septic shock. (27 Nov 2022 16:09)    PERTINENT PM/SXH:   Anxiety    Depression    Osteoporosis    Kidney cysts    Peripheral neuropathy    RA (rheumatoid arthritis)    OA (osteoarthritis)      Low back pain with radiation    OA (osteoarthritis)    RA (rheumatoid arthritis)      FAMILY HISTORY:  Family history of stroke (Mother)      ITEMS NOT CHECKED ARE NOT PRESENT    SOCIAL HISTORY:   Significant other/partner[ ]  Children[ ]  Episcopalian/Spirituality:  Substance hx:  [ ]   Tobacco hx:  [ ]   Alcohol hx: [ ]   Living Situation: [ ]Home  [ ]Long term care  [ ]Rehab [ ]Other  Home Services: [ ] HHA [ ] Braydon RN [ ] Hospice  Occupation:  Home Opioid hx:  [ ] Y [ ] N [ ] I-Stop Reference No:  This report was requested by: Rizwana Nuñez | Reference #: [id number]    You have not added a SHIRLEY number. Keeping your SHIRLEY number(s) up to date on the My SHIRLEY # page will enable the separation of your prescriptions from others in the search results.    Others' Prescriptions  Patient Name: Melissa RobertsBirth Date: 1955  Address: [address] [place], [place] [address]Sex: Female  Rx Written	Rx Dispensed	Drug	Quantity	Days Supply	Prescriber Name	Prescriber Shirley #	Payment Method	Dispenser  10/27/2022	11/04/2022	hydrocodone-acetaminophen  mg tablet	120	30	Mcgill, Issac	[id number]	Insurance	[id number]  10/27/2022	10/28/2022	alprazolam 0.25 mg tablet	60	30	Mcgill, Issac	[id number]	Insurance	[id number]  09/30/2022	10/05/2022	hydrocodone-acetaminophen  mg tablet	120	30	Mcgill, Issac	[id number]	Insurance	[id number]  09/02/2022	09/04/2022	alprazolam 0.25 mg tablet	60	30	Mcgill, Issac	[id number]	Insurance	[id number]  09/02/2022	09/04/2022	hydrocodone-acetaminophen  mg tablet	120	30	Mcgill, Issac	[id number]	Insurance	[id number]  08/05/2022	08/06/2022	hydrocodone-acetaminophen  mg tablet	120	30	Mcgill, Issac	[id number]	Insurance	[id number]  07/01/2022	07/07/2022	alprazolam 0.25 mg tablet	60	30	Mcgill, Issac	[id number]	Insurance	[id number]  07/01/2022	07/07/2022	hydrocodone-acetaminophen  mg tablet	120	30	Mcgill, Issac	[id number]	Insurance	[id number]  06/07/2022	06/07/2022	hydrocodone-acetaminophen  mg tablet	120	30	Mcgill, Issac	[id number]	Insurance	[id number]  05/05/2022	05/07/2022	hydrocodone-acetaminophen  mg tablet	120	30	Mcgill, Issca	[id number]	Insurance	[id number]  04/06/2022	04/07/2022	hydrocodone-acetaminophen  mg tablet	120	30	Mcgill, Issac	[id number]	Insurance	[id number]  03/04/2022	03/08/2022	hydrocodone-acetaminophen  mg tablet	120	30	Cmgill, Issac	[id number]	Insurance	[id number]  02/22/2022	02/24/2022	alprazolam 0.25 mg tablet	30	30	Isabel Pope PA	[id number]	Insurance	[id number]  01/27/2022	02/06/2022	hydrocodone-acetaminophen  mg tablet	120	30	Mcgill, Issac	[id number]	Insurance	[id number]  01/06/2022	01/07/2022	hydrocodone-acetaminophen  mg tablet	120	30	[facility], Issac	[id number]	Insurance	[id number]  12/07/2021	12/08/2021	hydrocodone-acetaminophen  mg tablet	120	30	[facility], Issac	[id number]	Insurance	[id number]  * - Drugs marked with an asterisk are compound drugs. If the compound drug is made up of more than one controlled substance, then each controlled substance will be a separate row   ADVANCE DIRECTIVES:    [ x] Full Code [ ] DNR  MOLST  [ ]  Living Will  [ ]   DECISION MAKER(s): HCP form in EMR   [x ] Health Care Proxy(s)  [ ] Surrogate(s)  [ ] Guardian           Name(s): Phone Number(s):  Answers: Emergency Contact Name Tellyangelica Mardemetri,  Answers: Emergency Contact Phone # [phone],  Answers: Emergency Contact Relationship Spouse  Alternate HCP :   Mike Roberts [phone]    Batsheva Roberts [phone]  BASELINE (I)ADL(s) (prior to admission):  [place]: [ ]Total  [ x] Moderate [ ]Dependent  Allergies    No Known Allergies    Intolerances    MEDICATIONS  (STANDING):  chlorhexidine 0.12% Liquid 15 milliLiter(s) Oral Mucosa two times a day  chlorhexidine 0.12% Liquid 15 milliLiter(s) Oral Mucosa every 12 hours  chlorhexidine 2% Cloths 1 Application(s) Topical <User Schedule>  chlorhexidine 2% Cloths 1 Application(s) Topical daily  clindamycin IVPB 900 milliGRAM(s) IV Intermittent every 8 hours  DAPTOmycin IVPB 320 milliGRAM(s) IV Intermittent every 24 hours  dexMEDEtomidine Infusion 0.05 MICROgram(s)/kG/Hr (0.5 mL/Hr) IV Continuous <Continuous>  dextrose 5%. 1000 milliLiter(s) (50 mL/Hr) IV Continuous <Continuous>  fentaNYL   Infusion. 0.5 MICROgram(s)/kG/Hr (2 mL/Hr) IV Continuous <Continuous>  hydrocortisone sodium succinate Injectable 100 milliGRAM(s) IV Push every 8 hours  multivitamin/minerals 1 Tablet(s) Oral daily  norepinephrine Infusion 0.05 MICROgram(s)/kG/Min (1.88 mL/Hr) IV Continuous <Continuous>  pantoprazole   Suspension 40 milliGRAM(s) Oral daily  piperacillin/tazobactam IVPB.. 3.375 Gram(s) IV Intermittent every 8 hours  polyethylene glycol 3350 17 Gram(s) Oral daily  senna 2 Tablet(s) Oral at bedtime  silver sulfADIAZINE 1% Cream 1 Application(s) Topical two times a day  thiamine 100 milliGRAM(s) Oral daily    MEDICATIONS  (PRN):    PRESENT SYMPTOMS: [x ]Unable to obtain due to poor mentation   Source if other than patient:  [ ]Family   [ ]Team     Pain: [ ]yes [ ]no  QOL impact -   Location -                    Aggravating factors -  Quality -  Radiation -  Timing-  Severity (0-10 scale):  Minimal acceptable level (0-10 scale):     CPOT:    https://www.[facility].org/getattachment/[email or web address]/Critical-Care-Pain-Observation-Tool-(CPOT)      PAIN AD Score:     http://geriatrictoolkit.[email or web address]/cog/painad.pdf (press ctrl +  left click to view)      Dyspnea:                           [ ]Mild [ ]Moderate [ ]Severe [ ]None  Anxiety:                             [ ]Mild [ ]Moderate [ ]Severe [ ]None  Fatigue:                             [ ]Mild [ ]Moderate [ ]Severe [ ]None  Nausea:                             [ ]Mild [ ]Moderate [ ]Severe [ ]None  Loss of appetite:              [ ]Mild [ ]Moderate [ ]Severe [ ]None  Constipation:                    [ ]Mild [ ]Moderate [ ]Severe [ ]None    Other Symptoms:  [ ]All other review of systems negative     Palliative Performance Status Version 2:         %    http://[email or web address]/files/news/palliative_performance_scale_[email or web address].pdf  PHYSICAL EXAM:  Vital Signs Last 24 Hrs  T(C): 37.2 (29 Nov 2022 12:00), Max: 37.2 (29 Nov 2022 12:00)  T(F): 99 (29 Nov 2022 12:00), Max: 99 (29 Nov 2022 12:00)  HR: 60 (29 Nov 2022 12:00) (44 - 110)  BP: 84/53 (29 Nov 2022 12:00) (75/54 - 142/71)  BP(mean): 62 (29 Nov 2022 12:00) (54 - 103)  RR: 18 (29 Nov 2022 12:00) (10 - 33)  SpO2: 100% (29 Nov 2022 12:00) (99% - 100%)    Parameters below as of 29 Nov 2022 12:00  Patient On (Oxygen Delivery Method): ventilator    O2 Concentration (%): 40 I&O's Summary    28 Nov 2022 07:01  -  29 Nov 2022 07:00  --------------------------------------------------------  IN: 3111.9 mL / OUT: 965 mL / NET: 2146.9 mL    29 Nov 2022 07:01  -  29 Nov 2022 12:21  --------------------------------------------------------  IN: 332.5 mL / OUT: 225 mL / NET: 107.5 mL        GENERAL:  [ ] No acute distress [x ]Lethargic  [ ]Unarousable  [ ]Verbal  [ ]Non-Verbal [ x]Cachexia    BEHAVIORAL/PSYCH:  [ ]Alert and Oriented x  [ ] Anxiety [ ] Delirium [ ] Agitation [ ] Calm   EYES: [x ] No scleral icterus [ ] Scleral icterus [ ] Closed  ENMT:  [ ]Dry mouth  [ ]No external oral lesions [ ] No external ear or nose lesions  CARDIOVASCULAR:  [ ]Regular [ ]Irregular [x ]Tachy [ ]Not Tachy  [ ]Parker [ ] Edema [ ] No edema  PULMONARY:  [ ]Tachypnea  [ ]Audible excessive secretions [ ] No labored breathing [ ] labored breathing  GASTROINTESTINAL: [x ]Soft  [ ]Distended  [ ]Not distended [ ]Non tender [ ]Tender  MUSCULOSKELETAL: [x ]No clubbing [ ] clubbing  [ ] No cyanosis [ ] cyanosis  NEUROLOGIC: [ ]No focal deficits  [ x]Follows commands  [ ]Does not follow commands  [ ]Cognitive impairment  [ ]Dysphagia  [ ]Dysarthria  [ ]Paresis   SKIN: [ ] Jaundiced [ x] Non-jaundiced [ ]Rash [ ]No Rash [ ] Warm [ ] Dry (+) several wounds, see nursing / burn notes please  MISC/LINES: [ x] ET tube [ ] Trach [ x]NGT/OGT [ ]PEG [ x]Hughes    CRITICAL CARE:  [x ] Shock Present  [x ]Septic [ ]Cardiogenic [ ]Neurologic [ ]Hypovolemic  [x ]  Vasopressors [ ]  Inotropes   [x ]Respiratory failure present [x ]Mechanical ventilation [ ]Non-invasive ventilatory support [ ]High flow  [ ]Acute  [ ]Chronic [ ]Hypoxic  [ ]Hypercarbic [ ]Other  [ ]Other organ failure     LABS: reviewed by me                        11.6   11.71 )-----------( 76       ( 29 Nov 2022 04:55 )             34.6   11-29    141  |  109  |  26<H>  ----------------------------<  219<H>  4.0   |  23  |  0.5<L>    Ca    8.0<L>      29 Nov 2022 04:55  Phos  2.8     11-29  Mg     1.8     11-29    TPro  4.3<L>  /  Alb  2.3<L>  /  TBili  0.2  /  DBili  x   /  AST  165<H>  /  ALT  150<H>  /  AlkPhos  324<H>  11-29  PT/INR - ( 28 Nov 2022 04:30 )   PT: 17.30 sec;   INR: 1.50 ratio         PTT - ( 28 Nov 2022 04:30 )  PTT:44.0 sec    Urinalysis Basic - ( 27 Nov 2022 19:15 )    Color: Yellow / Appearance: Clear / SG: >1.050 / pH: x  Gluc: x / Ketone: Trace  / Bili: Negative / Urobili: <2 mg/dL   Blood: x / Protein: 30 mg/dL / Nitrite: Negative   Leuk Esterase: Moderate / RBC: 2 /HPF / WBC 19 /HPF   Sq Epi: x / Non Sq Epi: 3 /HPF / Bacteria: Negative      RADIOLOGY & ADDITIONAL STUDIES: reviewed by me    EKG: reviewed by me        REFERRALS:   [ ]Chaplaincy  [ ]Hospice  [ ]Child Life  [ x]Social Work  [x ]Case management [ ]Holistic Therapy     Goals of Care Document:    HPI:    67F w/ h/o anorexia, anxiety, depression, kidney cysts, peripheral neuropathy, RA, OA, osteoporosis, low back pain s/p unwitnessed fall, found down by  +HT, ?LOC, -AC. Contacted patient's  who was able to provide some history and states he returned home from work around 11pm when he found the patient down on the tiled kitchen floor. States the patient was awake and alert when he found her on the floor, but she didn't recall how she fell down.  speculates that she might've been sitting in chair and fallen out of it as this has happened before in the past.  states that the patient said she hit her head, and he noticed blood on the patient's knees, but no other injuries. Patient was complaining of left-sided neck pain after the fall, but  states she has been having this pain for about 1 month. About 1 hour after the fall, patient became lethargic and was unable to stay up on her feet, so  called for an ambulance.     Of note, pt has long-standing h/o anorexia for several years per . Does not induce vomiting, but frequently uses laxatives for weight loss. Has never consistently followed w/ psychologist/psychiatrist. After recent discharge from hospital two weeks ago, anorexia acutely worsened and pt has had minimal caloric intake.    On arrival to ED, patient unresponsive, hypotensive, bradycardic, and hypothermic. Patient also hypoglycemic (FS 24) and given dextrose immediately. Subsequently given Emiliana hugger, given 2L of warm IV fluids, started on levophed, and given broad-spectrum abx, and started on levothyroxine for possible myxedema coma (although no documentation in eMAR, so unclear if actually given). VBG noted respiratory acidosis. Pt unable to tolerate BiPAP due to current mental status, so patient intubated for airway protection. CT spine demonstrated fx of anterior and posterior tubercle of left C6 transverse foramen. NSGY consulted, but no surgical intervention offered. Subsequently admitted to MICU for presumed septic shock. (27 Nov 2022 16:09)          PERTINENT PM/SXH:   Anxiety    Depression    Osteoporosis    Kidney cysts    Peripheral neuropathy    RA (rheumatoid arthritis)    OA (osteoarthritis)      Low back pain with radiation    OA (osteoarthritis)    RA (rheumatoid arthritis)      FAMILY HISTORY:  Family history of stroke (Mother)      ITEMS NOT CHECKED ARE NOT PRESENT    SOCIAL HISTORY:   Significant other/partner[ ]  Children[ ]  Spiritism/Spirituality:  Substance hx:  [ ]   Tobacco hx:  [ ]   Alcohol hx: [ ]   Living Situation: [ ]Home  [ ]Long term care  [ ]Rehab [ ]Other  Home Services: [ ] HHA [ ] Braydon RN [ ] Hospice  Occupation:  Home Opioid hx:  [ ] Y [ ] N [ ] I-Stop Reference No:  This report was requested by: Rizwana Nuñez | Reference #: [id number]    You have not added a SHIRLEY number. Keeping your SHIRLEY number(s) up to date on the My SHIRLEY # page will enable the separation of your prescriptions from others in the search results.    Others' Prescriptions  Patient Name: Melissa RobertsBirth Date: 1955  Address: [address] [place], [place] [address]Sex: Female  Rx Written	Rx Dispensed	Drug	Quantity	Days Supply	Prescriber Name	Prescriber Shirley #	Payment Method	Dispenser  10/27/2022	11/04/2022	hydrocodone-acetaminophen  mg tablet	120	30	Mcgill, Issac	[id number]	Insurance	[id number]  10/27/2022	10/28/2022	alprazolam 0.25 mg tablet	60	30	Mcgill, Issac	[id number]	Insurance	[id number]  09/30/2022	10/05/2022	hydrocodone-acetaminophen  mg tablet	120	30	Mcgill, Issac	[id number]	Insurance	[id number]  09/02/2022	09/04/2022	alprazolam 0.25 mg tablet	60	30	Mcgill, Issac	[id number]	Insurance	[id number]  09/02/2022	09/04/2022	hydrocodone-acetaminophen  mg tablet	120	30	Mcgill, Issac	[id number]	Insurance	[id number]  08/05/2022	08/06/2022	hydrocodone-acetaminophen  mg tablet	120	30	Mcgill, Issac	[id number]	Insurance	[id number]  07/01/2022	07/07/2022	alprazolam 0.25 mg tablet	60	30	Mcgill, Issac	[id number]	Insurance	[id number]  07/01/2022	07/07/2022	hydrocodone-acetaminophen  mg tablet	120	30	Mcgill, Issac	[id number]	Insurance	[id number]  06/07/2022	06/07/2022	hydrocodone-acetaminophen  mg tablet	120	30	Mcgill, Issac	[id number]	Insurance	[id number]  05/05/2022	05/07/2022	hydrocodone-acetaminophen  mg tablet	120	30	Mcgill, Issac	[id number]	Insurance	[id number]  04/06/2022	04/07/2022	hydrocodone-acetaminophen  mg tablet	120	30	Mcgill, Issac	[id number]	Insurance	[id number]  03/04/2022	03/08/2022	hydrocodone-acetaminophen  mg tablet	120	30	Mcgill, Issac	[id number]	Insurance	[id number]  02/22/2022	02/24/2022	alprazolam 0.25 mg tablet	30	30	Isabel Pope PA	[id number]	Insurance	[id number]  01/27/2022	02/06/2022	hydrocodone-acetaminophen  mg tablet	120	30	Mcgill, Issac	[id number]	Insurance	[id number]  01/06/2022	01/07/2022	hydrocodone-acetaminophen  mg tablet	120	30	[facility], Issac	[id number]	Insurance	[id number]  12/07/2021	12/08/2021	hydrocodone-acetaminophen  mg tablet	120	30	[facility], Issac	[id number]	Insurance	[id number]  * - Drugs marked with an asterisk are compound drugs. If the compound drug is made up of more than one controlled substance, then each controlled substance will be a separate row   ADVANCE DIRECTIVES:    [ x] Full Code [ ] DNR  MOLST  [ ]  Living Will  [ ]   DECISION MAKER(s): HCP form in EMR   [x ] Health Care Proxy(s)  [ ] Surrogate(s)  [ ] Guardian           Name(s): Phone Number(s):  Answers: Emergency Contact Name Tellyangelica Mardemetri,  Answers: Emergency Contact Phone # [phone],  Answers: Emergency Contact Relationship Spouse  Alternate HCP :   Mike Roberts [phone]    Batsheva Roberts [phone]  BASELINE (I)ADL(s) (prior to admission):  [place]: [ ]Total  [ x] Moderate [ ]Dependent  Allergies    No Known Allergies    Intolerances    MEDICATIONS  (STANDING):  chlorhexidine 0.12% Liquid 15 milliLiter(s) Oral Mucosa two times a day  chlorhexidine 0.12% Liquid 15 milliLiter(s) Oral Mucosa every 12 hours  chlorhexidine 2% Cloths 1 Application(s) Topical <User Schedule>  chlorhexidine 2% Cloths 1 Application(s) Topical daily  clindamycin IVPB 900 milliGRAM(s) IV Intermittent every 8 hours  DAPTOmycin IVPB 320 milliGRAM(s) IV Intermittent every 24 hours  dexMEDEtomidine Infusion 0.05 MICROgram(s)/kG/Hr (0.5 mL/Hr) IV Continuous <Continuous>  dextrose 5%. 1000 milliLiter(s) (50 mL/Hr) IV Continuous <Continuous>  fentaNYL   Infusion. 0.5 MICROgram(s)/kG/Hr (2 mL/Hr) IV Continuous <Continuous>  hydrocortisone sodium succinate Injectable 100 milliGRAM(s) IV Push every 8 hours  multivitamin/minerals 1 Tablet(s) Oral daily  norepinephrine Infusion 0.05 MICROgram(s)/kG/Min (1.88 mL/Hr) IV Continuous <Continuous>  pantoprazole   Suspension 40 milliGRAM(s) Oral daily  piperacillin/tazobactam IVPB.. 3.375 Gram(s) IV Intermittent every 8 hours  polyethylene glycol 3350 17 Gram(s) Oral daily  senna 2 Tablet(s) Oral at bedtime  silver sulfADIAZINE 1% Cream 1 Application(s) Topical two times a day  thiamine 100 milliGRAM(s) Oral daily    MEDICATIONS  (PRN):    PRESENT SYMPTOMS: [x ]Unable to obtain due to poor mentation   Source if other than patient:  [ ]Family   [ ]Team     Pain: [ ]yes [ ]no  QOL impact -   Location -                    Aggravating factors -  Quality -  Radiation -  Timing-  Severity (0-10 scale):  Minimal acceptable level (0-10 scale):     CPOT:    https://www.[facility].org/getattachment/[email or web address]/Critical-Care-Pain-Observation-Tool-(CPOT)      PAIN AD Score:     http://geriatrictoolkit.[email or web address]/[email or web address].pdf (press ctrl +  left click to view)      Dyspnea:                           [ ]Mild [ ]Moderate [ ]Severe [ ]None  Anxiety:                             [ ]Mild [ ]Moderate [ ]Severe [ ]None  Fatigue:                             [ ]Mild [ ]Moderate [ ]Severe [ ]None  Nausea:                             [ ]Mild [ ]Moderate [ ]Severe [ ]None  Loss of appetite:              [ ]Mild [ ]Moderate [ ]Severe [ ]None  Constipation:                    [ ]Mild [ ]Moderate [ ]Severe [ ]None    Other Symptoms:  [ ]All other review of systems negative     Palliative Performance Status Version 2:         %    http://[email or web address]_performance_[email or web address]_[email or web address]  PHYSICAL EXAM:  Vital Signs Last 24 Hrs  T(C): 37.2 (29 Nov 2022 12:00), Max: 37.2 (29 Nov 2022 12:00)  T(F): 99 (29 Nov 2022 12:00), Max: 99 (29 Nov 2022 12:00)  HR: 60 (29 Nov 2022 12:00) (44 - 110)  BP: 84/53 (29 Nov 2022 12:00) (75/54 - 142/71)  BP(mean): 62 (29 Nov 2022 12:00) (54 - 103)  RR: 18 (29 Nov 2022 12:00) (10 - 33)  SpO2: 100% (29 Nov 2022 12:00) (99% - 100%)    Parameters below as of 29 Nov 2022 12:00  Patient On (Oxygen Delivery Method): ventilator    O2 Concentration (%): 40 I&O's Summary    28 Nov 2022 07:01  -  29 Nov 2022 07:00  --------------------------------------------------------  IN: 3111.9 mL / OUT: 965 mL / NET: 2146.9 mL    29 Nov 2022 07:01  -  29 Nov 2022 12:21  --------------------------------------------------------  IN: 332.5 mL / OUT: 225 mL / NET: 107.5 mL        GENERAL:  [ ] No acute distress [x ]Lethargic  [ ]Unarousable  [ ]Verbal  [ ]Non-Verbal [ x]Cachexia    BEHAVIORAL/PSYCH:  [ ]Alert and Oriented x  [ ] Anxiety [ ] Delirium [ ] Agitation [ ] Calm   EYES: [x ] No scleral icterus [ ] Scleral icterus [ ] Closed  ENMT:  [ ]Dry mouth  [ ]No external oral lesions [ ] No external ear or nose lesions  CARDIOVASCULAR:  [ ]Regular [ ]Irregular [x ]Tachy [ ]Not Tachy  [ ]Parker [ ] Edema [ ] No edema  PULMONARY:  [ ]Tachypnea  [ ]Audible excessive secretions [ ] No labored breathing [ ] labored breathing  GASTROINTESTINAL: [x ]Soft  [ ]Distended  [ ]Not distended [ ]Non tender [ ]Tender  MUSCULOSKELETAL: [x ]No clubbing [ ] clubbing  [ ] No cyanosis [ ] cyanosis  NEUROLOGIC: [ ]No focal deficits  [ x]Follows commands  [ ]Does not follow commands  [ ]Cognitive impairment  [ ]Dysphagia  [ ]Dysarthria  [ ]Paresis   SKIN: [ ] Jaundiced [ x] Non-jaundiced [ ]Rash [ ]No Rash [ ] Warm [ ] Dry (+) several wounds, see nursing / burn notes please  MISC/LINES: [ x] ET tube [ ] Trach [ x]NGT/OGT [ ]PEG [ x]Hughes    CRITICAL CARE:  [x ] Shock Present  [x ]Septic [ ]Cardiogenic [ ]Neurologic [ ]Hypovolemic  [x ]  Vasopressors [ ]  Inotropes   [x ]Respiratory failure present [x ]Mechanical ventilation [ ]Non-invasive ventilatory support [ ]High flow  [ ]Acute  [ ]Chronic [ ]Hypoxic  [ ]Hypercarbic [ ]Other  [ ]Other organ failure     LABS: reviewed by me                        11.6   11.71 )-----------( 76       ( 29 Nov 2022 04:55 )             34.6   11-29    141  |  109  |  26<H>  ----------------------------<  219<H>  4.0   |  23  |  0.5<L>    Ca    8.0<L>      29 Nov 2022 04:55  Phos  2.8     11-29  Mg     1.8     11-29    TPro  4.3<L>  /  Alb  2.3<L>  /  TBili  0.2  /  DBili  x   /  AST  165<H>  /  ALT  150<H>  /  AlkPhos  324<H>  11-29  PT/INR - ( 28 Nov 2022 04:30 )   PT: 17.30 sec;   INR: 1.50 ratio         PTT - ( 28 Nov 2022 04:30 )  PTT:44.0 sec    Urinalysis Basic - ( 27 Nov 2022 19:15 )    Color: Yellow / Appearance: Clear / SG: >1.050 / pH: x  Gluc: x / Ketone: Trace  / Bili: Negative / Urobili: <2 mg/dL   Blood: x / Protein: 30 mg/dL / Nitrite: Negative   Leuk Esterase: Moderate / RBC: 2 /HPF / WBC 19 /HPF   Sq Epi: x / Non Sq Epi: 3 /HPF / Bacteria: Negative      RADIOLOGY & ADDITIONAL STUDIES: reviewed by me    EKG: reviewed by me        REFERRALS:   [ ]Chaplaincy  [ ]Hospice  [ ]Child Life  [ x]Social Work  [x ]Case management [ ]Holistic Therapy     Goals of Care Document:

## 2022-11-29 NOTE — CONSULT NOTE ADULT - PROBLEM SELECTOR RECOMMENDATION 4
Levofed gtt  clindamycin IVPB 900 milliGRAM(s) IV Intermittent every 8 hours  DAPTOmycin IVPB 320 milliGRAM(s) IV Intermittent every 24 hours  piperacillin/tazobactam IVPB.. 3.375 Gram(s) IV Intermittent every 8 hours  Wound care as per Burn team   ICU care Levofed gtt  clindamycin IVPB 900 milliGRAM(s) IV Intermittent every 8 hours  DAPTOmycin IVPB 320 milliGRAM(s) IV Intermittent every 24 hours  piperacillin/tazobactam IVPB.. 3.375 Gram(s) IV Intermittent every 8 hours  Wound care as per Burn team   ICU care  High risk, monitor counts, hemodynamics

## 2022-11-30 NOTE — PROGRESS NOTE ADULT - SUBJECTIVE AND OBJECTIVE BOX
NUTRITION SUPPORT TEAM  -  PROGRESS NOTE   remain vented/sedated  on pressors  on OGT feed   abdomen soft n/d      REVIEW OF SYSTEMS:  Negative except as noted above.     VITALS:  T(F): 97 (11-30 @ 09:02), Max: 97.3 (11-30 @ 04:00)  HR: 74 (11-30 @ 13:09) (44 - 94)  BP: 130/70 (11-30 @ 13:09) (86/54 - 134/105)  RR: 13 (11-30 @ 13:09) (13 - 34)  SpO2: 98% (11-30 @ 13:09) (94% - 100%)    HEIGHT/WEIGHT/BMI:   Height (cm): 170.2 (11-28), 170.2 (11-11), 170.2 (05-12), 170.2 (05-05)  Weight (kg): 44.5 (11-28), 49 (11-11), 49 (05-12), 49 (05-05)  BMI (kg/m2): 15.4 (11-28), 16.9 (11-11), 16.9 (05-12), 16.9 (05-05)       11-29-22 @ 07:01  -  11-30-22 @ 07:00  --------------------------------------------------------  IN:    Dexmedetomidine: 41.5 mL    dextrose 5%: 1200 mL    IV PiggyBack: 500 mL    Norepinephrine: 21.4 mL  Total IN: 1762.9 mL    OUT:    Indwelling Catheter - Urethral (mL): 1160 mL  Total OUT: 1160 mL    Total NET: 602.9 mL        STANDING MEDICATIONS:   ALPRAZolam      ALPRAZolam 0.25 milliGRAM(s) Oral two times a day  chlorhexidine 2% Cloths 1 Application(s) Topical daily  clindamycin IVPB 900 milliGRAM(s) IV Intermittent every 8 hours  dexMEDEtomidine Infusion 0.05 MICROgram(s)/kG/Hr IV Continuous <Continuous>  hydrocortisone sodium succinate Injectable 100 milliGRAM(s) IV Push every 8 hours  midodrine 10 milliGRAM(s) Oral every 8 hours  multivitamin/minerals 1 Tablet(s) Oral daily  norepinephrine Infusion 0.05 MICROgram(s)/kG/Min IV Continuous <Continuous>  pantoprazole   Suspension 40 milliGRAM(s) Oral daily  piperacillin/tazobactam IVPB.. 3.375 Gram(s) IV Intermittent every 8 hours  sertraline 50 milliGRAM(s) Oral daily  silver sulfADIAZINE 1% Cream 1 Application(s) Topical two times a day  thiamine 100 milliGRAM(s) Oral daily      LABS:                         9.8    12.87 )-----------( 61       ( 30 Nov 2022 12:47 )             28.0     140  |  105  |  21<H>  ----------------------------<  117<H>          (11-30-22 @ 05:00)  3.5   |  27  |  <0.5<L>    Ca    7.9<L>          (11-30-22 @ 05:00)  Phos  2.6         (11-30-22 @ 05:00)  Mg     1.6         (11-30-22 @ 05:00)    TPro  4.1<L>  /  Alb  2.2<L>  /  TBili  0.3  /  DBili  x   /  AST  88<H>  /  ALT  103<H>  /  AlkPhos  270<H>       11-30-22 @ 05:00      Triglycerides, Serum: 54 mg/dL (11-28 @ 04:30)  Prealbumin, Serum: 13 mg/dL (11-28-22 @ 04:30)  Vitamin D, 25-Hydroxy: 89 ng/mL (11-28 @ 04:30)      Blood Glucose (Past 24 hours):  113 mg/dL (11-30 @ 11:43)  128 mg/dL (11-30 @ 05:52)  122 mg/dL (11-29 @ 22:59)  160 mg/dL (11-29 @ 18:39)      DIET:   Diet, NPO with Tube Feed:   Tube Feeding Modality: Orogastric  REPLETE  Total Volume for 24 Hours (mL): 1200  Continuous  Starting Tube Feed Rate mL per Hour: 25  Increase Tube Feed Rate by (mL): 25     Every 4 hours  Until Goal Tube Feed Rate (mL per Hour): 50  Tube Feed Duration (in Hours): 24  Tube Feed Start Time: 15:00 (11-28-22 @ 14:38) [Active]      RADIOLOGY:   < from: Xray Chest 1 View- PORTABLE-Routine (Xray Chest 1 View- PORTABLE-Routine in AM.) (11.30.22 @ 05:51) >  Impression:  Low lung volume.  Interstitial prominence bilaterally.  Right IJ line.  Neurostimulator wires.   NUTRITION SUPPORT TEAM  -  PROGRESS NOTE   remain vented/sedated  on pressors  NPO  abdomen soft n/d      REVIEW OF SYSTEMS:  Negative except as noted above.     VITALS:  T(F): 97 (11-30 @ 09:02), Max: 97.3 (11-30 @ 04:00)  HR: 74 (11-30 @ 13:09) (44 - 94)  BP: 130/70 (11-30 @ 13:09) (86/54 - 134/105)  RR: 13 (11-30 @ 13:09) (13 - 34)  SpO2: 98% (11-30 @ 13:09) (94% - 100%)    HEIGHT/WEIGHT/BMI:   Height (cm): 170.2 (11-28), 170.2 (11-11), 170.2 (05-12), 170.2 (05-05)  Weight (kg): 44.5 (11-28), 49 (11-11), 49 (05-12), 49 (05-05)  BMI (kg/m2): 15.4 (11-28), 16.9 (11-11), 16.9 (05-12), 16.9 (05-05)       11-29-22 @ 07:01  -  11-30-22 @ 07:00  --------------------------------------------------------  IN:    Dexmedetomidine: 41.5 mL    dextrose 5%: 1200 mL    IV PiggyBack: 500 mL    Norepinephrine: 21.4 mL  Total IN: 1762.9 mL    OUT:    Indwelling Catheter - Urethral (mL): 1160 mL  Total OUT: 1160 mL    Total NET: 602.9 mL        STANDING MEDICATIONS:   ALPRAZolam      ALPRAZolam 0.25 milliGRAM(s) Oral two times a day  chlorhexidine 2% Cloths 1 Application(s) Topical daily  clindamycin IVPB 900 milliGRAM(s) IV Intermittent every 8 hours  dexMEDEtomidine Infusion 0.05 MICROgram(s)/kG/Hr IV Continuous <Continuous>  hydrocortisone sodium succinate Injectable 100 milliGRAM(s) IV Push every 8 hours  midodrine 10 milliGRAM(s) Oral every 8 hours  multivitamin/minerals 1 Tablet(s) Oral daily  norepinephrine Infusion 0.05 MICROgram(s)/kG/Min IV Continuous <Continuous>  pantoprazole   Suspension 40 milliGRAM(s) Oral daily  piperacillin/tazobactam IVPB.. 3.375 Gram(s) IV Intermittent every 8 hours  sertraline 50 milliGRAM(s) Oral daily  silver sulfADIAZINE 1% Cream 1 Application(s) Topical two times a day  thiamine 100 milliGRAM(s) Oral daily      LABS:                         9.8    12.87 )-----------( 61       ( 30 Nov 2022 12:47 )             28.0     140  |  105  |  21<H>  ----------------------------<  117<H>          (11-30-22 @ 05:00)  3.5   |  27  |  <0.5<L>    Ca    7.9<L>          (11-30-22 @ 05:00)  Phos  2.6         (11-30-22 @ 05:00)  Mg     1.6         (11-30-22 @ 05:00)    TPro  4.1<L>  /  Alb  2.2<L>  /  TBili  0.3  /  DBili  x   /  AST  88<H>  /  ALT  103<H>  /  AlkPhos  270<H>       11-30-22 @ 05:00      Triglycerides, Serum: 54 mg/dL (11-28 @ 04:30)  Prealbumin, Serum: 13 mg/dL (11-28-22 @ 04:30)  Vitamin D, 25-Hydroxy: 89 ng/mL (11-28 @ 04:30)      Blood Glucose (Past 24 hours):  113 mg/dL (11-30 @ 11:43)  128 mg/dL (11-30 @ 05:52)  122 mg/dL (11-29 @ 22:59)  160 mg/dL (11-29 @ 18:39)      DIET:   Diet, NPO with Tube Feed:   Tube Feeding Modality: Orogastric  REPLETE  Total Volume for 24 Hours (mL): 1200  Continuous  Starting Tube Feed Rate mL per Hour: 25  Increase Tube Feed Rate by (mL): 25     Every 4 hours  Until Goal Tube Feed Rate (mL per Hour): 50  Tube Feed Duration (in Hours): 24  Tube Feed Start Time: 15:00 (11-28-22 @ 14:38) [Active]      RADIOLOGY:   < from: Xray Chest 1 View- PORTABLE-Routine (Xray Chest 1 View- PORTABLE-Routine in AM.) (11.30.22 @ 05:51) >  Impression:  Low lung volume.  Interstitial prominence bilaterally.  Right IJ line.  Neurostimulator wires.   NUTRITION SUPPORT TEAM  -  PROGRESS NOTE   resting comfortably   s/p extubated  npo  abdomen soft n/d      REVIEW OF SYSTEMS:  Negative except as noted above.     VITALS:  T(F): 97 (11-30 @ 09:02), Max: 97.3 (11-30 @ 04:00)  HR: 74 (11-30 @ 13:09) (44 - 94)  BP: 130/70 (11-30 @ 13:09) (86/54 - 134/105)  RR: 13 (11-30 @ 13:09) (13 - 34)  SpO2: 98% (11-30 @ 13:09) (94% - 100%)    HEIGHT/WEIGHT/BMI:   Height (cm): 170.2 (11-28), 170.2 (11-11), 170.2 (05-12), 170.2 (05-05)  Weight (kg): 44.5 (11-28), 49 (11-11), 49 (05-12), 49 (05-05)  BMI (kg/m2): 15.4 (11-28), 16.9 (11-11), 16.9 (05-12), 16.9 (05-05)       11-29-22 @ 07:01  -  11-30-22 @ 07:00  --------------------------------------------------------  IN:    Dexmedetomidine: 41.5 mL    dextrose 5%: 1200 mL    IV PiggyBack: 500 mL    Norepinephrine: 21.4 mL  Total IN: 1762.9 mL    OUT:    Indwelling Catheter - Urethral (mL): 1160 mL  Total OUT: 1160 mL    Total NET: 602.9 mL        STANDING MEDICATIONS:   ALPRAZolam      ALPRAZolam 0.25 milliGRAM(s) Oral two times a day  chlorhexidine 2% Cloths 1 Application(s) Topical daily  clindamycin IVPB 900 milliGRAM(s) IV Intermittent every 8 hours  dexMEDEtomidine Infusion 0.05 MICROgram(s)/kG/Hr IV Continuous <Continuous>  hydrocortisone sodium succinate Injectable 100 milliGRAM(s) IV Push every 8 hours  midodrine 10 milliGRAM(s) Oral every 8 hours  multivitamin/minerals 1 Tablet(s) Oral daily  norepinephrine Infusion 0.05 MICROgram(s)/kG/Min IV Continuous <Continuous>  pantoprazole   Suspension 40 milliGRAM(s) Oral daily  piperacillin/tazobactam IVPB.. 3.375 Gram(s) IV Intermittent every 8 hours  sertraline 50 milliGRAM(s) Oral daily  silver sulfADIAZINE 1% Cream 1 Application(s) Topical two times a day  thiamine 100 milliGRAM(s) Oral daily      LABS:                         9.8    12.87 )-----------( 61       ( 30 Nov 2022 12:47 )             28.0     140  |  105  |  21<H>  ----------------------------<  117<H>          (11-30-22 @ 05:00)  3.5   |  27  |  <0.5<L>    Ca    7.9<L>          (11-30-22 @ 05:00)  Phos  2.6         (11-30-22 @ 05:00)  Mg     1.6         (11-30-22 @ 05:00)    TPro  4.1<L>  /  Alb  2.2<L>  /  TBili  0.3  /  DBili  x   /  AST  88<H>  /  ALT  103<H>  /  AlkPhos  270<H>       11-30-22 @ 05:00      Triglycerides, Serum: 54 mg/dL (11-28 @ 04:30)  Prealbumin, Serum: 13 mg/dL (11-28-22 @ 04:30)  Vitamin D, 25-Hydroxy: 89 ng/mL (11-28 @ 04:30)      Blood Glucose (Past 24 hours):  113 mg/dL (11-30 @ 11:43)  128 mg/dL (11-30 @ 05:52)  122 mg/dL (11-29 @ 22:59)  160 mg/dL (11-29 @ 18:39)      DIET:   Diet, NPO with Tube Feed:   Tube Feeding Modality: Orogastric  REPLETE  Total Volume for 24 Hours (mL): 1200  Continuous  Starting Tube Feed Rate mL per Hour: 25  Increase Tube Feed Rate by (mL): 25     Every 4 hours  Until Goal Tube Feed Rate (mL per Hour): 50  Tube Feed Duration (in Hours): 24  Tube Feed Start Time: 15:00 (11-28-22 @ 14:38) [Active]      RADIOLOGY:   < from: Xray Chest 1 View- PORTABLE-Routine (Xray Chest 1 View- PORTABLE-Routine in AM.) (11.30.22 @ 05:51) >  Impression:  Low lung volume.  Interstitial prominence bilaterally.  Right IJ line.  Neurostimulator wires.   NUTRITION SUPPORT TEAM  -  PROGRESS NOTE   agitated, trying to turn herself, pulls at neck collar  s/p extubated, no resp distress, pOx good  npo  abdomen soft n/d, n/t  mult ecchymoses and bruises, as before    REVIEW OF SYSTEMS:  Negative except as noted above.     VITALS:  T(F): 97 (11-30 @ 09:02), Max: 97.3 (11-30 @ 04:00)  HR: 74 (11-30 @ 13:09) (44 - 94)  BP: 130/70 (11-30 @ 13:09) (86/54 - 134/105)  RR: 13 (11-30 @ 13:09) (13 - 34)  SpO2: 98% (11-30 @ 13:09) (94% - 100%)    HEIGHT/WEIGHT/BMI:   Height (cm): 170.2 (11-28), 170.2 (11-11), 170.2 (05-12), 170.2 (05-05)  Weight (kg): 44.5 (11-28), 49 (11-11), 49 (05-12), 49 (05-05)  BMI (kg/m2): 15.4 (11-28), 16.9 (11-11), 16.9 (05-12), 16.9 (05-05)       11-29-22 @ 07:01  -  11-30-22 @ 07:00  --------------------------------------------------------  IN:    Dexmedetomidine: 41.5 mL    dextrose 5%: 1200 mL    IV PiggyBack: 500 mL    Norepinephrine: 21.4 mL  Total IN: 1762.9 mL    OUT:    Indwelling Catheter - Urethral (mL): 1160 mL  Total OUT: 1160 mL    Total NET: 602.9 mL        STANDING MEDICATIONS:   ALPRAZolam      ALPRAZolam 0.25 milliGRAM(s) Oral two times a day  chlorhexidine 2% Cloths 1 Application(s) Topical daily  clindamycin IVPB 900 milliGRAM(s) IV Intermittent every 8 hours  dexMEDEtomidine Infusion 0.05 MICROgram(s)/kG/Hr IV Continuous <Continuous>  hydrocortisone sodium succinate Injectable 100 milliGRAM(s) IV Push every 8 hours  midodrine 10 milliGRAM(s) Oral every 8 hours  multivitamin/minerals 1 Tablet(s) Oral daily  norepinephrine Infusion 0.05 MICROgram(s)/kG/Min IV Continuous <Continuous>  pantoprazole   Suspension 40 milliGRAM(s) Oral daily  piperacillin/tazobactam IVPB.. 3.375 Gram(s) IV Intermittent every 8 hours  sertraline 50 milliGRAM(s) Oral daily  silver sulfADIAZINE 1% Cream 1 Application(s) Topical two times a day  thiamine 100 milliGRAM(s) Oral daily      LABS:                         9.8    12.87 )-----------( 61       ( 30 Nov 2022 12:47 )             28.0     140  |  105  |  21<H>  ----------------------------<  117<H>          (11-30-22 @ 05:00)  3.5   |  27  |  <0.5<L>    Ca    7.9<L>          (11-30-22 @ 05:00)  Phos  2.6         (11-30-22 @ 05:00)  Mg     1.6         (11-30-22 @ 05:00)    TPro  4.1<L>  /  Alb  2.2<L>  /  TBili  0.3  /  DBili  x   /  AST  88<H>  /  ALT  103<H>  /  AlkPhos  270<H>       11-30-22 @ 05:00      Triglycerides, Serum: 54 mg/dL (11-28 @ 04:30)  Prealbumin, Serum: 13 mg/dL (11-28-22 @ 04:30)  Vitamin D, 25-Hydroxy: 89 ng/mL (11-28 @ 04:30)      Blood Glucose (Past 24 hours):  113 mg/dL (11-30 @ 11:43)  128 mg/dL (11-30 @ 05:52)  122 mg/dL (11-29 @ 22:59)  160 mg/dL (11-29 @ 18:39)      DIET:   Diet, NPO with Tube Feed:   Tube Feeding Modality: Orogastric  REPLETE  Total Volume for 24 Hours (mL): 1200  Continuous  Starting Tube Feed Rate mL per Hour: 25  Increase Tube Feed Rate by (mL): 25     Every 4 hours  Until Goal Tube Feed Rate (mL per Hour): 50  Tube Feed Duration (in Hours): 24  Tube Feed Start Time: 15:00 (11-28-22 @ 14:38) [Active]      RADIOLOGY:   < from: Xray Chest 1 View- PORTABLE-Routine (Xray Chest 1 View- PORTABLE-Routine in AM.) (11.30.22 @ 05:51) >  Impression:  Low lung volume.  Interstitial prominence bilaterally.  Right IJ line.  Neurostimulator wires.

## 2022-11-30 NOTE — PROGRESS NOTE ADULT - SUBJECTIVE AND OBJECTIVE BOX
Over Night Events: Events noted, on levophed 0.05 and LR 50 CC/H, sp extubation    PHYSICAL EXAM    ICU Vital Signs Last 24 Hrs  T(C): 36.3 (30 Nov 2022 00:00), Max: 37.2 (29 Nov 2022 12:00)  T(F): 97.3 (30 Nov 2022 00:00), Max: 99 (29 Nov 2022 12:00)  HR: 78 (30 Nov 2022 04:30) (46 - 110)  BP: 134/105 (30 Nov 2022 04:30) (74/54 - 134/105)  BP(mean): 110 (30 Nov 2022 04:30) (59 - 997)  RR: 20 (30 Nov 2022 04:30) (9 - 40)  SpO2: 98% (30 Nov 2022 04:30) (97% - 100%)    O2 Parameters below as of 30 Nov 2022 04:00  Patient On (Oxygen Delivery Method): room air            General: ill looking  HEENT: t collar          Lungs: dec bs both bases  Cardiovascular: ALEM 2.6  Abdomen: Soft, Positive BS  Extremities: No clubbing   Confused      11-29-22 @ 07:01  -  11-30-22 @ 07:00  --------------------------------------------------------  IN:    Dexmedetomidine: 41.5 mL    dextrose 5%: 1100 mL    IV PiggyBack: 350 mL    Norepinephrine: 20.6 mL  Total IN: 1512.1 mL    OUT:    Indwelling Catheter - Urethral (mL): 1075 mL  Total OUT: 1075 mL    Total NET: 437.1 mL          LABS:                          9.8    14.11 )-----------( 59       ( 30 Nov 2022 05:00 )             28.1                                               11-30    140  |  105  |  21<H>  ----------------------------<  117<H>  3.5   |  27  |  <0.5<L>    Ca    7.9<L>      30 Nov 2022 05:00  Phos  2.6     11-30  Mg     1.6     11-30    TPro  4.1<L>  /  Alb  2.2<L>  /  TBili  0.3  /  DBili  x   /  AST  88<H>  /  ALT  103<H>  /  AlkPhos  270<H>  11-30                                                                                           LIVER FUNCTIONS - ( 30 Nov 2022 05:00 )  Alb: 2.2 g/dL / Pro: 4.1 g/dL / ALK PHOS: 270 U/L / ALT: 103 U/L / AST: 88 U/L / GGT: x                                                  Culture - Other (collected 28 Nov 2022 13:31)  Source: .Other BLE  Preliminary Report (29 Nov 2022 18:56):    Rare Pseudomonas aeruginosa    Culture - Other (collected 28 Nov 2022 13:31)  Source: .Other BLE  Preliminary Report (29 Nov 2022 18:01):    Rare Pseudomonas aeruginosa    Culture - Urine (collected 27 Nov 2022 19:15)  Source: Clean Catch Clean Catch (Midstream)  Final Report (28 Nov 2022 23:09):    <10,000 CFU/mL Normal Urogenital Alyssa                                                   Mode: AC/ CMV (Assist Control/ Continuous Mandatory Ventilation)  FiO2: 40  PEEP: 8  PS: 5  MAP: 11  PIP: 14                                      ABG - ( 29 Nov 2022 03:14 )  pH, Arterial: 7.37  pH, Blood: x     /  pCO2: 40    /  pO2: 222   / HCO3: 23    / Base Excess: -2.0  /  SaO2: 100.0               MEDICATIONS  (STANDING):  chlorhexidine 2% Cloths 1 Application(s) Topical daily  clindamycin IVPB 900 milliGRAM(s) IV Intermittent every 8 hours  DAPTOmycin IVPB 320 milliGRAM(s) IV Intermittent every 24 hours  dexMEDEtomidine Infusion 0.05 MICROgram(s)/kG/Hr (0.5 mL/Hr) IV Continuous <Continuous>  dextrose 5%. 1000 milliLiter(s) (50 mL/Hr) IV Continuous <Continuous>  hydrocortisone sodium succinate Injectable 100 milliGRAM(s) IV Push every 8 hours  multivitamin/minerals 1 Tablet(s) Oral daily  norepinephrine Infusion 0.05 MICROgram(s)/kG/Min (1.88 mL/Hr) IV Continuous <Continuous>  pantoprazole   Suspension 40 milliGRAM(s) Oral daily  piperacillin/tazobactam IVPB.. 3.375 Gram(s) IV Intermittent every 8 hours  polyethylene glycol 3350 17 Gram(s) Oral daily  senna 2 Tablet(s) Oral at bedtime  silver sulfADIAZINE 1% Cream 1 Application(s) Topical two times a day  thiamine 100 milliGRAM(s) Oral daily    CXR reviewed

## 2022-11-30 NOTE — SWALLOW BEDSIDE ASSESSMENT ADULT - SLP PRECAUTIONS/LIMITATIONS: VISION
within functional limits Purse String (Intermediate) Text: Given the location of the defect and the characteristics of the surrounding skin a purse string intermediate closure was deemed most appropriate.  Undermining was performed circumfirentially around the surgical defect.  A purse string suture was then placed and tightened.

## 2022-11-30 NOTE — PROGRESS NOTE ADULT - PROBLEM SELECTOR PLAN 5
Pt had chronic pain management - please restart home meds   On Vicodin 10/325mg PO Q 6 hrs PRN pain 4-10   Gabapentin 600mg Q 8 hrs

## 2022-11-30 NOTE — PROGRESS NOTE ADULT - ASSESSMENT
ASSESSMENT      #Septic Shock on low dose levophed  # Acute hypoxemic resp failure sp extubation on RA  #Toxic-Metabolic Encephalopathy  #Necrotizing Fasciitis of bilateral LE / burn noted/ Pseudomonas  #Left C6 Transverse Foramen Fracture  #Anorexia, h/o  # thrombocytopenia    PLAN    CNS: xanax OP dose    HEENT  - Oral care, t collar per neuro sx    PULMONARY: aspiration precaution, or RA    CARDIOVASCULAR  - c/w Levophed (wean as tolerated)  - avoid volume overload    GI  - Diet: speech and swallow if failed NGT  - GI ppx: pantoprazole 40mg PO QD   - HOLD Laxitives    RENAL  - strict I&Os, daily weight, and monitor volume status  - Follow up renal function and lytes, correct as needed (K>4; Mg>2)    INFECTIOUS DISEASE  -abx per ID    HEMATOLOGICAL  - DVT ppx: Lovenox 40mg SQ QD   - HIT abx  - Trend CBC    ENDOCRINE  - hydrocortisone    MSK  - c/w C-Collar as per NSGY  - f/u Trauma for TTS    DISPOSITION: MICU  palliative care     BILL IRAHETA 11/27  BILL MACIAS 11/ 27
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
## 2022-11-30 NOTE — SWALLOW BEDSIDE ASSESSMENT ADULT - SWALLOW EVAL: DIAGNOSIS
+overt s/s of penetration/aspiration w/ thin liquids, +toleration of puree and mildly thick liquids w/o immediate overt s/s of penetration/aspiration +minimal po accepted
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
## 2022-11-30 NOTE — SWALLOW BEDSIDE ASSESSMENT ADULT - SLP GENERAL OBSERVATIONS
Pt received in bed +pt confused, intermittently responds to commands/questions, restless. Pt received in bed +pt confused, intermittently responds to commands/questions, restless. +dysphonic
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
## 2022-11-30 NOTE — PROGRESS NOTE ADULT - SUBJECTIVE AND OBJECTIVE BOX
HPI:  67F w/ h/o anorexia, anxiety, depression, kidney cysts, peripheral neuropathy, RA, OA, osteoporosis, low back pain s/p unwitnessed fall, found down by  +HT, ?LOC, -AC. Contacted patient's  who was able to provide some history and states he returned home from work around 11pm when he found the patient down on the tiled kitchen floor. States the patient was awake and alert when he found her on the floor, but she didn't recall how she fell down.  speculates that she might've been sitting in chair and fallen out of it as this has happened before in the past.  states that the patient said she hit her head, and he noticed blood on the patient's knees, but no other injuries. Patient was complaining of left-sided neck pain after the fall, but  states she has been having this pain for about 1 month. About 1 hour after the fall, patient became lethargic and was unable to stay up on her feet, so  called for an ambulance.     Of note, pt has long-standing h/o anorexia for several years per . Does not induce vomiting, but frequently uses laxatives for weight loss. Has never consistently followed w/ psychologist/psychiatrist. After recent discharge from hospital two weeks ago, anorexia acutely worsened and pt has had minimal caloric intake.    On arrival to ED, patient unresponsive, hypotensive, bradycardic, and hypothermic. Patient also hypoglycemic (FS 24) and given dextrose immediately. Subsequently given Emiliana hugger, given 2L of warm IV fluids, started on levophed, and given broad-spectrum abx, and started on levothyroxine for possible myxedema coma (although no documentation in eMAR, so unclear if actually given). VBG noted respiratory acidosis. Pt unable to tolerate BiPAP due to current mental status, so patient intubated for airway protection. CT spine demonstrated fx of anterior and posterior tubercle of left C6 transverse foramen. NSGY consulted, but no surgical intervention offered. Subsequently admitted to MICU for presumed septic shock. (27 Nov 2022 16:09)     INTERVAL EVENTS:  11/30: Pt extubated last evening, appears comfortable. She is confused and still restrained   ADVANCE DIRECTIVES:    FullCode MOLST  [ ]  Living Will  [ ]   DECISION MAKER(s):  [ ] Health Care Proxy(s)  [x ] Surrogate(s)  [ ] Guardian           Name(s): Phone Number(s):  Answers: Emergency Contact Name Henry Roberts,  Answers: Emergency Contact Phone # [phone],  Answers: Emergency Contact Relationship Spouse    BASELINE (I)ADL(s) (prior to admission):  [place]: [ ]Total  [ x] Moderate [ ]Dependent  Palliative Performance Status Version 2:         %    [email or web address]    Allergies    No Known Allergies    Intolerances    MEDICATIONS  (STANDING):  ALPRAZolam      ALPRAZolam 0.25 milliGRAM(s) Oral two times a day  chlorhexidine 2% Cloths 1 Application(s) Topical daily  clindamycin IVPB 900 milliGRAM(s) IV Intermittent every 8 hours  dexMEDEtomidine Infusion 0.05 MICROgram(s)/kG/Hr (0.5 mL/Hr) IV Continuous <Continuous>  dextrose 5%. 1000 milliLiter(s) (50 mL/Hr) IV Continuous <Continuous>  hydrocortisone sodium succinate Injectable 100 milliGRAM(s) IV Push every 8 hours  midodrine 10 milliGRAM(s) Oral every 8 hours  multivitamin/minerals 1 Tablet(s) Oral daily  norepinephrine Infusion 0.05 MICROgram(s)/kG/Min (1.88 mL/Hr) IV Continuous <Continuous>  pantoprazole   Suspension 40 milliGRAM(s) Oral daily  piperacillin/tazobactam IVPB.. 3.375 Gram(s) IV Intermittent every 8 hours  sertraline 50 milliGRAM(s) Oral daily  silver sulfADIAZINE 1% Cream 1 Application(s) Topical two times a day  thiamine 100 milliGRAM(s) Oral daily    MEDICATIONS  (PRN):    PRESENT SYMPTOMS: [ ]Unable to obtain due to poor mentation   Source if other than patient:  [ ]Family   [ ]Team     Pain: [ ]yes [ x]no  QOL impact -   Location -                    Aggravating factors -  Quality -  Radiation -  Timing-  Severity (0-10 scale):  Minimal acceptable level (0-10 scale):     Dyspnea:                           [ ]Mild [ ]Moderate [ ]Severe  Anxiety:                             [ ]Mild [ ]Moderate [ ]Severe  Fatigue:                             [ ]Mild [ ]Moderate [ ]Severe  Nausea:                             [ ]Mild [ ]Moderate [ ]Severe  Loss of appetite:              [ ]Mild [ ]Moderate [ ]Severe  Constipation:                    [ ]Mild [ ]Moderate [ ]Severe    Other Symptoms:  [ ]All other review of systems negative     Palliative Performance Status Version 2:         %    http://[facility].org/files/[email or web address]_performance_scale_[email or web address]  PHYSICAL EXAM:  Vital Signs Last 24 Hrs  T(C): 36.1 (30 Nov 2022 09:02), Max: 36.6 (29 Nov 2022 16:00)  T(F): 97 (30 Nov 2022 09:02), Max: 97.9 (29 Nov 2022 16:00)  HR: 74 (30 Nov 2022 13:09) (44 - 100)  BP: 130/70 (30 Nov 2022 13:09) (74/54 - 134/105)  BP(mean): 88 (30 Nov 2022 13:09) (59 - 997)  RR: 13 (30 Nov 2022 13:09) (9 - 40)  SpO2: 98% (30 Nov 2022 13:09) (94% - 100%)    Parameters below as of 30 Nov 2022 13:09  Patient On (Oxygen Delivery Method): room air     I&O's Summary    29 Nov 2022 07:01  -  30 Nov 2022 07:00  --------------------------------------------------------  IN: 1762.9 mL / OUT: 1160 mL / NET: 602.9 mL    30 Nov 2022 07:01  -  30 Nov 2022 14:22  --------------------------------------------------------  IN: 350.4 mL / OUT: 300 mL / NET: 50.4 mL      GENERAL:  [ ] No acute distress [x ]Lethargic  [ ]Unarousable  [ ]Verbal  [ ]Non-Verbal [ x]Cachexia    BEHAVIORAL/PSYCH:  [ ]Alert and Oriented x  [ ] Anxiety [ ] Delirium [ ] Agitation [ ] Calm   EYES: [x ] No scleral icterus [ ] Scleral icterus [ ] Closed  ENMT:  [ ]Dry mouth  [ ]No external oral lesions [ ] No external ear or nose lesions  CARDIOVASCULAR:  [ ]Regular [ ]Irregular [x ]Tachy [ ]Not Tachy  [ ]Parker [ ] Edema [ ] No edema  PULMONARY:  [ ]Tachypnea  [ ]Audible excessive secretions [ ] No labored breathing [ ] labored breathing  GASTROINTESTINAL: [x ]Soft  [ ]Distended  [ ]Not distended [ ]Non tender [ ]Tender  MUSCULOSKELETAL: [x ]No clubbing [ ] clubbing  [ ] No cyanosis [ ] cyanosis  NEUROLOGIC: [ ]No focal deficits  [ x]Follows commands  [ ]Does not follow commands  [ ]Cognitive impairment  [ ]Dysphagia  [ ]Dysarthria  [ ]Paresis   SKIN: [ ] Jaundiced [ x] Non-jaundiced [ ]Rash [ ]No Rash [ ] Warm [ ] Dry (+) several wounds, see nursing / burn notes please  MISC/LINES: [ ] ET tube [ ] Trach [ ]NGT/OGT [ ]PEG [ x]Hughes    CRITICAL CARE:  [x ] Shock Present  [x ]Septic [ ]Cardiogenic [ ]Neurologic [ ]Hypovolemic  [x ]  Vasopressors [ ]  Inotropes   [ ]Respiratory failure present [ ]Mechanical ventilation [ ]Non-invasive ventilatory support [ ]High flow  [ ]Acute  [ ]Chronic [ ]Hypoxic  [ ]Hypercarbic [ ]Other  [ ]Other organ failure     LABS:                        9.8    12.87 )-----------( 61       ( 30 Nov 2022 12:47 )             28.0   11-30    140  |  105  |  21<H>  ----------------------------<  117<H>  3.5   |  27  |  <0.5<L>    Ca    7.9<L>      30 Nov 2022 05:00  Phos  2.6     11-30  Mg     1.6     11-30    TPro  4.1<L>  /  Alb  2.2<L>  /  TBili  0.3  /  DBili  x   /  AST  88<H>  /  ALT  103<H>  /  AlkPhos  270<H>  11-30        RADIOLOGY & ADDITIONAL STUDIES:    REFERRALS:   [ ]Chaplaincy  [ ]Hospice  [ ]Child Life  [ ]Social Work  [ ]Case management [ ]Holistic Therapy     Goals of Care Document:

## 2022-11-30 NOTE — CHART NOTE - NSCHARTNOTEFT_GEN_A_CORE
Transfer Note    Transfer from: MICU  Transfer to: CEU    HPI: 67F w/ h/o anorexia, anxiety, depression, kidney cysts, peripheral neuropathy, RA, OA, osteoporosis, low back pain s/p unwitnessed fall, found down by  +HT, ?LOC, -AC. Contacted patient's  who was able to provide some history and states he returned home from work around 11pm when he found the patient down on the tiled kitchen floor. States the patient was awake and alert when he found her on the floor, but she didn't recall how she fell down.  speculates that she might've been sitting in chair and fallen out of it as this has happened before in the past.  states that the patient said she hit her head, and he noticed blood on the patient's knees, but no other injuries. Patient was complaining of left-sided neck pain after the fall, but  states she has been having this pain for about 1 month. About 1 hour after the fall, patient became lethargic and was unable to stay up on her feet, so  called for an ambulance.     Of note, pt has long-standing h/o anorexia for several years per . Does not induce vomiting, but frequently uses laxatives for weight loss. Has never consistently followed w/ psychologist/psychiatrist. After recent discharge from hospital two weeks ago, anorexia acutely worsened and pt has had minimal caloric intake.    On arrival to ED, patient unresponsive, hypotensive, bradycardic, and hypothermic. Patient also hypoglycemic (FS 24) and given dextrose immediately. Subsequently given Emiliana hugger, given 2L of warm IV fluids, started on levophed, and given broad-spectrum abx, and started on levothyroxine for possible myxedema coma (although no documentation in eMAR, so unclear if actually given). VBG noted respiratory acidosis. Pt unable to tolerate BiPAP due to current mental status, so patient intubated for airway protection. CT spine demonstrated fx of anterior and posterior tubercle of left C6 transverse foramen. NSGY consulted, but no surgical intervention offered. Subsequently admitted to MICU for presumed septic shock.    ICU COURSE:    VITAL SIGNS: Last 24 Hours  T(C): 36.1 (30 Nov 2022 09:02), Max: 36.6 (29 Nov 2022 16:00)  T(F): 97 (30 Nov 2022 09:02), Max: 97.9 (29 Nov 2022 16:00)  HR: 74 (30 Nov 2022 13:09) (44 - 102)  BP: 130/70 (30 Nov 2022 13:09) (74/54 - 134/105)  BP(mean): 88 (30 Nov 2022 13:09) (59 - 997)  RR: 13 (30 Nov 2022 13:09) (9 - 40)  SpO2: 98% (30 Nov 2022 13:09) (94% - 100%)    PHYSICAL EXAM:  General: Cachetic. Ill appearing.   HEENT: Normocephalic, R forehead hematoma. PERRL.  LUNGS: Clear to auscultation b/l. No wheezes, rales, or rhonchi.  HEART: RRR. No murmurs, rubs, or gallops.  ABDOMEN: Soft, nontender, nondistended. Normoactive bowel sounds.  EXT: Pulses palpable x 4. No lower extremity edema.  NEURO: Awake, alert. Refusing to talk. Unable to assess orientation.  SKIN: Warm, dry.    MEDICATIONS:  STANDING MEDICATIONS  ALPRAZolam      ALPRAZolam 0.25 milliGRAM(s) Oral two times a day  chlorhexidine 2% Cloths 1 Application(s) Topical daily  clindamycin IVPB 900 milliGRAM(s) IV Intermittent every 8 hours  DAPTOmycin IVPB 320 milliGRAM(s) IV Intermittent every 24 hours  dexMEDEtomidine Infusion 0.05 MICROgram(s)/kG/Hr IV Continuous <Continuous>  dextrose 5%. 1000 milliLiter(s) IV Continuous <Continuous>  hydrocortisone sodium succinate Injectable 100 milliGRAM(s) IV Push every 8 hours  midodrine 10 milliGRAM(s) Oral every 8 hours  multivitamin/minerals 1 Tablet(s) Oral daily  norepinephrine Infusion 0.05 MICROgram(s)/kG/Min IV Continuous <Continuous>  pantoprazole   Suspension 40 milliGRAM(s) Oral daily  piperacillin/tazobactam IVPB.. 3.375 Gram(s) IV Intermittent every 8 hours  sertraline 50 milliGRAM(s) Oral daily  silver sulfADIAZINE 1% Cream 1 Application(s) Topical two times a day  thiamine 100 milliGRAM(s) Oral daily    PRN MEDICATIONS      VITAL SIGNS: Last 24 Hours  T(C): 36.1 (30 Nov 2022 09:02), Max: 36.6 (29 Nov 2022 16:00)  T(F): 97 (30 Nov 2022 09:02), Max: 97.9 (29 Nov 2022 16:00)  HR: 74 (30 Nov 2022 13:09) (44 - 102)  BP: 130/70 (30 Nov 2022 13:09) (74/54 - 134/105)  BP(mean): 88 (30 Nov 2022 13:09) (59 - 997)  RR: 13 (30 Nov 2022 13:09) (9 - 40)  SpO2: 98% (30 Nov 2022 13:09) (94% - 100%)    LABS:                        9.8    14.11 )-----------( 59       ( 30 Nov 2022 05:00 )             28.1     11-30    140  |  105  |  21<H>  ----------------------------<  117<H>  3.5   |  27  |  <0.5<L>    Ca    7.9<L>      30 Nov 2022 05:00  Phos  2.6     11-30  Mg     1.6     11-30    TPro  4.1<L>  /  Alb  2.2<L>  /  TBili  0.3  /  DBili  x   /  AST  88<H>  /  ALT  103<H>  /  AlkPhos  270<H>  11-30    ABG - ( 29 Nov 2022 03:14 )  pH, Arterial: 7.37  pH, Blood: x     /  pCO2: 40    /  pO2: 222   / HCO3: 23    / Base Excess: -2.0  /  SaO2: 100.0     Culture - Other (collected 28 Nov 2022 13:31)  Source: .Other BLE  Preliminary Report (29 Nov 2022 18:56):    Rare Pseudomonas aeruginosa    Culture - Other (collected 28 Nov 2022 13:31)  Source: .Other BLE  Preliminary Report (29 Nov 2022 18:01):    Rare Pseudomonas aeruginosa    Culture - Urine (collected 27 Nov 2022 19:15)  Source: Clean Catch Clean Catch (Midstream)  Final Report (28 Nov 2022 23:09):    <10,000 CFU/mL Normal Urogenital Wanda    CAPILLARY BLOOD GLUCOSE  POCT Blood Glucose.: 113 mg/dL (30 Nov 2022 11:43)  POCT Blood Glucose.: 128 mg/dL (30 Nov 2022 05:52)  POCT Blood Glucose.: 122 mg/dL (29 Nov 2022 22:59)  POCT Blood Glucose.: 160 mg/dL (29 Nov 2022 18:39)      RADIOLOGY:  < from: Xray Chest 1 View- PORTABLE-Routine (Xray Chest 1 View- PORTABLE-Routine in AM.) (11.30.22 @ 05:51) >    Findings:  Support devices: Right IJ line with its tip overlying the SVC.   Neurostimulator wires overlie the spinal column.    Cardiac/mediastinum/hilum: Stable.    Lung parenchyma/Pleura: Interstitial prominence bilaterally. No   pneumothorax is seen.    Skeleton/soft tissues: Stable.    Impression:  Low lung volume.  Interstitial prominence bilaterally.  Right IJ line.  Neurostimulator wires.      ASSESSMENT & PLAN:     #Septic Shock  #Toxic-Metabolic Encephalopathy  #Necrotizing Fasciitis of bilateral LE (suspected)  #Osteomyelitis, suspected  #Left C6 Transverse Foramen Fracture  #Anorexia, h/o    PLAN  #Toxic-Metabolic Encephalopathy  - Off sedation  - Avoid oversedation  - f/u UDS  - Extubated 11/29    #Osteomylitis  #Septic Shock  - c/w Levophed (wean as tolerated)  - Start midodrine 10mg q8h  - f/u BCx (11/27) - NGTD  - UCx - normal urogenital wanda  - f/u CT B/L LE (r/o nec fasc vs OM)  - US RUQ - distended GB, no gallstones or inflammatory changes  - MRSA PCR neg, Strep and Legionella Abs neg  - Per ID: c/w Clindamycin 900mg q8h and Zosyn 3.375g q8h  - Burn consulted: local wound care, no surgical intervention at this time  - TTE: EF 50%  - HIT Ab neg    #Hypothyroidism  - obtain thyroid panel (r/o myxedema coma; s/p levothyroxine on presentation)  - TSH 7.92, T3 74, T4 7.7  - Start levothyroxine (11/29)     #Anorexia  - Started trickle feeds  - Per nutrition: start feeds with Replete at 25 ml/h - after tolerated for ~ 4 hours increase to goal 50 ml/h --> 77 gm protein & 1200 kcal/d  - f/u phos and correct to > 3.5, jin before attempting extubation  - (11/29) Palliative care consulted    #Left C6 transverse foramen fracture  - C/w C-collar  - f/u Trauma for TTS  - Neuro recc: MRI C-spine when stable    #Hx of rheumatoid arthritis  - Unknown if pt had been taking steroids. Per pt's , pt only takes pain control medications at home for RA  - Hydrocortisone 100mg IV q8h    #Anxiety  - Restarting home Xanax 0.25mg BID, Zoloft 50mg qd    Code Status: Full Code  PPI: Pantoprazole 40mg PO QD   DVT ppx: Lovenox 40mg SQ QD       For Follow-Up:  [] continue to trend LFTs, serum phosphorus  [] feeds per nutrition recs  [] c/w zosyn, clinda per ID Transfer Note    Transfer from: MICU  Transfer to: CEU    HPI: 67F w/ h/o anorexia, anxiety, depression, kidney cysts, peripheral neuropathy, RA, OA, osteoporosis, low back pain s/p unwitnessed fall, found down by  +HT, ?LOC, -AC. Contacted patient's  who was able to provide some history and states he returned home from work around 11pm when he found the patient down on the tiled kitchen floor. States the patient was awake and alert when he found her on the floor, but she didn't recall how she fell down.  speculates that she might've been sitting in chair and fallen out of it as this has happened before in the past.  states that the patient said she hit her head, and he noticed blood on the patient's knees, but no other injuries. Patient was complaining of left-sided neck pain after the fall, but  states she has been having this pain for about 1 month. About 1 hour after the fall, patient became lethargic and was unable to stay up on her feet, so  called for an ambulance.     Of note, pt has long-standing h/o anorexia for several years per . Does not induce vomiting, but frequently uses laxatives for weight loss. Has never consistently followed w/ psychologist/psychiatrist. After recent discharge from hospital two weeks ago, anorexia acutely worsened and pt has had minimal caloric intake.    On arrival to ED, patient unresponsive, hypotensive, bradycardic, and hypothermic. Patient also hypoglycemic (FS 24) and given dextrose immediately. Subsequently given Emiliana hugger, given 2L of warm IV fluids, started on levophed, and given broad-spectrum abx, and started on levothyroxine for possible myxedema coma (although no documentation in eMAR, so unclear if actually given). VBG noted respiratory acidosis. Pt unable to tolerate BiPAP due to current mental status, so patient intubated for airway protection. CT spine demonstrated fx of anterior and posterior tubercle of left C6 transverse foramen. NSGY consulted, but no surgical intervention offered. Subsequently admitted to MICU for presumed septic shock.    ICU COURSE:    In ICU, patient had elevated LFTs, RUQ sono negative for acute cholecystitis. LFTs likely due to patient taking handfuls of Advil and Tylenol daily for pain. LFTs down trending. Patient had SBT 11/29 and passed, she was successfully extubated. Patient refusing meds PO.       VITAL SIGNS: Last 24 Hours  T(C): 36.1 (30 Nov 2022 09:02), Max: 36.6 (29 Nov 2022 16:00)  T(F): 97 (30 Nov 2022 09:02), Max: 97.9 (29 Nov 2022 16:00)  HR: 74 (30 Nov 2022 13:09) (44 - 102)  BP: 130/70 (30 Nov 2022 13:09) (74/54 - 134/105)  BP(mean): 88 (30 Nov 2022 13:09) (59 - 997)  RR: 13 (30 Nov 2022 13:09) (9 - 40)  SpO2: 98% (30 Nov 2022 13:09) (94% - 100%)    PHYSICAL EXAM:  General: Cachetic. Ill appearing.   HEENT: Normocephalic, R forehead hematoma. PERRL.  LUNGS: Clear to auscultation b/l. No wheezes, rales, or rhonchi.  HEART: RRR. No murmurs, rubs, or gallops.  ABDOMEN: Soft, nontender, nondistended. Normoactive bowel sounds.  EXT: Pulses palpable x 4. No lower extremity edema.  NEURO: Awake, alert. Refusing to talk. Unable to assess orientation.  SKIN: Warm, dry.    MEDICATIONS:  STANDING MEDICATIONS  ALPRAZolam      ALPRAZolam 0.25 milliGRAM(s) Oral two times a day  chlorhexidine 2% Cloths 1 Application(s) Topical daily  clindamycin IVPB 900 milliGRAM(s) IV Intermittent every 8 hours  DAPTOmycin IVPB 320 milliGRAM(s) IV Intermittent every 24 hours  dexMEDEtomidine Infusion 0.05 MICROgram(s)/kG/Hr IV Continuous <Continuous>  dextrose 5%. 1000 milliLiter(s) IV Continuous <Continuous>  hydrocortisone sodium succinate Injectable 100 milliGRAM(s) IV Push every 8 hours  midodrine 10 milliGRAM(s) Oral every 8 hours  multivitamin/minerals 1 Tablet(s) Oral daily  norepinephrine Infusion 0.05 MICROgram(s)/kG/Min IV Continuous <Continuous>  pantoprazole   Suspension 40 milliGRAM(s) Oral daily  piperacillin/tazobactam IVPB.. 3.375 Gram(s) IV Intermittent every 8 hours  sertraline 50 milliGRAM(s) Oral daily  silver sulfADIAZINE 1% Cream 1 Application(s) Topical two times a day  thiamine 100 milliGRAM(s) Oral daily    PRN MEDICATIONS      VITAL SIGNS: Last 24 Hours  T(C): 36.1 (30 Nov 2022 09:02), Max: 36.6 (29 Nov 2022 16:00)  T(F): 97 (30 Nov 2022 09:02), Max: 97.9 (29 Nov 2022 16:00)  HR: 74 (30 Nov 2022 13:09) (44 - 102)  BP: 130/70 (30 Nov 2022 13:09) (74/54 - 134/105)  BP(mean): 88 (30 Nov 2022 13:09) (59 - 997)  RR: 13 (30 Nov 2022 13:09) (9 - 40)  SpO2: 98% (30 Nov 2022 13:09) (94% - 100%)    LABS:                        9.8    14.11 )-----------( 59       ( 30 Nov 2022 05:00 )             28.1     11-30    140  |  105  |  21<H>  ----------------------------<  117<H>  3.5   |  27  |  <0.5<L>    Ca    7.9<L>      30 Nov 2022 05:00  Phos  2.6     11-30  Mg     1.6     11-30    TPro  4.1<L>  /  Alb  2.2<L>  /  TBili  0.3  /  DBili  x   /  AST  88<H>  /  ALT  103<H>  /  AlkPhos  270<H>  11-30    ABG - ( 29 Nov 2022 03:14 )  pH, Arterial: 7.37  pH, Blood: x     /  pCO2: 40    /  pO2: 222   / HCO3: 23    / Base Excess: -2.0  /  SaO2: 100.0     Culture - Other (collected 28 Nov 2022 13:31)  Source: .Other BLE  Preliminary Report (29 Nov 2022 18:56):    Rare Pseudomonas aeruginosa    Culture - Other (collected 28 Nov 2022 13:31)  Source: .Other BLE  Preliminary Report (29 Nov 2022 18:01):    Rare Pseudomonas aeruginosa    Culture - Urine (collected 27 Nov 2022 19:15)  Source: Clean Catch Clean Catch (Midstream)  Final Report (28 Nov 2022 23:09):    <10,000 CFU/mL Normal Urogenital Wanda    CAPILLARY BLOOD GLUCOSE  POCT Blood Glucose.: 113 mg/dL (30 Nov 2022 11:43)  POCT Blood Glucose.: 128 mg/dL (30 Nov 2022 05:52)  POCT Blood Glucose.: 122 mg/dL (29 Nov 2022 22:59)  POCT Blood Glucose.: 160 mg/dL (29 Nov 2022 18:39)      RADIOLOGY:  < from: Xray Chest 1 View- PORTABLE-Routine (Xray Chest 1 View- PORTABLE-Routine in AM.) (11.30.22 @ 05:51) >    Findings:  Support devices: Right IJ line with its tip overlying the SVC.   Neurostimulator wires overlie the spinal column.    Cardiac/mediastinum/hilum: Stable.    Lung parenchyma/Pleura: Interstitial prominence bilaterally. No   pneumothorax is seen.    Skeleton/soft tissues: Stable.    Impression:  Low lung volume.  Interstitial prominence bilaterally.  Right IJ line.  Neurostimulator wires.      ASSESSMENT & PLAN:     #Septic Shock  #Toxic-Metabolic Encephalopathy  #Necrotizing Fasciitis of bilateral LE (suspected)  #Osteomyelitis, suspected  #Left C6 Transverse Foramen Fracture  #Anorexia, h/o    PLAN  #Toxic-Metabolic Encephalopathy  - Off sedation  - Avoid oversedation  - f/u UDS  - Extubated 11/29    #Osteomylitis  #Septic Shock  - c/w Levophed (wean as tolerated)  - Start midodrine 10mg q8h  - f/u BCx (11/27) - NGTD  - UCx - normal urogenital wanda  - f/u CT B/L LE (r/o nec fasc vs OM)  - US RUQ - distended GB, no gallstones or inflammatory changes  - MRSA PCR neg, Strep and Legionella Abs neg  - Per ID: c/w Clindamycin 900mg q8h and Zosyn 3.375g q8h  - Burn consulted: local wound care, no surgical intervention at this time  - TTE: EF 50%  - HIT Ab neg    #Hypothyroidism  - obtain thyroid panel (r/o myxedema coma; s/p levothyroxine on presentation)  - TSH 7.92, T3 74, T4 7.7  - Start levothyroxine (11/29)     #Anorexia  - Started trickle feeds  - Per nutrition: start feeds with Replete at 25 ml/h - after tolerated for ~ 4 hours increase to goal 50 ml/h --> 77 gm protein & 1200 kcal/d  - f/u phos and correct to > 3.5, jin before attempting extubation  - (11/29) Palliative care consulted    #Left C6 transverse foramen fracture  - C/w C-collar  - f/u Trauma for TTS  - Neuro recc: MRI C-spine when stable    #Hx of rheumatoid arthritis  - Unknown if pt had been taking steroids. Per pt's , pt only takes pain control medications at home for RA  - Hydrocortisone 100mg IV q8h    #Anxiety  - Restarting home Xanax 0.25mg BID, Zoloft 50mg qd    Code Status: Full Code  PPI: Pantoprazole 40mg PO QD   DVT ppx: Lovenox 40mg SQ QD       For Follow-Up:  [] continue to trend LFTs, serum phosphorus  [] feeds per nutrition recs  [] c/w zosyn, clinda per ID  - Possible psych evaluation

## 2022-12-01 NOTE — BH CONSULTATION LIAISON ASSESSMENT NOTE - NSBHCHARTREVIEWVS_PSY_A_CORE FT
Vital Signs Last 24 Hrs  T(C): 35.8 (01 Dec 2022 12:00), Max: 37.2 (30 Nov 2022 20:00)  T(F): 96.4 (01 Dec 2022 12:00), Max: 98.9 (30 Nov 2022 20:00)  HR: 66 (01 Dec 2022 15:00) (56 - 78)  BP: 124/71 (01 Dec 2022 15:00) (102/67 - 144/65)  BP(mean): 90 (01 Dec 2022 15:00) (78 - 115)  RR: 21 (01 Dec 2022 15:00) (15 - 30)  SpO2: 99% (01 Dec 2022 15:00) (98% - 100%)    Parameters below as of 01 Dec 2022 12:00  Patient On (Oxygen Delivery Method): room air

## 2022-12-01 NOTE — PROGRESS NOTE ADULT - SUBJECTIVE AND OBJECTIVE BOX
Patient is a 67y old  Female who presents with a chief complaint of AMS (30 Nov 2022 07:50)        Over Night Events: no events. was on D5.         ROS:     All ROS are negative except HPI         PHYSICAL EXAM    ICU Vital Signs Last 24 Hrs  T(C): 36.2 (01 Dec 2022 06:00), Max: 37.2 (30 Nov 2022 20:00)  T(F): 97.1 (01 Dec 2022 06:00), Max: 98.9 (30 Nov 2022 20:00)  HR: 66 (01 Dec 2022 06:00) (62 - 78)  BP: 133/76 (01 Dec 2022 06:00) (102/67 - 133/76)  BP(mean): 90 (01 Dec 2022 06:00) (78 - 115)-  RR: 17 (01 Dec 2022 06:00) (13 - 34)  SpO2: 98% (01 Dec 2022 06:00) (98% - 100%)    O2 Parameters below as of 30 Nov 2022 22:00  Patient On (Oxygen Delivery Method): room air            CONSTITUTIONAL:  ill appearing  Low BMI  T collar    ENT:   Airway patent,   Mouth with normal mucosa.      EYES:   Pupils equal,   Round and reactive to light.    CARDIAC:   Normal rate,   Regular rhythm.                RESPIRATORY:   No wheezing  Bilateral BS  Normal chest expansion  Not tachypneic,  No use of accessory muscles    GASTROINTESTINAL:  Abdomen soft,   Non-tender,   No guarding,   + BS         NEUROLOGICAL:   Alert and oriented   No motor  deficits.    SKIN:   sacrum stage 2   venous wounds              11-30-22 @ 07:01  -  12-01-22 @ 07:00  --------------------------------------------------------  IN:    dextrose 5%: 350 mL    dextrose 5%: 250 mL    dextrose 5%: 225 mL    IV PiggyBack: 50 mL    Norepinephrine: 0.4 mL  Total IN: 875.4 mL    OUT:    Dexmedetomidine: 0 mL    Indwelling Catheter - Urethral (mL): 1005 mL    Norepinephrine: 0 mL    Voided (mL): 1 mL  Total OUT: 1006 mL    Total NET: -130.6 mL          LABS:                            9.8    6.34  )-----------( 50       ( 01 Dec 2022 04:52 )             27.8                                               12-01    142  |  103  |  18  ----------------------------<  113<H>  3.1<L>   |  26  |  <0.5<L>    Ca    8.1<L>      01 Dec 2022 04:52  Phos  3.0     12-01  Mg     1.7     12-01    TPro  4.3<L>  /  Alb  2.3<L>  /  TBili  0.3  /  DBili  x   /  AST  118<H>  /  ALT  112<H>  /  AlkPhos  250<H>  12-01                                                                                           LIVER FUNCTIONS - ( 01 Dec 2022 04:52 )  Alb: 2.3 g/dL / Pro: 4.3 g/dL / ALK PHOS: 250 U/L / ALT: 112 U/L / AST: 118 U/L / GGT: x                                                  Culture - Other (collected 28 Nov 2022 13:31)  Source: .Other BLE  Preliminary Report (29 Nov 2022 18:56):    Rare Pseudomonas aeruginosa  Organism: Pseudomonas aeruginosa (30 Nov 2022 22:50)  Organism: Pseudomonas aeruginosa (30 Nov 2022 22:50)    Culture - Other (collected 28 Nov 2022 13:31)  Source: .Other BLE  Final Report (30 Nov 2022 13:55):    Rare Pseudomonas aeruginosa  Organism: Pseudomonas aeruginosa (30 Nov 2022 13:55)  Organism: Pseudomonas aeruginosa (30 Nov 2022 13:55)                                                                                           MEDICATIONS  (STANDING):  ALPRAZolam      ALPRAZolam 0.25 milliGRAM(s) Oral two times a day  chlorhexidine 2% Cloths 1 Application(s) Topical daily  clindamycin IVPB 900 milliGRAM(s) IV Intermittent every 8 hours  dexMEDEtomidine Infusion 0.05 MICROgram(s)/kG/Hr (0.5 mL/Hr) IV Continuous <Continuous>  hydrocortisone sodium succinate Injectable 100 milliGRAM(s) IV Push every 8 hours  midodrine 10 milliGRAM(s) Oral every 8 hours  multivitamin/minerals 1 Tablet(s) Oral daily  norepinephrine Infusion 0.02 MICROgram(s)/kG/Min (1.67 mL/Hr) IV Continuous <Continuous>  pantoprazole   Suspension 40 milliGRAM(s) Oral daily  piperacillin/tazobactam IVPB.. 3.375 Gram(s) IV Intermittent every 8 hours  sertraline 50 milliGRAM(s) Oral daily  silver sulfADIAZINE 1% Cream 1 Application(s) Topical two times a day  thiamine 100 milliGRAM(s) Oral daily    MEDICATIONS  (PRN):                Patient is a 67y old  Female who presents with a chief complaint of AMS (30 Nov 2022 07:50)        Over Night Events: no events. was on D5. speech and swallow noted, afebrile    PHYSICAL EXAM    ICU Vital Signs Last 24 Hrs  T(C): 36.2 (01 Dec 2022 06:00), Max: 37.2 (30 Nov 2022 20:00)  T(F): 97.1 (01 Dec 2022 06:00), Max: 98.9 (30 Nov 2022 20:00)  HR: 66 (01 Dec 2022 06:00) (62 - 78)  BP: 133/76 (01 Dec 2022 06:00) (102/67 - 133/76)  BP(mean): 90 (01 Dec 2022 06:00) (78 - 115)-  RR: 17 (01 Dec 2022 06:00) (13 - 34)  SpO2: 98% (01 Dec 2022 06:00) (98% - 100%)    O2 Parameters below as of 30 Nov 2022 22:00  Patient On (Oxygen Delivery Method): room air            CONSTITUTIONAL:  ill appearing  cachectic  T collar    ENT:   Airway patent,   Mouth with normal mucosa.      EYES:   Pupils equal,   Round and reactive to light.    CARDIAC:   ALEM 2.6    RESPIRATORY:   Dec bs both bases    GASTROINTESTINAL:  Abdomen soft,   Non-tender,   No guarding,   + BS         NEUROLOGICAL:   Alert and oriented   No motor  deficits.    SKIN:   sacrum stage 2   venous wounds  LE redness              11-30-22 @ 07:01  -  12-01-22 @ 07:00  --------------------------------------------------------  IN:    dextrose 5%: 350 mL    dextrose 5%: 250 mL    dextrose 5%: 225 mL    IV PiggyBack: 50 mL    Norepinephrine: 0.4 mL  Total IN: 875.4 mL    OUT:    Dexmedetomidine: 0 mL    Indwelling Catheter - Urethral (mL): 1005 mL    Norepinephrine: 0 mL    Voided (mL): 1 mL  Total OUT: 1006 mL    Total NET: -130.6 mL          LABS:                            9.8    6.34  )-----------( 50       ( 01 Dec 2022 04:52 )             27.8                                               12-01    142  |  103  |  18  ----------------------------<  113<H>  3.1<L>   |  26  |  <0.5<L>    Ca    8.1<L>      01 Dec 2022 04:52  Phos  3.0     12-01  Mg     1.7     12-01    TPro  4.3<L>  /  Alb  2.3<L>  /  TBili  0.3  /  DBili  x   /  AST  118<H>  /  ALT  112<H>  /  AlkPhos  250<H>  12-01                                                                                           LIVER FUNCTIONS - ( 01 Dec 2022 04:52 )  Alb: 2.3 g/dL / Pro: 4.3 g/dL / ALK PHOS: 250 U/L / ALT: 112 U/L / AST: 118 U/L / GGT: x                                                  Culture - Other (collected 28 Nov 2022 13:31)  Source: .Other BLE  Preliminary Report (29 Nov 2022 18:56):    Rare Pseudomonas aeruginosa  Organism: Pseudomonas aeruginosa (30 Nov 2022 22:50)  Organism: Pseudomonas aeruginosa (30 Nov 2022 22:50)    Culture - Other (collected 28 Nov 2022 13:31)  Source: .Other BLE  Final Report (30 Nov 2022 13:55):    Rare Pseudomonas aeruginosa  Organism: Pseudomonas aeruginosa (30 Nov 2022 13:55)  Organism: Pseudomonas aeruginosa (30 Nov 2022 13:55)                                                                                           MEDICATIONS  (STANDING):  ALPRAZolam      ALPRAZolam 0.25 milliGRAM(s) Oral two times a day  chlorhexidine 2% Cloths 1 Application(s) Topical daily  clindamycin IVPB 900 milliGRAM(s) IV Intermittent every 8 hours  dexMEDEtomidine Infusion 0.05 MICROgram(s)/kG/Hr (0.5 mL/Hr) IV Continuous <Continuous>  hydrocortisone sodium succinate Injectable 100 milliGRAM(s) IV Push every 8 hours  midodrine 10 milliGRAM(s) Oral every 8 hours  multivitamin/minerals 1 Tablet(s) Oral daily  norepinephrine Infusion 0.02 MICROgram(s)/kG/Min (1.67 mL/Hr) IV Continuous <Continuous>  pantoprazole   Suspension 40 milliGRAM(s) Oral daily  piperacillin/tazobactam IVPB.. 3.375 Gram(s) IV Intermittent every 8 hours  sertraline 50 milliGRAM(s) Oral daily  silver sulfADIAZINE 1% Cream 1 Application(s) Topical two times a day  thiamine 100 milliGRAM(s) Oral daily    MEDICATIONS  (PRN):

## 2022-12-01 NOTE — PHARMACOTHERAPY INTERVENTION NOTE - COMMENTS
Na 140-d/w team, d/c  D5W IVF
Mg 1.7-magnesium 2g IV x1 administered am, recommended 2nd dose
Vancomycin 750 mg q12h  PK: Peak 36.9        Trough 19.8

## 2022-12-01 NOTE — PROVIDER CONTACT NOTE (OTHER) - SITUATION
pt bradycardic <45. no interventions at the moment. held precedex  wound noted on left wrist
Pt has low urine output of only 75ml for the last 4 hours. Pt has in place gilbert catheter and has refused most oral intake. Pt started on IVF @ approximately 00:30.

## 2022-12-01 NOTE — PROGRESS NOTE ADULT - PROBLEM SELECTOR PLAN 5
Pt had chronic pain management - On Vicodin 10/325mg PO Q 6 hrs PRN pain 4-10   Gabapentin 600mg Q 8 hrs.  Would start Dilaudid 0.25mg IV Q 3 hrs PRN for pain 4-10 jin for dressing changes and during care

## 2022-12-01 NOTE — PROGRESS NOTE ADULT - ASSESSMENT
67yFemale being evaluated for goals of care and symptom management. Pt intubated, follows simple commands, unable to assess pain/dyspnea due to ET tube. Pt is restrained. Has been on sedation but it had recently been stopped to assess for SBT    Pt was recently at [facility] 11/10-11/11 had been sent from burn clinic re: worsening B/L LE wounds. Patient was ordered for a work up but she wanted to leave to complete outpatient. Now attempted to call  Henry to introduce palliative care, l/m on voicemail. Of note there is a HCP form in chart that lists other family members - see above in data    See [facility] note today.       MEDD (morphine equivalent daily dose): 0    See Recs below.    Please call [phone] with questions or concerns 24/7.   We will continue to follow.

## 2022-12-01 NOTE — BH CONSULTATION LIAISON ASSESSMENT NOTE - SUMMARY
Ms Roberts is a 67 year old woman  history of depression and Anorexia Nervosa who was admitted to the ICU for the management of altered mental status .   Psychiatry consult was called for a mental status evaluation .       At this time, patient is not considered an imminent danger to herself or others and does not need inpatient psychiatric hospitalization.  Ms Roberts is a 67 year old woman  history of depression and Anorexia Nervosa who was admitted to the ICU for the management of altered mental status .   Psychiatry consult was called for a mental status evaluation .   Patient seems to be less delirious than at presentation. She also seems to have a UTI which is currently being treated. Patient continues to have poor insight into her poor calorie intake .   For now, patient does not appear to have acute symptoms of psychosis, jimmy or depression. She denies having current suicidal ideations, intent or plan.   At this time, patient is not considered an imminent danger to herself or others and does not need inpatient psychiatric hospitalization. We would need to obtain some collateral information from patient's family member about patient's mental status. There is no indication for acute use of any psychotropic medication for now . The  psychiatric team will follow .

## 2022-12-01 NOTE — BH CONSULTATION LIAISON ASSESSMENT NOTE - OTHER PAST PSYCHIATRIC HISTORY (INCLUDE DETAILS REGARDING ONSET, COURSE OF ILLNESS, INPATIENT/OUTPATIENT TREATMENT)
Patient denies past inpatient psychiatric hospitalization and also denies past suicide attempts . however on chart review, patient was admitted to [facility] for Anorexia

## 2022-12-01 NOTE — PROGRESS NOTE ADULT - ASSESSMENT
ASSESSMENT      #Septic Shock resolved  # Acute hypoxemic resp failure sp extubation on RA  #Toxic-Metabolic Encephalopathy improving   #Necrotizing Fasciitis of bilateral LE / burn noted/ Pseudomonas  #Left C6 Transverse Foramen Fracture  #Anorexia, h/o  # thrombocytopenia worsening       PLAN    CNS: xanax OP dose    HEENT  - Oral care, t collar per neuro sx    PULMONARY: aspiration precaution, or RA    CARDIOVASCULAR  keep I=O     GI  - Diet: nutritional consult noted ,psych eval   - GI ppx: pantoprazole 40mg PO QD   - HOLD Laxitives    RENAL  - strict I&Os, daily weight, and monitor volume status  - Follow up renal function and lytes, correct as needed (K>4; Mg>2)    INFECTIOUS DISEASE  -abx per ID    HEMATOLOGICAL  - DVT ppx: Lovenox 40mg SQ QD   - HIT abx -ve   - Trend CBC    ENDOCRINE  - hydrocortisone    MSK  - c/w C-Collar as per NSGY  - f/u Trauma for TTS    DISPOSITION: SDU   palliative care     BILL IRAHETA 11/27  BILL MACIAS 11/ 27     ASSESSMENT      #Septic Shock resolved  # Acute hypoxemic resp failure sp extubation on RA  #Toxic-Metabolic Encephalopathy improving   #Necrotizing Fasciitis of bilateral LE / burn noted/ Pseudomonas in cx  #Left C6 Transverse Foramen Fracture  #Anorexia, h/o  # thrombocytopenia worsening   # transaminitis      PLAN    CNS: xanax OP dose    HEENT  - Oral care, t collar per neuro sx    PULMONARY: aspiration precaution, or RA    CARDIOVASCULAR dc IVF, if tolerates po    GI  - Diet: nutritional consult noted ,psych eval   - GI ppx: pantoprazole 40mg PO QD   - HOLD Laxitives    RENAL  - strict I&Os, daily weight, and monitor volume status  - Follow up renal function and lytes, correct as needed (K>4; Mg>2)    INFECTIOUS DISEASE  -abx per ID    HEMATOLOGICAL  - DVT ppx: Lovenox 40mg SQ QD   - HIT abx -ve   - Trend CBC    ENDOCRINE  - hydrocortisone 50q 8    MSK  - c/w C-Collar as per NSGY  - f/u Trauma for TTS    DISPOSITION: SDU   palliative care     BILL IRAHETA 11/27  BILL MACIAS 11/ 27

## 2022-12-01 NOTE — BH CONSULTATION LIAISON ASSESSMENT NOTE - CURRENT MEDICATION
MEDICATIONS  (STANDING):  ALPRAZolam      ALPRAZolam 0.25 milliGRAM(s) Oral two times a day  chlorhexidine 2% Cloths 1 Application(s) Topical daily  dexMEDEtomidine Infusion 0.05 MICROgram(s)/kG/Hr (0.5 mL/Hr) IV Continuous <Continuous>  hydrocortisone sodium succinate Injectable 100 milliGRAM(s) IV Push every 8 hours  midodrine 10 milliGRAM(s) Oral every 8 hours  multivitamin/minerals 1 Tablet(s) Oral daily  norepinephrine Infusion 0.02 MICROgram(s)/kG/Min (1.67 mL/Hr) IV Continuous <Continuous>  pantoprazole   Suspension 40 milliGRAM(s) Oral daily  piperacillin/tazobactam IVPB.. 3.375 Gram(s) IV Intermittent every 8 hours  sertraline 50 milliGRAM(s) Oral daily  silver sulfADIAZINE 1% Cream 1 Application(s) Topical two times a day    MEDICATIONS  (PRN):

## 2022-12-01 NOTE — BH CONSULTATION LIAISON ASSESSMENT NOTE - NSBHCONSULTFOLLOWAFTERCARE_PSY_A_CORE FT
Upon discharge , if patient is transferred to a [facility] rehab or nursing home, she can follow up with the psychiatrist affiliated with that facility , however , if patient is discharged home,  patient can be referred to [facility], [address] [place] [address], and Phone number: [phone]

## 2022-12-01 NOTE — BH CONSULTATION LIAISON ASSESSMENT NOTE - NSBHCHARTREVIEWLAB_PSY_A_CORE FT
9.8    6.34  )-----------( 50       ( 01 Dec 2022 04:52 )             27.8       12-01    142  |  103  |  18  ----------------------------<  113<H>  3.1<L>   |  26  |  <0.5<L>    Ca    8.1<L>      01 Dec 2022 04:52  Phos  3.0     12-01  Mg     1.7     12-01    TPro  4.3<L>  /  Alb  2.3<L>  /  TBili  0.3  /  DBili  x   /  AST  118<H>  /  ALT  112<H>  /  AlkPhos  250<H>  12-01                         9.8    6.34  )-----------( 50       ( 01 Dec 2022 04:52 )             27.8     Urinalysis (11.27.22 @ 19:15)    Glucose Qualitative, Urine: 100 mg/dL    Blood, Urine: Small    pH Urine: 6.5    Color: Yellow    Urine Appearance: Clear    Bilirubin: Negative    Ketone - Urine: Trace    Specific Gravity: >1.050    Protein, Urine: 30 mg/dL    Urobilinogen: <2 mg/dL    Nitrite: Negative    Leukocyte Esterase Concentration: Moderate      12-01    142  |  103  |  18  ----------------------------<  113<H>  3.1<L>   |  26  |  <0.5<L>    Ca    8.1<L>      01 Dec 2022 04:52  Phos  3.0     12-01  Mg     1.7     12-01    TPro  4.3<L>  /  Alb  2.3<L>  /  TBili  0.3  /  DBili  x   /  AST  118<H>  /  ALT  112<H>  /  AlkPhos  250<H>  12-01

## 2022-12-01 NOTE — PHARMACOTHERAPY INTERVENTION NOTE - INTERVENTION TYPE RECOOMEND
Pharmacokinetics Evaluation
Therapy Recommended - Med Rec related
Therapy Recommended - Additional therapy

## 2022-12-01 NOTE — PROGRESS NOTE ADULT - SUBJECTIVE AND OBJECTIVE BOX
HPI:  67F w/ h/o anorexia, anxiety, depression, kidney cysts, peripheral neuropathy, RA, OA, osteoporosis, low back pain s/p unwitnessed fall, found down by  +HT, ?LOC, -AC. Contacted patient's  who was able to provide some history and states he returned home from work around 11pm when he found the patient down on the tiled kitchen floor. States the patient was awake and alert when he found her on the floor, but she didn't recall how she fell down.  speculates that she might've been sitting in chair and fallen out of it as this has happened before in the past.  states that the patient said she hit her head, and he noticed blood on the patient's knees, but no other injuries. Patient was complaining of left-sided neck pain after the fall, but  states she has been having this pain for about 1 month. About 1 hour after the fall, patient became lethargic and was unable to stay up on her feet, so  called for an ambulance.     Of note, pt has long-standing h/o anorexia for several years per . Does not induce vomiting, but frequently uses laxatives for weight loss. Has never consistently followed w/ psychologist/psychiatrist. After recent discharge from hospital two weeks ago, anorexia acutely worsened and pt has had minimal caloric intake.    On arrival to ED, patient unresponsive, hypotensive, bradycardic, and hypothermic. Patient also hypoglycemic (FS 24) and given dextrose immediately. Subsequently given Emiliana hugger, given 2L of warm IV fluids, started on levophed, and given broad-spectrum abx, and started on levothyroxine for possible myxedema coma (although no documentation in eMAR, so unclear if actually given). VBG noted respiratory acidosis. Pt unable to tolerate BiPAP due to current mental status, so patient intubated for airway protection. CT spine demonstrated fx of anterior and posterior tubercle of left C6 transverse foramen. NSGY consulted, but no surgical intervention offered. Subsequently admitted to MICU for presumed septic shock. (27 Nov 2022 16:09)     INTERVAL EVENTS:  12/1: Patient off the ventilator. Met with spouse and son at bedside.     ADVANCE DIRECTIVES:    Full Code X Living Will  [ ]   DECISION MAKER(s):  [ ] Health Care Proxy(s)  [x ] Surrogate(s)  [ ] Guardian           Name(s): Phone Number(s):  Answers: Emergency Contact Name Henry Roberts,  Answers: Emergency Contact Phone # [phone],  Answers: Emergency Contact Relationship Spouse    BASELINE (I)ADL(s) (prior to admission):  [place]: [ ]Total  [x ] Moderate [ ]Dependent      Allergies    No Known Allergies    Intolerances    MEDICATIONS  (STANDING):  ALPRAZolam      ALPRAZolam 0.25 milliGRAM(s) Oral two times a day  chlorhexidine 2% Cloths 1 Application(s) Topical daily  dexMEDEtomidine Infusion 0.05 MICROgram(s)/kG/Hr (0.5 mL/Hr) IV Continuous <Continuous>  hydrocortisone sodium succinate Injectable 100 milliGRAM(s) IV Push every 8 hours  midodrine 10 milliGRAM(s) Oral every 8 hours  multivitamin/minerals 1 Tablet(s) Oral daily  norepinephrine Infusion 0.02 MICROgram(s)/kG/Min (1.67 mL/Hr) IV Continuous <Continuous>  pantoprazole   Suspension 40 milliGRAM(s) Oral daily  piperacillin/tazobactam IVPB.. 3.375 Gram(s) IV Intermittent every 8 hours  sertraline 50 milliGRAM(s) Oral daily  silver sulfADIAZINE 1% Cream 1 Application(s) Topical two times a day    MEDICATIONS  (PRN):    PRESENT SYMPTOMS: [x ]Unable to obtain due to poor mentation   Source if other than patient:  [ ]Family   [ ]Team     Pain: [ ]yes [ ]no  QOL impact -   Location -                    Aggravating factors -  Quality -  Radiation -  Timing-  Severity (0-10 scale):  Minimal acceptable level (0-10 scale):         Dyspnea:                           [ ]Mild [ ]Moderate [ ]Severe  Anxiety:                             [ ]Mild [ ]Moderate [ ]Severe  Fatigue:                             [ ]Mild [ ]Moderate [ ]Severe  Nausea:                             [ ]Mild [ ]Moderate [ ]Severe  Loss of appetite:              [ ]Mild [ ]Moderate [ ]Severe  Constipation:                    [ ]Mild [ ]Moderate [ ]Severe    Other Symptoms:  [ ]All other review of systems negative     Palliative Performance Status Version 2:         %    http://npcrc.org/files/news/palliative_performance_scale_ppsv2.pdf  PHYSICAL EXAM:  Vital Signs Last 24 Hrs  T(C): 35.8 (01 Dec 2022 12:00), Max: 37.2 (30 Nov 2022 20:00)  T(F): 96.4 (01 Dec 2022 12:00), Max: 98.9 (30 Nov 2022 20:00)  HR: 56 (01 Dec 2022 12:00) (56 - 78)  BP: 124/74 (01 Dec 2022 12:00) (102/67 - 144/65)  BP(mean): 85 (01 Dec 2022 12:00) (78 - 115)  RR: 19 (01 Dec 2022 12:00) (15 - 30)  SpO2: 100% (01 Dec 2022 12:00) (98% - 100%)    Parameters below as of 01 Dec 2022 12:00  Patient On (Oxygen Delivery Method): room air     I&O's Summary    30 Nov 2022 07:01  -  01 Dec 2022 07:00  --------------------------------------------------------  IN: 875.4 mL / OUT: 1006 mL / NET: -130.6 mL    01 Dec 2022 07:01  -  01 Dec 2022 14:13  --------------------------------------------------------  IN: 450 mL / OUT: 270 mL / NET: 180 mL  GENERAL:  [ ] No acute distress [x ]Lethargic  [ ]Unarousable  [ ]Verbal  [ ]Non-Verbal [ x]Cachexia    BEHAVIORAL/PSYCH:  [ ]Alert and Oriented x  [ ] Anxiety [ ] Delirium [ ] Agitation [ ] Calm   EYES: [x ] No scleral icterus [ ] Scleral icterus [ ] Closed  ENMT:  [ ]Dry mouth  [ ]No external oral lesions [ ] No external ear or nose lesions  CARDIOVASCULAR:  [ ]Regular [ ]Irregular [x ]Tachy [ ]Not Tachy  [ ]Parker [ ] Edema [ ] No edema  PULMONARY:  [ ]Tachypnea  [ ]Audible excessive secretions [ ] No labored breathing [ ] labored breathing  GASTROINTESTINAL: [x ]Soft  [ ]Distended  [ ]Not distended [ ]Non tender [ ]Tender  MUSCULOSKELETAL: [x ]No clubbing [ ] clubbing  [ ] No cyanosis [ ] cyanosis  NEUROLOGIC: [ ]No focal deficits  [ x]Follows commands  [ ]Does not follow commands  [ ]Cognitive impairment  [ ]Dysphagia  [ ]Dysarthria  [ ]Paresis   SKIN: [ ] Jaundiced [ x] Non-jaundiced [ ]Rash [ ]No Rash [ ] Warm [ ] Dry (+) several wounds, see nursing / burn notes please  MISC/LINES: [ ] ET tube [ ] Trach [ ]NGT/OGT [ ]PEG [ x]Hughes    CRITICAL CARE:  [x ] Shock Present  [x ]Septic [ ]Cardiogenic [ ]Neurologic [ ]Hypovolemic  [x ]  Vasopressors [ ]  Inotropes   [ ]Respiratory failure present [ ]Mechanical ventilation [ ]Non-invasive ventilatory support [ ]High flow  [ ]Acute  [ ]Chronic [ ]Hypoxic  [ ]Hypercarbic [ ]Other  [ ]Other organ failure   LABS:                        9.8    6.34  )-----------( 50       ( 01 Dec 2022 04:52 )             27.8   12-01    142  |  103  |  18  ----------------------------<  113<H>  3.1<L>   |  26  |  <0.5<L>    Ca    8.1<L>      01 Dec 2022 04:52  Phos  3.0     12-01  Mg     1.7     12-01    TPro  4.3<L>  /  Alb  2.3<L>  /  TBili  0.3  /  DBili  x   /  AST  118<H>  /  ALT  112<H>  /  AlkPhos  250<H>  12-01        REFERRALS:   [ ]Chaplaincy  [ ]Hospice  [ ]Child Life  [ x]Social Work  [x ]Case management [ ]Holistic Therapy     Goals of Care Document:

## 2022-12-01 NOTE — PROGRESS NOTE ADULT - SUBJECTIVE AND OBJECTIVE BOX
NUTRITION SUPPORT TEAM  -  PROGRESS NOTE   resting comfortably  swallow evaluation noted  on po diet    GI f/u noted      REVIEW OF SYSTEMS:  Negative except as noted above.     VITALS:  T(F): 96.4 (12-01 @ 12:00), Max: 97.9 (12-01 @ 04:00)  HR: 56 (12-01 @ 12:00) (56 - 66)  BP: 124/74 (12-01 @ 12:00) (113/66 - 144/65)  RR: 19 (12-01 @ 12:00) (15 - 30)  SpO2: 100% (12-01 @ 12:00) (98% - 100%)    HEIGHT/WEIGHT/BMI:   Height (cm): 170.2 (11-28), 170.2 (11-11), 170.2 (05-12), 170.2 (05-05)  Weight (kg): 44.5 (11-28), 49 (11-11), 49 (05-12), 49 (05-05)  BMI (kg/m2): 15.4 (11-28), 16.9 (11-11), 16.9 (05-12), 16.9 (05-05)      11-30-22 @ 07:01  -  12-01-22 @ 07:00  --------------------------------------------------------  IN:    dextrose 5%: 350 mL    dextrose 5%: 250 mL    dextrose 5%: 225 mL    IV PiggyBack: 50 mL    Norepinephrine: 0.4 mL  Total IN: 875.4 mL    OUT:    Dexmedetomidine: 0 mL    Indwelling Catheter - Urethral (mL): 1005 mL    Norepinephrine: 0 mL    Voided (mL): 1 mL  Total OUT: 1006 mL    Total NET: -130.6 mL        STANDING MEDICATIONS:   ALPRAZolam      ALPRAZolam 0.25 milliGRAM(s) Oral two times a day  chlorhexidine 2% Cloths 1 Application(s) Topical daily  dexMEDEtomidine Infusion 0.05 MICROgram(s)/kG/Hr IV Continuous <Continuous>  hydrocortisone sodium succinate Injectable 100 milliGRAM(s) IV Push every 8 hours  midodrine 10 milliGRAM(s) Oral every 8 hours  multivitamin/minerals 1 Tablet(s) Oral daily  norepinephrine Infusion 0.02 MICROgram(s)/kG/Min IV Continuous <Continuous>  pantoprazole   Suspension 40 milliGRAM(s) Oral daily  piperacillin/tazobactam IVPB.. 3.375 Gram(s) IV Intermittent every 8 hours  sertraline 50 milliGRAM(s) Oral daily  silver sulfADIAZINE 1% Cream 1 Application(s) Topical two times a day      LABS:                         9.8    6.34  )-----------( 50       ( 01 Dec 2022 04:52 )             27.8     142  |  103  |  18  ----------------------------<  113<H>          (12-01-22 @ 04:52)  3.1<L>   |  26  |  <0.5<L>    Ca    8.1<L>          (12-01-22 @ 04:52)  Phos  3.0         (12-01-22 @ 04:52)  Mg     1.7         (12-01-22 @ 04:52)    TPro  4.3<L>  /  Alb  2.3<L>  /  TBili  0.3  /  DBili  x   /  AST  118<H>  /  ALT  112<H>  /  AlkPhos  250<H>       12-01-22 @ 04:52      Triglycerides, Serum: 54 mg/dL (11-28 @ 04:30)    Prealbumin, Serum: 13 mg/dL (11-28-22 @ 04:30)    Vitamin D, 25-Hydroxy: 89 ng/mL (11-28 @ 04:30)    Blood Glucose (Past 24 hours):  136 mg/dL (12-01 @ 12:25)  139 mg/dL (12-01 @ 06:13)  100 mg/dL (11-30 @ 23:24)  131 mg/dL (11-30 @ 17:07)      DIET:   Diet, Pureed:   Mildly Thick Liquids (MILDTHICKLIQS) (12-01-22 @ 12:22) [Active]    RADIOLOGY:   < from: Xray Chest 1 View- PORTABLE-Routine (Xray Chest 1 View- PORTABLE-Routine in AM.) (11.30.22 @ 05:51) >  Impression:  Low lung volume.  Interstitial prominence bilaterally.  Right IJ line.  Neurostimulator wires.

## 2022-12-01 NOTE — CONSULT NOTE ADULT - SUBJECTIVE AND OBJECTIVE BOX
Gastroenterology Consultation:    Patient is a 67y old  Female who presents with a chief complaint of AMS (30 Nov 2022 07:50)        Admitted on: 11-27-22      HPI:    67F w/ h/o anorexia, anxiety, depression, kidney cysts, peripheral neuropathy, RA, OA, osteoporosis, low back pain s/p unwitnessed fall, found down by  +HT, ?LOC, -AC. On arrival to ED, patient unresponsive, hypotensive, bradycardic, and hypothermic. Patient also hypoglycemic (FS 24) and given dextrose immediately. Pt was admitted for septic shock requiring levophed and was intubated and extubated thereafter. Pt was also suspected to have possible necrotizing fascitis of b/l LE but was conservatively management with antibiotics and did not require surgical intervention. Pt was also suspected to have Osteomyelitis and received multiple courses of abx throughout admission. pt was also found to have C6 cervical fx and neck brace in place and hypothyroidism and started on synthroid. Pt was noted to have elevated liver enzymes on admission and Gi was consulted for management. Pt is a poor historian at this time. Denies n/V/D, fever, chills, sob, dizzness, acholic stools, dark urine, but endorses intentional weight loss.    Prior EGD: none in system    Prior Colonoscopy: none in system      PAST MEDICAL & SURGICAL HISTORY:  Anxiety      Depression      Osteoporosis      Kidney cysts      Peripheral neuropathy      RA (rheumatoid arthritis)      OA (osteoarthritis)      Low back pain with radiation  s/p &quot;nerve cutting&quot; 2015            FAMILY HISTORY:  Family history of stroke (Mother)  denies family hx of gastric, colon, pancreatic or liver cancer      Social History:  Tobacco: denies   Alcohol: denies   Drugs: denies    Home Medications:  ALPRAZolam 0.25 mg oral tablet: 1 tab(s) orally 2 times a day (29 Nov 2022 11:18)  Cardizem  mg/24 hours oral capsule, extended release: 1 cap(s) orally once a day (29 Nov 2022 11:18)  cilostazol 50 mg oral tablet: 1 tab(s) orally 2 times a day (29 Nov 2022 11:18)  gabapentin 600 mg oral tablet: 1 tab(s) orally 3 times a day (29 Nov 2022 11:18)  hydroCHLOROthiazide 25 mg oral tablet: 1 tab(s) orally 3 times a week (29 Nov 2022 11:18)  HYDROcodone-acetaminophen 10 mg-325 mg oral tablet: 1 tab(s) orally every 6 hours, As Needed - for severe pain (29 Nov 2022 11:18)  rosuvastatin 10 mg oral tablet: 1 tab(s) orally once a day (at bedtime) (29 Nov 2022 11:18)  sertraline 50 mg oral tablet: 1 tab(s) orally once a day (29 Nov 2022 11:18)        MEDICATIONS  (STANDING):  ALPRAZolam      ALPRAZolam 0.25 milliGRAM(s) Oral two times a day  chlorhexidine 2% Cloths 1 Application(s) Topical daily  dexMEDEtomidine Infusion 0.05 MICROgram(s)/kG/Hr (0.5 mL/Hr) IV Continuous <Continuous>  hydrocortisone sodium succinate Injectable 100 milliGRAM(s) IV Push every 8 hours  midodrine 10 milliGRAM(s) Oral every 8 hours  multivitamin/minerals 1 Tablet(s) Oral daily  norepinephrine Infusion 0.02 MICROgram(s)/kG/Min (1.67 mL/Hr) IV Continuous <Continuous>  pantoprazole   Suspension 40 milliGRAM(s) Oral daily  piperacillin/tazobactam IVPB.. 3.375 Gram(s) IV Intermittent every 8 hours  potassium chloride  20 mEq/100 mL IVPB 20 milliEquivalent(s) IV Intermittent every 2 hours  sertraline 50 milliGRAM(s) Oral daily  silver sulfADIAZINE 1% Cream 1 Application(s) Topical two times a day    MEDICATIONS  (PRN):      Allergies  No Known Allergies      Review of Systems:   Constitutional:  No Fever, No Chills  ENT/Mouth:  No Hearing Changes,  No Difficulty Swallowing  Eyes:  No Eye Pain, No Vision Changes  Cardiovascular:  No Chest Pain, No Palpitations  Respiratory:  No Cough, No Dyspnea  Gastrointestinal:  As described in HPI          Physical Examination:  T(C): 35.8 (12-01-22 @ 12:00), Max: 37.2 (11-30-22 @ 20:00)  HR: 56 (12-01-22 @ 12:00) (56 - 78)  BP: 124/74 (12-01-22 @ 12:00) (102/67 - 144/65)  RR: 19 (12-01-22 @ 12:00) (15 - 30)  SpO2: 100% (12-01-22 @ 12:00) (98% - 100%)      11-29-22 @ 07:01  -  11-30-22 @ 07:00  --------------------------------------------------------  IN: 1762.9 mL / OUT: 1160 mL / NET: 602.9 mL    11-30-22 @ 07:01  -  12-01-22 @ 07:00  --------------------------------------------------------  IN: 875.4 mL / OUT: 1006 mL / NET: -130.6 mL    12-01-22 @ 07:01  -  12-01-22 @ 13:18  --------------------------------------------------------  IN: 250 mL / OUT: 232 mL / NET: 18 mL          GENERAL: AAOx2, no acute distress. cachectic appearing  EYES: conjunctiva and sclera clear  CHEST/LUNG: Clear to auscultation bilaterally; No wheeze, rhonchi, or rales  HEART: Regular rate and rhythm; normal S1, S2, No murmurs.  ABDOMEN: Soft, nontender, nondistended; Bowel sounds present  SKIN: Intact, no jaundice        Data:                        9.8    6.34  )-----------( 50       ( 01 Dec 2022 04:52 )             27.8     Hgb Trend:  9.8  12-01-22 @ 04:52  9.8  11-30-22 @ 12:47  9.8  11-30-22 @ 05:00  11.6  11-29-22 @ 04:55        12-01    142  |  103  |  18  ----------------------------<  113<H>  3.1<L>   |  26  |  <0.5<L>    Ca    8.1<L>      01 Dec 2022 04:52  Phos  3.0     12-01  Mg     1.7     12-01    TPro  4.3<L>  /  Alb  2.3<L>  /  TBili  0.3  /  DBili  x   /  AST  118<H>  /  ALT  112<H>  /  AlkPhos  250<H>  12-01    Liver panel trend:  TBili 0.3   /      /      /   AlkP 250   /   Tptn 4.3   /   Alb 2.3    /   DBili --      12-01  TBili 0.3   /   AST 88   /      /   AlkP 270   /   Tptn 4.1   /   Alb 2.2    /   DBili --      11-30  TBili 0.2   /      /      /   AlkP 324   /   Tptn 4.3   /   Alb 2.3    /   DBili --      11-29  TBili <0.2   /      /      /   AlkP 362   /   Tptn 4.4   /   Alb 2.4    /   DBili --      11-28  TBili <0.2   /      /      /   AlkP 426   /   Tptn 4.9   /   Alb 2.6    /   DBili --      11-27  TBili <0.2   /      /   ALT 59   /   AlkP 157   /   Tptn 4.9   /   Alb 2.8    /   DBili --      11-27          Culture - Other (collected 28 Nov 2022 13:31)  Source: .Other BLE  Preliminary Report (29 Nov 2022 18:56):    Rare Pseudomonas aeruginosa  Organism: Pseudomonas aeruginosa (30 Nov 2022 22:50)  Organism: Pseudomonas aeruginosa (30 Nov 2022 22:50)    Culture - Other (collected 28 Nov 2022 13:31)  Source: .Other BLE  Final Report (30 Nov 2022 13:55):    Rare Pseudomonas aeruginosa  Organism: Pseudomonas aeruginosa (30 Nov 2022 13:55)  Organism: Pseudomonas aeruginosa (30 Nov 2022 13:55)          Radiology:

## 2022-12-01 NOTE — BH CONSULTATION LIAISON ASSESSMENT NOTE - HPI (INCLUDE ILLNESS QUALITY, SEVERITY, DURATION, TIMING, CONTEXT, MODIFYING FACTORS, ASSOCIATED SIGNS AND SYMPTOMS)
Ms Roberts is a 67 year old  woman ,  with 2 adult children , resides with her  and her children, unemployed , used to be a  , with a documented history of depression and Anorexia Nervosa who was admitted to the ICU for the management of altered mental status .   Psychiatry consult was called for a mental status evaluation .   According to the ICU team, patient is refusing medical care and the team is unclear if this is because of a psychiatric reason.         She denies current symptoms of anxiety, manic or psychosis . She denies current use of illicit drugs or alcohol.   Patient gave permission for the  psychiatry team to speak with her  for collateral information but we were unable to get through to him.    Ms Roberts is a 67 year old  woman ,  with 2 adult children , resides with her  and her children, unemployed , used to be a  , with a documented history of depression and Anorexia Nervosa who was admitted to the ICU for the management of altered mental status .   Psychiatry consult was called for a mental status evaluation .   According to the ICU team, patient is refusing medical care and the team is unclear if this is because of a psychiatric reason.   Upon approach, the patient was lying in bed awake with cervical collar in place. She appeared slightly uncomfortable, frequently adjusting her collar. She is observed to speaks at very low volumes difficult to hear what she was saying most of the time but she would often just reply to questions   by nodding or shaking her head . Patient reports that she fell when asked why she is in the hospital. She reports that she does not remember the events that happened afterwards. She however reports that she is  depressed, hopeless, and helpless but attributes this to not being able to work with resulting financial problems , after taking a fall in December of 2021. She reports that since then, her appetite and sleep have decreased but denies having suicidal thoughts intent or plan. When asked about the concern that the medical team have about her being anorexic, patient reports that she does not have anorexia and has been eating as much as anyone else in the hospital.   She denies current symptoms of anxiety, manic or psychosis . She denies current use of illicit drugs or alcohol.   Patient gave permission for the  psychiatry team to speak with her  for collateral information but we were unable to get through to him.

## 2022-12-01 NOTE — BH CONSULTATION LIAISON ASSESSMENT NOTE - NSBHCONSULTRECOMMENDOTHER_PSY_A_CORE FT
1. There is no acute indication for psychotropic medication for now   2. We would need to obtain collateral information from patient's family  3. Recommend behavioral interventions, and environmental modifications to help patient's orientation and help her feel safe in addition to implementing delirium precautions as per nursing staff .   4. Recommend melatonin 5 -10mg P.o bedtime to regulate sleep wake cycle   5. The  psychiatry team will follow  1. There is no acute indication for psychotropic medication for now   2. We would need to obtain collateral information from patient's family  3. Recommend behavioral interventions, and environmental modifications to help patient's orientation and help her feel safe in addition to implementing delirium precautions as per nursing staff .   4. Recommend melatonin 3 -5mg  P.o bedtime to regulate sleep wake cycle   5. The  psychiatry team will follow

## 2022-12-01 NOTE — SWALLOW BEDSIDE ASSESSMENT ADULT - NS SPL SWALLOW CLINIC TRIAL FT
+overt s/s of penetration/aspiration w/ thin liquids persists, +toleration of mildly thick and puree w/o overt s/s of penetration/aspiration, max encouragement required to accept po trials
+overt s/s of penetration/aspiration w/ thin liquids, +toleration of puree and mildly thick liquids w/o immediate overt s/s of penetration/aspiration +minimal po accepted

## 2022-12-01 NOTE — MEDICAL STUDENT PROGRESS NOTE(EDUCATION) - NS MD HP STUD ASPLAN ASSES FT
Patient is a 67 year old  female,  with two kids, domiciled with  and son on [place], last employed as a  in Dec 2021, completing education at [employer]. She has a PPH of anorexia, anxiety, depression. She was admitted to ICU for sepsis s/p unwitnessed fall at home. Psychiatry was consulted for mental health evaluation after patient appeared to have altered mental status, was refusing treatment. Patient appears to be depressed given recent financial stressor, being out of work for a year since her fall in 2021. Patient could benefit from psychotherapy, but has refused being treated medically for her depression.    Diagnosis:

## 2022-12-01 NOTE — SWALLOW BEDSIDE ASSESSMENT ADULT - SWALLOW EVAL: DIAGNOSIS
+overt s/s of penetration/aspiration w/ thin liquids persists, +toleration of mildly thick and puree w/o overt s/s of penetration/aspiration, max encouragement required to accept po trials.

## 2022-12-01 NOTE — PROGRESS NOTE ADULT - PROBLEM SELECTOR PLAN 4
Levofed gtt  clindamycin IVPB 900 milliGRAM(s) IV Intermittent every 8 hours  DAPTOmycin IVPB 320 milliGRAM(s) IV Intermittent every 24 hours  piperacillin/tazobactam IVPB.. 3.375 Gram(s) IV Intermittent every 8 hours  Wound care as per Burn team   SDU care   High risk, monitor counts, hemodynamics.

## 2022-12-01 NOTE — BH CONSULTATION LIAISON ASSESSMENT NOTE - RISK ASSESSMENT
risk factors for suicide in this patient are her depressed mood , chronic medical problems however she denies current suicidal ideations, denies past suicide attempts , has family support

## 2022-12-01 NOTE — PROGRESS NOTE ADULT - ASSESSMENT
ASSESSMENT  - septic shock  - acute resp failure  - anorexia  - LE wounds, r/o necrotizing fasciitis - sen by Burn team earlier today  - left C6 transverse foramen fracture  - hypokalemia/hypomagnesemia    SUGGEST:  on po diet   swallow evaluation noted   observe intake for adequacy  if enteral supplementation needed, which I suspect it will, need to feed by intermittent gravity, NOT continuous drip via NG, as pt without protected airway and she lies herself down and turns sideways

## 2022-12-01 NOTE — CHART NOTE - NSCHARTNOTEFT_GEN_A_CORE
Transfer Note  Transfer from: MICU  Transfer to: Step down    HPI: 67F w/ h/o anorexia, anxiety, depression, kidney cysts, peripheral neuropathy, RA, OA, osteoporosis, low back pain s/p unwitnessed fall, found down by  +HT, ?LOC, -AC. Contacted patient's  who was able to provide some history and states he returned home from work around 11pm when he found the patient down on the tiled kitchen floor. States the patient was awake and alert when he found her on the floor, but she didn't recall how she fell down.  speculates that she might've been sitting in chair and fallen out of it as this has happened before in the past.  states that the patient said she hit her head, and he noticed blood on the patient's knees, but no other injuries. Patient was complaining of left-sided neck pain after the fall, but  states she has been having this pain for about 1 month. About 1 hour after the fall, patient became lethargic and was unable to stay up on her feet, so  called for an ambulance.     Of note, pt has long-standing h/o anorexia for several years per . Does not induce vomiting, but frequently uses laxatives for weight loss. Has never consistently followed w/ psychologist/psychiatrist. After recent discharge from hospital two weeks ago, anorexia acutely worsened and pt has had minimal caloric intake.    On arrival to ED, patient unresponsive, hypotensive, bradycardic, and hypothermic. Patient also hypoglycemic (FS 24) and given dextrose immediately. Subsequently given Emiliana hugger, given 2L of warm IV fluids, started on levophed, and given broad-spectrum abx, and started on levothyroxine for possible myxedema coma (although no documentation in eMAR, so unclear if actually given). VBG noted respiratory acidosis. Pt unable to tolerate BiPAP due to current mental status, so patient intubated for airway protection. CT spine demonstrated fx of anterior and posterior tubercle of left C6 transverse foramen. NSGY consulted, but no surgical intervention offered. Subsequently admitted to MICU for presumed septic shock.    ICU COURSE:    In ICU, patient had elevated LFTs, RUQ sono negative for acute cholecystitis. LFTs likely due to patient taking handfuls of Advil and Tylenol daily for pain. LFTs down trending. Patient had SBT 11/29 and passed, she was successfully extubated. Patient refusing meds PO.     12/1- CMP showed hypomagnesemia and hypokalemia. Patient given magnesium and potassium. Hepatology consulted for elevated LFTs and CBD dilation. Consult placed to psychiatry to evaluated if underlying psychiatric disorder is associated with patient's anorexia. Monitor platelets. Pt hemodynamically stable. Clindamycin d/c. Hughes d/c.    F/u:  - Monitor platelets  - F/u hepatology recommendations  - F/u Psych rec  - continue to trend LFTs, serum phosphorus  - Daily CBC/CMP  - feeds per nutrition recs    MEDICATIONS:  STANDING MEDICATIONS  ALPRAZolam      ALPRAZolam 0.25 milliGRAM(s) Oral two times a day  chlorhexidine 2% Cloths 1 Application(s) Topical daily  dexMEDEtomidine Infusion 0.05 MICROgram(s)/kG/Hr IV Continuous <Continuous>  hydrocortisone sodium succinate Injectable 100 milliGRAM(s) IV Push every 8 hours  midodrine 10 milliGRAM(s) Oral every 8 hours  multivitamin/minerals 1 Tablet(s) Oral daily  norepinephrine Infusion 0.02 MICROgram(s)/kG/Min IV Continuous <Continuous>  pantoprazole   Suspension 40 milliGRAM(s) Oral daily  piperacillin/tazobactam IVPB.. 3.375 Gram(s) IV Intermittent every 8 hours  potassium chloride  20 mEq/100 mL IVPB 20 milliEquivalent(s) IV Intermittent every 2 hours  sertraline 50 milliGRAM(s) Oral daily  silver sulfADIAZINE 1% Cream 1 Application(s) Topical two times a day  thiamine 100 milliGRAM(s) Oral daily    PRN MEDICATIONS      VITAL SIGNS: Last 24 Hours  T(C): 35.9 (01 Dec 2022 08:00), Max: 37.2 (30 Nov 2022 20:00)  T(F): 96.7 (01 Dec 2022 08:00), Max: 98.9 (30 Nov 2022 20:00)  HR: 66 (01 Dec 2022 10:00) (62 - 78)  BP: 116/74 (01 Dec 2022 10:00) (102/67 - 144/65)  BP(mean): 86 (01 Dec 2022 10:00) (81 - 115)  RR: 21 (01 Dec 2022 10:00) (13 - 30)  SpO2: 98% (01 Dec 2022 10:00) (98% - 100%)    LABS:                        9.8    6.34  )-----------( 50       ( 01 Dec 2022 04:52 )             27.8     12-01    142  |  103  |  18  ----------------------------<  113<H>  3.1<L>   |  26  |  <0.5<L>    Ca    8.1<L>      01 Dec 2022 04:52  Phos  3.0     12-01  Mg     1.7     12-01    TPro  4.3<L>  /  Alb  2.3<L>  /  TBili  0.3  /  DBili  x   /  AST  118<H>  /  ALT  112<H>  /  AlkPhos  250<H>  12-01          Culture - Other (collected 28 Nov 2022 13:31)  Source: .Other BLE  Preliminary Report (29 Nov 2022 18:56):    Rare Pseudomonas aeruginosa  Organism: Pseudomonas aeruginosa (30 Nov 2022 22:50)  Organism: Pseudomonas aeruginosa (30 Nov 2022 22:50)    Culture - Other (collected 28 Nov 2022 13:31)  Source: .Other BLE  Final Report (30 Nov 2022 13:55):    Rare Pseudomonas aeruginosa  Organism: Pseudomonas aeruginosa (30 Nov 2022 13:55)  Organism: Pseudomonas aeruginosa (30 Nov 2022 13:55)          ASSESSMENT & PLAN:     #Septic Shock  #Toxic-Metabolic Encephalopathy  #Necrotizing Fasciitis of bilateral LE (suspected)  #Osteomyelitis, suspected  #Left C6 Transverse Foramen Fracture  #Anorexia, h/o    PLAN  #Toxic-Metabolic Encephalopathy  - Off sedation  - Avoid oversedation  - f/u UDS  - Extubated 11/29    #Osteomylitis  #Septic Shock  - c/w Levophed (wean as tolerated)  - Start midodrine 10mg q8h  - f/u BCx (11/27) - NGTD  - UCx - normal urogenital wanda  - f/u CT B/L LE (r/o nec fasc vs OM)  - US RUQ - distended GB, no gallstones or inflammatory changes  - MRSA PCR neg, Strep and Legionella Abs neg  - Per ID: c/w Clindamycin 900mg q8h and Zosyn 3.375g q8h  - Burn consulted: local wound care, no surgical intervention at this time  - TTE: EF 50%  - HIT Ab neg    #Hypothyroidism  - obtain thyroid panel (r/o myxedema coma; s/p levothyroxine on presentation)  - TSH 7.92, T3 74, T4 7.7  - Start levothyroxine (11/29)     #Anorexia  - Started trickle feeds  - Per nutrition: start feeds with Replete at 25 ml/h - after tolerated for ~ 4 hours increase to goal 50 ml/h --> 77 gm protein & 1200 kcal/d  - f/u phos and correct to > 3.5, jin before attempting extubation  - (11/29) Palliative care consulted    #Left C6 transverse foramen fracture  - C/w C-collar  - f/u Trauma for TTS  - Neuro recc: MRI C-spine when stable    #Hx of rheumatoid arthritis  - Unknown if pt had been taking steroids. Per pt's , pt only takes pain control medications at home for RA  - Hydrocortisone 100mg IV q8h    #Anxiety  - Restarting home Xanax 0.25mg BID, Zoloft 50mg qd    Code Status: Full Code  PPI: Pantoprazole 40mg PO QD   DVT ppx: Lovenox 40mg SQ QD       For Follow-Up:  [] continue to trend LFTs, serum phosphorus  [] feeds per nutrition recs  [] c/w zosyn, per ID  [] Possible psych evaluation.

## 2022-12-01 NOTE — MEDICAL STUDENT PROGRESS NOTE(EDUCATION) - SUBJECTIVE AND OBJECTIVE BOX
Chief Complaint: "I remember her from here"    HPI:  Patient is a 67 year old  female,  with two kids, domiciled with  and son on [address], last employed as a  in Dec 2021, completed schooling at [employer] and studied English. She has a PPH of anorexia, anxiety, depression and PMH of kidney cysts, peripheral neuropathy, RA, osteoporosis. She was admitted to ICU for sepsis s/p unwitnessed fall at home. Psychiatry was consulted for mental health evaluation after patient appeared to have altered mental status, refused treatment. Upon approach, the patient was lying in bed awake with cervical collar in place. She appeared slightly uncomfortable, frequently adjusting her collar. She speaks at very low volumes that are sometimes inaudible and at other times only responds by nodding. The patient admits to feeling depressed, hopeless, and helpless and attributes this to not being able to work after taking a fall in December of 2021. She also says that finances have been tough which she attributes partially to feeling this way. She says that since then, her appetite and sleep have also decreased. When asked how much she's been eating in the hospital, she responds "as much as anybody else." When asked if she thinks she is overweight or underweight she says 'neither.' The pt denies paranoia, delusions, visual hallucinations. She admits to having auditory hallucinations after she fell in 2021 but that it was 'just a one time thing.' She admits to SI without plan or intent. When asked if she would like for us to treat the depression, the patient refuses. Interview was slightly limited due to patient's lethargy, decreased volume and pace of speech. Upon leaving the room, the patient was able to call out to me (despite volume during interview) and ask me to prepare her things so that her family could take her home. Called  in attempt to obtain collateral information.     Mental Status Examination:  Patient is awake and alert. Speech is low in volume, at sometimes inaudible (though of note the pt expressed appropriate tone and verbalization at the end of the interview to try to get my attention as we were leaving). Patient reports being "depressed," affect is restricted. Thought process is linear. Pt admits to suicidal ideation but denies attempt, plan. Denies current auditory, visual hallucinations, paranoia. Patient has fair insight and judgment.     Vitals:  Vital Signs Last 24 Hrs  T(C): 35.8 (01 Dec 2022 12:00), Max: 37.2 (30 Nov 2022 20:00)  T(F): 96.4 (01 Dec 2022 12:00), Max: 98.9 (30 Nov 2022 20:00)  HR: 66 (01 Dec 2022 15:00) (56 - 78)  BP: 124/71 (01 Dec 2022 15:00) (102/67 - 144/65)  BP(mean): 90 (01 Dec 2022 15:00) (78 - 115)  RR: 21 (01 Dec 2022 15:00) (15 - 30)  SpO2: 99% (01 Dec 2022 15:00) (98% - 100%)    Parameters below as of 01 Dec 2022 12:00  Patient On (Oxygen Delivery Method): room air    Labs:                        9.8    6.34  )-----------( 50       ( 01 Dec 2022 04:52 )             27.8   12-01    142  |  103  |  18  ----------------------------<  113<H>  3.1<L>   |  26  |  <0.5<L>    Ca    8.1<L>      01 Dec 2022 04:52  Phos  3.0     12-01  Mg     1.7     12-01    TPro  4.3<L>  /  Alb  2.3<L>  /  TBili  0.3  /  DBili  x   /  AST  118<H>  /  ALT  112<H>  /  AlkPhos  250<H>  12-01      Past Psychiatric History:  Patient denies any formal psychiatric diagnoses, ever seeing a psychiatrist, history of inpatient psychiatric admission. She reports having been in therapy a long time ago.     Substance History:  Patient denies all substance use including alcohol and drugs. Denies admission to detox or rehab facilities.     Family History:  Patient denies any family history of psychiatric illness, suicide.     Past Medical History:    Social History:  Patient lives on [address] in a home with her  and son. She has two children. She completed college at [employer] and studied English. She was previously employed as a  but has been unemployed since 2021. Her  supports them financially and she also reports having a pension. She has 1 sister who she reports she is on good terms with.

## 2022-12-01 NOTE — BH CONSULTATION LIAISON ASSESSMENT NOTE - NSBHCONSULTMEDAGITATION_PSY_A_CORE FT
Recommend Haldol 2mg P.O Q 6 hrs PRN for agitation. Please note that this medication can be given via the intramuscular route if patient is severely agitated and is considered a danger to herself or others. Please ensure that QTC is < 500

## 2022-12-01 NOTE — CONSULT NOTE ADULT - ASSESSMENT
67F w/ h/o anorexia, anxiety, depression, kidney cysts, peripheral neuropathy, rectal prolapse s/p repair, RA, OA, osteoporosis, low back pain s/p unwitnessed fall, found down by  +HT, ?LOC, -AC. On arrival to ED, patient unresponsive, hypotensive, bradycardic, and hypothermic. Patient also hypoglycemic (FS 24) and given dextrose immediately. Pt was admitted for septic shock requiring levophed and was intubated and extubated thereafter. Pt was also suspected to have possible necrotizing fascitis of b/l LE but was conservatively management with antibiotics and did not require surgical intervention. Pt was also suspected to have Osteomyelitis and received multiple courses of abx throughout admission. pt was also found to have C6 cervical fx and neck brace in place and hypothyroidism and started on synthroid. Pt was noted to have elevated liver enzymes on admission and Gi was consulted for management.    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 Intrahepatic ductal dilation, CBD 1.4cm. PD mildly dilated. mild gb wall edema. no stones or sludge  - offending meds: outpatient (Augmentin 1/2022, Acyclovir 10/2022, Bactrim 4/2022, Cephalexin 4/2022). Inpatient - 11/27 - Meropenem, clindamycin, zosyn. 11/28 - Daptomycin  - Echo: EF >50%. G1DD and mild TVR  - no evidence of cholangitis     Plan  - Please send Hepatitis A IgM, Hepatitis B core IgM, core Ab total, surface Ag, surface Ab, HCV antibody, HCV RNA, Anti HEV  - Please send COMPA, AMA, anti-Smooth Muscle Ab type 1, Anti liver-kidney microsomal Ab, Anti-soluble liver Ag, immunoglobulin panel  - Please send CMV PCR, EBV PCR, HSV IgM  - Please send Serum Drug screen and Utox  - Please avoid hepatotoxic medications  - please obtain MRI pancreatic protocol with MRCP  - Monitor LFTs and INR daily

## 2022-12-02 NOTE — PROGRESS NOTE ADULT - SUBJECTIVE AND OBJECTIVE BOX
SUBJECTIVE:    Patient is a 67y old Female who presents with a chief complaint of fall and unresponsive (01 Dec 2022 14:11)    Currently admitted to medicine with the primary diagnosis of Acute respiratory failure with hypercapnia       Today is hospital day 5d. This morning she is resting comfortably in bed and reports no new issues or overnight events.     PAST MEDICAL & SURGICAL HISTORY  Anxiety    Depression    Osteoporosis    Kidney cysts    Peripheral neuropathy    RA (rheumatoid arthritis)    OA (osteoarthritis)    Low back pain with radiation  s/p &quot;nerve cutting&quot; 2015      SOCIAL HISTORY:  Negative for smoking/alcohol/drug use.     ALLERGIES:  No Known Allergies    MEDICATIONS:  STANDING MEDICATIONS  ALPRAZolam      ALPRAZolam 0.25 milliGRAM(s) Oral two times a day  chlorhexidine 2% Cloths 1 Application(s) Topical daily  hydrocortisone sodium succinate Injectable 100 milliGRAM(s) IV Push two times a day  multivitamin/minerals 1 Tablet(s) Oral daily  piperacillin/tazobactam IVPB.. 3.375 Gram(s) IV Intermittent every 8 hours  sertraline 50 milliGRAM(s) Oral daily  silver sulfADIAZINE 1% Cream 1 Application(s) Topical two times a day    PRN MEDICATIONS    VITALS:   T(F): 96.6  HR: 58  BP: 116/61  RR: 20  SpO2: 92%    LABS:                        11.0   6.25  )-----------( 70       ( 02 Dec 2022 12:32 )             32.8     12-02    142  |  104  |  24<H>  ----------------------------<  181<H>  3.6   |  29  |  0.5<L>    Ca    8.4      02 Dec 2022 12:32  Phos  3.0     12-01  Mg     1.9     12-02    TPro  4.8<L>  /  Alb  2.8<L>  /  TBili  0.4  /  DBili  x   /  AST  51<H>  /  ALT  84<H>  /  AlkPhos  249<H>  12-02    PT/INR - ( 02 Dec 2022 12:32 )   PT: 13.00 sec;   INR: 1.14 ratio         PTT - ( 02 Dec 2022 12:32 )  PTT:28.9 sec              RADIOLOGY:    PHYSICAL EXAM:  GENERAL:  NAD, cachectic, frail   SKIN: No rashes or lesions  HEENT: Atraumatic. Normocephalic. C collar in place   NECK: Supple, No JVD.   PULMONARY: Coarse breath sounds B/L. No wheezing.    CVS: Normal S1, S2. Rate and Rhythm are regular   ABDOMEN/GI: Soft, Nontender, Nondistended   MSK:  b/l LE dressing dry/intact   NEUROLOGIC:  moves all extremities   PSYCH: Awake and alert       Intravenous access:   NG tube:   Hughes Catheter:   Indwelling Urethral Catheter:     Connect To:  Straight Drainage/Gravity    Indication:  Urine Output Monitoring in Critically Ill (12-01-22 @ 02:05) (not performed) [Active]  Indwelling Urethral Catheter:     Connect To:  Straight Drainage/Gravity    Indication:  Urine Output Monitoring in Critically Ill (11-30-22 @ 02:16) (not performed) [Active]  Indwelling Urethral Catheter:     Connect To:  Straight Drainage/Gravity    Indication:  Urine Output Monitoring in Critically Ill (11-29-22 @ 01:31) (not performed) [Active]  Indwelling Urethral Catheter:     Connect To:  Straight Drainage/Gravity    Indication:  Urine Output Monitoring in Critically Ill (11-28-22 @ 15:29) (not performed) [Active]

## 2022-12-02 NOTE — SWALLOW BEDSIDE ASSESSMENT ADULT - SWALLOW EVAL: DIAGNOSIS
+toleration for puree, mildly thick liquids w/o overt s/s aspiration/penetration; +overt s/s persist w/ thin liquids and soft solids however pt w/ minimal po acceptance, requires max cues during PO trials.

## 2022-12-02 NOTE — CONSULT NOTE ADULT - SUBJECTIVE AND OBJECTIVE BOX
Outpt cardiologist:    HPI:  67F w/ h/o anorexia, anxiety, depression, kidney cysts, peripheral neuropathy, RA, OA, osteoporosis, low back pain s/p unwitnessed fall, found down by  +HT, ?LOC, -AC. Contacted patient's  who was able to provide some history and states he returned home from work around 11pm when he found the patient down on the tiled kitchen floor. States the patient was awake and alert when he found her on the floor, but she didn't recall how she fell down.  speculates that she might've been sitting in chair and fallen out of it as this has happened before in the past.  states that the patient said she hit her head, and he noticed blood on the patient's knees, but no other injuries. Patient was complaining of left-sided neck pain after the fall, but  states she has been having this pain for about 1 month. About 1 hour after the fall, patient became lethargic and was unable to stay up on her feet, so  called for an ambulance.     Of note, pt has long-standing h/o anorexia for several years per . Does not induce vomiting, but frequently uses laxatives for weight loss. Has never consistently followed w/ psychologist/psychiatrist. After recent discharge from hospital two weeks ago, anorexia acutely worsened and pt has had minimal caloric intake.    On arrival to ED, patient unresponsive, hypotensive, bradycardic, and hypothermic. Patient also hypoglycemic (FS 24) and given dextrose immediately. Subsequently given Emiliana hugger, given 2L of warm IV fluids, started on levophed, and given broad-spectrum abx, and started on levothyroxine for possible myxedema coma (although no documentation in eMAR, so unclear if actually given). VBG noted respiratory acidosis. Pt unable to tolerate BiPAP due to current mental status, so patient intubated for airway protection. CT spine demonstrated fx of anterior and posterior tubercle of left C6 transverse foramen. NSGY consulted, but no surgical intervention offered. Subsequently admitted to MICU for presumed septic shock. (2022 16:09)      ---  cardio fellow additional notes:    Patient was seen and examined at bedside. She feels fine and does not want to be bothered.   Cardiology was consulted for sinus bradycardia. She has no cardiac Hx. PMHx relevant for anorexia nervosa and malnourishment. She was admitted for metabolic encephalopathy and septic shock 2/2 pseudomonas bacteremia causing her fall.   Patient denies at the current moment any signs of lightheadedness or palpitations. She denies taking any medicati  Patient denies any interval Hx of ischemic events, symptomatic angina, dyspnea on exertion or palpitations. She does not follow with a cardiologist.     Medications reviewed - patient was previously on precedex for sedation and is currently off - Midodrine was stopped overnight - Hydrocortisone and quietapine initiated today. No AVNB agents administered     Lab work-up reviewed - TSH 7.2 - Trop stable around 0.04   EKG reviewed and showed sinus bradycardia with no other significant abn   Telemetry reviewed from 1 Dec - no events suggestive of dropped beats or abrupt change (HR max 72 - most of time was in s)   Echo reviewed - EF > 50% - no wall motion abn         PAST MEDICAL & SURGICAL HISTORY  Anxiety    Depression    Osteoporosis    Kidney cysts    Peripheral neuropathy    RA (rheumatoid arthritis)    OA (osteoarthritis)    Low back pain with radiation  s/p &quot;nerve cutting&quot; 2015        FAMILY HISTORY:  FAMILY HISTORY:  Family history of stroke (Mother)        SOCIAL HISTORY:  Social History:      ALLERGIES:  No Known Allergies      MEDICATIONS:  ALPRAZolam      ALPRAZolam 0.25 milliGRAM(s) Oral two times a day  chlorhexidine 2% Cloths 1 Application(s) Topical daily  hydrocortisone sodium succinate Injectable 100 milliGRAM(s) IV Push two times a day  multivitamin/minerals 1 Tablet(s) Oral daily  piperacillin/tazobactam IVPB.. 3.375 Gram(s) IV Intermittent every 8 hours  sertraline 50 milliGRAM(s) Oral daily  silver sulfADIAZINE 1% Cream 1 Application(s) Topical two times a day    PRN:      HOME MEDICATIONS:  Home Medications:  ALPRAZolam 0.25 mg oral tablet: 1 tab(s) orally 2 times a day (2022 11:18)  Cardizem  mg/24 hours oral capsule, extended release: 1 cap(s) orally once a day (2022 11:18)  cilostazol 50 mg oral tablet: 1 tab(s) orally 2 times a day (2022 11:18)  gabapentin 600 mg oral tablet: 1 tab(s) orally 3 times a day (2022 11:18)  hydroCHLOROthiazide 25 mg oral tablet: 1 tab(s) orally 3 times a week (2022 11:18)  HYDROcodone-acetaminophen 10 mg-325 mg oral tablet: 1 tab(s) orally every 6 hours, As Needed - for severe pain (2022 11:18)  rosuvastatin 10 mg oral tablet: 1 tab(s) orally once a day (at bedtime) (2022 11:18)  sertraline 50 mg oral tablet: 1 tab(s) orally once a day (2022 11:18)      VITALS:   T(F): 97.2 ( @ 16:00), Max: 99 ( @ 12:00)  HR: 53 ( @ 16:00) (37 - 110)  BP: 113/73 ( @ 16:00) (74/54 - 148/79)  BP(mean): 84 ( @ 12:22) (59 - 997)  RR: 20 ( @ 16:00) (9 - 40)  SpO2: 100% ( @ 16:00) (92% - 100%)    I&O's Summary    01 Dec 2022 07:  -  02 Dec 2022 07:00  --------------------------------------------------------  IN: 500 mL / OUT: 557 mL / NET: -57 mL    02 Dec 2022 07:01  -  02 Dec 2022 17:47  --------------------------------------------------------  IN: 250 mL / OUT: 0 mL / NET: 250 mL        REVIEW OF SYSTEMS:  CONSTITUTIONAL: No weakness, fevers or chills  HEENT: No visual changes, neck/ear pain  RESPIRATORY: No cough, sob  CARDIOVASCULAR: See HPI, slow pulse   GASTROINTESTINAL: No abdominal pain. No nausea, vomiting, diarrhea   GENITOURINARY: No dysuria, frequency or hematuria  NEUROLOGICAL: No new focal deficits  SKIN: No new rashes    PHYSICAL EXAM:  General: Not in distress.  Non-toxic appearing. Anorexic   HEENT: EOMI  Cardio: regular, S1, S2, no murmur  Pulm: B/L BS.  No wheezing / crackles / rales  Abdomen: Soft, non-tender, non-distended. Normoactive bowel sounds  Extremities: No edema b/l le  Neuro: A&O x3. No focal deficits    LABS:                        11.0   6.25  )-----------( 70       ( 02 Dec 2022 12:32 )             32.8     12    142  |  104  |  24<H>  ----------------------------<  181<H>  3.6   |  29  |  0.5<L>    Ca    8.4      02 Dec 2022 12:32  Phos  3.0     12  Mg     1.9         TPro  4.8<L>  /  Alb  2.8<L>  /  TBili  0.4  /  DBili  x   /  AST  51<H>  /  ALT  84<H>  /  AlkPhos  249<H>  12-02    PT/INR - ( 02 Dec 2022 12:32 )   PT: 13.00 sec;   INR: 1.14 ratio         PTT - ( 02 Dec 2022 12:32 )  PTT:28.9 sec          Troponin trend:      11- Chol 99 LDL -- HDL 68 Trig 54, 11-10 Chol 98 LDL -- HDL 63 Trig 72  COVID-19 PCR: NotDetec (10 Nov 2022 12:25)      RADIOLOGY:  -CXR:  -TTE:  -CCTA:  -STRESS TEST:  -CATHETERIZATION:  -OTHER:  EC Lead ECG:   Ventricular Rate 49 BPM    Atrial Rate 49 BPM    P-R Interval 164 ms    QRS Duration 108 ms    Q-T Interval 508 ms    QTC Calculation(Bazett) 458 ms    P Axis 72 degrees    R Axis 52 degrees    T Axis 59 degrees    Diagnosis Line Sinus bradycardia  Otherwise normal ECG    Confirmed by CONRAD SEGUNDO MD ([id number]) on 2022 2:37:10 PM ( @ 10:13)      TELEMETRY EVENTS:   Outpt cardiologist:    HPI:  67F w/ h/o anorexia, anxiety, depression, kidney cysts, peripheral neuropathy, RA, OA, osteoporosis, low back pain s/p unwitnessed fall, found down by  +HT, ?LOC, -AC. Contacted patient's  who was able to provide some history and states he returned home from work around 11pm when he found the patient down on the tiled kitchen floor. States the patient was awake and alert when he found her on the floor, but she didn't recall how she fell down.  speculates that she might've been sitting in chair and fallen out of it as this has happened before in the past.  states that the patient said she hit her head, and he noticed blood on the patient's knees, but no other injuries. Patient was complaining of left-sided neck pain after the fall, but  states she has been having this pain for about 1 month. About 1 hour after the fall, patient became lethargic and was unable to stay up on her feet, so  called for an ambulance.     Of note, pt has long-standing h/o anorexia for several years per . Does not induce vomiting, but frequently uses laxatives for weight loss. Has never consistently followed w/ psychologist/psychiatrist. After recent discharge from hospital two weeks ago, anorexia acutely worsened and pt has had minimal caloric intake.    On arrival to ED, patient unresponsive, hypotensive, bradycardic, and hypothermic. Patient also hypoglycemic (FS 24) and given dextrose immediately. Subsequently given Emiliana hugger, given 2L of warm IV fluids, started on levophed, and given broad-spectrum abx, and started on levothyroxine for possible myxedema coma (although no documentation in eMAR, so unclear if actually given). VBG noted respiratory acidosis. Pt unable to tolerate BiPAP due to current mental status, so patient intubated for airway protection. CT spine demonstrated fx of anterior and posterior tubercle of left C6 transverse foramen. NSGY consulted, but no surgical intervention offered. Subsequently admitted to MICU for presumed septic shock. (2022 16:09)      ---  cardio fellow additional notes:    Patient was seen and examined at bedside. She feels fine and does not want to be bothered.   Cardiology was consulted for sinus bradycardia. She has no cardiac Hx. PMHx relevant for anorexia nervosa and malnourishment. She was admitted for metabolic encephalopathy and septic shock 2/2 pseudomonas bacteremia causing her fall.   Patient denies at the current moment any signs of lightheadedness or palpitations. She denies taking any medication.   Patient has no previous history of low HR. Patient denies any interval Hx of ischemic events, symptomatic angina, dyspnea on exertion or palpitations. She does not follow with a cardiologist.     Medications reviewed - patient was previously on precedex for sedation and is currently off - Midodrine was stopped overnight - Hydrocortisone and quietapine initiated today. No AVNB agents administered     Lab work-up reviewed - TSH 7.2 - Trop stable around 0.04   EKG reviewed and showed sinus bradycardia with no other significant abn   Telemetry reviewed from 1 Dec - no events suggestive of dropped beats or abrupt change (HR max 72 - most of time was in  55s)   Echo reviewed - EF > 50% - no wall motion abn     PAST MEDICAL & SURGICAL HISTORY  Anxiety    Depression    Osteoporosis    Kidney cysts    Peripheral neuropathy    RA (rheumatoid arthritis)    OA (osteoarthritis)    Low back pain with radiation  s/p &quot;nerve cutting&quot; 2015        FAMILY HISTORY:  FAMILY HISTORY:  Family history of stroke (Mother)        SOCIAL HISTORY:  Social History:      ALLERGIES:  No Known Allergies      MEDICATIONS:  ALPRAZolam      ALPRAZolam 0.25 milliGRAM(s) Oral two times a day  chlorhexidine 2% Cloths 1 Application(s) Topical daily  hydrocortisone sodium succinate Injectable 100 milliGRAM(s) IV Push two times a day  multivitamin/minerals 1 Tablet(s) Oral daily  piperacillin/tazobactam IVPB.. 3.375 Gram(s) IV Intermittent every 8 hours  sertraline 50 milliGRAM(s) Oral daily  silver sulfADIAZINE 1% Cream 1 Application(s) Topical two times a day    PRN:      HOME MEDICATIONS:  Home Medications:  ALPRAZolam 0.25 mg oral tablet: 1 tab(s) orally 2 times a day (2022 11:18)  Cardizem  mg/24 hours oral capsule, extended release: 1 cap(s) orally once a day (2022 11:18)  cilostazol 50 mg oral tablet: 1 tab(s) orally 2 times a day (2022 11:18)  gabapentin 600 mg oral tablet: 1 tab(s) orally 3 times a day (2022 11:18)  hydroCHLOROthiazide 25 mg oral tablet: 1 tab(s) orally 3 times a week (2022 11:18)  HYDROcodone-acetaminophen 10 mg-325 mg oral tablet: 1 tab(s) orally every 6 hours, As Needed - for severe pain (2022 11:18)  rosuvastatin 10 mg oral tablet: 1 tab(s) orally once a day (at bedtime) (2022 11:18)  sertraline 50 mg oral tablet: 1 tab(s) orally once a day (2022 11:18)      VITALS:   T(F): 97.2 ( @ 16:00), Max: 99 ( @ 12:00)  HR: 53 ( @ 16:00) (37 - 110)  BP: 113/73 ( @ 16:00) (74/54 - 148/79)  BP(mean): 84 ( @ 12:22) (59 - 997)  RR: 20 ( @ 16:00) (9 - 40)  SpO2: 100% ( @ 16:00) (92% - 100%)    I&O's Summary    01 Dec 2022 07:  -  02 Dec 2022 07:00  --------------------------------------------------------  IN: 500 mL / OUT: 557 mL / NET: -57 mL    02 Dec 2022 07:  -  02 Dec 2022 17:47  --------------------------------------------------------  IN: 250 mL / OUT: 0 mL / NET: 250 mL        REVIEW OF SYSTEMS:  CONSTITUTIONAL: No weakness, fevers or chills  HEENT: No visual changes, neck/ear pain  RESPIRATORY: No cough, sob  CARDIOVASCULAR: See HPI, slow pulse   GASTROINTESTINAL: No abdominal pain. No nausea, vomiting, diarrhea   GENITOURINARY: No dysuria, frequency or hematuria  NEUROLOGICAL: No new focal deficits  SKIN: No new rashes    PHYSICAL EXAM:  General: Not in distress.  Non-toxic appearing. Anorexic   HEENT: EOMI  Cardio: regular, S1, S2, no murmur  Pulm: B/L BS.  No wheezing / crackles / rales  Abdomen: Soft, non-tender, non-distended. Normoactive bowel sounds  Extremities: No edema b/l le  Neuro: A&O x3. No focal deficits    LABS:                        11.0   6.25  )-----------( 70       ( 02 Dec 2022 12:32 )             32.8     12    142  |  104  |  24<H>  ----------------------------<  181<H>  3.6   |  29  |  0.5<L>    Ca    8.4      02 Dec 2022 12:32  Phos  3.0     12  Mg     1.9     12    TPro  4.8<L>  /  Alb  2.8<L>  /  TBili  0.4  /  DBili  x   /  AST  51<H>  /  ALT  84<H>  /  AlkPhos  249<H>  12-02    PT/INR - ( 02 Dec 2022 12:32 )   PT: 13.00 sec;   INR: 1.14 ratio         PTT - ( 02 Dec 2022 12:32 )  PTT:28.9 sec          Troponin trend:      11- Chol 99 LDL -- HDL 68 Trig 54, 11-10 Chol 98 LDL -- HDL 63 Trig 72  COVID-19 PCR: NotDetec (10 Nov 2022 12:25)      RADIOLOGY:  -CXR:  -TTE:  -CCTA:  -STRESS TEST:  -CATHETERIZATION:  -OTHER:  EC Lead ECG:   Ventricular Rate 49 BPM    Atrial Rate 49 BPM    P-R Interval 164 ms    QRS Duration 108 ms    Q-T Interval 508 ms    QTC Calculation(Bazett) 458 ms    P Axis 72 degrees    R Axis 52 degrees    T Axis 59 degrees    Diagnosis Line Sinus bradycardia  Otherwise normal ECG    Confirmed by CONRAD SEGUNDO MD ([id number]) on 2022 2:37:10 PM ( @ 10:13)      TELEMETRY EVENTS:

## 2022-12-02 NOTE — PROGRESS NOTE ADULT - ASSESSMENT
67F w/ h/o anorexia, anxiety, depression, kidney cysts, peripheral neuropathy, RA, OA, osteoporosis, low back pain s/p unwitnessed fall, found down by  +HT. On arrival to ED, patient unresponsive, hypotensive, bradycardic, and hypothermic. Patient also hypoglycemic (FS 24) and given dextrose immediately. Subsequently given Emiliana hugger, given 2L of warm IV fluids, started on levophed, and given broad-spectrum abx, and started on levothyroxine for possible myxedema coma (although no documentation in eMAR, so unclear if actually given). VBG noted respiratory acidosis.Patient intubated for airway protection. CT spine demonstrated fx of anterior and posterior tubercle of left C6 transverse foramen. NSGY consulted, but no surgical intervention offered. Subsequently admitted to MICU for presumed septic shock, extubated and downgraded to SDU     Septic Shock likely due to severe soft tissue infection  Toxic-Metabolic Encephalopathy  Full thickness wounds of bl LE   Osteomyelitis, suspected L Ankle/foot  intubated, extubated 11/29, currently comfortable on ra  s/p burn eval--> no evidence of nec fasc, c/w wound care  BID  on Zosyn per ID, tentatively until 12/7  dc midodrine due to bradycardia    Bradycardia, new, asymptomatic  HR noted 30s today  midodrine on hold, monitor electrlolytes/replete as needed   cardio eval pending    Left C6 Transverse Foramen Fracture  known to neurosurgery service, very high risk for surgical intervention  c/w Collar, will need MRI C spine when improved     Hypothyroidism  - TSH 7.92, T3 74, T4 7.7  - started on HC, now tapering  - repeat TFTs in 4-6 weeks     Anorexia  Anxiety  - long history per   - monitor PO intake, nutrition eval  - s/p psychiatry eval, no contraindication for dc, recs outpatient psychiatry/anorexia referral   - resumed home Xanax    Hx of rheumatoid arthritis  - Unknown if pt had been taking steroids. Per pt's , pt only takes pain control medications at home for RA  - on HC as above     Overall prognosis guarded  Patient requires continuous monitoring in SDU

## 2022-12-02 NOTE — CONSULT NOTE ADULT - PROVIDER SPECIALTY LIST ADULT
Burn
Neurosurgery
Trauma Surgery
Cardiology
Gastroenterology
Podiatry
Nutrition Support
Infectious Disease
Palliative Care

## 2022-12-02 NOTE — BH CONSULTATION LIAISON PROGRESS NOTE - NSBHCHARTREVIEWVS_PSY_A_CORE FT
Vital Signs Last 24 Hrs  T(C): 36.1 (02 Dec 2022 07:03), Max: 36.3 (01 Dec 2022 16:00)  T(F): 96.9 (02 Dec 2022 07:03), Max: 97.3 (01 Dec 2022 16:00)  HR: 37 (02 Dec 2022 07:03) (37 - 66)  BP: 140/65 (02 Dec 2022 07:03) (124/71 - 148/79)  BP(mean): 93 (02 Dec 2022 07:03) (85 - 100)  RR: 20 (02 Dec 2022 07:03) (16 - 23)  SpO2: 98% (02 Dec 2022 07:03) (97% - 100%)    Parameters below as of 02 Dec 2022 07:03  Patient On (Oxygen Delivery Method): room air

## 2022-12-02 NOTE — PROGRESS NOTE ADULT - ASSESSMENT
ASSESSMENT      #Septic Shock resolved  # Acute hypoxemic resp failure resolved   #Toxic-Metabolic Encephalopathy resolved   #Necrotizing Fasciitis of bilateral LE / burn noted/ Pseudomonas in cx  #Left C6 Transverse Foramen Fracture  # HO Anorexia.    # Thrombocytopenia HIT negative   # transaminitis      PLAN    CNS: Xanax OP dose    HEENT  - Oral care, t collar per neuro sx.  Repeat Speech     PULMONARY: Aspiration precaution, or RA.  HOB at 45 degrees     CARDIOVASCULAR EKG.  DC midodrine.  Card eval     GI  - Diet: nutritional consult noted ,psych eval   - GI ppx: pantoprazole 40mg PO QD   - HOLD Laxitives    RENAL  - Follow up renal function and lytes, correct as needed (K>4; Mg>2)    INFECTIOUS DISEASE  -ABX per ID    HEMATOLOGICAL  - DVT ppx: Lovenox 40mg SQ QD   - HIT abx -ve   - Trend CBC  - DIC panel     ENDOCRINE  - hydrocortisone 50q 12    MSK  - c/w C-Collar as per NSGY  - f/u Trauma for TTS    DISPOSITION: SDU

## 2022-12-02 NOTE — PROGRESS NOTE ADULT - SUBJECTIVE AND OBJECTIVE BOX
ANA LILIA VERMA  67y Female    CHIEF COMPLAINT:    Patient is a 67y old  Female who presents with a chief complaint of fall and unresponsive (01 Dec 2022 14:11)    INTERVAL HPI/OVERNIGHT EVENTS:    Patient seen and examined. No acute events overnight. Transferred from ICU, C collar in place     ROS: All other systems are negative.    Vital Signs:    T(F): 96.6 (22 @ 12:22), Max: 97.3 (22 @ 16:00)  HR: 58 (22 @ 12:22) (37 - 64)  BP: 116/61 (22 @ 12:22) (116/61 - 148/79)  RR: 20 (22 @ 12:22) (16 - 23)  SpO2: 92% (22 @ 12:22) (92% - 99%)    01 Dec 2022 07:01  -  02 Dec 2022 07:00  --------------------------------------------------------  IN: 500 mL / OUT: 557 mL / NET: -57 mL    02 Dec 2022 07:01  -  02 Dec 2022 15:48  --------------------------------------------------------  IN: 250 mL / OUT: 0 mL / NET: 250 mL    Daily Weight in k (02 Dec 2022 07:03)    POCT Blood Glucose.: 68 mg/dL (02 Dec 2022 07:34)    PHYSICAL EXAM:    GENERAL:  NAD, cachectic, frail   SKIN: No rashes or lesions  HEENT: Atraumatic. Normocephalic. C collar in place   NECK: Supple, No JVD.   PULMONARY: Coarse breath sounds B/L. No wheezing.    CVS: Normal S1, S2. Rate and Rhythm are regular   ABDOMEN/GI: Soft, Nontender, Nondistended   MSK:  b/l LE dressing dry/intact   NEUROLOGIC:  moves all extremities   PSYCH: Awake and alert     Consultant(s) Notes Reviewed:  [x ] YES  [ ] NO  Care Discussed with Consultants/Other Providers [ x] YES  [ ] NO    LABS:                        11.0   6.25  )-----------( 70       ( 02 Dec 2022 12:32 )             32.8    142  |  104  |  24<H>  ----------------------------<  181<H>  3.6   |  29  |  0.5<L>    Ca    8.4      02 Dec 2022 12:32  Phos  3.0     12  Mg     1.9     12    TPro  4.8<L>  /  Alb  2.8<L>  /  TBili  0.4  /  DBili  x   /  AST  51<H>  /  ALT  84<H>  /  AlkPhos  249<H>  12    PT/INR - ( 02 Dec 2022 12:32 )   PT: 13.00 sec;   INR: 1.14 ratio       PTT - ( 02 Dec 2022 12:32 )  PTT:28.9 sec  Serum Pro-Brain Natriuretic Peptide: 2184 pg/mL (22 @ 19:15)    RADIOLOGY & ADDITIONAL TESTS:  Imaging or report Personally Reviewed:  [x] YES  [ ] NO  EKG reviewed: [x] YES  [ ] NO    Medications:  Standing  ALPRAZolam      ALPRAZolam 0.25 milliGRAM(s) Oral two times a day  chlorhexidine 2% Cloths 1 Application(s) Topical daily  hydrocortisone sodium succinate Injectable 100 milliGRAM(s) IV Push two times a day  multivitamin/minerals 1 Tablet(s) Oral daily  piperacillin/tazobactam IVPB.. 3.375 Gram(s) IV Intermittent every 8 hours  sertraline 50 milliGRAM(s) Oral daily  silver sulfADIAZINE 1% Cream 1 Application(s) Topical two times a day    PRN Meds

## 2022-12-02 NOTE — CONSULT NOTE ADULT - ATTENDING COMMENTS
bilateral LE wounds  left plantar lateral foot ulcer--> no active drainage, no acute signs of infection  agree with burn regarding local wound care  early onset ischemic changes b/l feet, likely from pressors  f/u arterial duplex  will continue to monitor for demarcation      My notes supersedes the above resident note in case of discrepancy.     Gary Niteo, JOE, FACFAS
agree w/ above.  hx is mostly suggestive of DILI given multiple abx.  trend LFTs and avoid hepatotoxic agents.   for double duct sign, please get MRI pancreatic protocol to r/o panc head lesion.    Time-based billing (NON-critical care).   70 minutes spent on total encounter; more than 50% of the visit was spent counseling and / or coordinating care by the attending physician.  The necessity of the time spent during the encounter on this date of service was due to: Coordination of care.
Seen 12/2/22 evening.    No cardiac history.  Comorbidities as above.  Notably, anorexia (malnourishment).    Hospitalised post fall.  Sepsis secondary to pseudomonal bacteremia.    Telemetry reviewed: SB (HR mostly 50's / 70-80's at times.  No sinus pauses / AVB.  EKG: NSR  ECHO: nL LVSF    SB likely multifactorial (resting / home CCB / sepsis / hypothyroid).  Evidence of chronotropic competence on telemetry.    - Hold Cardizem.  - Cont Telemetry.  - Check thyroid studies.
67yF w/ PMHx of anxiety, depression, kidney cysts, peripheral neuropathy, RA, OA, osteoporosis, low back pain seen as Trauma Consult s/p unwitnessed fall, found down by  +HT, ?LOC, -AC with external signs of trauma including right forehead hematoma and R knee wound/abrasion. Trauma assessment in ED: intubated, vented and on pressors, GCS 3 , AAOx 0.     Injuries identified:   - Fracture of anterior and posterior tubercle of left C6 transverse foramen    PLAN:   - Trauma Labs: (CBC, BMP, Coags, T&S, UA, EtOH level)  Additional studies:  EKG  Utox    Trauma Imaging to include the following:  - CXR, Pelvic Xray  - CT Head,  CT C-spine, CT Chest, CT Abd/Pelvis  - CTA Head, CTA Neck, CT LE b/l   - Extremity films: None    Additional consultations:  -   -    Disposition pending results of above labs and imaging

## 2022-12-02 NOTE — PROGRESS NOTE ADULT - SUBJECTIVE AND OBJECTIVE BOX
ANA LILIA VERMA  67y, Female  Allergy: No Known Allergies      LOS  5d    CHIEF COMPLAINT: fall and unresponsive (01 Dec 2022 14:11)      INTERVAL EVENTS/HPI  - No acute events overnight  - T(F): , Max: 97.3 (12-01-22 @ 16:00)  - WBC Count: 6.25 (12-02-22 @ 12:32)  WBC Count: 6.34 (12-01-22 @ 04:52)     - Creatinine, Serum: 0.5 (12-02-22 @ 12:32)  Creatinine, Serum: <0.5 (12-01-22 @ 04:52)       ROS  General: Denies rigors, nightsweats  HEENT: Denies headache, rhinorrhea, sore throat, eye pain  CV: Denies CP, palpitations  PULM: Denies wheezing, hemoptysis  GI: Denies hematemesis, hematochezia, melena  : Denies discharge, hematuria  MSK: Denies arthralgias, myalgias  SKIN: Denies rash, lesions  NEURO: Denies paresthesias, weakness  PSYCH: Denies depression, anxiety    VITALS:  T(F): 96.6, Max: 97.3 (12-01-22 @ 16:00)  HR: 58  BP: 116/61  RR: 20Vital Signs Last 24 Hrs  T(C): 35.9 (02 Dec 2022 12:22), Max: 36.3 (01 Dec 2022 16:00)  T(F): 96.6 (02 Dec 2022 12:22), Max: 97.3 (01 Dec 2022 16:00)  HR: 58 (02 Dec 2022 12:22) (37 - 64)  BP: 116/61 (02 Dec 2022 12:22) (116/61 - 148/79)  BP(mean): 84 (02 Dec 2022 12:22) (84 - 100)  RR: 20 (02 Dec 2022 12:22) (16 - 23)  SpO2: 92% (02 Dec 2022 12:22) (92% - 99%)    Parameters below as of 02 Dec 2022 12:22  Patient On (Oxygen Delivery Method): room air        PHYSICAL EXAM:  Gen: NAD, resting in bed  HEENT: Normocephalic, atraumatic  Neck: supple, no lymphadenopathy  CV: Regular rate & regular rhythm  Lungs: decreased BS at bases, no fremitus  Abdomen: Soft, BS present  Ext: Warm, well perfused  Neuro: non focal, awake  Skin: no rash, no erythema  Lines: no phlebitis    FH: Non-contributory  Social Hx: Non-contributory    TESTS & MEASUREMENTS:                        11.0   6.25  )-----------( 70       ( 02 Dec 2022 12:32 )             32.8     12-02    142  |  104  |  24<H>  ----------------------------<  181<H>  3.6   |  29  |  0.5<L>    Ca    8.4      02 Dec 2022 12:32  Phos  3.0     12-01  Mg     1.9     12-02    TPro  4.8<L>  /  Alb  2.8<L>  /  TBili  0.4  /  DBili  x   /  AST  51<H>  /  ALT  84<H>  /  AlkPhos  249<H>  12-02      LIVER FUNCTIONS - ( 02 Dec 2022 12:32 )  Alb: 2.8 g/dL / Pro: 4.8 g/dL / ALK PHOS: 249 U/L / ALT: 84 U/L / AST: 51 U/L / GGT: x               Culture - Other (collected 11-28-22 @ 13:31)  Source: .Other BLE  Final Report (12-01-22 @ 23:12):    Rare Pseudomonas aeruginosa    Rare Most closely resembling Pseudomonas species, not aeruginosa  Organism: Pseudomonas aeruginosa  Pseudomonas species (12-01-22 @ 23:12)  Organism: Pseudomonas species (12-01-22 @ 23:12)      -  Amikacin: S <=16      -  Aztreonam: S <=4      -  Cefepime: S <=2      -  Ceftriaxone: R 8      -  Ciprofloxacin: S <=0.25      -  Gentamicin: S <=2      -  Levofloxacin: S <=0.5      -  Meropenem: S <=1      -  Piperacillin/Tazobactam: S <=8      -  Tobramycin: S <=2      -  Trimethoprim/Sulfamethoxazole: R >2/38      Method Type: SEPIDEH  Organism: Pseudomonas aeruginosa (12-01-22 @ 23:12)      -  Amikacin: S <=16      -  Aztreonam: S <=4      -  Cefepime: S <=2      -  Ceftazidime: S <=1      -  Ciprofloxacin: S <=0.25      -  Gentamicin: S <=2      -  Imipenem: S <=1      -  Levofloxacin: S <=0.5      -  Meropenem: S <=1      -  Piperacillin/Tazobactam: S <=8      -  Tobramycin: S <=2      Method Type: SEPIDEH    Culture - Other (collected 11-28-22 @ 13:31)  Source: .Other BLE  Final Report (11-30-22 @ 13:55):    Rare Pseudomonas aeruginosa  Organism: Pseudomonas aeruginosa (11-30-22 @ 13:55)  Organism: Pseudomonas aeruginosa (11-30-22 @ 13:55)      -  Amikacin: S <=16      -  Aztreonam: S 8      -  Cefepime: S <=2      -  Ceftazidime: S 4      -  Ciprofloxacin: S <=0.25      -  Gentamicin: S <=2      -  Imipenem: S 2      -  Levofloxacin: S <=0.5      -  Meropenem: S <=1      -  Piperacillin/Tazobactam: S <=8      -  Tobramycin: S <=2      Method Type: SEPIDEH    Culture - Urine (collected 11-27-22 @ 19:15)  Source: Clean Catch Clean Catch (Midstream)  Final Report (11-28-22 @ 23:09):    <10,000 CFU/mL Normal Urogenital Alyssa    Culture - Blood (collected 11-27-22 @ 06:30)  Source: .Blood Blood-Peripheral  Final Report (12-02-22 @ 13:00):    No Growth Final    Culture - Blood (collected 11-27-22 @ 06:25)  Source: .Blood Blood-Peripheral  Final Report (12-02-22 @ 13:00):    No Growth Final    Culture - Blood (collected 11-10-22 @ 12:12)  Source: .Blood Blood-Peripheral  Final Report (11-15-22 @ 23:00):    No Growth Final    Culture - Blood (collected 11-10-22 @ 12:12)  Source: .Blood Blood-Peripheral  Final Report (11-15-22 @ 23:00):    No Growth Final        Blood Gas Venous - Lactate: 1.20 mmol/L (11-29-22 @ 13:24)      INFECTIOUS DISEASES TESTING  MRSA PCR Result.: Negative (11-29-22 @ 05:00)  Procalcitonin, Serum: 0.15 (11-29-22 @ 04:55)  Legionella Antigen, Urine: Negative (11-27-22 @ 19:15)  Procalcitonin, Serum: 0.61 (11-27-22 @ 19:12)  MRSA PCR Result.: Negative (11-11-22 @ 14:30)  COVID-19 PCR: NotDetec (11-10-22 @ 12:25)  MRSA PCR Result.: Negative (04-27-22 @ 09:51)  COVID-19 PCR: NotDetec (01-16-22 @ 12:40)      INFLAMMATORY MARKERS  C-Reactive Protein, Serum: 9.6 mg/L (11-10-22 @ 14:17)  Sedimentation Rate, Erythrocyte: 25 mm/Hr (11-10-22 @ 14:17)      RADIOLOGY & ADDITIONAL TESTS:  I have personally reviewed the last available Chest xray  CXR      CT      CARDIOLOGY TESTING  12 Lead ECG:   Ventricular Rate 49 BPM    Atrial Rate 49 BPM    P-R Interval 164 ms    QRS Duration 108 ms    Q-T Interval 508 ms    QTC Calculation(Bazett) 458 ms    P Axis 72 degrees    R Axis 52 degrees    T Axis 59 degrees    Diagnosis Line Sinus bradycardia  Otherwise normal ECG    Confirmed by CONRAD SEGUNDO MD ([id number]) on 12/2/2022 2:37:10 PM (12-02-22 @ 10:13)  12 Lead ECG:   Ventricular Rate 46 BPM    Atrial Rate 46 BPM    P-R Interval 150 ms    QRS Duration 92 ms    Q-T Interval 488 ms    QTC Calculation(Bazett) 427 ms    P Axis 42 degrees    R Axis 68 degrees    T Axis 82 degrees    Diagnosis Line Marked sinus bradycardia  Abnormal ECG    Confirmed by Mitchel Miranda ([id number]) on 12/1/2022 5:57:32 PM (12-01-22 @ 15:45)      MEDICATIONS  ALPRAZolam     ALPRAZolam 0.25 Oral two times a day  chlorhexidine 2% Cloths 1 Topical daily  hydrocortisone sodium succinate Injectable 100 IV Push two times a day  multivitamin/minerals 1 Oral daily  piperacillin/tazobactam IVPB.. 3.375 IV Intermittent every 8 hours  sertraline 50 Oral daily  silver sulfADIAZINE 1% Cream 1 Topical two times a day      WEIGHT  Weight (kg): 45 (12-01-22 @ 20:00)  Creatinine, Serum: 0.5 mg/dL (12-02-22 @ 12:32)      ANTIBIOTICS:  piperacillin/tazobactam IVPB.. 3.375 Gram(s) IV Intermittent every 8 hours      All available historical records have been reviewed

## 2022-12-02 NOTE — BH CONSULTATION LIAISON PROGRESS NOTE - NSBHCHARTREVIEWLAB_PSY_A_CORE FT
9.8    6.34  )-----------( 50       ( 01 Dec 2022 04:52 )             27.8       12-01    142  |  103  |  18  ----------------------------<  113<H>  3.1<L>   |  26  |  <0.5<L>    Ca    8.1<L>      01 Dec 2022 04:52  Phos  3.0     12-01  Mg     1.7     12-01    TPro  4.3<L>  /  Alb  2.3<L>  /  TBili  0.3  /  DBili  x   /  AST  118<H>  /  ALT  112<H>  /  AlkPhos  250<H>  12-01    Creatine Kinase, Serum in AM (11.28.22 @ 04:30)    Creatine Kinase, Serum: 190 U/L  Urinalysis (05.09.18 @ 14:25)    Blood, Urine: Negative    Glucose Qualitative, Urine: Negative mg/dL    pH Urine: 7.0    Color: Yellow    Urine Appearance: Clear    Bilirubin: Negative    Ketone - Urine: Negative    Specific Gravity: 1.025    Protein, Urine: Negative mg/dL    Urobilinogen: 0.2 mg/dL    Nitrite: Negative    Leukocyte Esterase Concentration: Negative

## 2022-12-02 NOTE — BH CONSULTATION LIAISON PROGRESS NOTE - CURRENT MEDICATION
MEDICATIONS  (STANDING):  ALPRAZolam      ALPRAZolam 0.25 milliGRAM(s) Oral two times a day  chlorhexidine 2% Cloths 1 Application(s) Topical daily  hydrocortisone sodium succinate Injectable 100 milliGRAM(s) IV Push two times a day  multivitamin/minerals 1 Tablet(s) Oral daily  piperacillin/tazobactam IVPB.. 3.375 Gram(s) IV Intermittent every 8 hours  sertraline 50 milliGRAM(s) Oral daily  silver sulfADIAZINE 1% Cream 1 Application(s) Topical two times a day    MEDICATIONS  (PRN):

## 2022-12-02 NOTE — BH CHART NOTE - NSEVENTNOTEFT_PSY_ALL_CORE
Spoke with patient's , Henry Roberts ([phone] - home number) for collateral information. He reports that the patient was "normal" when they first . Soon after, she began to exhibit signs of anorexia. He reports that she does not induce vomiting but abuses laxatives, spending hours on the toilet each night, and avoids many foods, eating only tofu and soft cheeses. He reports that the pt was admitted to [facility] around [id number] for her anorexia and that she was there for about a month but he does not recall how she was treated or what she may have been given. He says that the patient has "waves" of anorexia and that she has gotten better for periods of time but then seems to relapse. The last time she was able to complete activities of daily living such as driving, going to her hair appointments was about a month ago. He reports she has never been on any psychiatric medications and that she always refuses them when offered. He reports that the patient is in constant pain and this makes her mood low. He describes that her sleep is decreased situationally due to a long night-time routine involving the use of cigarettes to relax, and then spending a long time on the toilet due to her laxative use. He says that she smokes about 1 ppd. He reports that on Tuesday when he came to see her in the hospital she was able to recognize him and his son but was also reporting visual hallucinations, that there was water leaking from the ceiling. When we went to see the pt, she was speaking at very low volume and pace which her  says is not her normal, that she's been "slower" since her last admission about 20 days ago.      provided his cell number [phone].

## 2022-12-02 NOTE — PROGRESS NOTE ADULT - SUBJECTIVE AND OBJECTIVE BOX
NUTRITION SUPPORT TEAM  -  PROGRESS NOTE   pt seen and evaluated   awake , alert  on po diet /breakfast tray at bedside intact  neck collar in place     abdomen soft n/d      REVIEW OF SYSTEMS:  Negative except as noted above.     VITALS:  T(F): 96.9 (12-02 @ 07:03), Max: 97 (12-02 @ 04:26)  HR: 37 (12-02 @ 07:03) (37 - 55)  BP: 140/65 (12-02 @ 07:03) (138/78 - 143/79)  RR: 20 (12-02 @ 07:03) (20 - 22)  SpO2: 98% (12-02 @ 07:03) (98% - 99%)    HEIGHT/WEIGHT/BMI:   Height (cm): 170.2 (11-28), 170.2 (11-11), 170.2 (05-12), 170.2 (05-05)  Weight (kg): 45 (12-01), 49 (11-11), 49 (05-12), 49 (05-05)  BMI (kg/m2): 15.5 (12-01), 16.9 (11-28), 16.9 (11-11), 16.9 (05-12)      12-01-22 @ 07:01  -  12-02-22 @ 07:00  --------------------------------------------------------  IN:    IV PiggyBack: 500 mL  Total IN: 500 mL    OUT:    Indwelling Catheter - Urethral (mL): 357 mL    Voided (mL): 200 mL  Total OUT: 557 mL    Total NET: -57 mL        STANDING MEDICATIONS:   ALPRAZolam      ALPRAZolam 0.25 milliGRAM(s) Oral two times a day  chlorhexidine 2% Cloths 1 Application(s) Topical daily  hydrocortisone sodium succinate Injectable 100 milliGRAM(s) IV Push two times a day  multivitamin/minerals 1 Tablet(s) Oral daily  piperacillin/tazobactam IVPB.. 3.375 Gram(s) IV Intermittent every 8 hours  sertraline 50 milliGRAM(s) Oral daily  silver sulfADIAZINE 1% Cream 1 Application(s) Topical two times a day      LABS:                         9.8    6.34  )-----------( 50       ( 01 Dec 2022 04:52 )             27.8     142  |  103  |  18  ----------------------------<  113<H>          (12-01-22 @ 04:52)  3.1<L>   |  26  |  <0.5<L>    Ca    8.1<L>          (12-01-22 @ 04:52)  Phos  3.0         (12-01-22 @ 04:52)  Mg     1.7         (12-01-22 @ 04:52)    TPro  4.3<L>  /  Alb  2.3<L>  /  TBili  0.3  /  DBili  x   /  AST  118<H>  /  ALT  112<H>  /  AlkPhos  250<H>       12-01-22 @ 04:52      Triglycerides, Serum: 54 mg/dL (11-28 @ 04:30)  Prealbumin, Serum: 13 mg/dL (11-28-22 @ 04:30)  Vitamin D, 25-Hydroxy: 89 ng/mL (11-28 @ 04:30)  Blood Glucose (Past 24 hours):  68 mg/dL (12-02 @ 07:34)  136 mg/dL (12-01 @ 12:25)      DIET:   Diet, Pureed:   Mildly Thick Liquids (MILDTHICKLIQS) (12-01-22 @ 12:22) [Active]    RADIOLOGY:   < from: Xray Chest 1 View- PORTABLE-Routine (Xray Chest 1 View- PORTABLE-Routine in AM.) (11.30.22 @ 05:51) >  Impression:  Low lung volume.  Interstitial prominence bilaterally.  Right IJ line.  Neurostimulator wires.     NUTRITION SUPPORT TEAM  -  PROGRESS NOTE   pt seen and evaluated   awake , alert, confused (hallucinating?)  on po diet /breakfast tray at bedside untouched  neck collar in place     abdomen soft n/d    REVIEW OF SYSTEMS:  Negative except as noted above.     VITALS:  T(F): 96.9 (12-02 @ 07:03), Max: 97 (12-02 @ 04:26)  HR: 37 (12-02 @ 07:03) (37 - 55)  BP: 140/65 (12-02 @ 07:03) (138/78 - 143/79)  RR: 20 (12-02 @ 07:03) (20 - 22)  SpO2: 98% (12-02 @ 07:03) (98% - 99%)    HEIGHT/WEIGHT/BMI:   Height (cm): 170.2 (11-28), 170.2 (11-11), 170.2 (05-12), 170.2 (05-05)  Weight (kg): 45 (12-01), 49 (11-11), 49 (05-12), 49 (05-05)  BMI (kg/m2): 15.5 (12-01), 16.9 (11-28), 16.9 (11-11), 16.9 (05-12)      12-01-22 @ 07:01  -  12-02-22 @ 07:00  --------------------------------------------------------  IN:    IV PiggyBack: 500 mL  Total IN: 500 mL    OUT:    Indwelling Catheter - Urethral (mL): 357 mL    Voided (mL): 200 mL  Total OUT: 557 mL    Total NET: -57 mL        STANDING MEDICATIONS:   ALPRAZolam      ALPRAZolam 0.25 milliGRAM(s) Oral two times a day  chlorhexidine 2% Cloths 1 Application(s) Topical daily  hydrocortisone sodium succinate Injectable 100 milliGRAM(s) IV Push two times a day  multivitamin/minerals 1 Tablet(s) Oral daily  piperacillin/tazobactam IVPB.. 3.375 Gram(s) IV Intermittent every 8 hours  sertraline 50 milliGRAM(s) Oral daily  silver sulfADIAZINE 1% Cream 1 Application(s) Topical two times a day      LABS:                         9.8    6.34  )-----------( 50       ( 01 Dec 2022 04:52 )             27.8     142  |  103  |  18  ----------------------------<  113<H>          (12-01-22 @ 04:52)  3.1<L>   |  26  |  <0.5<L>    Ca    8.1<L>          (12-01-22 @ 04:52)  Phos  3.0         (12-01-22 @ 04:52)  Mg     1.7         (12-01-22 @ 04:52)    TPro  4.3<L>  /  Alb  2.3<L>  /  TBili  0.3  /  DBili  x   /  AST  118<H>  /  ALT  112<H>  /  AlkPhos  250<H>       12-01-22 @ 04:52      Triglycerides, Serum: 54 mg/dL (11-28 @ 04:30)  Prealbumin, Serum: 13 mg/dL (11-28-22 @ 04:30)  Vitamin D, 25-Hydroxy: 89 ng/mL (11-28 @ 04:30)  Blood Glucose (Past 24 hours):  68 mg/dL (12-02 @ 07:34)  136 mg/dL (12-01 @ 12:25)      DIET:   Diet, Pureed:   Mildly Thick Liquids (MILDTHICKLIQS) (12-01-22 @ 12:22) [Active]    RADIOLOGY:   < from: Xray Chest 1 View- PORTABLE-Routine (Xray Chest 1 View- PORTABLE-Routine in AM.) (11.30.22 @ 05:51) >  Impression:  Low lung volume.  Interstitial prominence bilaterally.  Right IJ line.  Neurostimulator wires.

## 2022-12-02 NOTE — BH CONSULTATION LIAISON PROGRESS NOTE - NSBHASSESSMENTFT_PSY_ALL_CORE
Ms Roberts is a 67 year old woman  history of depression and Anorexia Nervosa who was admitted to the ICU for the management of altered mental status .   Psychiatry consult was called for a mental status evaluation .   Patient continues to be delirious with overt confusion and visual hallucinations and the report that her current presentation is not in-keeping with her baseline mental status. which is current confounding any concern for depression . This is likely secondary to the urinary tract infection  For now, patient does not appear to have acute symptoms of psychosis, jimmy   At this time, patient is not considered an imminent danger to herself or others and does not need inpatient psychiatric hospitalization. There is no indication for acute use of any psychotropic medication for now . We recommend continued delirium work up for the continued treatment of all medical causes of altered mental status in this patient. It is important to note that there is no role for psychotropic medication management in anorexia unless there is a comorbid psychiatric illness . Anorexia nervosa is primarily treated with psychotherapy targeted towards this eating disorder in an inpatient or outpatient  setting with staff that have expertise in this type ot treatment . For now , we recommend continued implementation of delirium precautions and regulation of her sleep wake cycle . We recommend continued coordination of care with the Nutrition . Upon discharge, we recommend referral to an Anorexia Nervosa outpatient or inpatient unit if patient is agreeable. please call as needed.

## 2022-12-02 NOTE — PROGRESS NOTE ADULT - ASSESSMENT
67F w/ h/o anorexia, anxiety, depression, kidney cysts, peripheral neuropathy, RA, OA, osteoporosis, low back pain s/p unwitnessed fall, found down by  +HT. On arrival to ED, patient unresponsive, hypotensive, bradycardic, and hypothermic. Patient also hypoglycemic (FS 24) and given dextrose immediately. Subsequently given Emiliana hugger, given 2L of warm IV fluids, started on levophed, and given broad-spectrum abx, and started on levothyroxine for possible myxedema coma (although no documentation in eMAR, so unclear if actually given). VBG noted respiratory acidosis.Patient intubated for airway protection. CT spine demonstrated fx of anterior and posterior tubercle of left C6 transverse foramen. NSGY consulted, but no surgical intervention offered. Subsequently admitted to MICU for presumed septic shock, extubated and downgraded to SDU     Septic Shock likely due to severe soft tissue infection  Toxic-Metabolic Encephalopathy  Full thickness wounds of bl LE   Osteomyelitis, suspected L Ankle/foot  intubated, extubated 11/29, currently comfortable on ra  s/p burn eval--> no evidence of nec fasc, c/w wound care  BID  on Zosyn per ID, tentatively until 12/7  dc midodrine due to bradycardia    Bradycardia, new, asymptomatic  HR noted 30s today  midodrine on hold, monitor electrlolytes/replete as needed   cardio eval pending    Left C6 Transverse Foramen Fracture  known to neurosurgery service, very high risk for surgical intervention  c/w Collar, will need MRI C spine when improved     Hypothyroidism  - TSH 7.92, T3 74, T4 7.7  - started on HC, now tapering  - repeat TFTs in 4-6 weeks     Anorexia  Anxiety  - long history per   - monitor PO intake, nutrition eval  - s/p psychiatry eval, no contraindication for dc, recs outpatient psychiatry/anorexia referral   - resumed home Xanax    Hx of rheumatoid arthritis  - Unknown if pt had been taking steroids. Per pt's , pt only takes pain control medications at home for RA  - on HC as above    67F w/ h/o anorexia, anxiety, depression, kidney cysts, peripheral neuropathy, RA, OA, osteoporosis, low back pain s/p unwitnessed fall, found down by  +HT. On arrival to ED, patient unresponsive, hypotensive, bradycardic, and hypothermic. Patient also hypoglycemic (FS 24) and given dextrose immediately. Subsequently given Emiliana hugger, given 2L of warm IV fluids, started on levophed, and given broad-spectrum abx, and started on levothyroxine for possible myxedema coma (although no documentation in eMAR, so unclear if actually given). VBG noted respiratory acidosis.Patient intubated for airway protection. CT spine demonstrated fx of anterior and posterior tubercle of left C6 transverse foramen. NSGY consulted, but no surgical intervention offered. Subsequently admitted to MICU for presumed septic shock, extubated and downgraded to SDU     #Septic Shock likely due to severe soft tissue infection  #Toxic-Metabolic Encephalopathy  #Full thickness wounds of bl LE   #Osteomyelitis, suspected L Ankle/foot  - intubated, extubated 11/29, currently comfortable on ra  - s/p burn eval--> no evidence of nec fasc, c/w wound care  BID  - on Zosyn per ID, tentatively until 12/7  - dc midodrine due to bradycardia    #Bradycardia, new, asymptomatic  - HR noted 30s today  - midodrine on hold, monitor electrlolytes/replete as needed   cardio eval pending    #Left C6 Transverse Foramen Fracture  - known to neurosurgery service, very high risk for surgical intervention  - c/w Collar, will need MRI C spine when improved     #Hypothyroidism  - TSH 7.92, T3 74, T4 7.7  - started on HC, now tapering  - repeat TFTs in 4-6 weeks     #Anorexia  #Anxiety  - long history per   - monitor PO intake, nutrition eval  - s/p psychiatry eval, no contraindication for dc, recs outpatient psychiatry/anorexia referral   - resumed home Xanax    #Hx of rheumatoid arthritis  - Unknown if pt had been taking steroids. Per pt's , pt only takes pain control medications at home for RA  - on HC as above

## 2022-12-02 NOTE — PROGRESS NOTE ADULT - SUBJECTIVE AND OBJECTIVE BOX
Patient is a 67y old  Female who presents with a chief complaint of fall and unresponsive (01 Dec 2022 14:11)        Over Night Events: On RA.  Off pressors.  Bradycardic          ROS:     All ROS are negative except HPI         PHYSICAL EXAM    ICU Vital Signs Last 24 Hrs  T(C): 36.1 (02 Dec 2022 07:03), Max: 36.3 (01 Dec 2022 16:00)  T(F): 96.9 (02 Dec 2022 07:03), Max: 97.3 (01 Dec 2022 16:00)  HR: 37 (02 Dec 2022 07:03) (37 - 66)  BP: 140/65 (02 Dec 2022 07:03) (113/66 - 148/79)  BP(mean): 93 (02 Dec 2022 07:03) (78 - 100)  ABP: --  ABP(mean): --  RR: 20 (02 Dec 2022 07:03) (16 - 23)  SpO2: 98% (02 Dec 2022 07:03) (97% - 100%)    O2 Parameters below as of 02 Dec 2022 07:03  Patient On (Oxygen Delivery Method): room air            CONSTITUTIONAL:  Ill appearing in  NAD    ENT:   Airway patent,   Mouth with normal mucosa.     EYES:   Pupils equal,   Round and reactive to light.    CARDIAC:   Parker   Regular rhythm.    No edema    RESPIRATORY:   No wheezing  Bilateral BS  Normal chest expansion  Not tachypneic,  No use of accessory muscles    GASTROINTESTINAL:  Abdomen soft,   Non-tender,   No guarding,   + BS    MUSCULOSKELETAL:   Range of motion is not limited,  No clubbing, cyanosis    NEUROLOGICAL:   Alert and oriented   No motor  deficits.    SKIN:   Skin normal color for race,   No evidence of rash.    PSYCHIATRIC:   No apparent risk to self or others.      12-01-22 @ 07:01  -  12-02-22 @ 07:00  --------------------------------------------------------  IN:    IV PiggyBack: 500 mL  Total IN: 500 mL    OUT:    Indwelling Catheter - Urethral (mL): 357 mL    Voided (mL): 200 mL  Total OUT: 557 mL    Total NET: -57 mL          LABS:                            9.8    6.34  )-----------( 50       ( 01 Dec 2022 04:52 )             27.8                       9.8    6.34  )-----------( 50       ( 12-01 @ 04:52 )             27.8                9.8    12.87 )-----------( 61       ( 11-30 @ 12:47 )             28.0                9.8    14.11 )-----------( 59       ( 11-30 @ 05:00 )             28.1                                            12-01    142  |  103  |  18  ----------------------------<  113<H>  3.1<L>   |  26  |  <0.5<L>    Ca    8.1<L>      01 Dec 2022 04:52  Phos  3.0     12-01  Mg     1.7     12-01    TPro  4.3<L>  /  Alb  2.3<L>  /  TBili  0.3  /  DBili  x   /  AST  118<H>  /  ALT  112<H>  /  AlkPhos  250<H>  12-01                                                                                           LIVER FUNCTIONS - ( 01 Dec 2022 04:52 )  Alb: 2.3 g/dL / Pro: 4.3 g/dL / ALK PHOS: 250 U/L / ALT: 112 U/L / AST: 118 U/L / GGT: x                                                                                                                                       MEDICATIONS  (STANDING):  ALPRAZolam      ALPRAZolam 0.25 milliGRAM(s) Oral two times a day  chlorhexidine 2% Cloths 1 Application(s) Topical daily  hydrocortisone sodium succinate Injectable 100 milliGRAM(s) IV Push every 8 hours  midodrine 10 milliGRAM(s) Oral every 8 hours  multivitamin/minerals 1 Tablet(s) Oral daily  piperacillin/tazobactam IVPB.. 3.375 Gram(s) IV Intermittent every 8 hours  sertraline 50 milliGRAM(s) Oral daily  silver sulfADIAZINE 1% Cream 1 Application(s) Topical two times a day    MEDICATIONS  (PRN):      New X-rays reviewed:                                                                                  ECHO

## 2022-12-02 NOTE — BH CONSULTATION LIAISON PROGRESS NOTE - NSBHFUPINTERVALHXFT_PSY_A_CORE
Patient seen and interviewed .   Upon approach, she was observed to be lying in a very awkward position, spoke louder than she did yesterday  but appeared confused .   She reports that she doesn't know why the doctors are confronting her about not eating because she is eating. She then pointing to the other side of the room asking writer to see the doctors . She reports that they are standing there  and are confronting her about food .   According to the medical team, patient is not eating and they are going to get a speech and swallow consult .   Medical student on the CL team obtained collateral information from patient's  Henry Roberts ([phone] - home number)who reports that patient has a long history of anxorextia , and uses laxative to prevent weight gain, avoids many foods, eating only tofu and soft cheeses. He reports that the patient was admitted to [facility] in 2005 her anorexia and that she was there for about a month but he does not recall what medications she was given.  He reports that patient started reporting  that there was water leaking from the ceiling about 3 days ago however prior to this , she had never reported seeing things that did not exist . Patient's  reports that patient's current presentation is not her baseline mental status .    Patient seen and interviewed .   Upon approach, she was observed to be lying in a very awkward position, spoke louder than she did yesterday  but appeared confused .   She reports that she doesn't know why the doctors are confronting her about not eating because she is eating. She then pointing to the other side of the room asking writer to see the doctors . She reports that they are standing there  and are confronting her about food .   According to the medical team, patient is not eating and they are going to get a speech and swallow consult .   Medical student on the CL team obtained collateral information from patient's  Henry Roberts ([phone] - home number)who reports that patient has a long history of anorexia , and uses laxative to prevent weight gain, avoids many foods, eating only tofu and soft cheeses. He reports that the patient was admitted to [facility] in 2005 her anorexia and that she was there for about a month but he does not recall what medications she was given.  He reports that patient started reporting  that there was water leaking from the ceiling about 3 days ago however prior to this , she had never reported seeing things that did not exist . Patient's  reports that patient's current presentation is not her baseline mental status .

## 2022-12-02 NOTE — CONSULT NOTE ADULT - CONSULT REQUESTED DATE/TIME
01-Dec-2022 13:18
02-Dec-2022 17:46
27-Nov-2022 06:44
27-Nov-2022 07:31
28-Nov-2022
27-Nov-2022 17:31
28-Nov-2022 09:21
28-Nov-2022 12:41
29-Nov-2022 12:20

## 2022-12-02 NOTE — PROGRESS NOTE ADULT - ASSESSMENT
ASSESSMENT  - septic shock  - acute resp failure  - anorexia  - LE wounds, r/o necrotizing fasciitis - sen by Burn team earlier today  - left C6 transverse foramen fracture  - hypokalemia/hypomagnesemia    SUGGEST:  on po diet   observe intake for adequacy  if enteral supplementation needed, which I suspect it will, need to feed by intermittent gravity, NOT continuous drip via NG, as pt without protected airway and she lies herself down and turns sideways   ASSESSMENT  - septic shock  - acute resp failure  - anorexia  - LE wounds, r/o necrotizing fasciitis - sen by Burn team earlier today  - left C6 transverse foramen fracture  - hypokalemia/hypomagnesemia    SUGGEST:  on po diet   observe intake for adequacy  if enteral supplementation needed, which I suspect it will, need to feed by intermittent gravity, NOT continuous drip via NG, as pt without protected airway and she lies herself down and turns sideways  spoke with Psych - pt might require in-patient eating disorder unit

## 2022-12-02 NOTE — CONSULT NOTE ADULT - ASSESSMENT
#Sinus Bradycardia   #Septic shock 2/2 Pseudomonas bacteremia  #Metabolic Encephalopathy   #Multiple LE wounds  #Hx of anorexia nervosa with malnourishment   - TSH 7.2 repeat   - c/w Telemetry   - Avoid AVNB agents   - Multivitamin supplementation  #Sinus Bradycardia - asymptomatic with occasional increase in HR   #Septic shock 2/2 Pseudomonas bacteremia  #Metabolic Encephalopathy   #Multiple LE wounds  #Hx of anorexia nervosa with malnourishment   - TSH 7.2 repeat   - c/w Telemetry   - Avoid AVNB agents   - Multivitamin supplementation   - no further cardiac working at this point  - Can f/u as OP and possibly do a EST for chronotropy competency

## 2022-12-02 NOTE — PROGRESS NOTE ADULT - ASSESSMENT
ASSESSMENT  67F w/ h/o anorexia, anxiety, depression, kidney cysts, peripheral neuropathy, RA, OA, osteoporosis, low back pain s/p unwitnessed fall, found down by     IMPRESSION  #s/p Fall  #Shock, possible septic -- severe soft tissue infectoin    - Blood Cx NG    #Necrotizing Fasciitis? vs severe soft tissue infection vs biliary  -  CT Lower Extremity w/ IV Cont, Bilateral (11.27.22 @ 11:02): 1) Bilateral lower extremity swelling and marked skin thickening, left  greater than right, compatible with cellulitis in the correct clinical  setting. Multiple rounded locules of gas are also seen tracking within the soft  tissues along the predominantly posterior calf and anterolateral/dorsal  ankle and foot. These are likely related to known multiple abrasions of  the left foot/leg and appear to partially contact the posterior calf skin  abrasion. However, given the burden of soft tissue disease it is difficult to completely exclude early necrotizing fasciitis on imaging,  and this was discussed with the team at the time of dictation.  - wound Cx Pseudomonas     #Transaminitis   - RUQ US with distended gallbladder    #Chronic Lower Extremity Ulcers  - Arterial US 11/10 with normal arterial flow     #Cortical erosions of left ankle/foot (Osteopenia vs OM)  #Bilateral Knee Joint effusions   #Abx allergy: NKDA    RECOMMENDATIONS  - continue zosyn 3.5 mg q 8 hours -- tentative end date 12/7   - trend LFTs     Please call or message on [employer] Teams if with any questions.  Spectra [phone]

## 2022-12-03 NOTE — PROGRESS NOTE ADULT - ASSESSMENT
67F w/ h/o anorexia, anxiety, depression, kidney cysts, peripheral neuropathy, RA, OA, osteoporosis, low back pain s/p unwitnessed fall, found down by  +HT. On arrival to ED, patient unresponsive, hypotensive, bradycardic, and hypothermic. Patient also hypoglycemic (FS 24) and given dextrose immediately. Subsequently given Emiliana hugger, given 2L of warm IV fluids, started on levophed, and given broad-spectrum abx, and started on levothyroxine for possible myxedema coma (although no documentation in eMAR, so unclear if actually given). VBG noted respiratory acidosis.Patient intubated for airway protection. CT spine demonstrated fx of anterior and posterior tubercle of left C6 transverse foramen. NSGY consulted, but no surgical intervention offered. Subsequently admitted to MICU for presumed septic shock, extubated and downgraded to SDU     Septic Shock likely due to severe soft tissue infection  Toxic-Metabolic Encephalopathy  Full thickness wounds of bl LE   Osteomyelitis, suspected L Ankle/foot  intubated, extubated 11/29, currently comfortable on ra  s/p burn eval--> no evidence of nec fasc, c/w wound care  BID  on Zosyn per ID, tentatively until 12/7  off midodrine due to bradycardia    Bradycardia, new, asymptomatic  HR noted 30s  midodrine on hold, monitor electrlolytes/replete as needed   check CTH  cardio eval appreciated    Left C6 Transverse Foramen Fracture  known to neurosurgery service, very high risk for surgical intervention  c/w Collar, will need MRI C spine when improved     Hypothyroidism  - TSH 7.92, T3 74, T4 7.7  - started on HC, now tapering--- decrease to 50 q12H tomorrow   - repeat TFTs in 4-6 weeks     Anorexia  Anxiety  - long history per   - monitor PO intake, nutrition eval  - s/p psychiatry eval, no contraindication for dc, recs outpatient psychiatry/anorexia referral   - resumed home Xanax    Hx of rheumatoid arthritis  - Unknown if pt had been taking steroids. Per pt's , pt only takes pain control medications at home for RA  - on HC as above     Overall prognosis guarded    #Progress Note Handoff  Pending (specify):   CTH, telemetry monitoring, burn f/u, PO intake, IV abx   Family discussion: Plan of care discussed with patient   Disposition:  from home     Carolann Lai MD  s. [phone]

## 2022-12-03 NOTE — PROGRESS NOTE ADULT - ASSESSMENT
ASSESSMENT      Septic Shock resolved  Acute hypoxemic resp failure resolved   Toxic-Metabolic Encephalopathy resolved   Necrotizing Fasciitis of bilateral LE / burn noted/ Pseudomonas in cx  Left C6 Transverse Foramen Fracture  HO Anorexia.    Thrombocytopenia HIT negative   transaminitis      PLAN    CNS: Continue Xanax as needed. CT head ordered and pending.     HEENT: Oral care, t collar per neuro sx.      PULMONARY: Aspiration precaution, or RA.  HOB at 45 degrees     CARDIOVASCULAR: Off Midodrine. MAP is adequate.     GI: Nutrition evaluation. Diet as tolerated.     RENAL: Follow up renal function and lytes, correct as needed (K>4; Mg>2)    INFECTIOUS DISEASE: On Zosyn. ID is following.     HEMATOLOGICAL: HIT Ab negative. Thrombocytopenia noted. On Lovenox for DVT ppx. Hold if platelet count is less than 50k. Check peripheral smear.     ENDOCRINE: Wean down HC.     DISPOSITION: If CTH is negative then transfer to telemetry.     Palliative care evaluation.

## 2022-12-03 NOTE — PROGRESS NOTE ADULT - SUBJECTIVE AND OBJECTIVE BOX
ANA LILIA VERMA  67y Female    CHIEF COMPLAINT:    Patient is a 67y old  Female who presents with a chief complaint of fall and unresponsive (01 Dec 2022 14:11)    INTERVAL HPI/OVERNIGHT EVENTS:    Patient seen and examined. No acute events overnight. Still bradycardic on monitor     ROS: All other systems are negative.    Vital Signs:    T(F): 97.5 (22 @ 11:38), Max: 98 (22 @ 20:00)  HR: 60 (22 @ 11:38) (40 - 60)  BP: 142/75 (22 @ 11:38) (113/73 - 142/78)  RR: 20 (22 @ 11:38) (20 - 20)  SpO2: 100% (22 @ 11:38) (98% - 100%)    02 Dec 2022 07:01  -  03 Dec 2022 07:00  --------------------------------------------------------  IN: 300 mL / OUT: 580 mL / NET: -280 mL    Daily Weight in k (03 Dec 2022 04:11)    POCT Blood Glucose.: 111 mg/dL (03 Dec 2022 06:27)  POCT Blood Glucose.: 122 mg/dL (03 Dec 2022 00:20)    PHYSICAL EXAM:    GENERAL:  NAD cachectic   SKIN: No rashes or lesions  HEENT: Atraumatic. Normocephalic.   NECK: Supple, No JVD.   PULMONARY: CTA B/L. No wheezing.   CVS: Normal S1, S2. Rate and Rhythm are regular.   ABDOMEN/GI: Soft, Nontender, Nondistended   MSK:  No edema B/L LE. LE dressing dry/intact bl  NEUROLOGIC: moves all extremities   PSYCH: Awake and alert    Consultant(s) Notes Reviewed:  [x ] YES  [ ] NO  Care Discussed with Consultants/Other Providers [ x] YES  [ ] NO    LABS:                        11.0   6.25  )-----------( 70       ( 02 Dec 2022 12:32 )             32.8     142  |  104  |  24<H>  ----------------------------<  181<H>  3.6   |  29  |  0.5<L>    Ca    8.4      02 Dec 2022 12:32  Mg     1.9         TPro  4.8<L>  /  Alb  2.8<L>  /  TBili  0.4  /  DBili  x   /  AST  51<H>  /  ALT  84<H>  /  AlkPhos  249<H>  12    PT/INR - ( 02 Dec 2022 12:32 )   PT: 13.00 sec;   INR: 1.14 ratio      PTT - ( 02 Dec 2022 12:32 )  PTT:28.9 sec  Serum Pro-Brain Natriuretic Peptide: 2184 pg/mL (22 @ 19:15)    RADIOLOGY & ADDITIONAL TESTS:  Imaging or report Personally Reviewed:  [x] YES  [ ] NO  EKG reviewed: [x] YES  [ ] NO    Medications:  Standing  ALPRAZolam      ALPRAZolam 0.25 milliGRAM(s) Oral two times a day  chlorhexidine 2% Cloths 1 Application(s) Topical daily  hydrocortisone sodium succinate Injectable 100 milliGRAM(s) IV Push two times a day  multivitamin/minerals 1 Tablet(s) Oral daily  piperacillin/tazobactam IVPB.. 3.375 Gram(s) IV Intermittent every 8 hours  sertraline 50 milliGRAM(s) Oral daily  silver sulfADIAZINE 1% Cream 1 Application(s) Topical two times a day    PRN Meds

## 2022-12-03 NOTE — PROGRESS NOTE ADULT - SUBJECTIVE AND OBJECTIVE BOX
Patient is a 67y old  Female who presents with a chief complaint of fall and unresponsive (01 Dec 2022 14:11)        Over Night Events:    On room air   Frail and cachectic   No fevers         ROS:  See HPI    PHYSICAL EXAM    ICU Vital Signs Last 24 Hrs  T(C): 36.4 (03 Dec 2022 04:11), Max: 36.7 (02 Dec 2022 20:00)  T(F): 97.6 (03 Dec 2022 04:11), Max: 98 (02 Dec 2022 20:00)  HR: 41 (03 Dec 2022 04:11) (40 - 58)  BP: 135/99 (03 Dec 2022 04:11) (113/73 - 135/99)  BP(mean): 113 (03 Dec 2022 04:11) (84 - 113)  ABP: --  ABP(mean): --  RR: 20 (03 Dec 2022 04:11) (20 - 20)  SpO2: 99% (03 Dec 2022 04:11) (92% - 100%)    O2 Parameters below as of 03 Dec 2022 04:11  Patient On (Oxygen Delivery Method): room air            General: NAD   HEENT: SHRUTHI             Lymphatic system: No cervical LN   Lungs: Bilateral BS, coarse   Cardiovascular: Regular   Gastrointestinal: Soft, Positive BS  Extremities: No clubbing.  Moves extremities.  Full Range of motion   Skin: Warm, intact  Neurological: No motor or sensory deficit       12-02-22 @ 07:01  -  12-03-22 @ 07:00  --------------------------------------------------------  IN:    IV PiggyBack: 100 mL    Oral Fluid: 200 mL  Total IN: 300 mL    OUT:    Rectal Tube (mL): 130 mL    Voided (mL): 450 mL  Total OUT: 580 mL    Total NET: -280 mL          LABS:                            11.0   6.25 )-----------( 70       ( 02 Dec 2022 12:32 )             32.8                                               12-02    142  |  104  |  24<H>  ----------------------------<  181<H>  3.6   |  29  |  0.5<L>    Ca    8.4      02 Dec 2022 12:32  Mg     1.9     12-02    TPro  4.8<L>  /  Alb  2.8<L>  /  TBili  0.4  /  DBili  x   /  AST  51<H>  /  ALT  84<H>  /  AlkPhos  249<H>  12-02      PT/INR - ( 02 Dec 2022 12:32 )   PT: 13.00 sec;   INR: 1.14 ratio         PTT - ( 02 Dec 2022 12:32 )  PTT:28.9 sec                                                                                     LIVER FUNCTIONS - ( 02 Dec 2022 12:32 )  Alb: 2.8 g/dL / Pro: 4.8 g/dL / ALK PHOS: 249 U/L / ALT: 84 U/L / AST: 51 U/L / GGT: x                                                                                                                                       MEDICATIONS  (STANDING):  ALPRAZolam      ALPRAZolam 0.25 milliGRAM(s) Oral two times a day  chlorhexidine 2% Cloths 1 Application(s) Topical daily  hydrocortisone sodium succinate Injectable 100 milliGRAM(s) IV Push two times a day  multivitamin/minerals 1 Tablet(s) Oral daily  piperacillin/tazobactam IVPB.. 3.375 Gram(s) IV Intermittent every 8 hours  sertraline 50 milliGRAM(s) Oral daily  silver sulfADIAZINE 1% Cream 1 Application(s) Topical two times a day    MEDICATIONS  (PRN):      Xrays:  No infiltrates                                                                                   ECHO

## 2022-12-03 NOTE — PROGRESS NOTE ADULT - SUBJECTIVE AND OBJECTIVE BOX
SUBJECTIVE:    Patient is a 67y old Female who presents with a chief complaint of fall and unresponsive (01 Dec 2022 14:11)    Currently admitted to medicine with the primary diagnosis of Acute respiratory failure with hypercapnia       Today is hospital day 6d. This morning she is resting comfortably in bed and reports no new issues or overnight events.     PAST MEDICAL & SURGICAL HISTORY  Anxiety    Depression    Osteoporosis    Kidney cysts    Peripheral neuropathy    RA (rheumatoid arthritis)    OA (osteoarthritis)    Low back pain with radiation  s/p &quot;nerve cutting&quot; 2015      SOCIAL HISTORY:  Negative for smoking/alcohol/drug use.     ALLERGIES:  No Known Allergies    MEDICATIONS:  STANDING MEDICATIONS  ALPRAZolam      ALPRAZolam 0.25 milliGRAM(s) Oral two times a day  chlorhexidine 2% Cloths 1 Application(s) Topical daily  hydrocortisone sodium succinate Injectable 100 milliGRAM(s) IV Push two times a day  multivitamin/minerals 1 Tablet(s) Oral daily  piperacillin/tazobactam IVPB.. 3.375 Gram(s) IV Intermittent every 8 hours  sertraline 50 milliGRAM(s) Oral daily  silver sulfADIAZINE 1% Cream 1 Application(s) Topical two times a day    PRN MEDICATIONS    VITALS:   T(F): 97.5  HR: 60  BP: 142/75  RR: 20  SpO2: 100%    LABS:                        11.0   6.25  )-----------( 70       ( 02 Dec 2022 12:32 )             32.8     12-02    142  |  104  |  24<H>  ----------------------------<  181<H>  3.6   |  29  |  0.5<L>    Ca    8.4      02 Dec 2022 12:32  Mg     1.9     12-02    TPro  4.8<L>  /  Alb  2.8<L>  /  TBili  0.4  /  DBili  x   /  AST  51<H>  /  ALT  84<H>  /  AlkPhos  249<H>  12-02    PT/INR - ( 02 Dec 2022 12:32 )   PT: 13.00 sec;   INR: 1.14 ratio         PTT - ( 02 Dec 2022 12:32 )  PTT:28.9 sec              RADIOLOGY:    PHYSICAL EXAM:  GENERAL:  NAD, cachectic, frail   SKIN: No rashes or lesions  HEENT: Atraumatic. Normocephalic. C collar in place   NECK: Supple, No JVD.   PULMONARY: Coarse breath sounds B/L. No wheezing.    CVS: Normal S1, S2. Rate and Rhythm are regular   ABDOMEN/GI: Soft, Nontender, Nondistended   MSK:  b/l LE dressing dry/intact   NEUROLOGIC:  moves all extremities   PSYCH: Awake and alert       Intravenous access:   NG tube:   Hughes Catheter:   Indwelling Urethral Catheter:     Connect To:  Straight Drainage/Gravity    Indication:  Urine Output Monitoring in Critically Ill (12-01-22 @ 02:05) (not performed) [Active]  Indwelling Urethral Catheter:     Connect To:  Straight Drainage/Gravity    Indication:  Urine Output Monitoring in Critically Ill (11-30-22 @ 02:16) (not performed) [Active]  Indwelling Urethral Catheter:     Connect To:  Straight Drainage/Gravity    Indication:  Urine Output Monitoring in Critically Ill (11-29-22 @ 01:31) (not performed) [Active]  Indwelling Urethral Catheter:     Connect To:  Straight Drainage/Gravity    Indication:  Urine Output Monitoring in Critically Ill (11-28-22 @ 15:29) (not performed) [Active]

## 2022-12-03 NOTE — PROGRESS NOTE ADULT - ASSESSMENT
67F w/ h/o anorexia, anxiety, depression, kidney cysts, peripheral neuropathy, RA, OA, osteoporosis, low back pain s/p unwitnessed fall, found down by  +HT. On arrival to ED, patient unresponsive, hypotensive, bradycardic, and hypothermic. Patient also hypoglycemic (FS 24) and given dextrose immediately. Subsequently given Emiliana hugger, given 2L of warm IV fluids, started on levophed, and given broad-spectrum abx, and started on levothyroxine for possible myxedema coma (although no documentation in eMAR, so unclear if actually given). VBG noted respiratory acidosis.Patient intubated for airway protection. CT spine demonstrated fx of anterior and posterior tubercle of left C6 transverse foramen. NSGY consulted, but no surgical intervention offered. Subsequently admitted to MICU for presumed septic shock, extubated and downgraded to SDU     #Septic Shock likely due to severe soft tissue infection  #Toxic-Metabolic Encephalopathy  #Full thickness wounds of bl LE   #Osteomyelitis, suspected L Ankle/foot  intubated, extubated 11/29, currently comfortable on ra  s/p burn eval--> no evidence of nec fasc, c/w wound care  BID  on Zosyn per ID, tentatively until 12/7  off midodrine due to bradycardia    #Bradycardia, new, asymptomatic  HR noted 30s  midodrine on hold, monitor electrlolytes/replete as needed   check CTH  cardio eval appreciated    #Left C6 Transverse Foramen Fracture  known to neurosurgery service, very high risk for surgical intervention  c/w Collar, will need MRI C spine when improved     #Hypothyroidism  - TSH 7.92, T3 74, T4 7.7  - started on HC, now tapering--- decrease to 50 q12H tomorrow   - repeat TFTs in 4-6 weeks     #Anorexia  #Anxiety  - long history per   - monitor PO intake, nutrition eval  - s/p psychiatry eval, no contraindication for dc, recs outpatient psychiatry/anorexia referral   - resumed home Xanax    #Hx of rheumatoid arthritis  - Unknown if pt had been taking steroids. Per pt's , pt only takes pain control medications at home for RA  - on HC as above

## 2022-12-04 NOTE — PROGRESS NOTE ADULT - SUBJECTIVE AND OBJECTIVE BOX
ANA LILIA VERMA  67y Female    CHIEF COMPLAINT:    Patient is a 67y old  Female who presents with a chief complaint of fall and unresponsive (01 Dec 2022 14:11)    INTERVAL HPI/OVERNIGHT EVENTS:    Patient seen and examined. No acute events overnight. Remains bradycardic, otherwise unchanged     ROS: All other systems are negative.    Vital Signs:    T(F): 97.4 (22 @ 11:54), Max: 97.8 (22 @ 00:47)  HR: 37 (22 @ 11:54) (37 - 89)  BP: 116/63 (22 @ 11:54) (116/63 - 147/76)  RR: 18 (22 @ 11:54) (18 - 20)  SpO2: 99% (22 @ 11:54) (98% - 99%)    03 Dec 2022 07:01  -  04 Dec 2022 07:00  --------------------------------------------------------  IN: 0 mL / OUT: 1300 mL / NET: -1300 mL    04 Dec 2022 07:01  -  04 Dec 2022 14:05  --------------------------------------------------------  IN: 300 mL / OUT: 0 mL / NET: 300 mL    Daily Weight in k.6 (04 Dec 2022 00:47)    POCT Blood Glucose.: 132 mg/dL (04 Dec 2022 12:13)  POCT Blood Glucose.: 148 mg/dL (04 Dec 2022 07:52)  POCT Blood Glucose.: 49 mg/dL (04 Dec 2022 05:58)  POCT Blood Glucose.: 168 mg/dL (04 Dec 2022 01:17)  POCT Blood Glucose.: 65 mg/dL (04 Dec 2022 00:22)  POCT Blood Glucose.: 112 mg/dL (03 Dec 2022 20:35)  POCT Blood Glucose.: 50 mg/dL (03 Dec 2022 19:07)    PHYSICAL EXAM:    GENERAL:  NAD frail, cachectic   SKIN: No rashes or lesions  HEENT: Atraumatic. Normocephalic.    NECK: Supple, No JVD.   PULMONARY: CTA B/L. No wheezing. No rales  CVS: Normal S1, S2. Rate and Rhythm are regular.   ABDOMEN/GI: Soft, Nontender, Nondistended   MSK:  No clubbing or cyanosis   NEUROLOGIC:  weak, moves all extremities   PSYCH: Awake and alert     Consultant(s) Notes Reviewed:  [x ] YES  [ ] NO  Care Discussed with Consultants/Other Providers [ x] YES  [ ] NO    LABS:                        10.9   6.44  )-----------( 101      ( 04 Dec 2022 11:36 )             32.5   Serum Pro-Brain Natriuretic Peptide: 2184 pg/mL (22 @ 19:15)    RADIOLOGY & ADDITIONAL TESTS:    Imaging or report Personally Reviewed:  [x] YES  [ ] NO  EKG reviewed: [x] YES  [ ] NO    Medications:  Standing  ALPRAZolam      ALPRAZolam 0.25 milliGRAM(s) Oral two times a day  chlorhexidine 2% Cloths 1 Application(s) Topical daily  dextrose 5%. 1000 milliLiter(s) IV Continuous <Continuous>  dextrose 50% Injectable 100 milliLiter(s) IV Push Once  hydrocortisone sodium succinate Injectable 50 milliGRAM(s) IV Push two times a day  multivitamin/minerals 1 Tablet(s) Oral daily  piperacillin/tazobactam IVPB.. 3.375 Gram(s) IV Intermittent every 8 hours  sertraline 50 milliGRAM(s) Oral daily  silver sulfADIAZINE 1% Cream 1 Application(s) Topical two times a day    PRN Meds

## 2022-12-04 NOTE — PROGRESS NOTE ADULT - SUBJECTIVE AND OBJECTIVE BOX
Patient is a 67y old  Female who presents with a chief complaint of fall and unresponsive (01 Dec 2022 14:11)        Over Night Events:    Remains bradycardic   CTH done and read is pending        ROS:  See HPI    PHYSICAL EXAM    ICU Vital Signs Last 24 Hrs  T(C): 36.6 (04 Dec 2022 00:47), Max: 36.6 (04 Dec 2022 00:47)  T(F): 97.8 (04 Dec 2022 00:47), Max: 97.8 (04 Dec 2022 00:47)  HR: 37 (04 Dec 2022 03:54) (37 - 89)  BP: 136/69 (04 Dec 2022 03:54) (136/69 - 147/76)  BP(mean): 102 (03 Dec 2022 11:38) (102 - 104)  ABP: --  ABP(mean): --  RR: 18 (04 Dec 2022 03:54) (18 - 20)  SpO2: 99% (04 Dec 2022 03:54) (98% - 100%)    O2 Parameters below as of 03 Dec 2022 21:00  Patient On (Oxygen Delivery Method): room air            General: NAD, awake and alert  HEENT: SHRUTHI             Lymphatic system: No cervical LN   Lungs: Bilateral BS  Cardiovascular: Regular   Gastrointestinal: Soft, Positive BS  Extremities: No clubbing.  Moves extremities.  Full Range of motion   Skin: Warm, intact  Neurological: No motor or sensory deficit       12-03-22 @ 07:01  -  12-04-22 @ 07:00  --------------------------------------------------------  IN:  Total IN: 0 mL    OUT:    Rectal Tube (mL): 0 mL    Voided (mL): 1300 mL  Total OUT: 1300 mL    Total NET: -1300 mL          LABS:                            11.0   6.25  )-----------( 70       ( 02 Dec 2022 12:32 )             32.8                                               12-02    142  |  104  |  24<H>  ----------------------------<  181<H>  3.6   |  29  |  0.5<L>    Ca    8.4      02 Dec 2022 12:32  Mg     1.9     12-02    TPro  4.8<L>  /  Alb  2.8<L>  /  TBili  0.4  /  DBili  x   /  AST  51<H>  /  ALT  84<H>  /  AlkPhos  249<H>  12-02      PT/INR - ( 02 Dec 2022 12:32 )   PT: 13.00 sec;   INR: 1.14 ratio         PTT - ( 02 Dec 2022 12:32 )  PTT:28.9 sec                                                                                     LIVER FUNCTIONS - ( 02 Dec 2022 12:32 )  Alb: 2.8 g/dL / Pro: 4.8 g/dL / ALK PHOS: 249 U/L / ALT: 84 U/L / AST: 51 U/L / GGT: x                                                                                                                                       MEDICATIONS  (STANDING):  ALPRAZolam      ALPRAZolam 0.25 milliGRAM(s) Oral two times a day  chlorhexidine 2% Cloths 1 Application(s) Topical daily  dextrose 50% Injectable 100 milliLiter(s) IV Push Once  hydrocortisone sodium succinate Injectable 100 milliGRAM(s) IV Push two times a day  multivitamin/minerals 1 Tablet(s) Oral daily  piperacillin/tazobactam IVPB.. 3.375 Gram(s) IV Intermittent every 8 hours  sertraline 50 milliGRAM(s) Oral daily  silver sulfADIAZINE 1% Cream 1 Application(s) Topical two times a day    MEDICATIONS  (PRN):

## 2022-12-04 NOTE — PROGRESS NOTE ADULT - ASSESSMENT
ASSESSMENT      Septic Shock resolved  Acute hypoxemic resp failure resolved   Toxic-Metabolic Encephalopathy resolved   Necrotizing Fasciitis of bilateral LE / burn noted/ Pseudomonas in cx  Left C6 Transverse Foramen Fracture  HO Anorexia.    Thrombocytopenia HIT negative   transaminitis      PLAN    CNS: Continue Xanax as needed. CT head ordered and pending official read.     HEENT: Oral care, t collar per neuro sx.      PULMONARY: Aspiration precaution, or RA.  HOB at 45 degrees     CARDIOVASCULAR: Off Midodrine. MAP is adequate. Remains bradycardic.     GI: Nutrition evaluation. Diet as tolerated.     RENAL: Follow up renal function and lytes, correct as needed (K>4; Mg>2)    INFECTIOUS DISEASE: On Zosyn. ID is following; Rare pseudomonas in wound culture.     HEMATOLOGICAL: HIT Ab negative. Thrombocytopenia noted. On Lovenox for DVT ppx. Hold if platelet count is less than 50k. Check peripheral smear.     ENDOCRINE: Wean down HC to 50mg BID. Start gentle D5W at 50cc/hr.     DISPOSITION: If CTH is negative then transfer to telemetry.     Palliative care evaluation. Nutrition evaluation. Prognosis is poor.

## 2022-12-04 NOTE — PROGRESS NOTE ADULT - ASSESSMENT
67F w/ h/o anorexia, anxiety, depression, kidney cysts, peripheral neuropathy, RA, OA, osteoporosis, low back pain s/p unwitnessed fall, found down by  +HT. On arrival to ED, patient unresponsive, hypotensive, bradycardic, and hypothermic. Patient also hypoglycemic (FS 24) and given dextrose immediately. Subsequently given Emiliana hugger, given 2L of warm IV fluids, started on levophed, and given broad-spectrum abx, and started on levothyroxine for possible myxedema coma (although no documentation in eMAR, so unclear if actually given). VBG noted respiratory acidosis.Patient intubated for airway protection. CT spine demonstrated fx of anterior and posterior tubercle of left C6 transverse foramen. NSGY consulted, but no surgical intervention offered. Subsequently admitted to MICU for presumed septic shock, extubated and downgraded to SDU     Septic Shock likely due to severe soft tissue infection  Toxic-Metabolic Encephalopathy  Full thickness wounds of bl LE   Osteomyelitis, suspected L Ankle/foot  intubated, extubated 11/29, currently comfortable on ra  s/p burn eval--> no evidence of nec fasc, c/w wound care  BID  on Zosyn per ID, tentatively until 12/7  off midodrine due to bradycardia    Bradycardia, new, asymptomatic  HR noted 30s  midodrine on hold, monitor electrlolytes/replete as needed   pending CTH read  cardio eval appreciated    Left C6 Transverse Foramen Fracture  known to neurosurgery service, very high risk for surgical intervention  c/w Collar, will need MRI C spine when improved     Hypothyroidism  - TSH 7.92, T3 74, T4 7.7  - started on HC, now tapering--- decrease to 50 q12H today  - repeat TFTs in 4-6 weeks     Anorexia  Anxiety  - long history per   - monitor PO intake, nutrition eval  - s/p psychiatry eval, no contraindication for dc, recs outpatient psychiatry/anorexia referral   - resumed home Xanax    Hx of rheumatoid arthritis  - Unknown if pt had been taking steroids. Per pt's , pt only takes pain control medications at home for RA  - on HC as above     Overall prognosis guarded    #Progress Note Handoff  Pending (specify):   CTH, telemetry monitoring, burn f/u, PO intake, IV abx   Family discussion: Plan of care discussed with patient   Disposition:  from home     Carolann Lai MD  s. [id number]

## 2022-12-05 NOTE — PROGRESS NOTE ADULT - SUBJECTIVE AND OBJECTIVE BOX
ANA LILIA VERMA  67y, Female  Allergy: No Known Allergies      LOS  8d    CHIEF COMPLAINT: fall and unresponsive (01 Dec 2022 14:11)      INTERVAL EVENTS/HPI  - No acute events overnight  - T(F): , Max: 96.9 (12-05-22 @ 08:04)  - Denies any worsening symptoms  - Tolerating medication  - WBC Count: 6.44 (12-04-22 @ 11:36)     -      ROS  General: Denies rigors, nightsweats  HEENT: Denies headache, rhinorrhea, sore throat, eye pain  CV: Denies CP, palpitations  PULM: Denies wheezing, hemoptysis  GI: Denies hematemesis, hematochezia, melena  : Denies discharge, hematuria  MSK: Denies arthralgias, myalgias  SKIN: Denies rash, lesions  NEURO: Denies paresthesias, weakness  PSYCH: Denies depression, anxiety    VITALS:  T(F): 96.8, Max: 96.9 (12-05-22 @ 08:04)  HR: 46  BP: 138/70  RR: 20Vital Signs Last 24 Hrs  T(C): 36 (05 Dec 2022 10:57), Max: 36.1 (05 Dec 2022 08:04)  T(F): 96.8 (05 Dec 2022 10:57), Max: 96.9 (05 Dec 2022 08:04)  HR: 46 (05 Dec 2022 10:57) (39 - 51)  BP: 138/70 (05 Dec 2022 10:57) (110/58 - 140/65)  BP(mean): 76 (05 Dec 2022 04:00) (76 - 101)  RR: 20 (05 Dec 2022 10:57) (18 - 20)  SpO2: 99% (05 Dec 2022 10:57) (97% - 100%)    Parameters below as of 05 Dec 2022 04:00  Patient On (Oxygen Delivery Method): room air        PHYSICAL EXAM:  Gen: NAD, resting in bed  HEENT: Normocephalic, atraumatic  Neck: supple, no lymphadenopathy  CV: Regular rate & regular rhythm  Lungs: decreased BS at bases, no fremitus  Abdomen: Soft, BS present  Ext: Warm, well perfused  Neuro: non focal, awake  Skin: no rash, no erythema  Lines: no phlebitis    FH: Non-contributory  Social Hx: Non-contributory    TESTS & MEASUREMENTS:                        10.9   6.44  )-----------( 101      ( 04 Dec 2022 11:36 )             32.5                   Culture - Other (collected 11-28-22 @ 13:31)  Source: .Other BLE  Final Report (12-01-22 @ 23:12):    Rare Pseudomonas aeruginosa    Rare Most closely resembling Pseudomonas species, not aeruginosa  Organism: Pseudomonas aeruginosa  Pseudomonas species (12-01-22 @ 23:12)  Organism: Pseudomonas species (12-01-22 @ 23:12)      -  Amikacin: S <=16      -  Aztreonam: S <=4      -  Cefepime: S <=2      -  Ceftriaxone: R 8      -  Ciprofloxacin: S <=0.25      -  Gentamicin: S <=2      -  Levofloxacin: S <=0.5      -  Meropenem: S <=1      -  Piperacillin/Tazobactam: S <=8      -  Tobramycin: S <=2      -  Trimethoprim/Sulfamethoxazole: R >2/38      Method Type: SEPIDEH  Organism: Pseudomonas aeruginosa (12-01-22 @ 23:12)      -  Amikacin: S <=16      -  Aztreonam: S <=4      -  Cefepime: S <=2      -  Ceftazidime: S <=1      -  Ciprofloxacin: S <=0.25      -  Gentamicin: S <=2      -  Imipenem: S <=1      -  Levofloxacin: S <=0.5      -  Meropenem: S <=1      -  Piperacillin/Tazobactam: S <=8      -  Tobramycin: S <=2      Method Type: SEPIDEH    Culture - Other (collected 11-28-22 @ 13:31)  Source: .Other BLE  Final Report (11-30-22 @ 13:55):    Rare Pseudomonas aeruginosa  Organism: Pseudomonas aeruginosa (11-30-22 @ 13:55)  Organism: Pseudomonas aeruginosa (11-30-22 @ 13:55)      -  Amikacin: S <=16      -  Aztreonam: S 8      -  Cefepime: S <=2      -  Ceftazidime: S 4      -  Ciprofloxacin: S <=0.25      -  Gentamicin: S <=2      -  Imipenem: S 2      -  Levofloxacin: S <=0.5      -  Meropenem: S <=1      -  Piperacillin/Tazobactam: S <=8      -  Tobramycin: S <=2      Method Type: SEPIDEH    Culture - Urine (collected 11-27-22 @ 19:15)  Source: Clean Catch Clean Catch (Midstream)  Final Report (11-28-22 @ 23:09):    <10,000 CFU/mL Normal Urogenital Alyssa    Culture - Blood (collected 11-27-22 @ 06:30)  Source: .Blood Blood-Peripheral  Final Report (12-02-22 @ 13:00):    No Growth Final    Culture - Blood (collected 11-27-22 @ 06:25)  Source: .Blood Blood-Peripheral  Final Report (12-02-22 @ 13:00):    No Growth Final    Culture - Blood (collected 11-10-22 @ 12:12)  Source: .Blood Blood-Peripheral  Final Report (11-15-22 @ 23:00):    No Growth Final    Culture - Blood (collected 11-10-22 @ 12:12)  Source: .Blood Blood-Peripheral  Final Report (11-15-22 @ 23:00):    No Growth Final            INFECTIOUS DISEASES TESTING  MRSA PCR Result.: Negative (11-29-22 @ 05:00)  Procalcitonin, Serum: 0.15 (11-29-22 @ 04:55)  Legionella Antigen, Urine: Negative (11-27-22 @ 19:15)  Procalcitonin, Serum: 0.61 (11-27-22 @ 19:12)  MRSA PCR Result.: Negative (11-11-22 @ 14:30)  COVID-19 PCR: NotDetec (11-10-22 @ 12:25)  MRSA PCR Result.: Negative (04-27-22 @ 09:51)  COVID-19 PCR: NotDetec (01-16-22 @ 12:40)      INFLAMMATORY MARKERS  C-Reactive Protein, Serum: 9.6 mg/L (11-10-22 @ 14:17)  Sedimentation Rate, Erythrocyte: 25 mm/Hr (11-10-22 @ 14:17)      RADIOLOGY & ADDITIONAL TESTS:  I have personally reviewed the last available Chest xray  CXR      CT      CARDIOLOGY TESTING  12 Lead ECG:   Ventricular Rate 49 BPM    Atrial Rate 49 BPM    P-R Interval 164 ms    QRS Duration 108 ms    Q-T Interval 508 ms    QTC Calculation(Bazett) 458 ms    P Axis 72 degrees    R Axis 52 degrees    T Axis 59 degrees    Diagnosis Line Sinus bradycardia  Otherwise normal ECG    Confirmed by CONRAD SEGUNDO MD ([id number]) on 12/2/2022 2:37:10 PM (12-02-22 @ 10:13)  12 Lead ECG:   Ventricular Rate 46 BPM    Atrial Rate 46 BPM    P-R Interval 150 ms    QRS Duration 92 ms    Q-T Interval 488 ms    QTC Calculation(Bazett) 427 ms    P Axis 42 degrees    R Axis 68 degrees    T Axis 82 degrees    Diagnosis Line Marked sinus bradycardia  Abnormal ECG    Confirmed by Mitchel Miranda ([id number]) on 12/1/2022 5:57:32 PM (12-01-22 @ 15:45)      MEDICATIONS  ALPRAZolam     ALPRAZolam 0.25 Oral two times a day  chlorhexidine 2% Cloths 1 Topical daily  dextrose 5%. 1000 IV Continuous <Continuous>  dextrose 50% Injectable 100 IV Push Once  hydrocortisone sodium succinate Injectable 50 IV Push two times a day  melatonin 5 Oral at bedtime  multivitamin/minerals 1 Oral daily  piperacillin/tazobactam IVPB.. 3.375 IV Intermittent every 8 hours  sertraline 50 Oral daily  silver sulfADIAZINE 1% Cream 1 Topical two times a day      WEIGHT  Weight (kg): 45 (12-01-22 @ 20:00)      ANTIBIOTICS:  piperacillin/tazobactam IVPB.. 3.375 Gram(s) IV Intermittent every 8 hours      All available historical records have been reviewed

## 2022-12-05 NOTE — PROGRESS NOTE ADULT - ASSESSMENT
ASSESSMENT  67F w/ h/o anorexia, anxiety, depression, kidney cysts, peripheral neuropathy, RA, OA, osteoporosis, low back pain s/p unwitnessed fall, found down by     IMPRESSION  #s/p Fall  #Shock, possible septic -- severe soft tissue infectoin    - Blood Cx NG    #Necrotizing Fasciitis? vs severe soft tissue infection vs biliary  -  CT Lower Extremity w/ IV Cont, Bilateral (11.27.22 @ 11:02): 1) Bilateral lower extremity swelling and marked skin thickening, left  greater than right, compatible with cellulitis in the correct clinical  setting. Multiple rounded locules of gas are also seen tracking within the soft  tissues along the predominantly posterior calf and anterolateral/dorsal  ankle and foot. These are likely related to known multiple abrasions of  the left foot/leg and appear to partially contact the posterior calf skin  abrasion. However, given the burden of soft tissue disease it is difficult to completely exclude early necrotizing fasciitis on imaging,  and this was discussed with the team at the time of dictation.  - wound Cx Pseudomonas     #Transaminitis - shock vs biliary   - RUQ US with distended gallbladder    #Chronic Lower Extremity Ulcers  - Arterial US 11/10 with normal arterial flow     #Cortical erosions of left ankle/foot (Osteopenia vs OM)  #Bilateral Knee Joint effusions   #Abx allergy: NKDA    RECOMMENDATIONS  - continue zosyn 3.5 mg q 8 hours --  end date 12/7   - trend LFTs     Please call or message on [employer] Teams if with any questions.  Spectra [phone]

## 2022-12-05 NOTE — PROGRESS NOTE ADULT - SUBJECTIVE AND OBJECTIVE BOX
Patient is a 67y old  Female who presents with a chief complaint of fall and unresponsive (01 Dec 2022 14:11)        Over Night Events:    Remains bradycardic   CTH done and read shows no acute pathologies.      ROS:     All pertinent ROS are negative except HPI         PHYSICAL EXAM    ICU Vital Signs Last 24 Hrs  T(C): 36.1 (05 Dec 2022 08:04), Max: 36.3 (04 Dec 2022 11:54)  T(F): 96.9 (05 Dec 2022 08:04), Max: 97.4 (04 Dec 2022 11:54)  HR: 44 (05 Dec 2022 08:04) (37 - 51)  BP: 128/71 (05 Dec 2022 08:04) (110/58 - 140/65)  BP(mean): 76 (05 Dec 2022 04:00) (76 - 101)  ABP: --  ABP(mean): --  RR: 20 (05 Dec 2022 08:04) (18 - 20)  SpO2: 99% (05 Dec 2022 08:04) (97% - 100%)    O2 Parameters below as of 05 Dec 2022 04:00  Patient On (Oxygen Delivery Method): room air          General: NAD, awake and alert  HEENT: SHRUTHI             Lymphatic system: No cervical LN   Lungs: Bilateral BS  Cardiovascular: Regular   Gastrointestinal: Soft, Positive BS  Extremities: No clubbing.  Moves extremities.  Full Range of motion   Skin: Warm, intact  Neurological: No motor or sensory deficit       12-04-22 @ 07:01  -  12-05-22 @ 07:00  --------------------------------------------------------  IN:    dextrose 5%: 650 mL    IV PiggyBack: 100 mL  Total IN: 750 mL    OUT:    Voided (mL): 350 mL  Total OUT: 350 mL    Total NET: 400 mL          LABS:                            10.9   6.44  )-----------( 101      ( 04 Dec 2022 11:36 )             32.5                                                                                                                                                                                                                                                                          MEDICATIONS  (STANDING):  ALPRAZolam      ALPRAZolam 0.25 milliGRAM(s) Oral two times a day  chlorhexidine 2% Cloths 1 Application(s) Topical daily  dextrose 5%. 1000 milliLiter(s) (50 mL/Hr) IV Continuous <Continuous>  dextrose 50% Injectable 100 milliLiter(s) IV Push Once  hydrocortisone sodium succinate Injectable 50 milliGRAM(s) IV Push two times a day  melatonin 5 milliGRAM(s) Oral at bedtime  multivitamin/minerals 1 Tablet(s) Oral daily  sertraline 50 milliGRAM(s) Oral daily  silver sulfADIAZINE 1% Cream 1 Application(s) Topical two times a day    MEDICATIONS  (PRN):      New X-rays reviewed:                                                                                  ECHO    CXR interpreted by me:       Patient is a 67y old  Female who presents with a chief complaint of fall and unresponsive (01 Dec 2022 14:11)        Over Night Events:    Remains bradycardic   CTH done and read shows no acute pathologies        PHYSICAL EXAM    ICU Vital Signs Last 24 Hrs  T(C): 36.1 (05 Dec 2022 08:04), Max: 36.3 (04 Dec 2022 11:54)  T(F): 96.9 (05 Dec 2022 08:04), Max: 97.4 (04 Dec 2022 11:54)  HR: 44 (05 Dec 2022 08:04) (37 - 51)  BP: 128/71 (05 Dec 2022 08:04) (110/58 - 140/65)  BP(mean): 76 (05 Dec 2022 04:00) (76 - 101)  RR: 20 (05 Dec 2022 08:04) (18 - 20)  SpO2: 99% (05 Dec 2022 08:04) (97% - 100%)    O2 Parameters below as of 05 Dec 2022 04:00  Patient On (Oxygen Delivery Method): room air          General: NAD, awake and alert  HEENT: SHRUTHI             Lymphatic system: No cervical LN   Lungs: Bilateral BS  Cardiovascular: Regular   Gastrointestinal: Soft, Positive BS  chronic LE changes      12-04-22 @ 07:01  -  12-05-22 @ 07:00  --------------------------------------------------------  IN:    dextrose 5%: 650 mL    IV PiggyBack: 100 mL  Total IN: 750 mL    OUT:    Voided (mL): 350 mL  Total OUT: 350 mL    Total NET: 400 mL          LABS:                            10.9   6.44  )-----------( 101      ( 04 Dec 2022 11:36 )             32.5                                                                                                                                                                                                                                                                          MEDICATIONS  (STANDING):  ALPRAZolam      ALPRAZolam 0.25 milliGRAM(s) Oral two times a day  chlorhexidine 2% Cloths 1 Application(s) Topical daily  dextrose 5%. 1000 milliLiter(s) (50 mL/Hr) IV Continuous <Continuous>  dextrose 50% Injectable 100 milliLiter(s) IV Push Once  hydrocortisone sodium succinate Injectable 50 milliGRAM(s) IV Push two times a day  melatonin 5 milliGRAM(s) Oral at bedtime  multivitamin/minerals 1 Tablet(s) Oral daily  sertraline 50 milliGRAM(s) Oral daily  silver sulfADIAZINE 1% Cream 1 Application(s) Topical two times a day

## 2022-12-05 NOTE — CHART NOTE - NSCHARTNOTEFT_GEN_A_CORE
Transfer Note: CEU to Telemetry    Transfer from: CEU    Transfer to:  (  ) CCU    (  ) MICU   ( X ) Telemetry     (  ) RCU                               (  ) Palliative    (  ) Stroke Unit    (  ) __________________      HPI / HOSPITAL COURSE:  67F w/ h/o anorexia, anxiety, depression, kidney cysts, peripheral neuropathy, RA, OA, osteoporosis, low back pain s/p unwitnessed fall, found down by  +HT, ?LOC, -AC. Contacted patient's  who was able to provide some history and states he returned home from work around 11pm when he found the patient down on the tiled kitchen floor. States the patient was awake and alert when he found her on the floor, but she didn't recall how she fell down.  speculates that she might've been sitting in chair and fallen out of it as this has happened before in the past.  states that the patient said she hit her head, and he noticed blood on the patient's knees, but no other injuries. Patient was complaining of left-sided neck pain after the fall, but  states she has been having this pain for about 1 month. About 1 hour after the fall, patient became lethargic and was unable to stay up on her feet, so  called for an ambulance.     Of note, pt has long-standing h/o anorexia for several years per . Does not induce vomiting, but frequently uses laxatives for weight loss. Has never consistently followed w/ psychologist/psychiatrist. After recent discharge from hospital two weeks ago, anorexia acutely worsened and pt has had minimal caloric intake.    On arrival to ED, patient unresponsive, hypotensive, bradycardic, and hypothermic. Patient also hypoglycemic (FS 24) and given dextrose immediately. Subsequently given Emiliana hugger, given 2L of warm IV fluids, started on levophed, and given broad-spectrum abx, and started on levothyroxine for possible myxedema coma (although no documentation in eMAR, so unclear if actually given). VBG noted respiratory acidosis. Pt unable to tolerate BiPAP due to current mental status, so patient intubated for airway protection. CT spine demonstrated fx of anterior and posterior tubercle of left C6 transverse foramen. NSGY consulted, but no surgical intervention offered.       Vital Signs Last 24 Hrs  T(C): 36 (05 Dec 2022 10:57), Max: 36.3 (04 Dec 2022 11:54)  T(F): 96.8 (05 Dec 2022 10:57), Max: 97.4 (04 Dec 2022 11:54)  HR: 46 (05 Dec 2022 10:57) (37 - 51)  BP: 138/70 (05 Dec 2022 10:57) (110/58 - 140/65)  BP(mean): 76 (05 Dec 2022 04:00) (76 - 101)  RR: 20 (05 Dec 2022 10:57) (18 - 20)  SpO2: 99% (05 Dec 2022 10:57) (97% - 100%)    Parameters below as of 05 Dec 2022 04:00  Patient On (Oxygen Delivery Method): room air        I&O's Summary    04 Dec 2022 07:01  -  05 Dec 2022 07:00  --------------------------------------------------------  IN: 750 mL / OUT: 350 mL / NET: 400 mL    05 Dec 2022 07:01  -  05 Dec 2022 11:18  --------------------------------------------------------  IN: 200 mL / OUT: 0 mL / NET: 200 mL        Physical Exam:   General: NAD, awake and alert  Cardiac: RRR, no murmur, no rub, no gallop  Respiratory: Good air exchange bilaterally, no wheezes, no crackles  GI: Abdomen nondistended, nontender, bowel sounds present  Neuro: AAOx3, no tremors, no fasciculations     LABS:                               10.9   6.44  )-----------( 101      ( 04 Dec 2022 11:36 )             32.5           ASSESSMENT & PLAN:   67F w/ h/o anorexia, anxiety, depression, kidney cysts, peripheral neuropathy, RA, OA, osteoporosis, low back pain s/p unwitnessed fall, found down by  +HT. On arrival to ED, patient unresponsive, hypotensive, bradycardic, and hypothermic. Patient also hypoglycemic (FS 24) and given dextrose immediately. Subsequently given Emiliana hugger, given 2L of warm IV fluids, started on levophed, and given broad-spectrum abx, and started on levothyroxine for possible myxedema coma (although no documentation in eMAR, so unclear if actually given). VBG noted respiratory acidosis.Patient intubated for airway protection. CT spine demonstrated fx of anterior and posterior tubercle of left C6 transverse foramen. NSGY consulted, but no surgical intervention offered. Subsequently admitted to MICU for presumed septic shock, extubated and downgraded to SDU     Septic Shock likely due to severe soft tissue infection  Toxic-Metabolic Encephalopathy  Full thickness wounds of bl LE   Osteomyelitis, suspected L Ankle/foot  intubated, extubated 11/29, currently comfortable on ra  s/p burn eval--> no evidence of nec fasc, c/w wound care  BID  on Zosyn per ID, tentatively until 12/7  off midodrine due to bradycardia    Bradycardia, new, asymptomatic  HR noted 30s  midodrine on hold, monitor electrlolytes/replete as needed   pending CTH read  cardio eval appreciated    Left C6 Transverse Foramen Fracture  known to neurosurgery service, very high risk for surgical intervention  c/w Collar, will need MRI C spine when improved     Hypothyroidism  - TSH 7.92, T3 74, T4 7.7  - started on HC, now tapering--- decrease to 50 q12H today  - repeat TFTs in 4-6 weeks     Anorexia  Anxiety  - long history per   - monitor PO intake, nutrition eval  - s/p psychiatry eval, no contraindication for dc, recs outpatient psychiatry/anorexia referral   - resumed home Xanax    Hx of rheumatoid arthritis  - Unknown if pt had been taking steroids. Per pt's , pt only takes pain control medications at home for RA  - on HC as above     Overall prognosis guarded    #Progress Note Handoff  Pending (specify):  telemetry monitoring, burn f/u, PO intake, IV abx   Family discussion: Plan of care discussed with patient   Disposition:  from home     FOLLOW-UP:  - f/u Neurosurgery recs re: C-collar change and duration  - HIT Ab negative. Thrombocytopenia noted. On Lovenox for DVT ppx. Hold if platelet count is less than 50k.   - Continue Zosyn. ID is following; Rare pseudomonas in wound culture.

## 2022-12-05 NOTE — PROGRESS NOTE ADULT - SUBJECTIVE AND OBJECTIVE BOX
NUTRITION SUPPORT TEAM  -  PROGRESS NOTE   pt seen and evaluated   on po diet   alert, awake, verbal  abdomen soft n/d        REVIEW OF SYSTEMS:  Negative except as noted above.     VITALS:  T(F): 96.9 (12-05 @ 08:04), Max: 96.9 (12-05 @ 08:04)  HR: 44 (12-05 @ 08:04) (39 - 51)  BP: 128/71 (12-05 @ 08:04) (110/58 - 140/65)  RR: 20 (12-05 @ 08:04) (18 - 20)  SpO2: 99% (12-05 @ 08:04) (97% - 99%)    HEIGHT/WEIGHT/BMI:   Height (cm): 170.2 (11-28), 170.2 (11-11), 170.2 (05-12), 170.2 (05-05)  Weight (kg): 45 (12-01), 49 (11-11), 49 (05-12), 49 (05-05)  BMI (kg/m2): 15.5 (12-01), 16.9 (11-28), 16.9 (11-11), 16.9 (05-12)    12-04-22 @ 07:01  -  12-05-22 @ 07:00  --------------------------------------------------------  IN:    dextrose 5%: 650 mL    IV PiggyBack: 100 mL  Total IN: 750 mL    OUT:    Voided (mL): 350 mL  Total OUT: 350 mL    Total NET: 400 mL        STANDING MEDICATIONS:   ALPRAZolam      ALPRAZolam 0.25 milliGRAM(s) Oral two times a day  chlorhexidine 2% Cloths 1 Application(s) Topical daily  dextrose 5%. 1000 milliLiter(s) IV Continuous <Continuous>  dextrose 50% Injectable 100 milliLiter(s) IV Push Once  hydrocortisone sodium succinate Injectable 50 milliGRAM(s) IV Push two times a day  melatonin 5 milliGRAM(s) Oral at bedtime  multivitamin/minerals 1 Tablet(s) Oral daily  sertraline 50 milliGRAM(s) Oral daily  silver sulfADIAZINE 1% Cream 1 Application(s) Topical two times a day      LABS:                         10.9   6.44  )-----------( 101      ( 04 Dec 2022 11:36 )             32.5   Triglycerides, Serum: 54 mg/dL (11-28 @ 04:30)  Vitamin D, 25-Hydroxy: 89 ng/mL (11-28 @ 04:30)  Blood Glucose (Past 24 hours):  69 mg/dL (12-05 @ 08:09)  69 mg/dL (12-05 @ 07:19)  132 mg/dL (12-04 @ 21:41)    DIET:   Diet, Pureed:   Mildly Thick Liquids (MILDTHICKLIQS) (12-01-22 @ 12:22) [Active]    RADIOLOGY:   < from: CT Head No Cont (12.03.22 @ 16:35) >  IMPRESSION:  Unremarkable study   NUTRITION SUPPORT TEAM  -  PROGRESS NOTE   pt seen and evaluated   on po diet   alert, awake, verbal  abdomen soft n/d      REVIEW OF SYSTEMS:  Negative except as noted above.     VITALS:  T(F): 96.9 (12-05 @ 08:04), Max: 96.9 (12-05 @ 08:04)  HR: 44 (12-05 @ 08:04) (39 - 51)  BP: 128/71 (12-05 @ 08:04) (110/58 - 140/65)  RR: 20 (12-05 @ 08:04) (18 - 20)  SpO2: 99% (12-05 @ 08:04) (97% - 99%)    HEIGHT/WEIGHT/BMI:   Height (cm): 170.2 (11-28), 170.2 (11-11), 170.2 (05-12), 170.2 (05-05)  Weight (kg): 45 (12-01), 49 (11-11), 49 (05-12), 49 (05-05)  BMI (kg/m2): 15.5 (12-01), 16.9 (11-28), 16.9 (11-11), 16.9 (05-12)    12-04-22 @ 07:01  -  12-05-22 @ 07:00  --------------------------------------------------------  IN:    dextrose 5%: 650 mL    IV PiggyBack: 100 mL  Total IN: 750 mL    OUT:    Voided (mL): 350 mL  Total OUT: 350 mL    Total NET: 400 mL    STANDING MEDICATIONS:   ALPRAZolam      ALPRAZolam 0.25 milliGRAM(s) Oral two times a day  chlorhexidine 2% Cloths 1 Application(s) Topical daily  hydrocortisone sodium succinate Injectable 50 milliGRAM(s) IV Push two times a day  melatonin 5 milliGRAM(s) Oral at bedtime  multivitamin/minerals 1 Tablet(s) Oral daily  sertraline 50 milliGRAM(s) Oral daily  silver sulfADIAZINE 1% Cream 1 Application(s) Topical two times a day    LABS:                         10.9   6.44  )-----------( 101      ( 04 Dec 2022 11:36 )             32.5   Triglycerides, Serum: 54 mg/dL (11-28 @ 04:30)  Vitamin D, 25-Hydroxy: 89 ng/mL (11-28 @ 04:30)  Blood Glucose (Past 24 hours):  69 mg/dL (12-05 @ 08:09)  69 mg/dL (12-05 @ 07:19)  132 mg/dL (12-04 @ 21:41)    DIET:   Diet, Pureed:   Mildly Thick Liquids (MILDTHICKLIQS) (12-01-22 @ 12:22) [Active]    RADIOLOGY:   < from: CT Head No Cont (12.03.22 @ 16:35) >  IMPRESSION:  Unremarkable study

## 2022-12-05 NOTE — SWALLOW BEDSIDE ASSESSMENT ADULT - SWALLOW EVAL: DIAGNOSIS
+delayed throat clear w/ puree and mildly thick liquid trials. Pt accepted barium, SLP to f/u w/ [facility] to further investigate pharyngeal swallow integrity

## 2022-12-05 NOTE — PROGRESS NOTE ADULT - ASSESSMENT
ASSESSMENT  - septic shock  - acute resp failure  - anorexia  - LE wounds, r/o necrotizing fasciitis`  - left C6 transverse foramen fracture    SUGGEST:  on po diet /po intake is fair   observe intake for adequacy  if enteral supplementation needed, need to feed by intermittent gravity, NOT continuous drip via NG, as pt without protected airway and she lies herself down and turns sideways  spoke with Psych - pt might require in-patient eating disorder unit ASSESSMENT  - septic shock  - acute resp failure  - anorexia  - LE wounds, r/o necrotizing fasciitis  - left C6 transverse foramen fracture    SUGGEST:  on po diet /po intake is fair   observe intake for adequacy  if enteral supplementation needed, need to feed by intermittent gravity, NOT continuous drip via NG, as pt without protected airway and she lies herself down and turns sideways  spoke with Psych - pt might require in-patient eating disorder unit

## 2022-12-05 NOTE — PROGRESS NOTE ADULT - SUBJECTIVE AND OBJECTIVE BOX
Podiatry Progress Note    Subjective:   ANA LILIA VERMA is a pleasant well-nourished, well developed 67y Female in no acute distress, alert awake, and oriented to person, place and time.  Patient is a 67y old  Female who presents with a chief complaint of fall and unresponsive (01 Dec 2022 14:11)      PAST MEDICAL & SURGICAL HISTORY:  Anxiety      Depression      Osteoporosis      Kidney cysts      Peripheral neuropathy      RA (rheumatoid arthritis)      OA (osteoarthritis)      Low back pain with radiation  s/p &quot;nerve cutting&quot; 2015           Objective:  Vital Signs Last 24 Hrs  T(C): 35.8 (05 Dec 2022 04:00), Max: 36.4 (04 Dec 2022 08:24)  T(F): 96.5 (05 Dec 2022 04:00), Max: 97.5 (04 Dec 2022 08:24)  HR: 48 (05 Dec 2022 04:00) (37 - 51)  BP: 110/58 (05 Dec 2022 04:00) (110/58 - 140/65)  BP(mean): 76 (05 Dec 2022 04:00) (76 - 104)  RR: 19 (05 Dec 2022 04:00) (18 - 19)  SpO2: 98% (05 Dec 2022 04:00) (97% - 100%)    Parameters below as of 05 Dec 2022 04:00  Patient On (Oxygen Delivery Method): room air                            10.9   6.44  )-----------( 101      ( 04 Dec 2022 11:36 )             32.5     Physical Exam - Lower Extremity Focused:   Derm:   Left Foot:   -Small ulcer sub5th met head - improved; dry, no drainage/bleeding/malodor   -Ulcer Dorsal 2nd DIPJ; Dried sanguinous exudate to wound base; Wound margins irregular; No active drainage/bleeding; Stable;   -Ulcer Posterior Heel: Wound base Granular; Wound Margins regular; hyperkeratotic, No active drainage/bleeding, No malodor;  Right Foot:    -Eschar Lesions: Dorsal 2nd PIPJ, Lateral Midfoot;    -Lateral Heel Ulcer: Wound Base Granular; Irregular Hyperkeratotic Border to periwound; No active drainage/bleeding; No Malodor; Stable;  Multiple <1cm Eschar lesions to Dorsal Toes and Feet, B/L;  Ischemic Changes B/L LE; Improving;    Vascular: DP and PT Pulses Diminished; Foot is cool to touch    Neuro: Gross touch sensation intact   MSK: Mild Pain On Palpation at Wound Site     Assessment:  Right Plantar Ulcer 5th Submetatarsal head - Healing  Right Dorsal 2nd DIPJ Ulcer - Stable  Right Posterior Heel Ulcer - Stable  Ischemic Changes B/L LE      Plan:  Chart reviewed and Patient evaluated. All Questions and Concerns Addressed and Answered  Weight Bearing Status; WBAT   B/L Feet cleansed w/NS: R Lateral Heel Ulcer & Left Posterior Heel Ulcer Dressed w/Silvadene / Adaptic / DSD / Kerlix; Q24  Local Wound Care; As Stated Above;   B/L Legs: Please continue with local wound care per Burn team recs  Discussed Plan w/ Dr. Nieto    Podiatry      Podiatry Progress Note    Subjective:   ANA LILIA VERMA is a pleasant well-nourished, well developed 67y Female in no acute distress, alert awake, and oriented to person, place and time.  Patient is a 67y old  Female who presents with a chief complaint of fall and unresponsive (01 Dec 2022 14:11). Pt seen & evaluated at beside. No new pedal complaints. Dressings D/C/I to b/l Legs. No dressings to feet.       PAST MEDICAL & SURGICAL HISTORY:  Anxiety      Depression      Osteoporosis      Kidney cysts      Peripheral neuropathy      RA (rheumatoid arthritis)      OA (osteoarthritis)      Low back pain with radiation  s/p &quot;nerve cutting&quot; 2015           Objective:  Vital Signs Last 24 Hrs  T(C): 35.8 (05 Dec 2022 04:00), Max: 36.4 (04 Dec 2022 08:24)  T(F): 96.5 (05 Dec 2022 04:00), Max: 97.5 (04 Dec 2022 08:24)  HR: 48 (05 Dec 2022 04:00) (37 - 51)  BP: 110/58 (05 Dec 2022 04:00) (110/58 - 140/65)  BP(mean): 76 (05 Dec 2022 04:00) (76 - 104)  RR: 19 (05 Dec 2022 04:00) (18 - 19)  SpO2: 98% (05 Dec 2022 04:00) (97% - 100%)    Parameters below as of 05 Dec 2022 04:00  Patient On (Oxygen Delivery Method): room air                            10.9   6.44  )-----------( 101      ( 04 Dec 2022 11:36 )             32.5     Physical Exam - Lower Extremity Focused:   Derm:   Left Foot:   -Small ulcer sub5th met head - improved; dry, no drainage/bleeding/malodor   -Ulcer Dorsal 2nd DIPJ; Dried sanguinous exudate to wound base; Wound margins irregular; No active drainage/bleeding; Stable;   -Ulcer Posterior Heel: Wound base Granular; Wound Margins regular; hyperkeratotic, No active drainage/bleeding, No malodor;  Right Foot:    -Eschar Lesions: Dorsal 2nd PIPJ, Lateral Midfoot;    -Lateral Heel Ulcer: Wound Base Granular; Irregular Hyperkeratotic Border to periwound; No active drainage/bleeding; No Malodor; Stable;  Multiple <1cm Eschar lesions to Dorsal Toes and Feet, B/L;  Ischemic Changes B/L LE; Improving;    Vascular: DP and PT Pulses Diminished; Foot is cool to touch    Neuro: Gross touch sensation intact   MSK: Mild Pain On Palpation at Wound Site     Assessment:  Right Plantar Ulcer 5th Submetatarsal head - Healing  Right Dorsal 2nd DIPJ Ulcer - Stable  Right Posterior Heel Ulcer - Stable  Ischemic Changes B/L LE      Plan:  Chart reviewed and Patient evaluated. All Questions and Concerns Addressed and Answered  Weight Bearing Status; WBAT   B/L Feet cleansed w/NS: R Lateral Heel Ulcer & Left Posterior Heel Ulcer Dressed w/Silvadene / Adaptic / DSD / Kerlix; Q24  Heel Offloading Boots B/L LE; Decubitus precautions;  Local Wound Care; As Stated Above;   B/L Legs: Please continue with local wound care per Burn team recs  Discussed Plan w/ Dr. Nieto    Podiatry

## 2022-12-05 NOTE — PROGRESS NOTE ADULT - SUBJECTIVE AND OBJECTIVE BOX
ANA LILIA VERMA  67y Female    CHIEF COMPLAINT:    Patient is a 67y old  Female who presents with a chief complaint of fall and unresponsive (01 Dec 2022 14:11)    INTERVAL HPI/OVERNIGHT EVENTS:    Patient seen and examined. No acute events overnight. Remains bradycardic, asymptomatic     ROS: All other systems are negative.    Vital Signs:    T(F): 96.8 (22 @ 10:57), Max: 96.9 (22 @ 08:04)  HR: 46 (22 @ 10:57) (39 - 51)  BP: 138/70 (22 @ 10:57) (110/58 - 140/65)  RR: 20 (22 @ 10:57) (18 - 20)  SpO2: 99% (22 @ 10:57) (97% - 100%)    04 Dec 2022 07:01  -  05 Dec 2022 07:00  --------------------------------------------------------  IN: 750 mL / OUT: 350 mL / NET: 400 mL    05 Dec 2022 07:01  -  05 Dec 2022 15:32  --------------------------------------------------------  IN: 740 mL / OUT: 700 mL / NET: 40 mL    Daily Weight in k.1 (05 Dec 2022 10:57)    POCT Blood Glucose.: 101 mg/dL (05 Dec 2022 11:16)  POCT Blood Glucose.: 69 mg/dL (05 Dec 2022 08:09)  POCT Blood Glucose.: 69 mg/dL (05 Dec 2022 07:19)  POCT Blood Glucose.: 132 mg/dL (04 Dec 2022 21:41)  POCT Blood Glucose.: 84 mg/dL (04 Dec 2022 16:24)    PHYSICAL EXAM:    GENERAL:  NAD thin frail   SKIN: No rashes or lesions  HEENT: Atraumatic. Normocephalic.    NECK: Supple, No JVD.    PULMONARY: Coarse breath sounds B/L. No wheezing   CVS: Normal S1, S2. Rate and Rhythm are regular, bradycardic   ABDOMEN/GI: Soft, Nontender, Nondistended   MSK:  No clubbing or cyanosis, b/l le dressing dry/intact   NEUROLOGIC: moves all extremities   PSYCH: Awake and alert, confused at times    Consultant(s) Notes Reviewed:  [x ] YES  [ ] NO  Care Discussed with Consultants/Other Providers [ x] YES  [ ] NO    LABS:                        10.9   6.44  )-----------( 101      ( 04 Dec 2022 11:36 )             32.5     RADIOLOGY & ADDITIONAL TESTS:  Imaging or report Personally Reviewed:  [x] YES  [ ] NO  EKG reviewed: [x] YES  [ ] NO    Medications:  Standing  ALPRAZolam      ALPRAZolam 0.25 milliGRAM(s) Oral two times a day  chlorhexidine 2% Cloths 1 Application(s) Topical daily  dextrose 5%. 1000 milliLiter(s) IV Continuous <Continuous>  dextrose 50% Injectable 100 milliLiter(s) IV Push Once  hydrocortisone sodium succinate Injectable 50 milliGRAM(s) IV Push two times a day  melatonin 5 milliGRAM(s) Oral at bedtime  multivitamin/minerals 1 Tablet(s) Oral daily  piperacillin/tazobactam IVPB.. 3.375 Gram(s) IV Intermittent every 8 hours  sertraline 50 milliGRAM(s) Oral daily  silver sulfADIAZINE 1% Cream 1 Application(s) Topical two times a day    PRN Meds

## 2022-12-05 NOTE — PROGRESS NOTE ADULT - ASSESSMENT
ASSESSMENT      Septic Shock resolved  Acute hypoxemic resp failure resolved   Toxic-Metabolic Encephalopathy resolved   No necrotizing Fasciitis of bilateral LE per surgery. Pseudomonas in cx  Left C6 Transverse Foramen Fracture  HO Anorexia.    Thrombocytopenia HIT negative   transaminitis      PLAN    CNS: Continue Xanax as needed. CT head ordered and pending official read.     HEENT: Oral care, t collar per neuro sx.  Neuro sx for collar duration.    PULMONARY: Aspiration precaution, or RA.  HOB at 45 degrees     CARDIOVASCULAR: Off Midodrine. MAP is adequate. Remains bradycardic. Cardiology consult appreciated.    GI: Nutrition evaluation. Diet as tolerated.     RENAL: Follow up renal function and lytes, correct as needed (K>4; Mg>2)    INFECTIOUS DISEASE: Continue Zosyn. ID is following; Rare pseudomonas in wound culture.     HEMATOLOGICAL: HIT Ab negative. Thrombocytopenia noted. On Lovenox for DVT ppx. Hold if platelet count is less than 50k.     ENDOCRINE: Wean down HC to 50mg BID. Start gentle D5W at 50cc/hr.     DISPOSITION: If CTH is negative then transfer to telemetry.     Palliative care evaluation. Nutrition evaluation. Prognosis is poor.       Downgrade to floor.   ASSESSMENT      Septic Shock resolved  Acute hypoxemic resp failure resolved   Toxic-Metabolic Encephalopathy resolved   No necrotizing Fasciitis of bilateral LE per surgery. Pseudomonas in cx  bradycardia  Left C6 Transverse Foramen Fracture  HO Anorexia.    Thrombocytopenia HIT negative   transaminitis      PLAN    CNS: Continue Xanax as needed. CT head ordered and pending official read.     HEENT: Oral care, t collar per neuro sx.  Neuro sx for collar duration.    PULMONARY: Aspiration precaution, or RA.  HOB at 45 degrees     CARDIOVASCULAR: Off Midodrine. MAP is adequate, cardio fup    GI: Nutrition evaluation. Diet as tolerated.     RENAL: Follow up renal function and lytes, correct as needed (K>4; Mg>2)    INFECTIOUS DISEASE: Continue Zosyn. ID is following; Rare pseudomonas in wound culture.     HEMATOLOGICAL: HIT Ab negative. Thrombocytopenia noted. On Lovenox for DVT ppx. Hold if platelet count is less than 50k.     ENDOCRINE: Wean down HC to 50mg BID. Start gentle D5W at 50cc/hr.     DISPOSITION: If CTH is negative then transfer to telemetry.     Palliative care evaluation. Nutrition evaluation. Prognosis is poor.     tele

## 2022-12-05 NOTE — PROGRESS NOTE ADULT - ASSESSMENT
67F w/ h/o anorexia, anxiety, depression, kidney cysts, peripheral neuropathy, RA, OA, osteoporosis, low back pain s/p unwitnessed fall, found down by  +HT. On arrival to ED, patient unresponsive, hypotensive, bradycardic, and hypothermic. Patient also hypoglycemic (FS 24) and given dextrose immediately. Subsequently given Emiliana hugger, given 2L of warm IV fluids, started on levophed, and given broad-spectrum abx, and started on levothyroxine for possible myxedema coma (although no documentation in eMAR, so unclear if actually given). VBG noted respiratory acidosis.Patient intubated for airway protection. CT spine demonstrated fx of anterior and posterior tubercle of left C6 transverse foramen. NSGY consulted, but no surgical intervention offered. Subsequently admitted to MICU for presumed septic shock, extubated and downgraded to SDU     Septic Shock likely due to severe soft tissue infection  Toxic-Metabolic Encephalopathy  Full thickness wounds of bl LE   Osteomyelitis, suspected L Ankle/foot  intubated, extubated 11/29, currently comfortable on ra  s/p burn eval--> no evidence of nec fasc, c/w wound care  BID  on Zosyn per ID, until 12/7  off midodrine due to bradycardia    Bradycardia, new, asymptomatic  HR noted 30s  midodrine on hold, monitor electrlolytes/replete as needed   CTH - stable, no acute process   cardio eval appreciated    Left C6 Transverse Foramen Fracture  known to neurosurgery service, very high risk for surgical intervention  check [facility] [place] MRI  f/u neuroisurgery post imaging     Hypothyroidism  - TSH 7.92, T3 74, T4 7.7  - started on HC, now tapering--- decrease to 25 q12H tomorrow   - repeat TFTs in 4-6 weeks     Anorexia  Anxiety  - long history per   - monitor PO intake, nutrition eval appreciated  - s/p psychiatry eval, no contraindication for dc, recs outpatient psychiatry/anorexia referral   - resumed home Xanax    Hx of rheumatoid arthritis  - Unknown if pt had been taking steroids. Per pt's , pt only takes pain control medications at home for RA  - on HC as above     Overall prognosis guarded    #Progress Note Handoff  Pending (specify):   telemetry monitoring, burn f/u, PO intake/nutrition, IV abx   Family discussion: Plan of care discussed with patient. Team updated    Disposition:  from home     Carolann Lai MD  s. [id number]

## 2022-12-06 NOTE — PROGRESS NOTE ADULT - ASSESSMENT
68 yo F PMHx anorexia, anxiety, depression, kidney cysts, peripheral neuropathy, RA, OA, osteoporosis, low back pain presented after an unwitnessed fall. She was found down by . Had head trauma. On arrival to ED, patient unresponsive, hypotensive, bradycardic, and hypothermic. Patient also hypoglycemic (FS 24) and given dextrose immediately. Subsequently given Emiliana hugger, given 2L of warm IV fluids, started on levophed, and given broad-spectrum abx, and started on levothyroxine for possible myxedema coma (although no documentation in eMAR, so unclear if actually given). VBG noted respiratory acidosis. Patient intubated for airway protection. CT spine demonstrated fx of anterior and posterior tubercle of left C6 transverse foramen. NSGY consulted, but no surgical intervention offered. Subsequently admitted to MICU for presumed septic shock, extubated and downgraded to SDU and subsequently to telemetry    Septic Shock likely due to severe soft tissue infection  Toxic-Metabolic Encephalopathy  Full thickness wounds of bl LE   Osteomyelitis, suspected L Ankle/foot  -intubated, extubated 11/29, currently comfortable on ra  -s/p burn eval--> no evidence of nec fasc, c/w wound care  BID  -on Zosyn per ID, until 12/7  -required pressor support, was transitioned to midodrine but midodrine stopped due to bradycardia      Bradycardia  -new, asymptomatic  -HR noted 30s  -midodrine on hold, monitor electrlolytes/replete as needed   -keep pt on telemetry today, do not go off monitor given pt's high risk of decompensation. If HR remains stable overnight then can go off tele for barium swallow  -doubt neurogenic cause    Left C6 Transverse Foramen Fracture  -known to neurosurgery service, very high risk for surgical intervention  -check C spine MRI tomorrow when ok to go off tele  -f/u neurosurgery post imaging     Hypothyroidism  - TSH 7.92, T3 74, T4 7.7  - started on HC, now tapering--- decrease to 25 q12H tomorrow   - repeat TFTs in 4-6 weeks     Anorexia  Anxiety  - long history per   - monitor PO intake, nutrition eval appreciated  - s/p psychiatry eval, no contraindication for dc, recs outpatient psychiatry/anorexia referral   - resumed home Xanax  - check micronutrients  - planned for barium swallow    Rheumatoid arthritis  - Unknown if pt had been taking steroids. Per pt's , pt only takes pain control medications at home for RA  - on HC as above   - has several large rheumatoid nodules    Overall prognosis guarded    #Progress Note Handoff  Pending (specify):   telemetry monitoring, burn f/u, PO intake/nutrition, IV abx   Disposition:  from home

## 2022-12-06 NOTE — PROGRESS NOTE ADULT - ASSESSMENT
ASSESSMENT/ PLAN:     12/7/22: 67yF w/ PMHx of anxiety, depression, kidney cysts, peripheral neuropathy, RA, OA, osteoporosis, low back pain seen as Trauma Consult s/p unwitnessed fall, found down by  +HT, ?LOC, -AC with external signs of trauma including right forehead hematoma and R knee wound/abrasion. Trauma assessment in ED: intubated, vented and on pressors, GCS 3 , AAOx 0. Patient seen at bedside on 12/6/22 for tertiary trauma survey. Patient alert and oriented with no acute issues reported by patient.       - Upon physical examination and review of all images/reports reviewed, no additional injuries suspected. No further traumatic work-up warranted.  - Recall trauma surgery as needed.   - Spectra [id number] Respiratory Syncytial Virus - Peds/Immunocompromised

## 2022-12-06 NOTE — PROGRESS NOTE ADULT - SUBJECTIVE AND OBJECTIVE BOX
Tertiary Trauma Survey (TTS)    Date of TTS: 12-06-22 @ 15:05   Admit Date: 11-27-22  Trauma Activation: CONSULT  Admit GCS: 15 E- 4 V- 5 M- 6    HPI:  67F w/ h/o anorexia, anxiety, depression, kidney cysts, peripheral neuropathy, RA, OA, osteoporosis, low back pain s/p unwitnessed fall, found down by  +HT, ?LOC, -AC. Contacted patient's  who was able to provide some history and states he returned home from work around 11pm when he found the patient down on the tiled kitchen floor. States the patient was awake and alert when he found her on the floor, but she didn't recall how she fell down.  speculates that she might've been sitting in chair and fallen out of it as this has happened before in the past.  states that the patient said she hit her head, and he noticed blood on the patient's knees, but no other injuries. Patient was complaining of left-sided neck pain after the fall, but  states she has been having this pain for about 1 month. About 1 hour after the fall, patient became lethargic and was unable to stay up on her feet, so  called for an ambulance.     Of note, pt has long-standing h/o anorexia for several years per . Does not induce vomiting, but frequently uses laxatives for weight loss. Has never consistently followed w/ psychologist/psychiatrist. After recent discharge from hospital two weeks ago, anorexia acutely worsened and pt has had minimal caloric intake.    On arrival to ED, patient unresponsive, hypotensive, bradycardic, and hypothermic. Patient also hypoglycemic (FS 24) and given dextrose immediately. Subsequently given Emiliana hugger, given 2L of warm IV fluids, started on levophed, and given broad-spectrum abx, and started on levothyroxine for possible myxedema coma (although no documentation in eMAR, so unclear if actually given). VBG noted respiratory acidosis. Pt unable to tolerate BiPAP due to current mental status, so patient intubated for airway protection. CT spine demonstrated fx of anterior and posterior tubercle of left C6 transverse foramen. NSGY consulted, but no surgical intervention offered. Subsequently admitted to MICU for presumed septic shock. (27 Nov 2022 16:09)    Patient seen and examined.     PHYSICAL EXAM:  General: NAD, AAOx3, thin, calm and cooperative  HEENT: NCAT, Leake J Collar in place   Cardiac: No peripheral cyanosis or pallor, extremities well perfused   Respiratory: Non-labored breathing, equal chest rise bilaterally   Abdomen: Soft, non-distended, non-tender, no rebound, no guarding  Musculoskeletal: Strength 5/5 BL UE/LE, ROM intact, compartments soft, bilateral lower extremities with ace bandage inferior to the knees to the ankles   Neuro: Sensation grossly intact and equal throughout, no focal deficits  Vascular: Pulses 2+ throughout, extremities well perfused  Skin: Warm/dry, normal color, no jaundice    Vital Signs Last 24 Hrs  T(C): 35.8 (06 Dec 2022 14:17), Max: 36.1 (05 Dec 2022 17:00)  T(F): 96.5 (06 Dec 2022 14:17), Max: 97 (05 Dec 2022 17:00)  HR: 54 (06 Dec 2022 14:17) (48 - 84)  BP: 115/73 (06 Dec 2022 14:17) (115/73 - 151/63)  BP(mean): --  RR: 18 (06 Dec 2022 14:17) (18 - 20)  SpO2: 92% (06 Dec 2022 05:00) (92% - 100%)    Parameters below as of 05 Dec 2022 23:32  Patient On (Oxygen Delivery Method): room air    Labs:  CAPILLARY BLOOD GLUCOSE    POCT Blood Glucose.: 124 mg/dL (05 Dec 2022 21:33)  POCT Blood Glucose.: 182 mg/dL (05 Dec 2022 16:51)  POCT Blood Glucose.: 34 mg/dL (05 Dec 2022 16:07)                        10.8   6.99  )-----------( 173      ( 06 Dec 2022 01:39 )             29.8       12-06    144  |  97<L>  |  10  ----------------------------<  97  2.2<LL>   |  39<H>  |  <0.5<L>    Calcium, Total Serum: 7.5 mg/dL (12-06-22 @ 01:39)    LFTs:             4.5  | 0.4  | 30       ------------------[175     ( 06 Dec 2022 01:39 )  2.6  | x    | 56          Lipase:x      Amylase:x        Coags:    Serum Pro-Brain Natriuretic Peptide: 2184 pg/mL (11-27-22 @ 19:15)    RADIOLOGICAL FINDINGS REVIEW:  CXR: Patchy right upper lobe opacities     Pelvis Films:     C-Spine Films:    T/L/S Spine Films:    Extremity Films:    Head CT and CT Cervical Spine:    Fracture of the anterior and posterior tubercle of the left C6 transverse   foramen, new since prior remote study. Consider CTA to ensure no   underlying vascular injury.    Appearance of destructive process at the C6-C7 disc space with erosive   change to the adjacent endplates. Findings new from 2018, however no more  recent priors. Findings may reflect acute or chronic   discitis-osteomyelitis.    CT Angio Head and Neck:     CTA HEAD:  No evidence of flow-limiting stenosis, occlusion or aneurysm.    CTA NECK:  No carotid, vertebral artery stenosis or evidence of vascular injury.    CT Chest/Abdomen/Pelvis    No CT evidence for acute traumatic injury to the chest, abdomen or pelvis.    No short interval change to pulmonary findings which may be   infectious/inflammatory and for which follow-up chest CT is again   recommended.    The urinary bladder is moderately distended despite Hughes catheter   balloon appearing within the bladder lumen.    Moderate colonic stool burden.    Other:

## 2022-12-06 NOTE — PROGRESS NOTE ADULT - SUBJECTIVE AND OBJECTIVE BOX
CHIEF COMPLAINT:    Patient is a 67y old  Female who presents with a chief complaint of fall and unresponsive    INTERVAL HPI/OVERNIGHT EVENTS:    Patient seen and examined at bedside. No acute overnight events occurred.    ROS: Unable to fully obtain, pt keeps saying she is wet, needs her phone and is going to go.     Medications:  Standing  ALPRAZolam      ALPRAZolam 0.25 milliGRAM(s) Oral two times a day  chlorhexidine 2% Cloths 1 Application(s) Topical daily  dextrose 5%. 1000 milliLiter(s) IV Continuous <Continuous>  dextrose 50% Injectable 100 milliLiter(s) IV Push Once  enoxaparin Injectable 30 milliGRAM(s) SubCutaneous every 24 hours  hydrocortisone sodium succinate Injectable 25 milliGRAM(s) IV Push two times a day  magnesium sulfate  IVPB 2 Gram(s) IV Intermittent every 4 hours  melatonin 5 milliGRAM(s) Oral at bedtime  multivitamin/minerals 1 Tablet(s) Oral daily  piperacillin/tazobactam IVPB.. 3.375 Gram(s) IV Intermittent every 8 hours  potassium chloride  20 mEq/100 mL IVPB 20 milliEquivalent(s) IV Intermittent every 2 hours  potassium chloride  20 mEq/100 mL IVPB 20 milliEquivalent(s) IV Intermittent every 2 hours  sertraline 50 milliGRAM(s) Oral daily  silver sulfADIAZINE 1% Cream 1 Application(s) Topical two times a day    PRN Meds        Vital Signs:    T(F): 96.6 (12-06-22 @ 05:00), Max: 97 (12-05-22 @ 17:00)  HR: 54 (12-06-22 @ 05:00) (48 - 84)  BP: 151/63 (12-06-22 @ 05:00) (130/72 - 151/63)  RR: 18 (12-06-22 @ 05:00) (18 - 20)  SpO2: 92% (12-06-22 @ 05:00) (92% - 100%)  I&O's Summary    05 Dec 2022 07:01  -  06 Dec 2022 07:00  --------------------------------------------------------  IN: 1340 mL / OUT: 700 mL / NET: 640 mL      POCT Blood Glucose.: 124 mg/dL (05 Dec 2022 21:33)  POCT Blood Glucose.: 182 mg/dL (05 Dec 2022 16:51)  POCT Blood Glucose.: 34 mg/dL (05 Dec 2022 16:07)      PHYSICAL EXAM:  GENERAL:  NAD  SKIN: rheumatoid nodules  HEENT: Atraumatic. Normocephalic. Anicteric  NECK:  No JVD.   PULMONARY: Clear to ausculation bilaterally. No wheezing. No rales  CVS: Normal S1, S2. Regular rate and rhythm. No murmurs.  ABDOMEN/GI: Soft, Nontender, Nondistended; Bowel sounds are present  EXTREMITIES:  No edema B/L LE.  NEUROLOGIC:  No motor deficit.  PSYCH: agitated, cannot assess capacity at present as pt not engaging      LABS:                        10.8   6.99  )-----------( 173      ( 06 Dec 2022 01:39 )             29.8     12-06    144  |  97<L>  |  10  ----------------------------<  97  2.2<LL>   |  39<H>  |  <0.5<L>    Ca    7.5<L>      06 Dec 2022 01:39  Mg     1.3     12-06    TPro  4.5<L>  /  Alb  2.6<L>  /  TBili  0.4  /  DBili  x   /  AST  30  /  ALT  56<H>  /  AlkPhos  175<H>  12-06              RADIOLOGY & ADDITIONAL TESTS:  Imaging or report Personally Reviewed:  [ ] YES  [ ] NO -->no new images    Telemetry reviewed independently - sinu bradycardia  EKG reviewed independently -->no new EKGs    Consultant(s) Notes Reviewed:  [ ] YES  [ ] NO  Care Discussed with Consultants/Other Providers [ ] YES  [ ] NO    Case discussed with resident  Care discussed with pt

## 2022-12-07 NOTE — PHYSICAL THERAPY INITIAL EVALUATION ADULT - PERTINENT HX OF CURRENT PROBLEM, REHAB EVAL
67yF w/ PMHx of anxiety, depression, kidney cysts, peripheral neuropathy, RA, OA, osteoporosis, low back pain seen as Trauma Consult s/p unwitnessed fall, found down by  +HT, ?LOC, -AC with external signs of trauma including right forehead hematoma and R knee wound/abrasion. Trauma assessment in ED: intubated, vented and on pressors

## 2022-12-07 NOTE — PROGRESS NOTE ADULT - PROBLEM SELECTOR PLAN 5
Pt had chronic pain management - On Vicodin 10/325mg PO Q 6 hrs PRN pain 4-10   Gabapentin 600mg Q 8 hrs.  Would start Dilaudid 0.25mg IV Q 3 hrs PRN for pain 4-10 jin for dressing changes and during care. History of spinal stimulator, ? status of stimulator.

## 2022-12-07 NOTE — SWALLOW FEES ASSESSMENT ADULT - RECOMMENDED FEEDING/EATING TECHNIQUES
intermittent cough/throat clear t/o meal/allow for swallow between intakes/alternate food with liquid/oral hygiene/position upright (90 degrees)

## 2022-12-07 NOTE — SWALLOW FEES ASSESSMENT ADULT - SUCCESSFUL STRATEGIES TRIALED DURING PROCEDURE
unable to trial other compensatory swallow strategies d/t cervical collar and reduced ROM/productive volitional cough following clinician cue

## 2022-12-07 NOTE — PHYSICAL THERAPY INITIAL EVALUATION ADULT - LIVES WITH, PROFILE
In pvt home with flight of stairs, however pt reports she can stay on first floor where bathroom and place to sleep are./spouse

## 2022-12-07 NOTE — PHYSICAL THERAPY INITIAL EVALUATION ADULT - DIAGNOSIS, PT EVAL
Debility/ unwitnessed fall at home Debility/ unwitnessed fall at home/ Fracture of the anterior and posterior tubercle of the left C6 transverse foramen

## 2022-12-07 NOTE — SWALLOW FEES ASSESSMENT ADULT - PRELIMINARY ENDOSCOPIC EXAMINATIONS
B/L posterior commissure erythema; slight glottal gap/Interarytenoid/Arytenoid erythema/Vocal fold adduction/abduction

## 2022-12-07 NOTE — PROGRESS NOTE ADULT - PROBLEM SELECTOR PLAN 2
Symptom management.  PT eval pending
Ongoing ICU care  Symptom management.
Ongoing ICU care  Symptom management.

## 2022-12-07 NOTE — PROGRESS NOTE ADULT - PROBLEM SELECTOR PLAN 1
Xanax 0.25mg PO Q 12 hrs daily - restarted by primary team - held x 2 by RN in last 24 hrs   and Zoloft 50mg daily.
: Xanax 0.25mg PO Q 12 hrs daily - restarted by primary team - held x 2 by RN in last 24 hrs   and Zoloft 50mg daily.
Xanax 0.25mg PO Q 12 hrs daily - restarted by primary team   and Zoloft 50mg daily.

## 2022-12-07 NOTE — PROGRESS NOTE ADULT - SUBJECTIVE AND OBJECTIVE BOX
ANA LILIA VERMA  67y  Female      Patient is a 67y old  Female who presents with a chief complaint of AMS/Weakness (07 Dec 2022 10:30)      INTERVAL HPI/OVERNIGHT EVENTS:  She is still with bradycardia.   Vital Signs Last 24 Hrs  T(C): 36.4 (07 Dec 2022 13:19), Max: 36.4 (07 Dec 2022 04:50)  T(F): 97.6 (07 Dec 2022 13:19), Max: 97.6 (07 Dec 2022 04:50)  HR: 54 (07 Dec 2022 13:19) (43 - 57)  BP: 113/67 (07 Dec 2022 13:19) (113/67 - 136/67)  BP(mean): --  RR: 18 (07 Dec 2022 13:19) (16 - 18)  SpO2: 100% (07 Dec 2022 08:36) (100% - 100%)    Parameters below as of 07 Dec 2022 08:36  Patient On (Oxygen Delivery Method): room air          12-06-22 @ 07:01  -  12-07-22 @ 07:00  --------------------------------------------------------  IN: 775 mL / OUT: 950 mL / NET: -175 mL    12-07-22 @ 07:01  -  12-07-22 @ 16:35  --------------------------------------------------------  IN: 786 mL / OUT: 0 mL / NET: 786 mL            Consultant(s) Notes Reviewed:  [x ] YES  [ ] NO          MEDICATIONS  (STANDING):  ALPRAZolam      ALPRAZolam 0.25 milliGRAM(s) Oral two times a day  chlorhexidine 2% Cloths 1 Application(s) Topical daily  dextrose 5%. 1000 milliLiter(s) (50 mL/Hr) IV Continuous <Continuous>  dextrose 50% Injectable 100 milliLiter(s) IV Push Once  enoxaparin Injectable 30 milliGRAM(s) SubCutaneous every 24 hours  hydrocortisone sodium succinate Injectable 25 milliGRAM(s) IV Push two times a day  melatonin 5 milliGRAM(s) Oral at bedtime  multivitamin/minerals 1 Tablet(s) Oral daily  sertraline 50 milliGRAM(s) Oral daily  silver sulfADIAZINE 1% Cream 1 Application(s) Topical two times a day    MEDICATIONS  (PRN):      LABS                          11.3   6.92  )-----------( 225      ( 07 Dec 2022 07:15 )             32.7     12-07    138  |  93<L>  |  8<L>  ----------------------------<  75  2.7<LL>   |  37<H>  |  <0.5<L>    Ca    7.4<L>      07 Dec 2022 07:15  Phos  1.9     12-07  Mg     2.3     12-07    TPro  4.4<L>  /  Alb  2.5<L>  /  TBili  0.4  /  DBili  x   /  AST  26  /  ALT  43<H>  /  AlkPhos  184<H>  12-07          Lactate Trend        CAPILLARY BLOOD GLUCOSE      POCT Blood Glucose.: 124 mg/dL (05 Dec 2022 21:33)        RADIOLOGY & ADDITIONAL TESTS:    Imaging Personally Reviewed:  [ ] YES  [ ] NO    HEALTH ISSUES - PROBLEM Dx:  Palliative care by specialist    Sepsis    RA (rheumatoid arthritis)    Anxiety    Pain    Neuropathy    Anorexia nervosa    Depression    Depression    Anorexia nervosa            PHYSICAL EXAM:  GENERAL: catechetic.   HEAD:  Atraumatic, Normocephalic.  EYES: EOMI, PERRLA, conjunctiva and sclera clear.  NECK: Supple, No JVD.  CHEST/LUNG: Clear to auscultation bilaterally; No wheeze.  HEART: Regular rate and rhythm; S1 S2.   ABDOMEN: Soft, Nontender, Nondistended; Bowel sounds present.  EXTREMITIES:  bilateral lower extremities wound with dressing.   PSYCH: AAOx3.  NEUROLOGY: non-focal.  SKIN: No rashes or lesions. No

## 2022-12-07 NOTE — PROGRESS NOTE ADULT - CONVERSATION DETAILS
Spoke to patient's  KUSUMyissel     Introduced palliative care, discussed patient's overall medical condition. He is aware she has advanced illness. He is overwhelmed by how sick she is. She is not her baseline at this time. She has been becoming more confused. Explained that we help with goals of care discussions    Will meet tomorrow at 9:00am to further discuss patient's goals of care
Palliative care NP met with pt's spouse and son    Reviewed palliative care, explained role of palliative care. Pt did not participate in conversation. She has been confused. Pt's spouse and son explained that her health has been declining for a few years but since the wounds on her legs this year she has been much sicker and more debilitated. She has a history of anorexia - obsession with her weight and eating very little by her own choice. Now she is very debilitated and not eating again    Introduced advanced directives - DNR and DNI. Explained that due to how debilitated the patient is a code could cause more suffering than good. Also discussed intubation as pt was just intubated and extubated. Explained that sometimes people choose not to be intubated as it increases suffering.    Another issue for the patient is feeding. Pt has poor nutritional status and maybe offered a feeding tube. If pt refuses the  said he would want her to be fed even through a tube, son worried about doing things to his mom that she doesn't want.     Support provided
Palliative care team introduced palliative care, and advanced directives, Pt much more alert and oriented wants to be part of her medical planning. Explained DNR/DNI and that decisions like those that she could make. She remembers being intubated and she is aware of how frail she is and how debilitated.     She wants to try to get rehab and walk. But she also doesn't want to suffer. Support provided. Recommended further discussions with her family and her

## 2022-12-07 NOTE — SWALLOW BEDSIDE ASSESSMENT ADULT - PHARYNGEAL PHASE
Delayed pharyngeal swallow/Cough post oral intake Within functional limits delayed throat clear noted w/ larger bolus size of mildly thick liquids/Delayed throat clear post oral intake/Within functional limits

## 2022-12-07 NOTE — PHYSICAL THERAPY INITIAL EVALUATION ADULT - TRANSFER SAFETY CONCERNS NOTED: SIT/STAND, REHAB EVAL
Pt presents unsteady with initial posterior loss of balance requiring PT assist to maintain trunk alignment/losing balance/decreased weight-shifting ability

## 2022-12-07 NOTE — PROGRESS NOTE ADULT - PROBLEM SELECTOR PLAN 3
Full code  For downgrade from ICU  - met with spouse and son, see GOC note  - pt more alert, assess for ability to make own goals of care decisions   - will follow. Full code  For downgrade to floor   - met with spouse and son, see GOC note  - pt more alert able to make own goals of care decisions but wants family input  - will follow.

## 2022-12-07 NOTE — SWALLOW FEES ASSESSMENT ADULT - DIAGNOSTIC IMPRESSIONS
severe pharyngeal dysphagia for thin liquids; moderate pharyngeal dysphagia for mildly thick liquids, puree, and moderately thick liquids

## 2022-12-07 NOTE — PROGRESS NOTE ADULT - SUBJECTIVE AND OBJECTIVE BOX
HPI:  67F w/ h/o anorexia, anxiety, depression, kidney cysts, peripheral neuropathy, RA, OA, osteoporosis, low back pain s/p unwitnessed fall, found down by  +HT, ?LOC, -AC. Contacted patient's  who was able to provide some history and states he returned home from work around 11pm when he found the patient down on the tiled kitchen floor. States the patient was awake and alert when he found her on the floor, but she didn't recall how she fell down.  speculates that she might've been sitting in chair and fallen out of it as this has happened before in the past.  states that the patient said she hit her head, and he noticed blood on the patient's knees, but no other injuries. Patient was complaining of left-sided neck pain after the fall, but  states she has been having this pain for about 1 month. About 1 hour after the fall, patient became lethargic and was unable to stay up on her feet, so  called for an ambulance.     Of note, pt has long-standing h/o anorexia for several years per . Does not induce vomiting, but frequently uses laxatives for weight loss. Has never consistently followed w/ psychologist/psychiatrist. After recent discharge from hospital two weeks ago, anorexia acutely worsened and pt has had minimal caloric intake.    On arrival to ED, patient unresponsive, hypotensive, bradycardic, and hypothermic. Patient also hypoglycemic (FS 24) and given dextrose immediately. Subsequently given Emiliana hugger, given 2L of warm IV fluids, started on levophed, and given broad-spectrum abx, and started on levothyroxine for possible myxedema coma (although no documentation in eMAR, so unclear if actually given). VBG noted respiratory acidosis. Pt unable to tolerate BiPAP due to current mental status, so patient intubated for airway protection. CT spine demonstrated fx of anterior and posterior tubercle of left C6 transverse foramen. NSGY consulted, but no surgical intervention offered. Subsequently admitted to MICU for presumed septic shock. (27 Nov 2022 16:09)     INTERVAL EVENTS:  12/1: Patient off the ventilator. Met with spouse and son at bedside.   12/7: Patient downgraded, on POI diet. Plan for PT eval.   ADVANCE DIRECTIVES:    Full Code X Living Will  [ ]   DECISION MAKER(s):  [ x] Health Care Proxy(s)  [ ] Surrogate(s)  [ ] Guardian           Name(s): Phone Number(s):  Answers: Emergency Contact Name Henry Roberts,  Answers: Emergency Contact Phone # [phone],  Answers: Emergency Contact Relationship Spouse  Son Dave is also HCP   BASELINE (I)ADL(s) (prior to admission):  Fisher: [ ]Total  [ ] Moderate [ ]Dependent  Palliative Performance Status Version 2:         %    [email or web address]    Allergies    No Known Allergies    Intolerances    MEDICATIONS  (STANDING):  ALPRAZolam      ALPRAZolam 0.25 milliGRAM(s) Oral two times a day  chlorhexidine 2% Cloths 1 Application(s) Topical daily  dextrose 5%. 1000 milliLiter(s) (50 mL/Hr) IV Continuous <Continuous>  dextrose 50% Injectable 100 milliLiter(s) IV Push Once  enoxaparin Injectable 30 milliGRAM(s) SubCutaneous every 24 hours  hydrocortisone sodium succinate Injectable 25 milliGRAM(s) IV Push two times a day  melatonin 5 milliGRAM(s) Oral at bedtime  multivitamin/minerals 1 Tablet(s) Oral daily  potassium chloride  20 mEq/100 mL IVPB 20 milliEquivalent(s) IV Intermittent every 2 hours  sertraline 50 milliGRAM(s) Oral daily  silver sulfADIAZINE 1% Cream 1 Application(s) Topical two times a day    MEDICATIONS  (PRN):    PRESENT SYMPTOMS: [ ]Unable to obtain due to poor mentation   Source if other than patient:  [ ]Family   [ ]Team     Pain: [ ]yes [ ]no  QOL impact -   Location -                    Aggravating factors -  Quality -  Radiation -  Timing-  Severity (0-10 scale):  Minimal acceptable level (0-10 scale):         Dyspnea:                           [ ]Mild [ ]Moderate [ ]Severe  Anxiety:                             [ ]Mild [ ]Moderate [ ]Severe  Fatigue:                             [ ]Mild [ ]Moderate [ ]Severe  Nausea:                             [ ]Mild [ ]Moderate [ ]Severe  Loss of appetite:              [ ]Mild [ ]Moderate [ ]Severe  Constipation:                    [ ]Mild [ ]Moderate [ ]Severe    Other Symptoms:  [ ]All other review of systems negative     Palliative Performance Status Version 2:         %    [email or web address]  PHYSICAL EXAM:  Vital Signs Last 24 Hrs  T(C): 36.4 (07 Dec 2022 04:50), Max: 36.4 (07 Dec 2022 04:50)  T(F): 97.6 (07 Dec 2022 04:50), Max: 97.6 (07 Dec 2022 04:50)  HR: 43 (07 Dec 2022 08:36) (43 - 57)  BP: 136/67 (07 Dec 2022 04:50) (115/69 - 136/67)  BP(mean): --  RR: 16 (07 Dec 2022 08:36) (16 - 18)  SpO2: 100% (07 Dec 2022 08:36) (100% - 100%)    Parameters below as of 07 Dec 2022 08:36  Patient On (Oxygen Delivery Method): room air     I&O's Summary    06 Dec 2022 07:01  -  07 Dec 2022 07:00  --------------------------------------------------------  IN: 775 mL / OUT: 950 mL / NET: -175 mL    GENERAL:  [ ] No acute distress [x ]Lethargic  [ ]Unarousable  [ ]Verbal  [ ]Non-Verbal [ x]Cachexia    BEHAVIORAL/PSYCH:  [ ]Alert and Oriented x  [ ] Anxiety [ ] Delirium [ ] Agitation [ ] Calm   EYES: [x ] No scleral icterus [ ] Scleral icterus [ ] Closed  ENMT:  [ ]Dry mouth  [ ]No external oral lesions [ ] No external ear or nose lesions  CARDIOVASCULAR:  [ ]Regular [ ]Irregular [x ]Tachy [ ]Not Tachy  [ ]Parker [ ] Edema [ ] No edema  PULMONARY:  [ ]Tachypnea  [ ]Audible excessive secretions [ ] No labored breathing [ ] labored breathing  GASTROINTESTINAL: [x ]Soft  [ ]Distended  [ ]Not distended [ ]Non tender [ ]Tender  MUSCULOSKELETAL: [x ]No clubbing [ ] clubbing  [ ] No cyanosis [ ] cyanosis  NEUROLOGIC: [ ]No focal deficits  [ x]Follows commands  [ ]Does not follow commands  [ ]Cognitive impairment  [ ]Dysphagia  [ ]Dysarthria  [ ]Paresis   SKIN: [ ] Jaundiced [ x] Non-jaundiced [ ]Rash [ ]No Rash [ ] Warm [ ] Dry (+) several wounds, see nursing / burn notes please  MISC/LINES: [ ] ET tube [ ] Trach [ ]NGT/OGT [ ]PEG [x ]Premafit - urine collection device       LABS:                        11.3   6.92  )-----------( 225      ( 07 Dec 2022 07:15 )             32.7   12-07    138  |  93<L>  |  8<L>  ----------------------------<  75  2.7<LL>   |  37<H>  |  <0.5<L>    Ca    7.4<L>      07 Dec 2022 07:15  Phos  1.9     12-07  Mg     2.3     12-07    TPro  4.4<L>  /  Alb  2.5<L>  /  TBili  0.4  /  DBili  x   /  AST  26  /  ALT  43<H>  /  AlkPhos  184<H>  12-07        RADIOLOGY & ADDITIONAL STUDIES:        REFERRALS:   [ ]Chaplaincy  [ ]Hospice  [ ]Child Life  [ x]Social Work  [x ]Case management [ ]Holistic Therapy     Goals of Care Document:

## 2022-12-07 NOTE — PROGRESS NOTE ADULT - ASSESSMENT
67yFemale being evaluated for goals of care and symptom management. Pt intubated, follows simple commands, unable to assess pain/dyspnea due to ET tube. Pt is restrained. Has been on sedation but it had recently been stopped to assess for SBT    Pt was recently at [facility] 11/10-11/11 had been sent from burn clinic re: worsening B/L LE wounds. Patient was ordered for a work up but she wanted to leave to complete outpatient. Now attempted to call  Henry to introduce palliative care, l/m on voicemail. Of note there is a HCP form in chart that lists other family members - see above in data    See [facility] note today.       MEDD (morphine equivalent daily dose): 0    See Recs below.    Please call [phone] with questions or concerns 24/7.   We will continue to follow.  67yFemale being evaluated for goals of care and symptom management. Pt more alert, palliative follow up to assess her mental status which has improved.       See Recs below.    Please call [phone] with questions or concerns 24/7.   We will continue to follow.

## 2022-12-07 NOTE — PROGRESS NOTE ADULT - PROBLEM SELECTOR PLAN 6
: History of spinal stimulator, ? status of stimulator.
History of spinal stimulator, ? status of stimulator.
History of spinal stimulator, ? status of stimulator.

## 2022-12-07 NOTE — PROGRESS NOTE ADULT - ASSESSMENT
66 yo F PMHx anorexia, anxiety, depression, kidney cysts, peripheral neuropathy, RA, OA, osteoporosis, low back pain presented after an unwitnessed fall. She was found down by . Had head trauma. On arrival to ED, patient unresponsive, hypotensive, bradycardic, and hypothermic. Patient also hypoglycemic (FS 24) and given dextrose immediately. Subsequently given Emiliana hugger, given 2L of warm IV fluids, started on levophed, and given broad-spectrum abx, and started on levothyroxine for possible myxedema coma (although no documentation in eMAR, so unclear if actually given). VBG noted respiratory acidosis. Patient intubated for airway protection. CT spine demonstrated fx of anterior and posterior tubercle of left C6 transverse foramen. NSGY consulted, but no surgical intervention offered. Subsequently admitted to MICU for presumed septic shock, extubated and downgraded to SDU and subsequently to telemetry    A/P:   Sepsis with Septic Shock  Lower extremities Cellulitis:   Chronic Lower extremities ulcer:   Toxic-Metabolic Encephalopathy: improved.   Full thickness wounds.  CT Lower Extremity w/ IV Cont, Bilateral (11.27.22 @ 11:02): 1) Bilateral lower extremity swelling and marked skin thickening, left  greater than right, compatible with cellulitis. Multiple rounded locules of gas are also seen tracking within the soft  tissues along the predominantly posterior calf and anterolateral/dorsal  ankle and foot likely related to known multiple abrasions, can not completely exclude early necrotizing fasciitis.  Burn evaluated the patient, recommended wound care with Silverdiazine  required pressor support, was transitioned to midodrine but midodrine stopped due to bradycardia  s/p Zosyn IV completed.     Acute Hypoxic respiratory failure due to Septic shock.   Resolved, s/p intubation in the ED 11/29, extubated    Left Ankle ulcer:   Seen by podiatry.   Recent arterial duplex 11/10/22 was normal.     Sinus Bradycardia:   HR 30-40s.   midodrine on hold, TSH 7.1, FT4 normal.   Continue Telemetry.     Left C6 Transverse Foramen Fracture  -known to neurosurgery service, very high risk for surgical intervention  -check C spine MRI tomorrow when ok to go off tele  -f/u neurosurgery post imaging     Hypothyroidism  - TSH 7.92, T3 74, T4 7.7  repeat TFTs in 4-6 weeks     Anorexia  Caachexia, low BMI.   Anxiety  - monitor PO intake, nutrition eval appreciated  - s/p psychiatry eval, no contraindication for dc, recs outpatient psychiatry/anorexia referral   - resumed home Xanax  - check micronutrients  - planned for barium swallow    Rheumatoid arthritis  Outpatient follow up with rheumatology.     Overall prognosis guarded  DVT Prophylaxis: Lovenox 30mg SC.   #Progress Note Handoff  Pending (specify):    Disposition:  from home

## 2022-12-07 NOTE — SWALLOW BEDSIDE ASSESSMENT ADULT - SWALLOW EVAL: DIAGNOSIS
+toleration for small sips of mildly thick liquids, puree, and minced+moist consistencies; +overt s/s aspiration/penetration w/ thin liquids +toleration for small sips of mildly thick liquids, puree, and minced+moist consistencies; +delayed +overt s/s aspiration/penetration w/ thin liquids persist.

## 2022-12-07 NOTE — PROGRESS NOTE ADULT - PROBLEM SELECTOR PLAN 4
clindamycin IVPB 900 milliGRAM(s) IV Intermittent every 8 hours  DAPTOmycin IVPB 320 milliGRAM(s) IV Intermittent every 24 hours  piperacillin/tazobactam IVPB.. 3.375 Gram(s) IV Intermittent every 8 hours  Wound care as per Burn team   SDU care   High risk, monitor counts, hemodynamics. Pt had chronic pain management - currently denies pain. Assess daily

## 2022-12-07 NOTE — SWALLOW FEES ASSESSMENT ADULT - SLP PERTINENT HISTORY OF CURRENT PROBLEM
pt is a 68 y/o F w/ PMHx: anorexia, anxiety, depression, kidney cysts, peripheral neuropathy, RA, OA, osteoporosis, low back pain s/p unwitnessed fall, found down by  +HT, ?LOC, -AC. Of note, pt has long-standing h/o anorexia for several years per . Does not induce vomiting, but frequently uses laxatives for weight loss. Has never consistently followed w/ psychologist/psychiatrist. After recent discharge from hospital two weeks ago, anorexia acutely worsened and pt has had minimal caloric intake. On arrival to ED, pt unresponsive, hypotensive, bradycardic, and hypothermic. Pt intubated for airway protection. CT spine demonstrated fx of anterior and posterior tubercle of left C6 transverse foramen. NSGY consulted, but no surgical intervention offered. Subsequently admitted to MICU for presumed septic shock; s/p extubation 11/29. Pt now downgraded to 3C tele. Nutrition and palliative care team following. PO intake has somewhat improved over the last few days.

## 2022-12-07 NOTE — PHYSICAL THERAPY INITIAL EVALUATION ADULT - GENERAL OBSERVATIONS, REHAB EVAL
1435 - 1505 Pt rec semifowler in bed with +tele, +primafit, +IV's, +hard cervical collar, +rectal digni shield, in NAD. Pt reports feeling very weak/deconditioned, agreeable to OOB.

## 2022-12-07 NOTE — SWALLOW FEES ASSESSMENT ADULT - ROSENBEK'S PENETRATION ASPIRATION SCALE
(6) material passes glottis, no subglottic residue remains (aspiration) (5) material contacts vocal cords, visible residue remains (penetration)

## 2022-12-07 NOTE — PHYSICAL THERAPY INITIAL EVALUATION ADULT - GAIT DEVIATIONS NOTED, PT EVAL
Pt reports fear of falling, NBOS with loss of balance in multiple directions, pt presents very deconditioned. Pt requested to return to bed. Pt presents with poor balance and endurance at this time./decreased destiny/increased time in double stance/decreased step length/decreased stride length/decreased weight-shifting ability

## 2022-12-08 NOTE — PROGRESS NOTE ADULT - ASSESSMENT
68 yo F PMHx anorexia, anxiety, depression, kidney cysts, peripheral neuropathy, RA, OA, osteoporosis, low back pain presented after an unwitnessed fall. She was found down by . Had head trauma. On arrival to ED, patient unresponsive, hypotensive, bradycardic, and hypothermic. Patient also hypoglycemic (FS 24) and given dextrose immediately. Subsequently given Emiliana hugger, given 2L of warm IV fluids, started on levophed, and given broad-spectrum abx, and started on levothyroxine for possible myxedema coma (although no documentation in eMAR, so unclear if actually given). VBG noted respiratory acidosis. Patient intubated for airway protection. CT spine demonstrated fx of anterior and posterior tubercle of left C6 transverse foramen. NSGY consulted, but no surgical intervention offered. Subsequently admitted to MICU for presumed septic shock, extubated and downgraded to SDU and subsequently to telemetry    A/P:   Sepsis with Septic Shock  Lower extremities Cellulitis:   Chronic Lower extremities ulcer:   Toxic-Metabolic Encephalopathy: improved.   Full thickness wounds.  CT Lower Extremity w/ IV Cont, Bilateral (11.27.22 @ 11:02): 1) Bilateral lower extremity swelling and marked skin thickening, left  greater than right, compatible with cellulitis. Multiple rounded locules of gas are also seen tracking within the soft  tissues along the predominantly posterior calf and anterolateral/dorsal  ankle and foot likely related to known multiple abrasions, can not completely exclude early necrotizing fasciitis.  Burn evaluated the patient, recommended wound care with Silverdiazine  required pressor support, was transitioned to midodrine but midodrine stopped due to bradycardia  s/p Zosyn IV completed.     Acute Hypoxic respiratory failure due to Septic shock.   Resolved, s/p intubation in the ED 11/29, extubated    Left Ankle ulcer:   Seen by podiatry.   Recent arterial duplex 11/10/22 was normal.     Sinus Bradycardia:   HR 30-40s.   midodrine on hold, TSH 7.1, FT4 normal.   Continue Telemetry.     Left C6 Transverse Foramen Fracture  known to neurosurgery service, very high risk for surgical intervention  Neurosurgery recommended cervical spine MRI, pending confirmation of her spinal stimulator status.     Hypothyroidism  TSH 7.92, Ft4 low.   Start on Synthroid 25mcg daily, will increase it to 50mcg daily after one week. Repeat TFTs in 4-6 weeks     Anorexia  Cachexia, low BMI.   Pharyngeal Dysphagia  s/p psychiatry eval, no contraindication for dc, recs outpatient psychiatry/anorexia referral   Speech and swallow evaluation recommended pureed diet.     Anxiety: Continue    Rheumatoid arthritis  Outpatient follow up with rheumatology.     Overall prognosis guarded  DVT Prophylaxis: Lovenox 30mg SC.   #Progress Note Handoff  Pending (specify):    Disposition:  from home

## 2022-12-08 NOTE — PROGRESS NOTE ADULT - SUBJECTIVE AND OBJECTIVE BOX
SIERRA VERMAJOSÉRAHEEL  67y  Female      Patient is a 67y old  Female who presents with a chief complaint of AMS/Weakness (07 Dec 2022 10:30)      INTERVAL HPI/OVERNIGHT EVENTS:  She feels ok, no new complaints.   Vital Signs Last 24 Hrs  T(C): 36.3 (08 Dec 2022 08:45), Max: 36.4 (07 Dec 2022 13:19)  T(F): 97.4 (08 Dec 2022 08:45), Max: 97.6 (07 Dec 2022 13:19)  HR: 54 (08 Dec 2022 08:45) (45 - 54)  BP: 120/72 (08 Dec 2022 04:28) (113/67 - 129/61)  BP(mean): --  RR: 18 (08 Dec 2022 04:28) (18 - 18)  SpO2: 100% (08 Dec 2022 08:45) (100% - 100%)    Parameters below as of 08 Dec 2022 08:45  Patient On (Oxygen Delivery Method): room air          12-07-22 @ 07:01  -  12-08-22 @ 07:00  --------------------------------------------------------  IN: 1936 mL / OUT: 1100 mL / NET: 836 mL            Consultant(s) Notes Reviewed:  [x ] YES  [ ] NO          MEDICATIONS  (STANDING):  ALPRAZolam      ALPRAZolam 0.25 milliGRAM(s) Oral two times a day  chlorhexidine 2% Cloths 1 Application(s) Topical daily  dextrose 5%. 1000 milliLiter(s) (50 mL/Hr) IV Continuous <Continuous>  dextrose 50% Injectable 100 milliLiter(s) IV Push Once  enoxaparin Injectable 30 milliGRAM(s) SubCutaneous every 24 hours  levothyroxine 25 MICROGram(s) Oral daily  melatonin 5 milliGRAM(s) Oral at bedtime  multivitamin/minerals 1 Tablet(s) Oral daily  potassium chloride    Tablet ER 20 milliEquivalent(s) Oral daily  sertraline 50 milliGRAM(s) Oral daily  silver sulfADIAZINE 1% Cream 1 Application(s) Topical two times a day    MEDICATIONS  (PRN):      LABS                          11.6   6.39  )-----------( 244      ( 08 Dec 2022 07:06 )             32.1     12-08    135  |  90<L>  |  9<L>  ----------------------------<  65<L>  3.3<L>   |  37<H>  |  <0.5<L>    Ca    7.7<L>      08 Dec 2022 07:06  Phos  1.9     12-07  Mg     1.7     12-08    TPro  4.5<L>  /  Alb  2.4<L>  /  TBili  0.3  /  DBili  x   /  AST  27  /  ALT  45<H>  /  AlkPhos  180<H>  12-08          Lactate Trend        CAPILLARY BLOOD GLUCOSE            RADIOLOGY & ADDITIONAL TESTS:    Imaging Personally Reviewed:  [ ] YES  [ ] NO    HEALTH ISSUES - PROBLEM Dx:  Palliative care by specialist    Sepsis    RA (rheumatoid arthritis)    Anxiety    Pain    Neuropathy    Anorexia nervosa    Depression    Depression    Anorexia nervosa            PHYSICAL EXAM:  GENERAL: catechetic.   HEAD:  Atraumatic, Normocephalic.  EYES: EOMI, PERRLA, conjunctiva and sclera clear.  NECK: Supple, No JVD.  CHEST/LUNG: Clear to auscultation bilaterally; No wheeze.  HEART: Regular rate and rhythm; S1 S2.   ABDOMEN: Soft, Nontender, Nondistended; Bowel sounds present.  EXTREMITIES:  bilateral lower extremities wound with dressing.   PSYCH: AAOx3.  NEUROLOGY: non-focal.  SKIN: No rashes or lesions.

## 2022-12-09 NOTE — PROGRESS NOTE ADULT - ASSESSMENT
68 yo F PMHx anorexia, anxiety, depression, kidney cysts, peripheral neuropathy, RA, OA, osteoporosis, low back pain presented after an unwitnessed fall. She was found down by . Had head trauma. On arrival to ED, patient unresponsive, hypotensive, bradycardic, and hypothermic. Patient also hypoglycemic (FS 24) and given dextrose immediately. Subsequently given Emiliana hugger, given 2L of warm IV fluids, started on levophed, and given broad-spectrum abx, and started on levothyroxine for possible myxedema coma (although no documentation in eMAR, so unclear if actually given). VBG noted respiratory acidosis. Patient intubated for airway protection. CT spine demonstrated fx of anterior and posterior tubercle of left C6 transverse foramen. NSGY consulted, but no surgical intervention offered. Subsequently admitted to MICU for presumed septic shock, extubated and downgraded to SDU and subsequently to telemetry    A/P:   Sepsis with Septic Shock  Lower extremities Cellulitis:   Chronic Lower extremities ulcer:   Toxic-Metabolic Encephalopathy: improved.   Full thickness wounds.  CT Lower Extremity w/ IV Cont, Bilateral (11.27.22 @ 11:02): 1) Bilateral lower extremity swelling and marked skin thickening, left  greater than right, compatible with cellulitis. Multiple rounded locules of gas are also seen tracking within the soft  tissues along the predominantly posterior calf and anterolateral/dorsal  ankle and foot likely related to known multiple abrasions, can not completely exclude early necrotizing fasciitis.  Burn evaluated the patient, recommended wound care with Silverdiazine  required pressor support, was transitioned to midodrine but midodrine stopped due to bradycardia  s/p Zosyn IV completed.     Acute Hypoxic respiratory failure due to Septic shock.   Resolved, s/p intubation in the ED 11/29, extubated    Diarrhea:   Patient with rectal tube from ICU, was placed for stage 2 sacral ulcer, patient is still with diarrhea  C.diff negative, pending GI PCR.     COVID exposure:   her roommate was found with COVID on 12/7/22, COVID is negative, repeat COVID.     Left Ankle ulcer:   Seen by podiatry.   Recent arterial duplex 11/10/22 was normal.     Sinus Bradycardia:   HR 30-40s. Improved.   midodrine on hold, TSH 7.1, FT4 low, started on Synthroid.     Left C6 Transverse Foramen Fracture  known to neurosurgery service, very high risk for surgical intervention  Neurosurgery recommended cervical spine MRI, pending confirmation of her spinal stimulator status.     Hypothyroidism  TSH 7.92, Ft4 low.   Start on Synthroid 25mcg daily, will increase it to 50mcg daily after one week. Repeat TFTs in 4-6 weeks     Anorexia  Cachexia, low BMI.   Pharyngeal Dysphagia  s/p psychiatry eval, no contraindication for dc, recs outpatient psychiatry/anorexia referral   Speech and swallow evaluation recommended pureed diet.     Anxiety: Continue Xanax and Zoloft.     Rheumatoid arthritis  Outpatient follow up with rheumatology.     Overall prognosis guarded  DVT Prophylaxis: Lovenox 30mg SC.   #Progress Note Handoff  Pending (specify):  cervical MRI, improving diarrhea.   Disposition:  from home

## 2022-12-09 NOTE — PROGRESS NOTE ADULT - ASSESSMENT
68 yo F PMHx anorexia, anxiety, depression, kidney cysts, peripheral neuropathy, RA, OA, osteoporosis, low back pain presented after an unwitnessed fall. She was found down by . Had head trauma. On arrival to ED, patient unresponsive, hypotensive, bradycardic, and hypothermic. Patient also hypoglycemic (FS 24) and given dextrose immediately. Subsequently given Emiliana hugger, given 2L of warm IV fluids, started on levophed, and given broad-spectrum abx, and started on levothyroxine for possible myxedema coma (although no documentation in eMAR, so unclear if actually given). VBG noted respiratory acidosis. Patient intubated for airway protection. CT spine demonstrated fx of anterior and posterior tubercle of left C6 transverse foramen. NSGY consulted, but no surgical intervention offered. Subsequently admitted to MICU for presumed septic shock, extubated and downgraded to SDU and subsequently to telemetry    #Sepsis with Septic Shock - Resolved  #Lower extremities Cellulitis  #Chronic Lower extremities ulcer  #Toxic-Metabolic Encephalopathy: improved  - s/p Zosyn IV completed  - Full thickness wounds.  - CT Lower Extremity w/ IV Cont, Bilateral (11.27.22 @ 11:02): 1) Bilateral lower extremity swelling and marked skin thickening, left  greater than right, compatible with cellulitis. Multiple rounded locules of gas are also seen tracking within the soft  tissues along the predominantly posterior calf and anterolateral/dorsal  ankle and foot likely related to known multiple abrasions, can not completely exclude early necrotizing fasciitis.  - Burn evaluated the patient, recommended wound care with Silver sulfdiazine  - Required pressor support, was transitioned to midodrine but midodrine stopped due to bradycardia    #Acute Hypoxic respiratory failure due to Septic shock -Resolved  - s/p intubation in the ED 11/29, extubated  - On RA 99%    #Left Ankle ulcer:   Seen by podiatry.   Recent arterial duplex 11/10/22 was normal    #Sinus Bradycardia:   - Asymptomatic, HR 30-40s.   - midodrine on hold  - TSH 7.1  - Levothyroxine 25 mcg started    #Left C6 Transverse Foramen Fracture  - Neurosurgery onboard service, very high risk for surgical intervention  - Neurosurgery recommended cervical spine MRI, pending confirmation of her spinal stimulator status, team following up with  for the device documents    #Hypothyroidism  - TSH 7.92, Ft4 low.   - Start on Synthroid 25mcg daily, will increase it to 50mcg daily after one week  - Repeat TFTs in 4-6 weeks     #Anorexia  - Cachexia, low BMI.   - Pharyngeal Dysphagia  - s/p psychiatry eval, no contraindication for dc, reccs outpatient psychiatry/anorexia referral   - Speech and swallow evaluation recommended pureed diet.     #Rheumatoid arthritis  Outpatient follow up with rheumatology.     #Mood disorder  - On Sertraline  - On Alprazolam    DVT ppx: Lovenox 30mg SC.   Diet: Mildly thick liquids  Handoff: F/u MRI once device status is known, monitor HR (thyroxine started)

## 2022-12-09 NOTE — SWALLOW BEDSIDE ASSESSMENT ADULT - SLP GENERAL OBSERVATIONS
pt received in bed awake alert w/o c/o pain. +cervical collar in place +room air +pt w/ difficulty recalling swallow strategies

## 2022-12-09 NOTE — PROGRESS NOTE ADULT - SUBJECTIVE AND OBJECTIVE BOX
ANA LILIA VERMA  67y  Female      Patient is a 67y old  Female who presents with a chief complaint of AMS/Weakness (07 Dec 2022 10:30)      INTERVAL HPI/OVERNIGHT EVENTS:  She is still with diarrhea, no fever, no abdominal pain.   Vital Signs Last 24 Hrs  T(C): 36.2 (09 Dec 2022 13:01), Max: 36.8 (08 Dec 2022 19:05)  T(F): 97.2 (09 Dec 2022 13:01), Max: 98.2 (08 Dec 2022 19:05)  HR: 57 (09 Dec 2022 13:01) (53 - 66)  BP: 118/69 (09 Dec 2022 13:01) (103/57 - 139/88)  BP(mean): --  RR: 18 (09 Dec 2022 13:01) (18 - 18)  SpO2: 99% (09 Dec 2022 05:38) (98% - 99%)    Parameters below as of 09 Dec 2022 05:38  Patient On (Oxygen Delivery Method): room air          12-08-22 @ 07:01  -  12-09-22 @ 07:00  --------------------------------------------------------  IN: 670 mL / OUT: 975 mL / NET: -305 mL    12-09-22 @ 07:01  -  12-09-22 @ 17:00  --------------------------------------------------------  IN: 150 mL / OUT: 0 mL / NET: 150 mL            Consultant(s) Notes Reviewed:  [x ] YES  [ ] NO          MEDICATIONS  (STANDING):  ALPRAZolam      ALPRAZolam 0.25 milliGRAM(s) Oral two times a day  chlorhexidine 2% Cloths 1 Application(s) Topical daily  dextrose 5%. 1000 milliLiter(s) (50 mL/Hr) IV Continuous <Continuous>  dextrose 50% Injectable 100 milliLiter(s) IV Push Once  enoxaparin Injectable 30 milliGRAM(s) SubCutaneous every 24 hours  levothyroxine 25 MICROGram(s) Oral daily  melatonin 5 milliGRAM(s) Oral at bedtime  multivitamin/minerals 1 Tablet(s) Oral daily  potassium chloride    Tablet ER 20 milliEquivalent(s) Oral once  sertraline 50 milliGRAM(s) Oral daily  silver sulfADIAZINE 1% Cream 1 Application(s) Topical two times a day    MEDICATIONS  (PRN):      LABS                          10.8   8.39  )-----------( 237      ( 09 Dec 2022 05:03 )             31.3     12-09    134<L>  |  93<L>  |  8<L>  ----------------------------<  65<L>  3.2<L>   |  35<H>  |  <0.5<L>    Ca    7.6<L>      09 Dec 2022 05:03  Mg     1.8     12-09    TPro  4.1<L>  /  Alb  2.3<L>  /  TBili  0.3  /  DBili  x   /  AST  20  /  ALT  35  /  AlkPhos  162<H>  12-09          Lactate Trend        CAPILLARY BLOOD GLUCOSE            RADIOLOGY & ADDITIONAL TESTS:    Imaging Personally Reviewed:  [ ] YES  [ ] NO    HEALTH ISSUES - PROBLEM Dx:  Palliative care by specialist    Sepsis    RA (rheumatoid arthritis)    Anxiety    Pain    Neuropathy    Anorexia nervosa    Depression    Depression    Anorexia nervosa            PHYSICAL EXAM:  GENERAL: catechetic.   HEAD:  Atraumatic, Normocephalic.  EYES: EOMI, PERRLA, conjunctiva and sclera clear.  NECK: Supple, No JVD.  CHEST/LUNG: Clear to auscultation bilaterally; No wheeze.  HEART: Regular rate and rhythm; S1 S2.   ABDOMEN: Soft, Nontender, Nondistended; Bowel sounds present.  EXTREMITIES:  bilateral lower extremities wound with dressing.   PSYCH: AAOx3.  NEUROLOGY: non-focal.  SKIN: No rashes or lesions.

## 2022-12-09 NOTE — PROGRESS NOTE ADULT - SUBJECTIVE AND OBJECTIVE BOX
24H events:    Patient is a 67y old Female who presents with a chief complaint of AMS/Weakness (07 Dec 2022 10:30)    Primary diagnosis of Acute respiratory failure with hypercapnia       Today is hospital day 12d. This morning patient was seen and examined at bedside, resting comfortably in bed.      PAST MEDICAL & SURGICAL HISTORY  Anxiety    Depression    Osteoporosis    Kidney cysts    Peripheral neuropathy    RA (rheumatoid arthritis)    OA (osteoarthritis)    Low back pain with radiation  s/p &quot;nerve cutting&quot; 2015      SOCIAL HISTORY:  Social History:      ALLERGIES:  No Known Allergies    MEDICATIONS:  STANDING MEDICATIONS  ALPRAZolam      ALPRAZolam 0.25 milliGRAM(s) Oral two times a day  chlorhexidine 2% Cloths 1 Application(s) Topical daily  dextrose 5%. 1000 milliLiter(s) IV Continuous <Continuous>  dextrose 50% Injectable 100 milliLiter(s) IV Push Once  enoxaparin Injectable 30 milliGRAM(s) SubCutaneous every 24 hours  levothyroxine 25 MICROGram(s) Oral daily  melatonin 5 milliGRAM(s) Oral at bedtime  multivitamin/minerals 1 Tablet(s) Oral daily  potassium chloride    Tablet ER 20 milliEquivalent(s) Oral once  sertraline 50 milliGRAM(s) Oral daily  silver sulfADIAZINE 1% Cream 1 Application(s) Topical two times a day    PRN MEDICATIONS        LABS:                        10.8   8.39  )-----------( 237      ( 09 Dec 2022 05:03 )             31.3     12-09    134<L>  |  93<L>  |  8<L>  ----------------------------<  65<L>  3.2<L>   |  35<H>  |  <0.5<L>    Ca    7.6<L>      09 Dec 2022 05:03  Mg     1.8     12-09    TPro  4.1<L>  /  Alb  2.3<L>  /  TBili  0.3  /  DBili  x   /  AST  20  /  ALT  35  /  AlkPhos  162<H>  12-09    RADIOLOGY:    VITALS:   T(F): 95  HR: 53  BP: 139/88  RR: 18  SpO2: 99%    PHYSICAL EXAM:    GENERAL: NAD, well-groomed, well-developed  HEAD:  Atraumatic, Normocephalic  EYES: EOMI  NECK: Supple  NERVOUS SYSTEM:  Alert & Oriented X3, motor 5/5 b/l upper and lower ext, sensation intact  CHEST/LUNG: Clear to auscultation bilaterally; No rales, rhonchi, wheezing, or rubs  HEART: Regular rate and rhythm; No murmurs, rubs, or gallops  ABDOMEN: Soft, Nontender, Nondistended; Bowel sounds present  EXTREMITIES: No clubbing, cyanosis, or edema  LYMPH: No lymphadenopathy noted  SKIN: No rashes or lesions

## 2022-12-10 NOTE — PROGRESS NOTE ADULT - SUBJECTIVE AND OBJECTIVE BOX
ANA LILIA VERMA  67y  Female      Patient is a 67y old  Female who presents with a chief complaint of AMS/Weakness (07 Dec 2022 10:30)      INTERVAL HPI/OVERNIGHT EVENTS:  She feels ok, still with mild diarrhea, no abdominal pain.   Vital Signs Last 24 Hrs  T(C): 35.3 (10 Dec 2022 05:40), Max: 35.7 (09 Dec 2022 20:58)  T(F): 95.6 (10 Dec 2022 05:40), Max: 96.2 (09 Dec 2022 20:58)  HR: 54 (10 Dec 2022 05:40) (54 - 68)  BP: 129/75 (10 Dec 2022 05:40) (115/71 - 129/75)  BP(mean): --  RR: 18 (10 Dec 2022 05:40) (18 - 18)  SpO2: 100% (10 Dec 2022 05:40) (100% - 100%)          12-09-22 @ 07:01  -  12-10-22 @ 07:00  --------------------------------------------------------  IN: 584 mL / OUT: 450 mL / NET: 134 mL    12-10-22 @ 07:01  -  12-10-22 @ 13:17  --------------------------------------------------------  IN: 100 mL / OUT: 0 mL / NET: 100 mL            Consultant(s) Notes Reviewed:  [x ] YES  [ ] NO          MEDICATIONS  (STANDING):  ALPRAZolam      ALPRAZolam 0.25 milliGRAM(s) Oral two times a day  chlorhexidine 2% Cloths 1 Application(s) Topical daily  dextrose 5%. 1000 milliLiter(s) (50 mL/Hr) IV Continuous <Continuous>  enoxaparin Injectable 30 milliGRAM(s) SubCutaneous every 24 hours  levothyroxine 25 MICROGram(s) Oral daily  magnesium sulfate  IVPB 2 Gram(s) IV Intermittent every 2 hours  melatonin 5 milliGRAM(s) Oral at bedtime  multivitamin/minerals 1 Tablet(s) Oral daily  sertraline 50 milliGRAM(s) Oral daily  silver sulfADIAZINE 1% Cream 1 Application(s) Topical two times a day    MEDICATIONS  (PRN):  acetaminophen     Tablet .. 650 milliGRAM(s) Oral every 6 hours PRN Mild Pain (1 - 3), Moderate Pain (4 - 6)      LABS                          11.1   7.28  )-----------( 252      ( 10 Dec 2022 06:48 )             32.7     12-10    132<L>  |  92<L>  |  8<L>  ----------------------------<  62<L>  3.7   |  34<H>  |  <0.5<L>    Ca    7.6<L>      10 Dec 2022 06:48  Mg     1.5     12-10    TPro  4.2<L>  /  Alb  2.4<L>  /  TBili  0.3  /  DBili  x   /  AST  23  /  ALT  39  /  AlkPhos  168<H>  12-10          Lactate Trend        CAPILLARY BLOOD GLUCOSE            RADIOLOGY & ADDITIONAL TESTS:    Imaging Personally Reviewed:  [ ] YES  [ ] NO    HEALTH ISSUES - PROBLEM Dx:  Palliative care by specialist    Sepsis    RA (rheumatoid arthritis)    Anxiety    Pain    Neuropathy    Anorexia nervosa    Depression    Depression    Anorexia nervosa            PHYSICAL EXAM:  GENERAL: catechetic.   HEAD:  Atraumatic, Normocephalic.  EYES: EOMI, PERRLA, conjunctiva and sclera clear.  NECK: Supple, No JVD.  CHEST/LUNG: Clear to auscultation bilaterally; No wheeze.  HEART: Regular rate and rhythm; S1 S2.   ABDOMEN: Soft, Nontender, Nondistended; Bowel sounds present.  EXTREMITIES:  bilateral lower extremities wound with dressing.   PSYCH: AAOx3.  NEUROLOGY: non-focal.  SKIN: No rashes or lesions.

## 2022-12-10 NOTE — PROGRESS NOTE ADULT - ASSESSMENT
66 yo F PMHx anorexia, anxiety, depression, kidney cysts, peripheral neuropathy, RA, OA, osteoporosis, low back pain presented after an unwitnessed fall. She was found down by . Had head trauma. On arrival to ED, patient unresponsive, hypotensive, bradycardic, and hypothermic. Patient also hypoglycemic (FS 24) and given dextrose immediately. Subsequently given Emiliana hugger, given 2L of warm IV fluids, started on levophed, and given broad-spectrum abx, and started on levothyroxine for possible myxedema coma (although no documentation in eMAR, so unclear if actually given). VBG noted respiratory acidosis. Patient intubated for airway protection. CT spine demonstrated fx of anterior and posterior tubercle of left C6 transverse foramen. NSGY consulted, but no surgical intervention offered. Subsequently admitted to MICU for presumed septic shock, extubated and downgraded to SDU and subsequently to telemetry    A/P:   Sepsis with Septic Shock  Lower extremities Cellulitis:   Chronic Lower extremities ulcer:   Toxic-Metabolic Encephalopathy: improved.   Full thickness wounds.  CT Lower Extremity w/ IV Cont, Bilateral (11.27.22 @ 11:02): 1) Bilateral lower extremity swelling and marked skin thickening, left  greater than right, compatible with cellulitis. Multiple rounded locules of gas are also seen tracking within the soft  tissues along the predominantly posterior calf and anterolateral/dorsal  ankle and foot likely related to known multiple abrasions, can not completely exclude early necrotizing fasciitis.  Burn evaluated the patient, recommended wound care with Silverdiazine  required pressor support, was transitioned to midodrine but midodrine stopped due to bradycardia  s/p Zosyn IV completed.     Acute Hypoxic respiratory failure due to Septic shock.   Resolved, s/p intubation in the ED 11/29, extubated    Diarrhea:   Patient with rectal tube from ICU, was placed for stage 2 sacral ulcer, patient is still with diarrhea  C.diff negative, pending GI PCR. Can start Imodium prn.     COVID exposure:   her roommate was found with COVID on 12/7/22, COVID is negative, repeat COVID.     Left Ankle ulcer:   Seen by podiatry.   Recent arterial duplex 11/10/22 was normal.     Sinus Bradycardia:   HR 30-40s. Improved. TSH 7.1, FT4 low, started on Synthroid.     Left C6 Transverse Foramen Fracture  known to neurosurgery service, very high risk for surgical intervention  Neurosurgery recommended cervical spine MRI, pending confirmation of her spinal stimulator status.     Hypothyroidism  TSH 7.92, Ft4 low.   Start on Synthroid 25mcg daily, will increase it to 50mcg daily after one week. Repeat TFTs in 4-6 weeks     Anorexia  Cachexia, low BMI.   Pharyngeal Dysphagia  s/p psychiatry eval, no contraindication for dc, recs outpatient psychiatry/anorexia referral   Speech and swallow evaluation recommended pureed diet.     Anxiety: Continue Xanax and Zoloft.     Rheumatoid arthritis  Outpatient follow up with rheumatology.     Overall prognosis guarded  DVT Prophylaxis: Lovenox 30mg SC.   #Progress Note Handoff  Pending (specify):  cervical MRI, improving diarrhea.   Disposition:  from home

## 2022-12-11 NOTE — PROGRESS NOTE ADULT - ASSESSMENT
66 yo F PMHx anorexia, anxiety, depression, kidney cysts, peripheral neuropathy, RA, OA, osteoporosis, low back pain presented after an unwitnessed fall. She was found down by . Had head trauma. On arrival to ED, patient unresponsive, hypotensive, bradycardic, and hypothermic. Patient also hypoglycemic (FS 24) and given dextrose immediately. Subsequently given Emiliana hugger, given 2L of warm IV fluids, started on levophed, and given broad-spectrum abx, and started on levothyroxine for possible myxedema coma (although no documentation in eMAR, so unclear if actually given). VBG noted respiratory acidosis. Patient intubated for airway protection. CT spine demonstrated fx of anterior and posterior tubercle of left C6 transverse foramen. NSGY consulted, but no surgical intervention offered. Subsequently admitted to MICU for presumed septic shock, extubated and downgraded to SDU and subsequently to telemetry    A/P:   COVID-19 infection  She was exposed from her roommate.   Now COVID-19 is positive.   Patient is asymptomatic, she is vaccinated with one booster.   Continue airborne isolation.     Sepsis with Septic Shock  Lower extremities Cellulitis:   Chronic Lower extremities ulcer:   Toxic-Metabolic Encephalopathy: improved.   Full thickness wounds.  CT Lower Extremity w/ IV Cont, Bilateral (11.27.22 @ 11:02): 1) Bilateral lower extremity swelling and marked skin thickening, left  greater than right, compatible with cellulitis. Multiple rounded locules of gas are also seen tracking within the soft  tissues along the predominantly posterior calf and anterolateral/dorsal  ankle and foot likely related to known multiple abrasions, can not completely exclude early necrotizing fasciitis.  Burn evaluated the patient, recommended wound care with Silverdiazine  required pressor support, was transitioned to midodrine but midodrine stopped due to bradycardia  s/p Zosyn IV completed.     Acute Hypoxic respiratory failure due to Septic shock.   Resolved, s/p intubation in the ED 11/29, extubated    Diarrhea:   Patient with rectal tube from ICU, was placed for stage 2 sacral ulcer, patient is still with diarrhea  C.diff negative, pending GI PCR. Can start Imodium prn.     Left Ankle ulcer:   Seen by podiatry.   Recent arterial duplex 11/10/22 was normal.     Sinus Bradycardia:   HR 30-40s. Improved. TSH 7.1, FT4 low, started on Synthroid.     Left C6 Transverse Foramen Fracture  known to neurosurgery service, very high risk for surgical intervention  Neurosurgery recommended cervical spine MRI, pending confirmation of her spinal stimulator status.     Hypothyroidism  TSH 7.92, Ft4 low.   Start on Synthroid 25mcg daily, will increase it to 50mcg daily after one week. Repeat TFTs in 4-6 weeks     Anorexia  Cachexia, low BMI.   Pharyngeal Dysphagia  s/p psychiatry eval, no contraindication for dc, recs outpatient psychiatry/anorexia referral   Speech and swallow evaluation recommended pureed diet.     Anxiety: Continue Xanax and Zoloft.     Rheumatoid arthritis  Outpatient follow up with rheumatology.     Overall prognosis guarded  DVT Prophylaxis: Lovenox 30mg SC.   #Progress Note Handoff  Pending (specify):  cervical MRI, improving diarrhea.   Disposition:  from home

## 2022-12-11 NOTE — PROGRESS NOTE ADULT - SUBJECTIVE AND OBJECTIVE BOX
SIERRA VERMAPADMINI  67y  Female      Patient is a 67y old  Female who presents with a chief complaint of AMS/Weakness (07 Dec 2022 10:30)      INTERVAL HPI/OVERNIGHT EVENTS:  She feels ok, she is still with soft diarrhea via rectal tube.   Vital Signs Last 24 Hrs  T(C): 36.1 (11 Dec 2022 05:10), Max: 36.1 (11 Dec 2022 05:10)  T(F): 97 (11 Dec 2022 05:10), Max: 97 (11 Dec 2022 05:10)  HR: 56 (11 Dec 2022 05:10) (56 - 58)  BP: 105/60 (11 Dec 2022 05:10) (105/56 - 105/60)  BP(mean): --  RR: 18 (11 Dec 2022 05:10) (18 - 18)  SpO2: 97% (11 Dec 2022 05:10) (97% - 97%)    Parameters below as of 11 Dec 2022 05:10  Patient On (Oxygen Delivery Method): room air          12-10-22 @ 07:01  -  12-11-22 @ 07:00  --------------------------------------------------------  IN: 100 mL / OUT: 300 mL / NET: -200 mL            Consultant(s) Notes Reviewed:  [x ] YES  [ ] NO          MEDICATIONS  (STANDING):  ALPRAZolam 0.25 milliGRAM(s) Oral two times a day  ALPRAZolam      chlorhexidine 2% Cloths 1 Application(s) Topical daily  dextrose 5%. 1000 milliLiter(s) (50 mL/Hr) IV Continuous <Continuous>  enoxaparin Injectable 30 milliGRAM(s) SubCutaneous every 24 hours  levothyroxine 25 MICROGram(s) Oral daily  melatonin 5 milliGRAM(s) Oral at bedtime  multivitamin/minerals 1 Tablet(s) Oral daily  sertraline 50 milliGRAM(s) Oral daily  silver sulfADIAZINE 1% Cream 1 Application(s) Topical two times a day    MEDICATIONS  (PRN):  acetaminophen     Tablet .. 650 milliGRAM(s) Oral every 6 hours PRN Mild Pain (1 - 3), Moderate Pain (4 - 6)  calcium carbonate    500 mG (Tums) Chewable 1 Tablet(s) Chew two times a day PRN Heartburn      LABS                          11.0   6.97  )-----------( 253      ( 11 Dec 2022 07:18 )             30.8     12-11    131<L>  |  91<L>  |  8<L>  ----------------------------<  118<H>  3.5   |  33<H>  |  <0.5<L>    Ca    8.0<L>      11 Dec 2022 07:18  Mg     1.7     12-11    TPro  4.3<L>  /  Alb  2.4<L>  /  TBili  0.3  /  DBili  x   /  AST  25  /  ALT  39  /  AlkPhos  165<H>  12-11          Lactate Trend        CAPILLARY BLOOD GLUCOSE            RADIOLOGY & ADDITIONAL TESTS:    Imaging Personally Reviewed:  [ ] YES  [ ] NO    HEALTH ISSUES - PROBLEM Dx:  Palliative care by specialist    Sepsis    RA (rheumatoid arthritis)    Anxiety    Pain    Neuropathy    Anorexia nervosa    Depression    Depression    Anorexia nervosa            PHYSICAL EXAM:  GENERAL: catechetic.   HEAD:  Atraumatic, Normocephalic.  EYES: EOMI, PERRLA, conjunctiva and sclera clear.  NECK: Supple, No JVD.  CHEST/LUNG: Clear to auscultation bilaterally; No wheeze.  HEART: Regular rate and rhythm; S1 S2.   ABDOMEN: Soft, Nontender, Nondistended; Bowel sounds present.  EXTREMITIES:  bilateral lower extremities wound with dressing.   PSYCH: AAOx3.  NEUROLOGY: non-focal.  SKIN: No rashes or lesions.

## 2022-12-12 NOTE — PROGRESS NOTE ADULT - SUBJECTIVE AND OBJECTIVE BOX
SIERRA VERMAJOSÉRAHEEL  67y  Female      Patient is a 67y old  Female who presents with a chief complaint of AMS/Weakness (07 Dec 2022 10:30)      INTERVAL HPI/OVERNIGHT EVENTS:  She feels ok, no new complaints,   Vital Signs Last 24 Hrs  T(C): 36.4 (12 Dec 2022 04:22), Max: 36.4 (12 Dec 2022 04:22)  T(F): 97.6 (12 Dec 2022 04:22), Max: 97.6 (12 Dec 2022 04:22)  HR: 52 (12 Dec 2022 04:22) (52 - 63)  BP: 125/66 (12 Dec 2022 04:22) (107/58 - 125/66)  BP(mean): --  RR: 18 (12 Dec 2022 04:22) (18 - 18)  SpO2: 96% (12 Dec 2022 09:10) (96% - 96%)    Parameters below as of 12 Dec 2022 09:10  Patient On (Oxygen Delivery Method): nasal cannula          12-11-22 @ 07:01  -  12-12-22 @ 07:00  --------------------------------------------------------  IN: 0 mL / OUT: 800 mL / NET: -800 mL            Consultant(s) Notes Reviewed:  [x ] YES  [ ] NO          MEDICATIONS  (STANDING):  ALPRAZolam      ALPRAZolam 0.25 milliGRAM(s) Oral two times a day  chlorhexidine 2% Cloths 1 Application(s) Topical daily  dextrose 5%. 1000 milliLiter(s) (50 mL/Hr) IV Continuous <Continuous>  enoxaparin Injectable 30 milliGRAM(s) SubCutaneous every 24 hours  levothyroxine 25 MICROGram(s) Oral daily  melatonin 5 milliGRAM(s) Oral at bedtime  multivitamin/minerals 1 Tablet(s) Oral daily  potassium chloride   Powder 20 milliEquivalent(s) Oral two times a day  sertraline 50 milliGRAM(s) Oral daily  silver sulfADIAZINE 1% Cream 1 Application(s) Topical two times a day    MEDICATIONS  (PRN):  acetaminophen     Tablet .. 650 milliGRAM(s) Oral every 6 hours PRN Mild Pain (1 - 3), Moderate Pain (4 - 6)  calcium carbonate    500 mG (Tums) Chewable 1 Tablet(s) Chew two times a day PRN Heartburn  oxycodone    5 mG/acetaminophen 325 mG 1 Tablet(s) Oral every 6 hours PRN Severe Pain (7 - 10)      LABS                          10.5   5.33  )-----------( 204      ( 12 Dec 2022 07:23 )             30.8     12-12    134<L>  |  96<L>  |  6<L>  ----------------------------<  86  3.4<L>   |  28  |  <0.5<L>    Ca    8.1<L>      12 Dec 2022 07:23  Mg     1.4     12-12    TPro  4.2<L>  /  Alb  2.3<L>  /  TBili  0.2  /  DBili  x   /  AST  26  /  ALT  37  /  AlkPhos  157<H>  12-12          Lactate Trend        CAPILLARY BLOOD GLUCOSE            RADIOLOGY & ADDITIONAL TESTS:    Imaging Personally Reviewed:  [ ] YES  [ ] NO    HEALTH ISSUES - PROBLEM Dx:  Palliative care by specialist    Sepsis    RA (rheumatoid arthritis)    Anxiety    Pain    Neuropathy    Anorexia nervosa    Depression    Depression    Anorexia nervosa            PHYSICAL EXAM:  GENERAL: catechetic.   HEAD:  Atraumatic, Normocephalic.  EYES: EOMI, PERRLA, conjunctiva and sclera clear.  NECK: Supple, No JVD.  CHEST/LUNG: Clear to auscultation bilaterally; No wheeze.  HEART: Regular rate and rhythm; S1 S2.   ABDOMEN: Soft, Nontender, Nondistended; Bowel sounds present.  EXTREMITIES:  bilateral lower extremities wound with dressing.   PSYCH: AAOx3.  NEUROLOGY: non-focal.  SKIN: No rashes or lesions.

## 2022-12-12 NOTE — SWALLOW BEDSIDE ASSESSMENT ADULT - ASR SWALLOW ASPIRATION MONITOR
change of breathing pattern/oral hygiene/position upright (90Y)/cough/gurgly voice/fever/pneumonia/throat clearing/upper respiratory infection
oral hygiene/position upright (90Y)
change of breathing pattern/oral hygiene/position upright (90Y)/cough/gurgly voice/fever/pneumonia/throat clearing/upper respiratory infection

## 2022-12-12 NOTE — SWALLOW BEDSIDE ASSESSMENT ADULT - SWALLOW EVAL: RECOMMENDED DIET
minced+moist, mildly thick liquids
continue puree, mildly thick liquids (vegetarian)
puree diet w/ mildly thick liquids
continue puree, mildly thick liquids
continue puree, mildly thick liquids
continue puree, mildly thick liquids (vegetarian)
puree diet w/ mildly thick liquids

## 2022-12-12 NOTE — SWALLOW BEDSIDE ASSESSMENT ADULT - SLP GENERAL OBSERVATIONS
pt received in bed awake alert w/o c/o pain. +cervical collar in place +room air +pt frustrated w/ current diet recommendations

## 2022-12-12 NOTE — PROGRESS NOTE ADULT - ASSESSMENT
ASSESSMENT: 66 y/o female with PMHx of anorexia, anxiety, depression, renal cysts, peripheral neuropathy, RA, OA, osteoporosis, low back pain, currently admitted after an unwitnessed fall with head strike and LOC. Originally admitted to MICU for possible septic shock and intubated for airway protection. Hypoglycemic (FS 24) on admission, s/p dextrose. s/p IVF and levophed. s/p broad-spectrum antibiotics. Started on Synthroid for possible myxedema coma. CT spine demonstrated fx of anterior and posterior tubercle of left C6 transverse foramen. No surgical intervention, as per NSGY. Downgraded to SDU then telemetry s/p extubation. Currently no obvious respiratory distress on O2 NC.    Plans: ASSESSMENT: 66 y/o female with PMHx of anorexia, anxiety, depression, renal cysts, peripheral neuropathy, RA, OA, osteoporosis, low back pain, currently admitted after an unwitnessed fall with head strike and LOC. Originally admitted to MICU for possible septic shock and intubated for airway protection. Hypoglycemic (FS 24) on admission, s/p dextrose. s/p IVF and levophed. s/p broad-spectrum antibiotics. Started on Synthroid for possible myxedema coma. CT spine demonstrated fx of anterior and posterior tubercle of left C6 transverse foramen. No surgical intervention, as per NSGY. Downgraded to SDU then telemetry s/p extubation. Currently no obvious respiratory distress on O2 NC.    Plans:  # Left C6 transverse foraminal fracture  - CT spine demonstrated fx of anterior and posterior tubercle of left C6 transverse foramen.   - Seen by neurosurgery.  - Per neurosurgery, neurostimulator (Semadic) is MRI compatible but needs to be on MRI mode.   - f/u MRI cervical spine.     # COVID-19 exposure -> infection  - Patient contracted COVID from roommate.  - Patient is vaccinated and received 1 booster.  - Currently asymptomatic.  - Continue airborne isolation.    # Lower extremity cellulitis  # Toxic metabolic encephalopathy  # Chronic b/l lower extremity cellulitis  # Left ankle ulcer  - Seen by burn and podiatry.  - LE arterial duplex WNL on 11/10.  - CT lower extremity on  showin) Bilateral lower extremity swelling and marked skin thickening, left  greater than right, compatible with cellulitis. Multiple rounded locules of gas are also seen tracking within the soft  tissues along the predominantly posterior calf and anterolateral/dorsal ankle and foot likely related to known multiple abrasions, can not completely exclude early necrotizing fasciitis.  - Seen by Burn.   - Continue wound care with Silvadene.    # Septic shock  # Acute hypoxic respiratory failure.  - Originally admitted to MICU and intubated.  - s/p broad-spectrum antibiotics.  - s/p midodrine.  - Downgraded to SDU then telemetry s/p extubation.    # Crohn's disease  # Diarrhea  - C. diff PCR negative.   - GI PCR negative.  - Rectal tube placed in ICU for stage 2 sacral ulcer.    # Hypothyroidism  - TSH 7.92 on admission -> 7.12.  - Continue synthroid 25mcg PO QD.    # Anxiety  # Cachexia  - Seen by Psychiatry.  - Continue Zoloft 50mg PO QD.  - Continue Xanax 0.25mg BID.  - Per psychiatry, no contraindications for discharge, but needs outpatient psychiatry/anorexia follow-up.  - Continue Pureed diet with mildly thick liquids.     # Rheumatoid arthritis  - f/u as outpatient with rheumatology.    # Diet  - Pureed with mildly thick liquids.  - Lacto-Ovo veg diet.    # DVT PPX  - Lovenox 30mg subQ.    # Activity  - IAT.    # Dispo  - Acute, pending MRI cervical spine.  - From home.

## 2022-12-12 NOTE — SWALLOW BEDSIDE ASSESSMENT ADULT - SLP PERTINENT HISTORY OF CURRENT PROBLEM
pt is a 66 y/o F w/ PMHx: anorexia, anxiety, depression, kidney cysts, peripheral neuropathy, RA, OA, osteoporosis, low back pain s/p unwitnessed fall, found down by  +HT, ?LOC, -AC. Of note, pt has long-standing h/o anorexia for several years per . Does not induce vomiting, but frequently uses laxatives for weight loss. Has never consistently followed w/ psychologist/psychiatrist. After recent discharge from hospital two weeks ago, anorexia acutely worsened and pt has had minimal caloric intake. On arrival to ED, pt unresponsive, hypotensive, bradycardic, and hypothermic. Pt intubated for airway protection. CT spine demonstrated fx of anterior and posterior tubercle of left C6 transverse foramen. NSGY consulted, but no surgical intervention offered. Subsequently admitted to MICU for presumed septic shock; s/p extubation 11/29. Pt now downgraded to 3C tele. Nutrition and palliative care team following. PO intake has somewhat improved over the last few days.
pt is a 68 y/o F w/ PMHx: anorexia, anxiety, depression, kidney cysts, peripheral neuropathy, RA, OA, osteoporosis, low back pain s/p unwitnessed fall, found down by  +HT, ?LOC, -AC. Of note, pt has long-standing h/o anorexia for several years per . Does not induce vomiting, but frequently uses laxatives for weight loss. Has never consistently followed w/ psychologist/psychiatrist. After recent discharge from hospital two weeks ago, anorexia acutely worsened and pt has had minimal caloric intake. On arrival to ED, pt unresponsive, hypotensive, bradycardic, and hypothermic. Pt intubated for airway protection. CT spine demonstrated fx of anterior and posterior tubercle of left C6 transverse foramen. NSGY consulted, but no surgical intervention offered. Subsequently admitted to MICU for presumed septic shock; s/p extubation 11/29. Nutrition following.
pt is a 66 y/o F w/ PMHx: anorexia, anxiety, depression, kidney cysts, peripheral neuropathy, RA, OA, osteoporosis, low back pain s/p unwitnessed fall, found down by  +HT, ?LOC, -AC. Of note, pt has long-standing h/o anorexia for several years per . Does not induce vomiting, but frequently uses laxatives for weight loss. Has never consistently followed w/ psychologist/psychiatrist. After recent discharge from hospital two weeks ago, anorexia acutely worsened and pt has had minimal caloric intake. On arrival to ED, pt unresponsive, hypotensive, bradycardic, and hypothermic. Pt intubated for airway protection. CT spine demonstrated fx of anterior and posterior tubercle of left C6 transverse foramen. NSGY consulted, but no surgical intervention offered. Subsequently admitted to MICU for presumed septic shock; s/p extubation 11/29. CXR 12/7-> New bibasilar opacities/effusions.
pt is a 68 y/o F w/ PMHx: anorexia, anxiety, depression, kidney cysts, peripheral neuropathy, RA, OA, osteoporosis, low back pain s/p unwitnessed fall, found down by  +HT, ?LOC, -AC. Of note, pt has long-standing h/o anorexia for several years per . Does not induce vomiting, but frequently uses laxatives for weight loss. Has never consistently followed w/ psychologist/psychiatrist. After recent discharge from hospital two weeks ago, anorexia acutely worsened and pt has had minimal caloric intake. On arrival to ED, pt unresponsive, hypotensive, bradycardic, and hypothermic. Pt intubated for airway protection. CT spine demonstrated fx of anterior and posterior tubercle of left C6 transverse foramen. NSGY consulted, but no surgical intervention offered. Subsequently admitted to MICU for presumed septic shock; s/p extubation 11/29. Nutrition and palliative care team following. PO intake improving.
Pt admitted s/p fall +septic shock, +AHRF s/p extubation +metabolic encephalopathy +necrotizing fascitis b/l LE w/ pseudomonas, pt takes laxatives +h/p anorexia +thrombocytopenia
Pt admitted s/p fall +septic shock, +AHRF s/p extubation +metabolic encephalopathy +necrotizing fascitis b/l LE w/ pseudomonas, pt takes laxatives +h/p anorexia +thrombocytopenia
pt is a 66 y/o F w/ PMHx: anorexia, anxiety, depression, kidney cysts, peripheral neuropathy, RA, OA, osteoporosis, low back pain s/p unwitnessed fall, found down by  +HT, ?LOC, -AC. Of note, pt has long-standing h/o anorexia for several years per . Does not induce vomiting, but frequently uses laxatives for weight loss. Has never consistently followed w/ psychologist/psychiatrist. After recent discharge from hospital two weeks ago, anorexia acutely worsened and pt has had minimal caloric intake. On arrival to ED, pt unresponsive, hypotensive, bradycardic, and hypothermic. Pt intubated for airway protection. CT spine demonstrated fx of anterior and posterior tubercle of left C6 transverse foramen. NSGY consulted, but no surgical intervention offered. Subsequently admitted to MICU for presumed septic shock; s/p extubation 11/29. Nutrition following.

## 2022-12-12 NOTE — PROGRESS NOTE ADULT - SUBJECTIVE AND OBJECTIVE BOX
ANA LILIA VERMA 67y Female  MRN#: [id number]     Hospital Day: 15d    SUMMARY: 66 y/o female with PMHx of anorexia, anxiety, depression, kidney cysts, peripheral neuropathy, RA, OA, osteoporosis, low back pain, currently admitted after an unwitnessed fall with head strike and LOC. Originally admitted to MICU for possible septic shock and intubated for airway protection. Hypoglycemic (FS 24) on admission, s/p dextrose. s/p IVF and levophed. s/p broad-spectrum antibiotics. Started on Synthroid for possible myxedema coma. CT spine demonstrated fx of anterior and posterior tubercle of left C6 transverse foramen. No surgical intervention, as per NSGY. Downgraded to SDU then telemetry s/p extubation.      SUBJECTIVE  No reported acute overnight events.                                             ----------------------------------------------------------  OBJECTIVE  PAST MEDICAL & SURGICAL HISTORY  Anxiety    Depression    Osteoporosis    Kidney cysts    Peripheral neuropathy    RA (rheumatoid arthritis)    OA (osteoarthritis)    Low back pain with radiation  s/p &quot;nerve cutting&quot; 2015                                              -----------------------------------------------------------  ALLERGIES:  No Known Allergies                                            ------------------------------------------------------------    HOME MEDICATIONS  Home Medications:  ALPRAZolam 0.25 mg oral tablet: 1 tab(s) orally 2 times a day (29 Nov 2022 11:18)  Cardizem  mg/24 hours oral capsule, extended release: 1 cap(s) orally once a day (29 Nov 2022 11:18)  cilostazol 50 mg oral tablet: 1 tab(s) orally 2 times a day (29 Nov 2022 11:18)  gabapentin 600 mg oral tablet: 1 tab(s) orally 3 times a day (29 Nov 2022 11:18)  hydroCHLOROthiazide 25 mg oral tablet: 1 tab(s) orally 3 times a week (29 Nov 2022 11:18)  HYDROcodone-acetaminophen 10 mg-325 mg oral tablet: 1 tab(s) orally every 6 hours, As Needed - for severe pain (29 Nov 2022 11:18)  rosuvastatin 10 mg oral tablet: 1 tab(s) orally once a day (at bedtime) (29 Nov 2022 11:18)  sertraline 50 mg oral tablet: 1 tab(s) orally once a day (29 Nov 2022 11:18)                           MEDICATIONS:  STANDING MEDICATIONS  ALPRAZolam      ALPRAZolam 0.25 milliGRAM(s) Oral two times a day  chlorhexidine 2% Cloths 1 Application(s) Topical daily  dextrose 5%. 1000 milliLiter(s) IV Continuous <Continuous>  enoxaparin Injectable 30 milliGRAM(s) SubCutaneous every 24 hours  levothyroxine 25 MICROGram(s) Oral daily  melatonin 5 milliGRAM(s) Oral at bedtime  multivitamin/minerals 1 Tablet(s) Oral daily  potassium chloride   Powder 20 milliEquivalent(s) Oral two times a day  sertraline 50 milliGRAM(s) Oral daily  silver sulfADIAZINE 1% Cream 1 Application(s) Topical two times a day    PRN MEDICATIONS  acetaminophen     Tablet .. 650 milliGRAM(s) Oral every 6 hours PRN  calcium carbonate    500 mG (Tums) Chewable 1 Tablet(s) Chew two times a day PRN  oxycodone    5 mG/acetaminophen 325 mG 1 Tablet(s) Oral every 6 hours PRN                                            ------------------------------------------------------------  VITAL SIGNS: Last 24 Hours  T(C): 36.4 (12 Dec 2022 04:22), Max: 36.4 (12 Dec 2022 04:22)  T(F): 97.6 (12 Dec 2022 04:22), Max: 97.6 (12 Dec 2022 04:22)  HR: 52 (12 Dec 2022 04:22) (52 - 63)  BP: 125/66 (12 Dec 2022 04:22) (107/58 - 125/66)  BP(mean): --  RR: 18 (12 Dec 2022 04:22) (18 - 18)  SpO2: 96% (12 Dec 2022 09:10) (96% - 96%)      12-11-22 @ 07:01  -  12-12-22 @ 07:00  --------------------------------------------------------  IN: 0 mL / OUT: 800 mL / NET: -800 mL                                             --------------------------------------------------------------  LABS:                        10.5   5.33  )-----------( 204      ( 12 Dec 2022 07:23 )             30.8     12-12    134<L>  |  96<L>  |  6<L>  ----------------------------<  86  3.4<L>   |  28  |  <0.5<L>    Ca    8.1<L>      12 Dec 2022 07:23  Mg     1.4     12-12    TPro  4.2<L>  /  Alb  2.3<L>  /  TBili  0.2  /  DBili  x   /  AST  26  /  ALT  37  /  AlkPhos  157<H>  12-12                                                              -------------------------------------------------------------  RADIOLOGY:                                            --------------------------------------------------------------    PHYSICAL EXAM:  General:   HEENT:  LUNGS:  HEART:  ABDOMEN:  EXT:  NEURO:  SKIN:                                           --------------------------------------------------------------       ANA LILIA VERMA 67y Female  MRN#: [id number]     Hospital Day: 15d    SUMMARY: 68 y/o female with PMHx of anorexia, anxiety, depression, renal cysts, peripheral neuropathy, RA, OA, osteoporosis, low back pain, currently admitted after an unwitnessed fall with head strike and LOC. Originally admitted to MICU for possible septic shock and intubated for airway protection. Hypoglycemic (FS 24) on admission, s/p dextrose. s/p IVF and levophed. s/p broad-spectrum antibiotics. Started on Synthroid for possible myxedema coma. CT spine demonstrated fx of anterior and posterior tubercle of left C6 transverse foramen. No surgical intervention, as per NSGY. Downgraded to SDU then telemetry s/p extubation.      SUBJECTIVE  No reported acute overnight events.                                             ----------------------------------------------------------  OBJECTIVE  PAST MEDICAL & SURGICAL HISTORY  Anxiety    Depression    Osteoporosis    Kidney cysts    Peripheral neuropathy    RA (rheumatoid arthritis)    OA (osteoarthritis)    Low back pain with radiation  s/p &quot;nerve cutting&quot; 2015                                              -----------------------------------------------------------  ALLERGIES:  No Known Allergies                                            ------------------------------------------------------------    HOME MEDICATIONS  Home Medications:  ALPRAZolam 0.25 mg oral tablet: 1 tab(s) orally 2 times a day (29 Nov 2022 11:18)  Cardizem  mg/24 hours oral capsule, extended release: 1 cap(s) orally once a day (29 Nov 2022 11:18)  cilostazol 50 mg oral tablet: 1 tab(s) orally 2 times a day (29 Nov 2022 11:18)  gabapentin 600 mg oral tablet: 1 tab(s) orally 3 times a day (29 Nov 2022 11:18)  hydroCHLOROthiazide 25 mg oral tablet: 1 tab(s) orally 3 times a week (29 Nov 2022 11:18)  HYDROcodone-acetaminophen 10 mg-325 mg oral tablet: 1 tab(s) orally every 6 hours, As Needed - for severe pain (29 Nov 2022 11:18)  rosuvastatin 10 mg oral tablet: 1 tab(s) orally once a day (at bedtime) (29 Nov 2022 11:18)  sertraline 50 mg oral tablet: 1 tab(s) orally once a day (29 Nov 2022 11:18)                           MEDICATIONS:  STANDING MEDICATIONS  ALPRAZolam      ALPRAZolam 0.25 milliGRAM(s) Oral two times a day  chlorhexidine 2% Cloths 1 Application(s) Topical daily  dextrose 5%. 1000 milliLiter(s) IV Continuous <Continuous>  enoxaparin Injectable 30 milliGRAM(s) SubCutaneous every 24 hours  levothyroxine 25 MICROGram(s) Oral daily  melatonin 5 milliGRAM(s) Oral at bedtime  multivitamin/minerals 1 Tablet(s) Oral daily  potassium chloride   Powder 20 milliEquivalent(s) Oral two times a day  sertraline 50 milliGRAM(s) Oral daily  silver sulfADIAZINE 1% Cream 1 Application(s) Topical two times a day    PRN MEDICATIONS  acetaminophen     Tablet .. 650 milliGRAM(s) Oral every 6 hours PRN  calcium carbonate    500 mG (Tums) Chewable 1 Tablet(s) Chew two times a day PRN  oxycodone    5 mG/acetaminophen 325 mG 1 Tablet(s) Oral every 6 hours PRN                                            ------------------------------------------------------------  VITAL SIGNS: Last 24 Hours  T(C): 36.4 (12 Dec 2022 04:22), Max: 36.4 (12 Dec 2022 04:22)  T(F): 97.6 (12 Dec 2022 04:22), Max: 97.6 (12 Dec 2022 04:22)  HR: 52 (12 Dec 2022 04:22) (52 - 63)  BP: 125/66 (12 Dec 2022 04:22) (107/58 - 125/66)  BP(mean): --  RR: 18 (12 Dec 2022 04:22) (18 - 18)  SpO2: 96% (12 Dec 2022 09:10) (96% - 96%)      12-11-22 @ 07:01  -  12-12-22 @ 07:00  --------------------------------------------------------  IN: 0 mL / OUT: 800 mL / NET: -800 mL                                             --------------------------------------------------------------  LABS:                        10.5   5.33  )-----------( 204      ( 12 Dec 2022 07:23 )             30.8     12-12    134<L>  |  96<L>  |  6<L>  ----------------------------<  86  3.4<L>   |  28  |  <0.5<L>    Ca    8.1<L>      12 Dec 2022 07:23  Mg     1.4     12-12    TPro  4.2<L>  /  Alb  2.3<L>  /  TBili  0.2  /  DBili  x   /  AST  26  /  ALT  37  /  AlkPhos  157<H>  12-12                                                              -------------------------------------------------------------  RADIOLOGY:                                            --------------------------------------------------------------    PHYSICAL EXAM:  CONSTITUTIONAL: Patient appears cachectic.    EYES: PERRLA and symmetric, EOMI, No conjunctival injection or pallor.     ENMT: Moist mucous membranes. No external nasal lesions. No gross hearing impairment noted.  	NECK: Supple, symmetric and without tracheal deviation.    RESPIRATORY: No respiratory distress. No obvious use of accessory muscles. Lungs CTAB with no crackling, wheezing, rhonchi or rubs.    CARDIOVASCULAR: Regular rate and rhythm. Normal S1 and S2. No murmurs, rubs or gallops. Peripheral pulses 2+.    GASTROINTESTINAL: Soft, non-tender to palpation in all quadrants. No palpable masses. No appreciable hernias.    MUSCULOSKELETAL: Clean, intact dressing present over bilateral lower extremities.    SKIN: No obvious rashes or ulcers.    NEUROLOGIC: No focal deficits noted on exam.    PSYCHIATRIC: A+O x 3.                                             --------------------------------------------------------------

## 2022-12-12 NOTE — SWALLOW BEDSIDE ASSESSMENT ADULT - ORAL PREPARATORY PHASE
Reduced oral grading
Reduced oral grading
Within functional limits
Reduced oral grading
Within functional limits

## 2022-12-12 NOTE — SWALLOW BEDSIDE ASSESSMENT ADULT - SWALLOW EVAL: DIAGNOSIS
+toleration for min amount of puree, mildly thick liquids w/o overt s/s aspiration; pt declined further po trials.

## 2022-12-12 NOTE — PROGRESS NOTE ADULT - ASSESSMENT
66 yo F PMHx anorexia, anxiety, depression, kidney cysts, peripheral neuropathy, RA, OA, osteoporosis, low back pain presented after an unwitnessed fall. She was found down by . Had head trauma. On arrival to ED, patient unresponsive, hypotensive, bradycardic, and hypothermic. Patient also hypoglycemic (FS 24) and given dextrose immediately. Subsequently given Emiliana hugger, given 2L of warm IV fluids, started on levophed, and given broad-spectrum abx, and started on levothyroxine for possible myxedema coma (although no documentation in eMAR, so unclear if actually given). VBG noted respiratory acidosis. Patient intubated for airway protection. CT spine demonstrated fx of anterior and posterior tubercle of left C6 transverse foramen. NSGY consulted, but no surgical intervention offered. Subsequently admitted to MICU for presumed septic shock, extubated and downgraded to SDU and subsequently to telemetry    A/P:   COVID-19 infection  She was exposed from her roommate.   Now COVID-19 is positive.   Patient is asymptomatic, she is vaccinated with one booster.   Continue airborne isolation.     Sepsis with Septic Shock  Lower extremities Cellulitis:   Chronic Lower extremities ulcer:   Toxic-Metabolic Encephalopathy: improved.   Full thickness wounds.  CT Lower Extremity w/ IV Cont, Bilateral (11.27.22 @ 11:02): 1) Bilateral lower extremity swelling and marked skin thickening, left  greater than right, compatible with cellulitis. Multiple rounded locules of gas are also seen tracking within the soft  tissues along the predominantly posterior calf and anterolateral/dorsal  ankle and foot likely related to known multiple abrasions, can not completely exclude early necrotizing fasciitis.  Burn evaluated the patient, recommended wound care with Silverdiazine  required pressor support, was transitioned to midodrine but midodrine stopped due to bradycardia  s/p Zosyn IV completed.     Acute Hypoxic respiratory failure due to Septic shock.   Resolved, s/p intubation in the ED 11/29, extubated    Diarrhea:   History of Crohn's disease:   Patient with rectal tube from ICU, was placed for stage 2 sacral ulcer, patient is still with diarrhea  C.diff and GI PCR negative.    Left Ankle ulcer:   Seen by podiatry.   Recent arterial duplex 11/10/22 was normal.     Sinus Bradycardia:   HR 30-40s. Improved. TSH 7.1, FT4 low, started on Synthroid.     Left C6 Transverse Foramen Fracture  known to neurosurgery service, very high risk for surgical intervention  Neurosurgery recommended cervical spine MRI, neurosurgery confirmed the device is MRI compatible, [employer] need to be on MRI mode, fully charged.     Hypothyroidism  TSH 7.92, Ft4 low.   Start on Synthroid 25mcg daily, will increase it to 50mcg daily after one week. Repeat TFTs in 4-6 weeks     Anorexia  Cachexia, low BMI.   Pharyngeal Dysphagia  s/p psychiatry eval, no contraindication for dc, recs outpatient psychiatry/anorexia referral   Speech and swallow evaluation recommended pureed diet.     Anxiety: Continue Xanax and Zoloft.     Rheumatoid arthritis  Outpatient follow up with rheumatology.     Overall prognosis guarded  DVT Prophylaxis: Lovenox 30mg SC.   #Progress Note Handoff  Pending (specify):  cervical MRI,  Disposition:  from home

## 2022-12-12 NOTE — SWALLOW BEDSIDE ASSESSMENT ADULT - NS ASR SWALLOW FINDINGS DISCUS
Physician/Nursing/Patient
Physician/Nursing/Patient/Family
Physician/Nursing/Patient
Physician/Patient
Physician/Nursing/Patient

## 2022-12-12 NOTE — SWALLOW BEDSIDE ASSESSMENT ADULT - POSITIONING
upright (90 degrees)
upright (90 degrees)
upright (45 degrees)
upright (90 degrees)

## 2022-12-12 NOTE — SWALLOW BEDSIDE ASSESSMENT ADULT - COMMENTS
CXR 12/7-> New bibasilar opacities/effusions.
Pt previously planned for VFSS to further investigate pharyngeal swallow function, however pt w/ episodes of bradycardia, study deferred at this time.
pt now COVID+

## 2022-12-12 NOTE — SWALLOW BEDSIDE ASSESSMENT ADULT - SWALLOW EVAL: CURRENT DIET
NPO
puree, mildly thick liquids
puree diet (needs clarification SLP recommended puree w/ mildly thick on 11/30)
puree, mildly thick liquids
puree, mildly thick liquids

## 2022-12-13 NOTE — PROGRESS NOTE ADULT - SUBJECTIVE AND OBJECTIVE BOX
ANA LILIA VERMA 67y Female  MRN#: [id number]     Hospital Day: 16d    SUMMARY: 66 y/o female with PMHx of anorexia, anxiety, depression, renal cysts, peripheral neuropathy, RA, OA, osteoporosis, low back pain, currently admitted after an unwitnessed fall with head strike and LOC. Originally admitted to MICU for possible septic shock and intubated for airway protection. Hypoglycemic (FS 24) on admission, s/p dextrose. s/p IVF and levophed. s/p broad-spectrum antibiotics. Started on Synthroid for possible myxedema coma. CT spine demonstrated fx of anterior and posterior tubercle of left C6 transverse foramen. No surgical intervention, as per NSGY. Downgraded to SDU then telemetry s/p extubation.    SUBJECTIVE  No reported acute overnight events.                                             ----------------------------------------------------------  OBJECTIVE  PAST MEDICAL & SURGICAL HISTORY  Anxiety    Depression    Osteoporosis    Kidney cysts    Peripheral neuropathy    RA (rheumatoid arthritis)    OA (osteoarthritis)    Low back pain with radiation  s/p &quot;nerve cutting&quot; 2015                                              -----------------------------------------------------------  ALLERGIES:  No Known Allergies                                            ------------------------------------------------------------    HOME MEDICATIONS  Home Medications:  ALPRAZolam 0.25 mg oral tablet: 1 tab(s) orally 2 times a day (29 Nov 2022 11:18)  Cardizem  mg/24 hours oral capsule, extended release: 1 cap(s) orally once a day (29 Nov 2022 11:18)  cilostazol 50 mg oral tablet: 1 tab(s) orally 2 times a day (29 Nov 2022 11:18)  gabapentin 600 mg oral tablet: 1 tab(s) orally 3 times a day (29 Nov 2022 11:18)  hydroCHLOROthiazide 25 mg oral tablet: 1 tab(s) orally 3 times a week (29 Nov 2022 11:18)  HYDROcodone-acetaminophen 10 mg-325 mg oral tablet: 1 tab(s) orally every 6 hours, As Needed - for severe pain (29 Nov 2022 11:18)  rosuvastatin 10 mg oral tablet: 1 tab(s) orally once a day (at bedtime) (29 Nov 2022 11:18)  sertraline 50 mg oral tablet: 1 tab(s) orally once a day (29 Nov 2022 11:18)                           MEDICATIONS:  STANDING MEDICATIONS  ALPRAZolam      ALPRAZolam 0.25 milliGRAM(s) Oral two times a day  chlorhexidine 2% Cloths 1 Application(s) Topical <User Schedule>  dextrose 5%. 1000 milliLiter(s) IV Continuous <Continuous>  enoxaparin Injectable 30 milliGRAM(s) SubCutaneous every 24 hours  levothyroxine 25 MICROGram(s) Oral daily  melatonin 5 milliGRAM(s) Oral at bedtime  multivitamin/minerals 1 Tablet(s) Oral daily  potassium chloride   Powder 20 milliEquivalent(s) Oral two times a day  sertraline 50 milliGRAM(s) Oral daily  silver sulfADIAZINE 1% Cream 1 Application(s) Topical two times a day    PRN MEDICATIONS  acetaminophen     Tablet .. 650 milliGRAM(s) Oral every 6 hours PRN  calcium carbonate    500 mG (Tums) Chewable 1 Tablet(s) Chew two times a day PRN  oxycodone    5 mG/acetaminophen 325 mG 1 Tablet(s) Oral every 6 hours PRN                                            ------------------------------------------------------------  VITAL SIGNS: Last 24 Hours  T(C): 36.7 (13 Dec 2022 12:35), Max: 36.7 (13 Dec 2022 12:35)  T(F): 98 (13 Dec 2022 12:35), Max: 98 (13 Dec 2022 12:35)  HR: 80 (13 Dec 2022 12:35) (66 - 80)  BP: 128/68 (13 Dec 2022 12:35) (95/55 - 128/68)  BP(mean): --  RR: 18 (13 Dec 2022 12:35) (18 - 18)  SpO2: 96% (13 Dec 2022 09:18) (96% - 96%)      12-12-22 @ 07:01  -  12-13-22 @ 07:00  --------------------------------------------------------  IN: 230 mL / OUT: 800 mL / NET: -570 mL                                             --------------------------------------------------------------  LABS:                        11.5   5.64  )-----------( 233      ( 13 Dec 2022 05:57 )             33.2     12-13    130<L>  |  91<L>  |  5<L>  ----------------------------<  50<LL>  5.0   |  34<H>  |  <0.5<L>    Ca    8.5      13 Dec 2022 05:57  Mg     1.3     12-13    TPro  4.6<L>  /  Alb  2.4<L>  /  TBili  0.2  /  DBili  x   /  AST  25  /  ALT  40  /  AlkPhos  180<H>  12-13                                                -------------------------------------------------------------  RADIOLOGY:  ACC: [id number] EXAM: XR CHEST 1 VIEW  PROCEDURE DATE: 12/07/2022  IMPRESSION:  New bibasilar opacities/effusions.  RPIMO RESENDEZ MD; Attending Radiologist  This document has been electronically signed. Dec 8 2022 1:31PM                                              --------------------------------------------------------------    PHYSICAL EXAM:  CONSTITUTIONAL: Patient appears cachectic.    EYES: PERRLA and symmetric, EOMI, No conjunctival injection or pallor.     ENMT: Moist mucous membranes. No external nasal lesions. No gross hearing impairment noted.  	NECK: Supple, symmetric and without tracheal deviation.    RESPIRATORY: No respiratory distress. No obvious use of accessory muscles. Lungs CTAB with no crackling, wheezing, rhonchi or rubs.    CARDIOVASCULAR: Regular rate and rhythm. Normal S1 and S2. No murmurs, rubs or gallops. Peripheral pulses 2+.    GASTROINTESTINAL: Soft, non-tender to palpation in all quadrants. No palpable masses. No appreciable hernias.    MUSCULOSKELETAL: Clean, intact dressing present over bilateral lower extremities.    SKIN: No obvious rashes or ulcers.    NEUROLOGIC: No focal deficits noted on exam.    PSYCHIATRIC: A+O x 3.                                         --------------------------------------------------------------

## 2022-12-13 NOTE — PROGRESS NOTE ADULT - SUBJECTIVE AND OBJECTIVE BOX
ANA LILIA VERMA  67y  Female      Patient is a 67y old  Female who presents with a chief complaint of AMS/Weakness (07 Dec 2022 10:30)      INTERVAL HPI/OVERNIGHT EVENTS:  She has no fever, no cough.   Vital Signs Last 24 Hrs  T(C): 36.7 (13 Dec 2022 12:35), Max: 36.7 (13 Dec 2022 12:35)  T(F): 98 (13 Dec 2022 12:35), Max: 98 (13 Dec 2022 12:35)  HR: 80 (13 Dec 2022 12:35) (66 - 80)  BP: 128/68 (13 Dec 2022 12:35) (95/55 - 128/68)  BP(mean): --  RR: 18 (13 Dec 2022 12:35) (18 - 18)  SpO2: 96% (13 Dec 2022 09:18) (96% - 96%)    Parameters below as of 13 Dec 2022 09:18  Patient On (Oxygen Delivery Method): nasal cannula  O2 Flow (L/min): 2        12-12-22 @ 07:01  -  12-13-22 @ 07:00  --------------------------------------------------------  IN: 230 mL / OUT: 800 mL / NET: -570 mL    12-13-22 @ 07:01  -  12-13-22 @ 18:21  --------------------------------------------------------  IN: 876 mL / OUT: 800 mL / NET: 76 mL            Consultant(s) Notes Reviewed:  [x ] YES  [ ] NO          MEDICATIONS  (STANDING):  ALPRAZolam      ALPRAZolam 0.25 milliGRAM(s) Oral two times a day  chlorhexidine 2% Cloths 1 Application(s) Topical <User Schedule>  dextrose 5%. 1000 milliLiter(s) (50 mL/Hr) IV Continuous <Continuous>  enoxaparin Injectable 30 milliGRAM(s) SubCutaneous every 24 hours  melatonin 5 milliGRAM(s) Oral at bedtime  multivitamin/minerals 1 Tablet(s) Oral daily  potassium chloride   Powder 20 milliEquivalent(s) Oral two times a day  sertraline 50 milliGRAM(s) Oral daily  silver sulfADIAZINE 1% Cream 1 Application(s) Topical two times a day    MEDICATIONS  (PRN):  acetaminophen     Tablet .. 650 milliGRAM(s) Oral every 6 hours PRN Mild Pain (1 - 3), Moderate Pain (4 - 6)  calcium carbonate    500 mG (Tums) Chewable 1 Tablet(s) Chew two times a day PRN Heartburn  oxycodone    5 mG/acetaminophen 325 mG 1 Tablet(s) Oral every 6 hours PRN Severe Pain (7 - 10)      LABS                          11.5   5.64  )-----------( 233      ( 13 Dec 2022 05:57 )             33.2     12-13    130<L>  |  91<L>  |  5<L>  ----------------------------<  50<LL>  5.0   |  34<H>  |  <0.5<L>    Ca    8.5      13 Dec 2022 05:57  Mg     1.3     12-13    TPro  4.6<L>  /  Alb  2.4<L>  /  TBili  0.2  /  DBili  x   /  AST  25  /  ALT  40  /  AlkPhos  180<H>  12-13          Lactate Trend        CAPILLARY BLOOD GLUCOSE      POCT Blood Glucose.: 137 mg/dL (13 Dec 2022 16:41)        RADIOLOGY & ADDITIONAL TESTS:    Imaging Personally Reviewed:  [ ] YES  [ ] NO    HEALTH ISSUES - PROBLEM Dx:  Palliative care by specialist    Sepsis    RA (rheumatoid arthritis)    Anxiety    Pain    Neuropathy    Anorexia nervosa    Depression    Depression    Anorexia nervosa            PHYSICAL EXAM:  GENERAL: catechetic.   HEAD:  Atraumatic, Normocephalic.  EYES: EOMI, PERRLA, conjunctiva and sclera clear.  NECK: Supple, No JVD.  CHEST/LUNG: Clear to auscultation bilaterally; No wheeze.  HEART: Regular rate and rhythm; S1 S2.   ABDOMEN: Soft, Nontender, Nondistended; Bowel sounds present.  EXTREMITIES:  bilateral lower extremities wound with dressing.   PSYCH: AAOx3.  NEUROLOGY: non-focal.  SKIN: No rashes or lesions.

## 2022-12-13 NOTE — PROGRESS NOTE ADULT - ASSESSMENT
66 yo F PMHx anorexia, anxiety, depression, kidney cysts, peripheral neuropathy, RA, OA, osteoporosis, low back pain presented after an unwitnessed fall. She was found down by . Had head trauma. On arrival to ED, patient unresponsive, hypotensive, bradycardic, and hypothermic. Patient also hypoglycemic (FS 24) and given dextrose immediately. Subsequently given Emiliana hugger, given 2L of warm IV fluids, started on levophed, and given broad-spectrum abx, and started on levothyroxine for possible myxedema coma (although no documentation in eMAR, so unclear if actually given). VBG noted respiratory acidosis. Patient intubated for airway protection. CT spine demonstrated fx of anterior and posterior tubercle of left C6 transverse foramen. NSGY consulted, but no surgical intervention offered. Subsequently admitted to MICU for presumed septic shock, extubated and downgraded to SDU and subsequently to telemetry    A/P:   COVID-19 infection  She was exposed from her roommate.   Now COVID-19 is positive.   Patient is asymptomatic, she is vaccinated with one booster.   Continue airborne isolation. Check D-dimer AM.    Sepsis with Septic Shock  Lower extremities Cellulitis:   Chronic Lower extremities ulcer:   Toxic-Metabolic Encephalopathy: improved.   Full thickness wounds.  CT Lower Extremity w/ IV Cont, Bilateral (11.27.22 @ 11:02): 1) Bilateral lower extremity swelling and marked skin thickening, left  greater than right, compatible with cellulitis. Multiple rounded locules of gas are also seen tracking within the soft  tissues along the predominantly posterior calf and anterolateral/dorsal  ankle and foot likely related to known multiple abrasions, can not completely exclude early necrotizing fasciitis.  Burn evaluated the patient, recommended wound care with Silverdiazine  required pressor support, was transitioned to midodrine but midodrine stopped due to bradycardia  s/p Zosyn IV completed.     Acute Hypoxic respiratory failure due to Septic shock.   Resolved, s/p intubation in the ED 11/29, extubated    Diarrhea:   History of Crohn's disease:   Patient with rectal tube from ICU, was placed for stage 2 sacral ulcer, patient is still with diarrhea  C.diff and GI PCR negative.    Left Ankle ulcer:   Seen by podiatry.   Recent arterial duplex 11/10/22 was normal.     Sinus Bradycardia:   HR 30-40s. Improved. TSH 7.1, FT4 low, started on Synthroid.     Left C6 Transverse Foramen Fracture  known to neurosurgery service, very high risk for surgical intervention  Neurosurgery recommended cervical spine MRI, neurosurgery confirmed the device is MRI compatible, [employer] need to be on MRI mode, fully charged.   Patient went for MRI but didn't bring the remote from home, her son should bring it soon.     Hypothyroidism  TSH 7.92, Ft4 low.   Started Synthroid 25mcg, increase to 50mcg daily, Repeat TFTs in 4-6 weeks     Anorexia  Cachexia, severe underweight.   Pharyngeal Dysphagia  s/p psychiatry eval, no contraindication for dc, recs outpatient psychiatry/anorexia referral   Speech and swallow evaluation recommended pureed diet.     Anxiety: Continue Xanax and Zoloft.     Rheumatoid arthritis  Outpatient follow up with rheumatology.     Overall prognosis guarded  DVT Prophylaxis: Lovenox 30mg SC.   #Progress Note Handoff  Pending (specify):  cervical MRI,  Disposition:  from home

## 2022-12-13 NOTE — PROGRESS NOTE ADULT - ASSESSMENT
ASSESSMENT: 68 y/o female with PMHx of anorexia, anxiety, depression, renal cysts, peripheral neuropathy, RA, OA, osteoporosis, low back pain, currently admitted after an unwitnessed fall with head strike and LOC. Originally admitted to MICU for possible septic shock and intubated for airway protection. Hypoglycemic (FS 24) on admission, s/p dextrose. s/p IVF and levophed. s/p broad-spectrum antibiotics. Started on Synthroid for possible myxedema coma. CT spine demonstrated fx of anterior and posterior tubercle of left C6 transverse foramen. No surgical intervention, as per NSGY. Downgraded to SDU then telemetry s/p extubation. Currently no obvious respiratory distress on O2 NC.    Plans:  # Left C6 transverse foraminal fracture  - CT spine demonstrated fx of anterior and posterior tubercle of left C6 transverse foramen.   - Seen by neurosurgery.  - Per neurosurgery, neurostimulator ([employer]) is MRI compatible but needs to be on MRI mode.   - f/u MRI cervical spine.     # COVID-19 exposure -> infection  - Patient contracted COVID from roommate.  - Patient is vaccinated and received 1 booster.  - Currently asymptomatic.  - Continue airborne isolation.    # Lower extremity cellulitis  # Toxic metabolic encephalopathy  # Chronic b/l lower extremity cellulitis  # Left ankle ulcer  - Seen by burn and podiatry.  - LE arterial duplex WNL on 11/10.  - CT lower extremity on  showin) Bilateral lower extremity swelling and marked skin thickening, left  greater than right, compatible with cellulitis. Multiple rounded locules of gas are also seen tracking within the soft  tissues along the predominantly posterior calf and anterolateral/dorsal ankle and foot likely related to known multiple abrasions, can not completely exclude early necrotizing fasciitis.  - Seen by Burn.   - Continue wound care with Silvadene.    # Septic shock  # Acute hypoxic respiratory failure.  - Originally admitted to MICU and intubated.  - s/p broad-spectrum antibiotics.  - s/p midodrine.  - Downgraded to SDU then telemetry s/p extubation.    # Crohn's disease  # Diarrhea  - C. diff PCR negative.   - GI PCR negative.  - Rectal tube placed in ICU for stage 2 sacral ulcer.    # Hypothyroidism  - TSH 7.92 on admission -> 7.12.  - Continue synthroid 25mcg PO QD.    # Anxiety  # Cachexia  - Seen by Psychiatry.  - Continue Zoloft 50mg PO QD.  - Continue Xanax 0.25mg BID.  - Per psychiatry, no contraindications for discharge, but needs outpatient psychiatry/anorexia follow-up.  - Continue Pureed diet with mildly thick liquids.     # Rheumatoid arthritis  - f/u as outpatient with rheumatology.    # Diet  - Pureed with mildly thick liquids.  - Lacto-Ovo veg diet.    # DVT PPX  - Lovenox 30mg subQ.    # Activity  - IAT.    # Dispo  - Acute, pending MRI cervical spine.  - From home.

## 2022-12-14 NOTE — CHART NOTE - NSCHARTNOTEFT_GEN_A_CORE
Pts spinal stimulator information  Make: [employer]  Ref no.: [id number]  Serial number: [id number]     will bring the Remote for the device sathish.

## 2022-12-14 NOTE — CHART NOTE - NSCHARTNOTEFT_GEN_A_CORE
Radiology spoke to [employer], the spinal stimulator is only compatible with lower magnetic setting, the machine in house with those capability Is under repair and the estimated time is 6-8 weeks. So for now pt cannot get the MRI with the machine that we have.

## 2022-12-14 NOTE — PROGRESS NOTE ADULT - SUBJECTIVE AND OBJECTIVE BOX
CHIEF COMPLAINT:    Patient is a 67y old  Female who presents with a chief complaint of AMS/Weakness     INTERVAL HPI/OVERNIGHT EVENTS:    Patient seen and examined at bedside. No acute overnight events occurred.    ROS: Reports she only wants non pureed foods, denied having abnormal FEES. All other systems are negative.    Medications:  Standing  ALPRAZolam      ALPRAZolam 0.25 milliGRAM(s) Oral two times a day  chlorhexidine 2% Cloths 1 Application(s) Topical <User Schedule>  enoxaparin Injectable 30 milliGRAM(s) SubCutaneous every 24 hours  levothyroxine 50 MICROGram(s) Oral daily  magnesium sulfate  IVPB 2 Gram(s) IV Intermittent every 2 hours  melatonin 5 milliGRAM(s) Oral at bedtime  multivitamin/minerals 1 Tablet(s) Oral daily  potassium chloride   Powder 20 milliEquivalent(s) Oral two times a day  sertraline 50 milliGRAM(s) Oral daily  silver sulfADIAZINE 1% Cream 1 Application(s) Topical two times a day  sodium chloride 0.9%. 1000 milliLiter(s) IV Continuous <Continuous>    PRN Meds  acetaminophen     Tablet .. 650 milliGRAM(s) Oral every 6 hours PRN  calcium carbonate    500 mG (Tums) Chewable 1 Tablet(s) Chew two times a day PRN  oxycodone    5 mG/acetaminophen 325 mG 1 Tablet(s) Oral every 6 hours PRN        Vital Signs:    T(F): 98.1 (12-14-22 @ 04:48), Max: 98.1 (12-14-22 @ 04:48)  HR: 61 (12-14-22 @ 04:48) (61 - 80)  BP: 102/56 (12-14-22 @ 04:48) (100/60 - 128/68)  RR: 18 (12-14-22 @ 04:48) (18 - 20)  SpO2: 98% (12-13-22 @ 20:45) (98% - 98%)  I&O's Summary    13 Dec 2022 07:01  -  14 Dec 2022 07:00  --------------------------------------------------------  IN: 1076 mL / OUT: 1500 mL / NET: -424 mL      POCT Blood Glucose.: 114 mg/dL (14 Dec 2022 11:14)  POCT Blood Glucose.: 108 mg/dL (14 Dec 2022 07:27)  POCT Blood Glucose.: 129 mg/dL (13 Dec 2022 21:53)  POCT Blood Glucose.: 137 mg/dL (13 Dec 2022 16:41)      PHYSICAL EXAM:  GENERAL:  NAD, cachetic  SKIN: No rashes, has bruises  HEENT: Atraumatic. Normocephalic. Anicteric, cervical collar on  NECK:  No JVD.   PULMONARY: Clear to ausculation bilaterally. No wheezing. No rales  CVS: Normal S1, S2. Regular rate and rhythm. No murmurs.  ABDOMEN/GI: Soft, Nontender, Nondistended; Bowel sounds are present  EXTREMITIES:  No edema B/L LE.  NEUROLOGIC:  No motor deficit.  PSYCH: Alert & oriented x 3, normal affect      LABS:                        9.2    4.47  )-----------( 177      ( 14 Dec 2022 07:37 )             26.7     12-14    126<L>  |  90<L>  |  8<L>  ----------------------------<  96  4.4   |  30  |  <0.5<L>    Ca    8.0<L>      14 Dec 2022 07:37  Mg     1.2     12-14    TPro  4.2<L>  /  Alb  2.2<L>  /  TBili  <0.2  /  DBili  x   /  AST  20  /  ALT  31  /  AlkPhos  164<H>  12-14      RADIOLOGY & ADDITIONAL TESTS:  Imaging or report Personally Reviewed:  [ ] YES  [ ] NO -->no new images    Telemetry reviewed independently - NSR, no acute events  EKG reviewed independently -->no new EKGs    Consultant(s) Notes Reviewed:  [ ] YES  [ ] NO  Care Discussed with Consultants/Other Providers [ ] YES  [ ] NO    Case discussed with resident  Care discussed with pt

## 2022-12-14 NOTE — GOALS OF CARE CONVERSATION - ADVANCED CARE PLANNING - CONVERSATION DETAILS
Met with patient    Discussed advanced directives, pt aware of her advanced illness, Recently intubated and extubated. She knows she has a HCP form already done. She wants to be a full code at this time.     She wants to go to rehab and wants better nutrition

## 2022-12-14 NOTE — PROGRESS NOTE ADULT - ASSESSMENT
66 yo F PMHx anorexia, anxiety, depression, kidney cysts, peripheral neuropathy, RA, OA, osteoporosis, low back pain presented after an unwitnessed fall. She was found down by . Had head trauma. On arrival to ED, patient unresponsive, hypotensive, bradycardic, and hypothermic. Patient also hypoglycemic (FS 24) and given dextrose immediately. Subsequently given Emiliana hugger, given 2L of warm IV fluids, started on levophed, and given broad-spectrum abx, and started on levothyroxine for possible myxedema coma (although no documentation in eMAR, so unclear if actually given). VBG noted respiratory acidosis. Patient intubated for airway protection. CT spine demonstrated fx of anterior and posterior tubercle of left C6 transverse foramen. NSGY consulted, but no surgical intervention offered. Subsequently admitted to MICU for presumed septic shock, extubated and downgraded to SDU and subsequently to telemetry. Course complicated by dysphagia and COVID-19 infection.        COVID-19 infection  -She was exposed from her roommate.   -Now COVID-19 is positive.   -Patient is asymptomatic, she is vaccinated with one booster.   -Continue airborne isolation.   -d-dimer 982, requires extended DVT px  - check LE duplex, if negative then can likely avoid therapeutic lovenox    Sepsis with Septic Shock  Lower extremities Cellulitis:   Chronic Lower extremities ulcer:   Toxic-Metabolic Encephalopathy: improved.   Full thickness wounds.  -CT Lower Extremity w/ IV Cont, Bilateral (11.27.22 @ 11:02): 1) Bilateral lower extremity swelling and marked skin thickening, left  greater than right, compatible with cellulitis. Multiple rounded locules of gas are also seen tracking within the soft  tissues along the predominantly posterior calf and anterolateral/dorsal  ankle and foot likely related to known multiple abrasions, can not completely exclude early necrotizing fasciitis.  -Burn evaluated the patient, recommended wound care with Silverdiazine  -required pressor support, was transitioned to midodrine but midodrine stopped due to bradycardia  -s/p Zosyn IV completed.     Acute Hypoxic respiratory failure due to Septic shock.   -Resolved, s/p intubation in the ED 11/29, extubated    Diarrhea:   History of Crohn's disease:   -Patient with rectal tube from ICU, was placed for stage 2 sacral ulcer, patient is still with diarrhea  -C.diff and GI PCR negative.    Left Ankle ulcer:   -Seen by podiatry.   -Recent arterial duplex 11/10/22 was normal.     Sinus Bradycardia:   -HR 30-40s. Improved. TSH 7.1, FT4 low, started on Synthroid.     Left C6 Transverse Foramen Fracture  -known to neurosurgery service, very high risk for surgical intervention  -Neurosurgery recommended cervical spine MRI, neurosurgery confirmed the device is MRI compatible, [employer] need to be on MRI mode, fully charged.   -Patient went for MRI but didn't bring the remote from home, her son should bring it soon.     Hypothyroidism  -TSH 7.92, Ft4 low.   -Started Synthroid 25mcg, increase to 50mcg daily, Repeat TFTs in 4-6 weeks     Anorexia  -Cachexia, severe underweight.   -Pharyngeal Dysphagia  -s/p psychiatry eval, no contraindication for dc, recs outpatient psychiatry/anorexia referral   -Speech and swallow evaluation recommended pureed diet    Dysphagia  - failed FEEs  - awaiting S&S follow up, pt insistent upon eating full diet, cannot assess if pt understands risk of aspiration as she is just yelling about eating regular food. If family agreeable to regular diet then we can advance    Anxiety: Continue Xanax and Zoloft.     Rheumatoid arthritis  Outpatient follow up with rheumatology.     Overall prognosis guarded  DVT Prophylaxis: Lovenox 30mg SC.   #Progress Note Handoff  Pending (specify):  cervical MRI, s&s follow up  Disposition:  from home

## 2022-12-15 NOTE — PROGRESS NOTE ADULT - ASSESSMENT
ASSESSMENT: 68 y/o female with PMHx of anorexia, anxiety, depression, renal cysts, peripheral neuropathy, RA, OA, osteoporosis, low back pain, currently admitted after an unwitnessed fall with head strike and LOC. Originally admitted to MICU for possible septic shock and intubated for airway protection. Hypoglycemic (FS 24) on admission, s/p dextrose. s/p IVF and levophed. s/p broad-spectrum antibiotics. Started on Synthroid for possible myxedema coma. CT spine demonstrated fx of anterior and posterior tubercle of left C6 transverse foramen. No surgical intervention, as per NSGY. Downgraded to SDU then telemetry s/p extubation. Per radiology, patient cannot receive C-spine MRI for spinal stenosis at this time, given his spinal stimulator.     Plans:  # Left C6 transverse foraminal fracture  - CT spine demonstrated fx of anterior and posterior tubercle of left C6 transverse foramen.   - Seen by neurosurgery.  - Per resident chart note : "Radiology spoke to [employer], the spinal stimulator is only compatible with lower magnetic setting, the machine in house with those capability Is under repair and the estimated time is 6-8 weeks. So for now pt cannot get the MRI with the machine that we have."    # COVID-19 exposure -> infection  - Patient contracted COVID from roommate.  - Patient is vaccinated and received 1 booster.  - Currently asymptomatic.  - Continue airborne isolation.    # Lower extremity cellulitis  # Toxic metabolic encephalopathy  # Chronic b/l lower extremity cellulitis  # Left ankle ulcer  - Seen by burn and podiatry.  - LE arterial duplex WNL on 11/10.  - CT lower extremity on  showin) Bilateral lower extremity swelling and marked skin thickening, left  greater than right, compatible with cellulitis. Multiple rounded locules of gas are also seen tracking within the soft  tissues along the predominantly posterior calf and anterolateral/dorsal ankle and foot likely related to known multiple abrasions, can not completely exclude early necrotizing fasciitis.  - Seen by Burn.   - Continue wound care with Silvadene.    # Septic shock  # Acute hypoxic respiratory failure.  - Originally admitted to MICU and intubated.  - s/p broad-spectrum antibiotics.  - s/p midodrine.  - Downgraded to SDU then telemetry s/p extubation.    # Crohn's disease  # Diarrhea  - C. diff PCR negative.   - GI PCR negative.  - Rectal tube placed in ICU for stage 2 sacral ulcer.    # Hypothyroidism  - TSH 7.92 on admission -> 7.12.  - Continue synthroid 25mcg PO QD.    # Anxiety  # Cachexia  - Seen by Psychiatry.  - Continue Zoloft 50mg PO QD.  - Continue Xanax 0.25mg BID.  - Per psychiatry, no contraindications for discharge, but needs outpatient psychiatry/anorexia follow-up.  - Continue Pureed diet with mildly thick liquids.     # Rheumatoid arthritis  - f/u as outpatient with rheumatology.    # Diet  - Pureed with mildly thick liquids.  - Lacto-Ovo veg diet.    # DVT PPX  - Lovenox 30mg subQ.    # Activity  - IAT.    # Dispo  - Acute, pending MRI cervical spine.  - From home.     ASSESSMENT: 66 y/o female with PMHx of anorexia, anxiety, depression, renal cysts, peripheral neuropathy, RA, OA, osteoporosis, low back pain, currently admitted after an unwitnessed fall with head strike and LOC. Originally admitted to MICU for possible septic shock and intubated for airway protection. Hypoglycemic (FS 24) on admission, s/p dextrose. s/p IVF and levophed. s/p broad-spectrum antibiotics. Started on Synthroid for possible myxedema coma. CT spine demonstrated fx of anterior and posterior tubercle of left C6 transverse foramen. No surgical intervention, as per NSGY. Downgraded to SDU then telemetry s/p extubation. Per radiology, patient cannot receive C-spine MRI for spinal stenosis at this time, given his spinal stimulator.     Plans:  # Left C6 transverse foraminal fracture  - CT spine demonstrated fx of anterior and posterior tubercle of left C6 transverse foramen.   - Seen by neurosurgery.  - Per resident chart note : "Radiology spoke to [employer], the spinal stimulator is only compatible with lower magnetic setting, the machine in house with those capability Is under repair and the estimated time is 6-8 weeks. So for now pt cannot get the MRI with the machine that we have."    # COVID-19 exposure -> infection  - Patient contracted COVID from roommate.  - Patient is vaccinated and received 1 booster.  - Currently asymptomatic.  - Continue airborne isolation.    # Lower extremity cellulitis  # Toxic metabolic encephalopathy  # Chronic b/l lower extremity cellulitis  # Left ankle ulcer  - Seen by burn and podiatry.  - LE arterial duplex WNL on 11/10.  - CT lower extremity on  showin) Bilateral lower extremity swelling and marked skin thickening, left  greater than right, compatible with cellulitis. Multiple rounded locules of gas are also seen tracking within the soft  tissues along the predominantly posterior calf and anterolateral/dorsal ankle and foot likely related to known multiple abrasions, can not completely exclude early necrotizing fasciitis.  - Seen by Burn.   - Continue wound care with Silvadene.    # Septic shock  # Acute hypoxic respiratory failure.  - Originally admitted to MICU and intubated.  - s/p broad-spectrum antibiotics.  - s/p midodrine.  - Downgraded to SDU then telemetry s/p extubation.    # Crohn's disease  # Diarrhea  - C. diff PCR negative.   - GI PCR negative.  - Rectal tube placed in ICU for stage 2 sacral ulcer.    # Hypothyroidism  - TSH 7.92 on admission -> 7.12.  - Continue synthroid 25mcg PO QD.    # Anxiety  # Cachexia  - Seen by Psychiatry.  - Continue Zoloft 50mg PO QD.  - Continue Xanax 0.25mg BID.  - Per psychiatry, no contraindications for discharge, but needs outpatient psychiatry/anorexia follow-up.  - Continue Pureed diet with mildly thick liquids.     # Rheumatoid arthritis  - f/u as outpatient with rheumatology.    # Diet  - Diet advanced to soft and bite-sized with 1:1 feed and aspiration precautions, as per speech and swallow.    # DVT PPX  - Lovenox 30mg subQ.    # Activity  - IAT.    # Dispo  - Patient wishes for rehab, per [facility] note .  - From home.     ASSESSMENT: 66 y/o female with PMHx of anorexia, anxiety, depression, renal cysts, peripheral neuropathy, RA, OA, osteoporosis, low back pain, currently admitted after an unwitnessed fall with head strike and LOC. Originally admitted to MICU for possible septic shock and intubated for airway protection. Hypoglycemic (FS 24) on admission, s/p dextrose. s/p IVF and levophed. s/p broad-spectrum antibiotics. Started on Synthroid for possible myxedema coma. CT spine demonstrated fx of anterior and posterior tubercle of left C6 transverse foramen. No surgical intervention, as per NSGY. Downgraded to SDU then telemetry s/p extubation. Per radiology, patient cannot receive C-spine MRI for spinal stenosis at this time, given his spinal stimulator.     Plans:  # Left C6 transverse foraminal fracture  - CT spine demonstrated fx of anterior and posterior tubercle of left C6 transverse foramen.   - Seen by neurosurgery.  - Per resident chart note : "Radiology spoke to [employer], the spinal stimulator is only compatible with lower magnetic setting, the machine in house with those capability Is under repair and the estimated time is 6-8 weeks. So for now pt cannot get the MRI with the machine that we have."    # COVID-19 exposure -> infection  - Patient contracted COVID from roommate.  - Patient is vaccinated and received 1 booster.  - Currently asymptomatic.  - Continue airborne isolation.    # Lower extremity cellulitis  # Toxic metabolic encephalopathy  # Chronic b/l lower extremity cellulitis  # Left ankle ulcer  - Seen by burn and podiatry.  - LE arterial duplex WNL on 11/10.  - CT lower extremity on  showin) Bilateral lower extremity swelling and marked skin thickening, left  greater than right, compatible with cellulitis. Multiple rounded locules of gas are also seen tracking within the soft  tissues along the predominantly posterior calf and anterolateral/dorsal ankle and foot likely related to known multiple abrasions, can not completely exclude early necrotizing fasciitis.  - Seen by Burn.   - Continue wound care with Silvadene.    # Septic shock  # Acute hypoxic respiratory failure.  - Originally admitted to MICU and intubated.  - s/p broad-spectrum antibiotics.  - s/p midodrine.  - Downgraded to SDU then telemetry s/p extubation.    # Crohn's disease  # Diarrhea  - C. diff PCR negative.   - GI PCR negative.  - Rectal tube placed in ICU for stage 2 sacral ulcer.    # Hypothyroidism  - TSH 7.92 on admission -> 7.12.  - Continue synthroid 25mcg PO QD.    # Anxiety  # Cachexia  - Seen by Psychiatry.  - Continue Zoloft 50mg PO QD.  - Continue Xanax 0.25mg BID.  - Per psychiatry, no contraindications for discharge, but needs outpatient psychiatry/anorexia follow-up.  - Continue Pureed diet with mildly thick liquids.     # Rheumatoid arthritis  - f/u as outpatient with rheumatology.    # Dysphagia  # Patulous esophagus and poor bolus flow through proximal esophagus on imaging  # Diet  - Diet advanced to soft and bite-sized with 1:1 feed and aspiration precautions, as per speech and swallow.  - Per speech and swallow assessment 12/15, patulous esophagus and poor bolus flow through proximal esophagus on MBS.  - f/u GI consult (recommended by speech and swallow).    # DVT PPX  - Lovenox 30mg subQ.    # Activity  - IAT.    # Dispo  - Patient wishes for rehab, per [facility] note .  - From home.     ASSESSMENT: 68 y/o female with PMHx of anorexia, anxiety, depression, renal cysts, peripheral neuropathy, RA, OA, osteoporosis, low back pain, currently admitted after an unwitnessed fall with head strike and LOC. Originally admitted to MICU for possible septic shock and intubated for airway protection. Hypoglycemic (FS 24) on admission, s/p dextrose. s/p IVF and levophed. s/p broad-spectrum antibiotics. Started on Synthroid for possible myxedema coma. CT spine demonstrated fx of anterior and posterior tubercle of left C6 transverse foramen. No surgical intervention, as per NSGY. Downgraded to SDU then telemetry s/p extubation. Per radiology, patient cannot receive C-spine MRI for spinal stenosis at this time, given his spinal stimulator.     # Dysphagia, malnutrition and anorexia   # Patulous esophagus and poor bolus flow through proximal esophagus on imaging  - Diet advanced to soft and bite-sized with 1:1 feed and aspiration precautions, as per speech and swallow.  - Per speech and swallow assessment 12/15, patulous esophagus and poor bolus flow through proximal esophagus on FEES  - refused EGD GI consult   - consider barium esophagogram although doubt patient is fit   - nutrition team f/u       # Left C6 transverse foraminal fracture  - CT spine demonstrated fx of anterior and posterior tubercle of left C6 transverse foramen.   - NSGY consulted, but no surgical intervention offered  - cervical collar in place  - Per resident chart note : "Radiology spoke to [email or web address], the spinal stimulator is only compatible with lower magnetic setting, the machine in house with those capability Is under repair and the estimated time is 6-8 weeks. So for now pt cannot get the MRI with the machine that we have."    # COVID-19 exposure -> infection  - Patient contracted COVID from roommate.  - Patient is vaccinated and received 1 booster.  - Currently asymptomatic and stable on RA  - Continue airborne isolation.    # Lower extremity cellulitis  # Toxic metabolic encephalopathy resolved   # Chronic b/l lower extremity cellulitis  # Left ankle ulcer  - Seen by burn and podiatry.  - LE arterial duplex WNL on 11/10.  - CT lower extremity on  showin) Bilateral lower extremity swelling and marked skin thickening, left  greater than right, compatible with cellulitis. Multiple rounded locules of gas are also seen tracking within the soft  tissues along the predominantly posterior calf and anterolateral/dorsal ankle and foot likely related to known multiple abrasions, can not completely exclude early necrotizing fasciitis.  - completed abx course, Seen by Burn.   - Continue wound care with Silvadene.    #Acute Hypoxic respiratory failure due to Septic shock.   -Resolved, s/p intubation in the ED , extubated      # Crohn's disease  # Diarrhea  - C. diff PCR negative.   - GI PCR negative.  - Rectal tube placed in ICU for stage 2 sacral ulcer.    # Hypothyroidism  - TSH 7.92 on admission -> 7.12.  - Continue synthroid 25mcg PO QD.    # Anxiety  # Cachexia  - Seen by Psychiatry.  - Continue Zoloft 50mg PO QD.  - Continue Xanax 0.25mg BID.  - Per psychiatry, no contraindications for discharge, but needs outpatient psychiatry/anorexia follow-up.      # Rheumatoid arthritis  - f/u as outpatient with rheumatology.      # DVT PPX  - Lovenox 30mg subQ.    # Dispo  - to rehab

## 2022-12-15 NOTE — SWALLOW VFSS/MBS ASSESSMENT ADULT - RECOMMENDED FEEDING/EATING TECHNIQUES
liquids via tsp sip, intermittent throat clear and dry swallow/allow for swallow between intakes/alternate food with liquid/crush medication (when feasible)/maintain upright posture during/after eating for 30 mins/no straws/oral hygiene/position upright (90 degrees)/small sips/bites

## 2022-12-15 NOTE — SWALLOW VFSS/MBS ASSESSMENT ADULT - COMMENTS
Pt underwent FEES study on 12/7 w/ recs for NPO w/ recs for puree, mildly thick liquids, pt has been frustrated w/ current diet recs, limiting overall PO intake and nutritional status.

## 2022-12-15 NOTE — SWALLOW VFSS/MBS ASSESSMENT ADULT - UNSUCCESSFUL STRATEGIES TRIALED DURING PROCEDURE
volitional bolus hold, 5cc medicine cup; further strategies unable to be implemented 2' pt requiring cervical neck collar

## 2022-12-15 NOTE — PROGRESS NOTE ADULT - SUBJECTIVE AND OBJECTIVE BOX
Gastroenterology progress note:     Patient is a 67y old  Female who presents with a chief complaint of AMS/Weakness (07 Dec 2022 10:30)       Admitted on: 11-27-22  67F w/ h/o anorexia, anxiety, depression, kidney cysts, peripheral neuropathy, RA, OA, osteoporosis, low back pain, Crohn's disease per records (used to follow with dr Loya) was brought in s/p unwitnessed fall and lethargy. admitted to MICU for sepsis and she is s/p pressors, IV antibiotics, s/p intubation and extubation.  she was also found to have fx of anterior and posterior tubercle of left C6 transverse foramen. NSGY consulted, but no surgical intervention offered. patient with collar mask  during her stay she has been exposed to COVID and tested positive 12/10/2022    PAST MEDICAL & SURGICAL HISTORY:  Anxiety   Depression  Osteoporosis  Kidney cysts  Peripheral neuropathy  RA (rheumatoid arthritis)  OA (osteoarthritis)  Low back pain with radiation  s/p &quot;nerve cutting&quot; 2015    MEDICATIONS  (STANDING):  ALPRAZolam      ALPRAZolam 0.25 milliGRAM(s) Oral two times a day  chlorhexidine 2% Cloths 1 Application(s) Topical <User Schedule>  enoxaparin Injectable 30 milliGRAM(s) SubCutaneous every 24 hours  levothyroxine 50 MICROGram(s) Oral daily  melatonin 5 milliGRAM(s) Oral at bedtime  morphine  - Injectable 2 milliGRAM(s) IV Push once  multivitamin/minerals 1 Tablet(s) Oral daily  potassium chloride   Powder 20 milliEquivalent(s) Oral two times a day  sertraline 50 milliGRAM(s) Oral daily  silver sulfADIAZINE 1% Cream 1 Application(s) Topical two times a day  sodium chloride 0.9%. 1000 milliLiter(s) (50 mL/Hr) IV Continuous <Continuous>    MEDICATIONS  (PRN):  acetaminophen     Tablet .. 650 milliGRAM(s) Oral every 6 hours PRN Mild Pain (1 - 3), Moderate Pain (4 - 6)  calcium carbonate    500 mG (Tums) Chewable 1 Tablet(s) Chew two times a day PRN Heartburn  oxycodone    5 mG/acetaminophen 325 mG 1 Tablet(s) Oral every 6 hours PRN Severe Pain (7 - 10)      Allergies  No Known Allergies      Review of Systems:   General: no fever  HEENT: no hemoptysis  Cardiovascular:  No Chest Pain, No Palpitations  Respiratory:  No Cough, No Dyspnea  Gastrointestinal:  As described in HPI  Hematology: no bruising or hematoma   Neurology: no new motor deficit  Skin: no new rash    Physical Examination:  T(C): 36.7 (12-15-22 @ 13:20), Max: 37.1 (12-15-22 @ 05:24)  HR: 71 (12-15-22 @ 13:20) (71 - 80)  BP: 101/55 (12-15-22 @ 13:20) (100/51 - 101/55)  RR: 17 (12-15-22 @ 13:20) (17 - 18)  SpO2: --  Weight (kg): 43.6 (12-15-22 @ 05:24)    Constitutional: No acute distress.  Head: normocephalic  Neck: supple   Eyes: EOMI  Respiratory:  No signs of respiratory distress. Lung sounds are clear bilaterally.  Cardiovascular:  S1 S2, Regular rate and rhythm.  Abdominal: Abdomen is soft, symmetric, and non-tender without distention. There are no visible lesions or scars. Bowel sounds are present and normoactive in all four quadrants. No masses, hepatomegaly, or splenomegaly are noted.   Extremities: no pitting edema  Neurology: alert oriented *3, no asterixis   Skin: No rashes, No Jaundice.      Data: (reviewed by attending)                        10.2   4.02  )-----------( 161      ( 15 Dec 2022 06:59 )             28.5     Hgb trend:  10.2  12-15-22 @ 06:59  9.2  12-14-22 @ 07:37  11.5  12-13-22 @ 05:57        12-15    130<L>  |  94<L>  |  9<L>  ----------------------------<  66<L>  4.2   |  25  |  <0.5<L>    Ca    7.7<L>      15 Dec 2022 06:59  Phos  4.1     12-15  Mg     2.1     12-15    TPro  4.5<L>  /  Alb  2.3<L>  /  TBili  <0.2  /  DBili  x   /  AST  29  /  ALT  32  /  AlkPhos  163<H>  12-15    Liver panel trend:  TBili <0.2   /   AST 29   /   ALT 32   /   AlkP 163   /   Tptn 4.5   /   Alb 2.3    /   DBili --      12-15  TBili <0.2   /   AST 20   /   ALT 31   /   AlkP 164   /   Tptn 4.2   /   Alb 2.2    /   DBili --      12-14  TBili 0.2   /   AST 25   /   ALT 40   /   AlkP 180   /   Tptn 4.6   /   Alb 2.4    /   DBili --      12-13  TBili 0.2   /   AST 26   /   ALT 37   /   AlkP 157   /   Tptn 4.2   /   Alb 2.3    /   DBili --      12-12  TBili 0.3   /   AST 25   /   ALT 39   /   AlkP 165   /   Tptn 4.3   /   Alb 2.4    /   DBili --      12-11  TBili 0.3   /   AST 23   /   ALT 39   /   AlkP 168   /   Tptn 4.2   /   Alb 2.4    /   DBili --      12-10  TBili 0.3   /   AST 20   /   ALT 35   /   AlkP 162   /   Tptn 4.1   /   Alb 2.3    /   DBili --      12-09  TBili 0.3   /   AST 27   /   ALT 45   /   AlkP 180   /   Tptn 4.5   /   Alb 2.4    /   DBili --      12-08  TBili 0.4   /   AST 26   /   ALT 43   /   AlkP 184   /   Tptn 4.4   /   Alb 2.5    /   DBili --      12-07  TBili 0.4   /   AST 30   /   ALT 56   /   AlkP 175   /   Tptn 4.5   /   Alb 2.6    /   DBili --      12-06             Radiology: (reviewed by attending)       Gastroenterology progress note:     Patient is a 67y old  Female who presents with a chief complaint of AMS/Weakness (07 Dec 2022 10:30)       Admitted on: 11-27-22  67F w/ h/o anorexia, anxiety, depression, kidney cysts, peripheral neuropathy, RA, OA, osteoporosis, low back pain, Crohn's disease per records (used to follow with dr Loya) was brought in s/p unwitnessed fall and lethargy. admitted to MICU for sepsis and she is s/p pressors, IV antibiotics, s/p intubation and extubation.  she was also found to have fx of anterior and posterior tubercle of left C6 transverse foramen. NSGY consulted, but no surgical intervention offered. patient with collar mask  during her stay she has been exposed to COVID and tested positive 12/10/2022    after extubation S&S evaluated the patient, s/p FEES showing mild pharyngeal dysphagia and residue noted in proximal esophagus below the level of the UES, patulous esophagus    at bedside patient denies hx of Crohn's disease. she denies dysphagia, reports she is able and willing to eat solid. denies cough, odynophagia. patient looked cachectic but reports she has good appetite and wants to eat to get stronger     PAST MEDICAL & SURGICAL HISTORY:  Anxiety   Depression  Osteoporosis  Kidney cysts  Peripheral neuropathy  RA (rheumatoid arthritis)  OA (osteoarthritis)  Low back pain with radiation  s/p &quot;nerve cutting&quot; 2015  hx of colostomy, reversed     MEDICATIONS  (STANDING):  ALPRAZolam      ALPRAZolam 0.25 milliGRAM(s) Oral two times a day  chlorhexidine 2% Cloths 1 Application(s) Topical <User Schedule>  enoxaparin Injectable 30 milliGRAM(s) SubCutaneous every 24 hours  levothyroxine 50 MICROGram(s) Oral daily  melatonin 5 milliGRAM(s) Oral at bedtime  morphine  - Injectable 2 milliGRAM(s) IV Push once  multivitamin/minerals 1 Tablet(s) Oral daily  potassium chloride   Powder 20 milliEquivalent(s) Oral two times a day  sertraline 50 milliGRAM(s) Oral daily  silver sulfADIAZINE 1% Cream 1 Application(s) Topical two times a day  sodium chloride 0.9%. 1000 milliLiter(s) (50 mL/Hr) IV Continuous <Continuous>    MEDICATIONS  (PRN):  acetaminophen     Tablet .. 650 milliGRAM(s) Oral every 6 hours PRN Mild Pain (1 - 3), Moderate Pain (4 - 6)  calcium carbonate    500 mG (Tums) Chewable 1 Tablet(s) Chew two times a day PRN Heartburn  oxycodone    5 mG/acetaminophen 325 mG 1 Tablet(s) Oral every 6 hours PRN Severe Pain (7 - 10)      Allergies  No Known Allergies      Review of Systems:   General: no fever  HEENT: no hemoptysis  Cardiovascular:  No Chest Pain, No Palpitations  Respiratory:  No Cough, No Dyspnea  Gastrointestinal:  As described in HPI  Hematology: no bruising or hematoma   Neurology: no new motor deficit  Skin: no new rash    Physical Examination:  T(C): 36.7 (12-15-22 @ 13:20), Max: 37.1 (12-15-22 @ 05:24)  HR: 71 (12-15-22 @ 13:20) (71 - 80)  BP: 101/55 (12-15-22 @ 13:20) (100/51 - 101/55)  RR: 17 (12-15-22 @ 13:20) (17 - 18)  SpO2: --  Weight (kg): 43.6 (12-15-22 @ 05:24)    Constitutional: No acute distress. cachectic  Head: normocephalic  Neck: supple   Eyes: EOMI  Respiratory:  No signs of respiratory distress. Lung sounds are clear bilaterally.  Cardiovascular:  S1 S2, Regular rate and rhythm.  Abdominal: Abdomen is soft, symmetric, and non-tender without distention. scars noted  Extremities: no pitting edema  Neurology: alert oriented *3, no asterixis   Skin: No rashes, No Jaundice.      Data: (reviewed by attending)                        10.2   4.02  )-----------( 161      ( 15 Dec 2022 06:59 )             28.5     Hgb trend:  10.2  12-15-22 @ 06:59  9.2  12-14-22 @ 07:37  11.5  12-13-22 @ 05:57        12-15    130<L>  |  94<L>  |  9<L>  ----------------------------<  66<L>  4.2   |  25  |  <0.5<L>    Ca    7.7<L>      15 Dec 2022 06:59  Phos  4.1     12-15  Mg     2.1     12-15    TPro  4.5<L>  /  Alb  2.3<L>  /  TBili  <0.2  /  DBili  x   /  AST  29  /  ALT  32  /  AlkPhos  163<H>  12-15    Liver panel trend:  TBili <0.2   /   AST 29   /   ALT 32   /   AlkP 163   /   Tptn 4.5   /   Alb 2.3    /   DBili --      12-15  TBili <0.2   /   AST 20   /   ALT 31   /   AlkP 164   /   Tptn 4.2   /   Alb 2.2    /   DBili --      12-14  TBili 0.2   /   AST 25   /   ALT 40   /   AlkP 180   /   Tptn 4.6   /   Alb 2.4    /   DBili --      12-13  TBili 0.2   /   AST 26   /   ALT 37   /   AlkP 157   /   Tptn 4.2   /   Alb 2.3    /   DBili --      12-12     Radiology: (reviewed by attending)  `< from: US Abdomen Upper Quadrant Right (11.27.22 @ 19:07) >  FINDINGS:  Liver is normal in size and contains several small cysts. There is   intrahepatic bile duct dilatation.  The gallbladder is distended and there is mild gallbladder wall edema. No   gallstones or pericholecystic fluid is seen.  The common bile duct is dilated measuring 1.4 cm. The etiology of this   dilatation is not apparent on this exam.  The pancreatic duct is mildly dilated (otherwise unremarkable.  The right kidney is echogenic with no hydronephrosis seen.  There is no ascites.  There is a right pleural effusion.    IMPRESSION: Distended gallbladder. No gallstones or inflammatory changes   seen.  Common bile duct and intrahepatic bile duct dilatation etiology not   apparent on this exam.    < end of copied text >    `< from: CT Abdomen and Pelvis w/ IV Cont (11.27.22 @ 04:24) >  IMPRESSION:    No CT evidence for acute traumatic injury to the chest, abdomen or pelvis.    No short interval change to pulmonary findings which may be   infectious/inflammatory and for which follow-up chest CT is again   recommended.    The urinary bladder is moderately distended despite Hughes catheter   balloon appearing within the bladder lumen.    Moderate colonic stool burden.    Additional incidental findings as above.    --- End of Report ---    < end of copied text >

## 2022-12-15 NOTE — SWALLOW VFSS/MBS ASSESSMENT ADULT - DEMONSTRATES NEED FOR REFERRAL TO ANOTHER SERVICE
2' patulous esophagus and poor bolus flow through proximal esophagus/GI Outpatient GI f/u 2' patulous esophagus and poor bolus flow through proximal esophagus/GI

## 2022-12-15 NOTE — PROGRESS NOTE ADULT - ASSESSMENT
67F w/ h/o anorexia, anxiety, depression, kidney cysts, peripheral neuropathy, RA, OA, osteoporosis, low back pain, Crohn's disease per records (used to follow with dr Loya) was brought in s/p unwitnessed fall and lethargy. admitted to MICU for sepsis and she is s/p pressors, IV antibiotics, s/p intubation and extubation.  she was also found to have fx of anterior and posterior tubercle of left C6 transverse foramen. NSGY consulted, but no surgical intervention offered. patient with collar mask  during her stay she has been exposed to COVID and tested positive 12/10/2022    *Mild pharyngeal dysphagia and residue noted in proximal esophagus below the level of the UES, patulous esophagus on FEES  -patient denies dysphagia / odynophagia   -anterior and posterior tubercle of left C6 transverse foramen with collar mask  -discussed findings with S&S, patient with mild pharyngeal dysphagia and possibly has esophageal dysphagia but she is in denial   -patient refused upper endoscopy     rec  -can consider barium esophagogram although doubt patient is fit (not sure if she can stand)  -diet per S&S  -nutrition follow up, consider adding supplements     *Crohn's disease  -denies abdominal pain, diarrhea, nausea or vomiting   -rec OP follow up for repeat colonoscopy     *CBD dilation on US 11/27  -patient with spinal stimulator, last MRI 2020 but limited because artifact (there was no biliary dilation)  -she was counseled about need for EUS for diagnosis as OP after neurosurgery clear her     -call as needed, [phone] during weekdays till 5pm and call GI service after 5pm and on weekends [phone]  -Follow up with our [facility] located at [address] [place] [address]. Phone Number: [phone]

## 2022-12-15 NOTE — SWALLOW VFSS/MBS ASSESSMENT ADULT - ORAL PHASE
Residue in oral cavity Delayed oral transit time/Reduced anterior - posterior transport/Residue in oral cavity/Incomplete tongue to palate contact/Uncontrolled bolus / spillover in ching-pharynx/Uncontrolled bolus / spillover in hypopharynx/Laryngeal penetration before swallow - silent

## 2022-12-15 NOTE — SWALLOW VFSS/MBS ASSESSMENT ADULT - SUCCESSFUL STRATEGIES TRIALED DURING PROCEDURE
tsp sips of thin liquids, intermittent throat clear, dry swallow liquid wash assists in clearing only some esophageal residue. Pt would benefit from GI w/u

## 2022-12-15 NOTE — SWALLOW VFSS/MBS ASSESSMENT ADULT - SLP PERTINENT HISTORY OF CURRENT PROBLEM
pt is a 66 y/o F w/ PMHx: anorexia, anxiety, depression, kidney cysts, peripheral neuropathy, RA, OA, osteoporosis, low back pain s/p unwitnessed fall, found down by  +HT, ?LOC, -AC. Of note, pt has long-standing h/o anorexia for several years per . Does not induce vomiting, but frequently uses laxatives for weight loss. Has never consistently followed w/ psychologist/psychiatrist. After recent discharge from hospital two weeks ago, anorexia acutely worsened and pt has had minimal caloric intake. On arrival to ED, pt unresponsive, hypotensive, bradycardic, and hypothermic. Pt intubated for airway protection. CT spine demonstrated fx of anterior and posterior tubercle of left C6 transverse foramen. NSGY consulted, but no surgical intervention offered. Subsequently admitted to MICU for presumed septic shock; s/p extubation 11/29. CXR 12/7-> New bibasilar opacities/effusions. Pt COVID+

## 2022-12-15 NOTE — PROGRESS NOTE ADULT - SUBJECTIVE AND OBJECTIVE BOX
ANA LILIA VERMA 67y Female  MRN#: [id number]     Hospital Day: 18d    SUMMARY: 66 y/o female with PMHx of anorexia, anxiety, depression, renal cysts, peripheral neuropathy, RA, OA, osteoporosis, low back pain, currently admitted after an unwitnessed fall with head strike and LOC. Originally admitted to MICU for possible septic shock and intubated for airway protection. Hypoglycemic (FS 24) on admission, s/p dextrose. s/p IVF and levophed. s/p broad-spectrum antibiotics. Started on Synthroid for possible myxedema coma. CT spine demonstrated fx of anterior and posterior tubercle of left C6 transverse foramen. No surgical intervention, as per NSGY. Downgraded to SDU then telemetry s/p extubation. Per radiology, patient cannot receive C-spine MRI for spinal stenosis at this time, given his spinal stimulator.     SUBJECTIVE  No reported acute overnight events. Resting in bed this morning.                                            ----------------------------------------------------------  OBJECTIVE  PAST MEDICAL & SURGICAL HISTORY  Anxiety    Depression    Osteoporosis    Kidney cysts    Peripheral neuropathy    RA (rheumatoid arthritis)    OA (osteoarthritis)    Low back pain with radiation  s/p &quot;nerve cutting&quot; 2015                                              -----------------------------------------------------------  ALLERGIES:  No Known Allergies                                            ------------------------------------------------------------    HOME MEDICATIONS  Home Medications:  ALPRAZolam 0.25 mg oral tablet: 1 tab(s) orally 2 times a day (29 Nov 2022 11:18)  Cardizem  mg/24 hours oral capsule, extended release: 1 cap(s) orally once a day (29 Nov 2022 11:18)  cilostazol 50 mg oral tablet: 1 tab(s) orally 2 times a day (29 Nov 2022 11:18)  gabapentin 600 mg oral tablet: 1 tab(s) orally 3 times a day (29 Nov 2022 11:18)  hydroCHLOROthiazide 25 mg oral tablet: 1 tab(s) orally 3 times a week (29 Nov 2022 11:18)  HYDROcodone-acetaminophen 10 mg-325 mg oral tablet: 1 tab(s) orally every 6 hours, As Needed - for severe pain (29 Nov 2022 11:18)  rosuvastatin 10 mg oral tablet: 1 tab(s) orally once a day (at bedtime) (29 Nov 2022 11:18)  sertraline 50 mg oral tablet: 1 tab(s) orally once a day (29 Nov 2022 11:18)                           MEDICATIONS:  STANDING MEDICATIONS  ALPRAZolam      ALPRAZolam 0.25 milliGRAM(s) Oral two times a day  chlorhexidine 2% Cloths 1 Application(s) Topical <User Schedule>  enoxaparin Injectable 30 milliGRAM(s) SubCutaneous every 24 hours  levothyroxine 50 MICROGram(s) Oral daily  melatonin 5 milliGRAM(s) Oral at bedtime  morphine  - Injectable 2 milliGRAM(s) IV Push once  multivitamin/minerals 1 Tablet(s) Oral daily  potassium chloride   Powder 20 milliEquivalent(s) Oral two times a day  sertraline 50 milliGRAM(s) Oral daily  silver sulfADIAZINE 1% Cream 1 Application(s) Topical two times a day  sodium chloride 0.9%. 1000 milliLiter(s) IV Continuous <Continuous>    PRN MEDICATIONS  acetaminophen     Tablet .. 650 milliGRAM(s) Oral every 6 hours PRN  calcium carbonate    500 mG (Tums) Chewable 1 Tablet(s) Chew two times a day PRN  oxycodone    5 mG/acetaminophen 325 mG 1 Tablet(s) Oral every 6 hours PRN                                            ------------------------------------------------------------  VITAL SIGNS: Last 24 Hours  T(C): 37.1 (15 Dec 2022 05:24), Max: 37.1 (15 Dec 2022 05:24)  T(F): 98.7 (15 Dec 2022 05:24), Max: 98.7 (15 Dec 2022 05:24)  HR: 76 (15 Dec 2022 05:24) (73 - 80)  BP: 101/53 (15 Dec 2022 05:24) (100/51 - 102/54)  BP(mean): --  RR: 18 (15 Dec 2022 05:24) (17 - 18)  SpO2: --      12-14-22 @ 07:01  -  12-15-22 @ 07:00  --------------------------------------------------------  IN: 659 mL / OUT: 500 mL / NET: 159 mL                                             --------------------------------------------------------------  LABS:                        10.2   4.02  )-----------( 161      ( 15 Dec 2022 06:59 )             28.5     12-15    130<L>  |  94<L>  |  9<L>  ----------------------------<  66<L>  4.2   |  25  |  <0.5<L>    Ca    7.7<L>      15 Dec 2022 06:59  Phos  4.1     12-15  Mg     2.1     12-15    TPro  4.5<L>  /  Alb  2.3<L>  /  TBili  <0.2  /  DBili  x   /  AST  29  /  ALT  32  /  AlkPhos  163<H>  12-15                                                              -------------------------------------------------------------  RADIOLOGY:                                            --------------------------------------------------------------    PHYSICAL EXAM:  General:   HEENT:  LUNGS:  HEART:  ABDOMEN:  EXT:  NEURO:  SKIN:                                           --------------------------------------------------------------         ANA LILIA VERMA 67y Female  MRN#: [id number]     Hospital Day: 18d    SUMMARY: 66 y/o female with PMHx of anorexia, anxiety, depression, renal cysts, peripheral neuropathy, RA, OA, osteoporosis, low back pain, currently admitted after an unwitnessed fall with head strike and LOC. Originally admitted to MICU for possible septic shock and intubated for airway protection. Hypoglycemic (FS 24) on admission, s/p dextrose. s/p IVF and levophed. s/p broad-spectrum antibiotics. Started on Synthroid for possible myxedema coma. CT spine demonstrated fx of anterior and posterior tubercle of left C6 transverse foramen. No surgical intervention, as per NSGY. Downgraded to SDU then telemetry s/p extubation. Per radiology, patient cannot receive C-spine MRI for spinal stenosis at this time, given his spinal stimulator.     SUBJECTIVE  No reported acute overnight events. Resting in bed this morning.                                            ----------------------------------------------------------  OBJECTIVE  PAST MEDICAL & SURGICAL HISTORY  Anxiety    Depression    Osteoporosis    Kidney cysts    Peripheral neuropathy    RA (rheumatoid arthritis)    OA (osteoarthritis)    Low back pain with radiation  s/p &quot;nerve cutting&quot; 2015                                              -----------------------------------------------------------  ALLERGIES:  No Known Allergies                                            ------------------------------------------------------------    HOME MEDICATIONS  Home Medications:  ALPRAZolam 0.25 mg oral tablet: 1 tab(s) orally 2 times a day (29 Nov 2022 11:18)  Cardizem  mg/24 hours oral capsule, extended release: 1 cap(s) orally once a day (29 Nov 2022 11:18)  cilostazol 50 mg oral tablet: 1 tab(s) orally 2 times a day (29 Nov 2022 11:18)  gabapentin 600 mg oral tablet: 1 tab(s) orally 3 times a day (29 Nov 2022 11:18)  hydroCHLOROthiazide 25 mg oral tablet: 1 tab(s) orally 3 times a week (29 Nov 2022 11:18)  HYDROcodone-acetaminophen 10 mg-325 mg oral tablet: 1 tab(s) orally every 6 hours, As Needed - for severe pain (29 Nov 2022 11:18)  rosuvastatin 10 mg oral tablet: 1 tab(s) orally once a day (at bedtime) (29 Nov 2022 11:18)  sertraline 50 mg oral tablet: 1 tab(s) orally once a day (29 Nov 2022 11:18)                           MEDICATIONS:  STANDING MEDICATIONS  ALPRAZolam      ALPRAZolam 0.25 milliGRAM(s) Oral two times a day  chlorhexidine 2% Cloths 1 Application(s) Topical <User Schedule>  enoxaparin Injectable 30 milliGRAM(s) SubCutaneous every 24 hours  levothyroxine 50 MICROGram(s) Oral daily  melatonin 5 milliGRAM(s) Oral at bedtime  morphine  - Injectable 2 milliGRAM(s) IV Push once  multivitamin/minerals 1 Tablet(s) Oral daily  potassium chloride   Powder 20 milliEquivalent(s) Oral two times a day  sertraline 50 milliGRAM(s) Oral daily  silver sulfADIAZINE 1% Cream 1 Application(s) Topical two times a day  sodium chloride 0.9%. 1000 milliLiter(s) IV Continuous <Continuous>    PRN MEDICATIONS  acetaminophen     Tablet .. 650 milliGRAM(s) Oral every 6 hours PRN  calcium carbonate    500 mG (Tums) Chewable 1 Tablet(s) Chew two times a day PRN  oxycodone    5 mG/acetaminophen 325 mG 1 Tablet(s) Oral every 6 hours PRN                                            ------------------------------------------------------------  VITAL SIGNS: Last 24 Hours  T(C): 37.1 (15 Dec 2022 05:24), Max: 37.1 (15 Dec 2022 05:24)  T(F): 98.7 (15 Dec 2022 05:24), Max: 98.7 (15 Dec 2022 05:24)  HR: 76 (15 Dec 2022 05:24) (73 - 80)  BP: 101/53 (15 Dec 2022 05:24) (100/51 - 102/54)  BP(mean): --  RR: 18 (15 Dec 2022 05:24) (17 - 18)  SpO2: --      12-14-22 @ 07:01  -  12-15-22 @ 07:00  --------------------------------------------------------  IN: 659 mL / OUT: 500 mL / NET: 159 mL                                             --------------------------------------------------------------  LABS:                        10.2   4.02  )-----------( 161      ( 15 Dec 2022 06:59 )             28.5     12-15    130<L>  |  94<L>  |  9<L>  ----------------------------<  66<L>  4.2   |  25  |  <0.5<L>    Ca    7.7<L>      15 Dec 2022 06:59  Phos  4.1     12-15  Mg     2.1     12-15    TPro  4.5<L>  /  Alb  2.3<L>  /  TBili  <0.2  /  DBili  x   /  AST  29  /  ALT  32  /  AlkPhos  163<H>  12-15                                                              -------------------------------------------------------------  RADIOLOGY:                                            --------------------------------------------------------------    PHYSICAL EXAM:  CONSTITUTIONAL: Patient appears cachectic.    EYES: PERRLA and symmetric, EOMI, No conjunctival injection or pallor.     ENMT: Moist mucous membranes. No external nasal lesions. No gross hearing impairment noted.  	NECK: Supple, symmetric and without tracheal deviation.    RESPIRATORY: No respiratory distress. No obvious use of accessory muscles. Lungs CTAB with no crackling, wheezing, rhonchi or rubs.    CARDIOVASCULAR: Regular rate and rhythm. Normal S1 and S2. No murmurs, rubs or gallops. Peripheral pulses 2+.    GASTROINTESTINAL: Soft, non-tender to palpation in all quadrants. No palpable masses. No appreciable hernias.    MUSCULOSKELETAL: Dressings present over bilateral lower extremities.    SKIN: No obvious rashes or ulcers.    NEUROLOGIC: No focal deficits noted on exam.    PSYCHIATRIC: A+O x 3.                                           --------------------------------------------------------------         ANA LILIA VERMA 67y Female  MRN#: [id number]     Hospital Day: 18d    SUMMARY: 66 y/o female with PMHx of anorexia, anxiety, depression, renal cysts, peripheral neuropathy, RA, OA, osteoporosis, low back pain, currently admitted after an unwitnessed fall with head strike and LOC. Originally admitted to MICU for possible septic shock and intubated for airway protection. Hypoglycemic (FS 24) on admission, s/p dextrose. s/p IVF and levophed. s/p broad-spectrum antibiotics. Started on Synthroid for possible myxedema coma. CT spine demonstrated fx of anterior and posterior tubercle of left C6 transverse foramen. No surgical intervention, as per NSGY. Downgraded to SDU then telemetry s/p extubation. Per radiology, patient cannot receive C-spine MRI for spinal stenosis at this time, given his spinal stimulator.     SUBJECTIVE  No reported acute overnight events. Resting in bed this morning.                                            ----------------------------------------------------------  OBJECTIVE  PAST MEDICAL & SURGICAL HISTORY  Anxiety    Depression    Osteoporosis    Kidney cysts    Peripheral neuropathy    RA (rheumatoid arthritis)    OA (osteoarthritis)    Low back pain with radiation  s/p &quot;nerve cutting&quot; 2015                                              -----------------------------------------------------------  ALLERGIES:  No Known Allergies                                            ------------------------------------------------------------    HOME MEDICATIONS  Home Medications:  ALPRAZolam 0.25 mg oral tablet: 1 tab(s) orally 2 times a day (29 Nov 2022 11:18)  Cardizem  mg/24 hours oral capsule, extended release: 1 cap(s) orally once a day (29 Nov 2022 11:18)  cilostazol 50 mg oral tablet: 1 tab(s) orally 2 times a day (29 Nov 2022 11:18)  gabapentin 600 mg oral tablet: 1 tab(s) orally 3 times a day (29 Nov 2022 11:18)  hydroCHLOROthiazide 25 mg oral tablet: 1 tab(s) orally 3 times a week (29 Nov 2022 11:18)  HYDROcodone-acetaminophen 10 mg-325 mg oral tablet: 1 tab(s) orally every 6 hours, As Needed - for severe pain (29 Nov 2022 11:18)  rosuvastatin 10 mg oral tablet: 1 tab(s) orally once a day (at bedtime) (29 Nov 2022 11:18)  sertraline 50 mg oral tablet: 1 tab(s) orally once a day (29 Nov 2022 11:18)                           MEDICATIONS:  STANDING MEDICATIONS  ALPRAZolam      ALPRAZolam 0.25 milliGRAM(s) Oral two times a day  chlorhexidine 2% Cloths 1 Application(s) Topical <User Schedule>  enoxaparin Injectable 30 milliGRAM(s) SubCutaneous every 24 hours  levothyroxine 50 MICROGram(s) Oral daily  melatonin 5 milliGRAM(s) Oral at bedtime  morphine  - Injectable 2 milliGRAM(s) IV Push once  multivitamin/minerals 1 Tablet(s) Oral daily  potassium chloride   Powder 20 milliEquivalent(s) Oral two times a day  sertraline 50 milliGRAM(s) Oral daily  silver sulfADIAZINE 1% Cream 1 Application(s) Topical two times a day  sodium chloride 0.9%. 1000 milliLiter(s) IV Continuous <Continuous>    PRN MEDICATIONS  acetaminophen     Tablet .. 650 milliGRAM(s) Oral every 6 hours PRN  calcium carbonate    500 mG (Tums) Chewable 1 Tablet(s) Chew two times a day PRN  oxycodone    5 mG/acetaminophen 325 mG 1 Tablet(s) Oral every 6 hours PRN                                            ------------------------------------------------------------  VITAL SIGNS: Last 24 Hours  T(C): 37.1 (15 Dec 2022 05:24), Max: 37.1 (15 Dec 2022 05:24)  T(F): 98.7 (15 Dec 2022 05:24), Max: 98.7 (15 Dec 2022 05:24)  HR: 76 (15 Dec 2022 05:24) (73 - 80)  BP: 101/53 (15 Dec 2022 05:24) (100/51 - 102/54)  BP(mean): --  RR: 18 (15 Dec 2022 05:24) (17 - 18)  SpO2: --      12-14-22 @ 07:01  -  12-15-22 @ 07:00  --------------------------------------------------------  IN: 659 mL / OUT: 500 mL / NET: 159 mL                                             --------------------------------------------------------------  LABS:                        10.2   4.02  )-----------( 161      ( 15 Dec 2022 06:59 )             28.5     12-15    130<L>  |  94<L>  |  9<L>  ----------------------------<  66<L>  4.2   |  25  |  <0.5<L>    Ca    7.7<L>      15 Dec 2022 06:59  Phos  4.1     12-15  Mg     2.1     12-15    TPro  4.5<L>  /  Alb  2.3<L>  /  TBili  <0.2  /  DBili  x   /  AST  29  /  ALT  32  /  AlkPhos  163<H>  12-15                                                  -------------------------------------------------------------  RADIOLOGY:  ACC: [id number] EXAM: XR SWAL FUNC LUCY VID CON STDY  PROCEDURE DATE: 12/15/2022  FINDINGS/IMPRESSION:  Fluoroscopic assistance was provided by the radiologist to the speech pathologist for a modified barium swallow study. Please refer to the speech pathologist's report for a detailed description of the findings and recommendations  GIN GAVIN MD; Resident Radiologist  This document has been electronically signed.  ADIS WAYNE MD; Attending Radiologist  This document has been electronically signed. Dec 15 2022 10:42AM                                            --------------------------------------------------------------    PHYSICAL EXAM:  CONSTITUTIONAL: Patient appears cachectic.    EYES: PERRLA and symmetric, EOMI, No conjunctival injection or pallor.     ENMT: Moist mucous membranes. No external nasal lesions. No gross hearing impairment noted.  	NECK: Supple, symmetric and without tracheal deviation.    RESPIRATORY: No respiratory distress. No obvious use of accessory muscles. Lungs CTAB with no crackling, wheezing, rhonchi or rubs.    CARDIOVASCULAR: Regular rate and rhythm. Normal S1 and S2. No murmurs, rubs or gallops. Peripheral pulses 2+.    GASTROINTESTINAL: Soft, non-tender to palpation in all quadrants. No palpable masses. No appreciable hernias.    MUSCULOSKELETAL: Dressings present over bilateral lower extremities.    SKIN: No obvious rashes or ulcers.    NEUROLOGIC: No focal deficits noted on exam.    PSYCHIATRIC: A+O x 3.                                           --------------------------------------------------------------         ANA LILIA VERMA 67y Female  MRN#: [id number]     Hospital Day: 18d    SUMMARY: 68 y/o female with PMHx of anorexia, anxiety, depression, renal cysts, peripheral neuropathy, RA, OA, osteoporosis, low back pain, currently admitted after an unwitnessed fall with head strike and LOC. Originally admitted to MICU for possible septic shock and intubated for airway protection. Hypoglycemic (FS 24) on admission, s/p dextrose. s/p IVF and levophed. s/p broad-spectrum antibiotics. Started on Synthroid for possible myxedema coma. CT spine demonstrated fx of anterior and posterior tubercle of left C6 transverse foramen. No surgical intervention, as per NSGY. Downgraded to SDU then telemetry s/p extubation. Per radiology, patient cannot receive C-spine MRI for spinal stenosis at this time, given his spinal stimulator.     SUBJECTIVE  No reported acute overnight events. Resting in bed this morning.    REVIEW OF SYSTEMS:    CONSTITUTIONAL: +ve general weakness, no fevers or chills  EYES/ENT: No visual changes;  No vertigo or throat pain   NECK: No pain or stiffness  RESPIRATORY: No cough, wheezing, hemoptysis; No shortness of breath  CARDIOVASCULAR: No chest pain or palpitations  GASTROINTESTINAL: No abdominal or epigastric pain. No nausea, vomiting, or hematemesis; No diarrhea or constipation. No melena or hematochezia.  GENITOURINARY: No dysuria, frequency or hematuria  NEUROLOGICAL: No numbness or weakness  SKIN: No itching, rashes                                              ----------------------------------------------------------  OBJECTIVE  PAST MEDICAL & SURGICAL HISTORY  Anxiety    Depression    Osteoporosis    Kidney cysts    Peripheral neuropathy    RA (rheumatoid arthritis)    OA (osteoarthritis)    Low back pain with radiation  s/p &quot;nerve cutting&quot; 2015                                              -----------------------------------------------------------  ALLERGIES:  No Known Allergies                                            ------------------------------------------------------------    HOME MEDICATIONS  Home Medications:  ALPRAZolam 0.25 mg oral tablet: 1 tab(s) orally 2 times a day (29 Nov 2022 11:18)  Cardizem  mg/24 hours oral capsule, extended release: 1 cap(s) orally once a day (29 Nov 2022 11:18)  cilostazol 50 mg oral tablet: 1 tab(s) orally 2 times a day (29 Nov 2022 11:18)  gabapentin 600 mg oral tablet: 1 tab(s) orally 3 times a day (29 Nov 2022 11:18)  hydroCHLOROthiazide 25 mg oral tablet: 1 tab(s) orally 3 times a week (29 Nov 2022 11:18)  HYDROcodone-acetaminophen 10 mg-325 mg oral tablet: 1 tab(s) orally every 6 hours, As Needed - for severe pain (29 Nov 2022 11:18)  rosuvastatin 10 mg oral tablet: 1 tab(s) orally once a day (at bedtime) (29 Nov 2022 11:18)  sertraline 50 mg oral tablet: 1 tab(s) orally once a day (29 Nov 2022 11:18)                           MEDICATIONS:  STANDING MEDICATIONS  ALPRAZolam      ALPRAZolam 0.25 milliGRAM(s) Oral two times a day  chlorhexidine 2% Cloths 1 Application(s) Topical <User Schedule>  enoxaparin Injectable 30 milliGRAM(s) SubCutaneous every 24 hours  levothyroxine 50 MICROGram(s) Oral daily  melatonin 5 milliGRAM(s) Oral at bedtime  morphine  - Injectable 2 milliGRAM(s) IV Push once  multivitamin/minerals 1 Tablet(s) Oral daily  potassium chloride   Powder 20 milliEquivalent(s) Oral two times a day  sertraline 50 milliGRAM(s) Oral daily  silver sulfADIAZINE 1% Cream 1 Application(s) Topical two times a day  sodium chloride 0.9%. 1000 milliLiter(s) IV Continuous <Continuous>    PRN MEDICATIONS  acetaminophen     Tablet .. 650 milliGRAM(s) Oral every 6 hours PRN  calcium carbonate    500 mG (Tums) Chewable 1 Tablet(s) Chew two times a day PRN  oxycodone    5 mG/acetaminophen 325 mG 1 Tablet(s) Oral every 6 hours PRN                                            ------------------------------------------------------------  VITAL SIGNS: Last 24 Hours  T(C): 37.1 (15 Dec 2022 05:24), Max: 37.1 (15 Dec 2022 05:24)  T(F): 98.7 (15 Dec 2022 05:24), Max: 98.7 (15 Dec 2022 05:24)  HR: 76 (15 Dec 2022 05:24) (73 - 80)  BP: 101/53 (15 Dec 2022 05:24) (100/51 - 102/54)  BP(mean): --  RR: 18 (15 Dec 2022 05:24) (17 - 18)  SpO2: --      12-14-22 @ 07:01  -  12-15-22 @ 07:00  --------------------------------------------------------  IN: 659 mL / OUT: 500 mL / NET: 159 mL                                             --------------------------------------------------------------  LABS:                        10.2   4.02  )-----------( 161      ( 15 Dec 2022 06:59 )             28.5     12-15    130<L>  |  94<L>  |  9<L>  ----------------------------<  66<L>  4.2   |  25  |  <0.5<L>    Ca    7.7<L>      15 Dec 2022 06:59  Phos  4.1     12-15  Mg     2.1     12-15    TPro  4.5<L>  /  Alb  2.3<L>  /  TBili  <0.2  /  DBili  x   /  AST  29  /  ALT  32  /  AlkPhos  163<H>  12-15                                                  -------------------------------------------------------------  RADIOLOGY:  ACC: [id number] EXAM: XR SWAL FUNC LUCY VID CON STDY  PROCEDURE DATE: 12/15/2022  FINDINGS/IMPRESSION:  Fluoroscopic assistance was provided by the radiologist to the speech pathologist for a modified barium swallow study. Please refer to the speech pathologist's report for a detailed description of the findings and recommendations  GIN GAVIN MD; Resident Radiologist  This document has been electronically signed.  ADIS WAYNE MD; Attending Radiologist  This document has been electronically signed. Dec 15 2022 10:42AM                                            --------------------------------------------------------------    PHYSICAL EXAM:  CONSTITUTIONAL: thin cachectic lady chronically look ill     EYES: PERRLA and symmetric, EOMI, No conjunctival injection or pallor.     ENMT: Moist mucous membranes. No external nasal lesions. No gross hearing impairment noted.  	NECK: Supple, symmetric and without tracheal deviation.  cervical collar in place    RESPIRATORY: No respiratory distress. No obvious use of accessory muscles. Lungs CTAB with no crackling, wheezing, rhonchi or rubs.    CARDIOVASCULAR: Regular rate and rhythm. Normal S1 and S2. No murmurs, rubs or gallops. Peripheral pulses 2+.    GASTROINTESTINAL: Soft, non-tender to palpation in all quadrants. No palpable masses. No appreciable hernias.    MUSCULOSKELETAL: multiple bruises     SKIN: No obvious rashes or ulcers.    NEUROLOGIC: awake, alert and oriented x3, No focal deficits noted on exam.                                               --------------------------------------------------------------      < from: Xray Cinesophagram Swallow Function w/ Contrast (12.15.22 @ 10:45) >  FINDINGS/  IMPRESSION:    Fluoroscopic assistance was provided by the radiologist to the speech   pathologist for amodified barium swallow study. Please refer to the   speech pathologist's report for a detailed description of the findings   and recommendations.    < end of copied text >

## 2022-12-15 NOTE — PROGRESS NOTE ADULT - TIME BILLING
I have personally seen and examined this patient.    I have reviewed all pertinent clinical information and reviewed all relevant imaging and diagnostic studies personally.   I counseled the patient about diagnostic testing and treatment plan. All questions were answered.   I discussed recommendations with the primary team.
Coordination of care

## 2022-12-15 NOTE — SWALLOW VFSS/MBS ASSESSMENT ADULT - DIAGNOSTIC IMPRESSIONS
moderate pharyngeal dysphagia for thin and mildly thick liquids; mild pharyngeal dysphagia for puree and soft and bite sized consistencies

## 2022-12-15 NOTE — SWALLOW VFSS/MBS ASSESSMENT ADULT - ESOPHAGEAL STAGE
increased residue noted in proximal esophagus below the level of the UES; pt w/ increasing pain, unable to tolerate A-P positioning +residue noted in proximal esophagus below the level of the UES

## 2022-12-16 NOTE — PROGRESS NOTE ADULT - ASSESSMENT
66 y/o female with PMHx of anorexia, anxiety, depression, renal cysts, peripheral neuropathy, RA, OA, osteoporosis, low back pain, currently admitted after an unwitnessed fall with head strike and LOC. Originally admitted to MICU for possible septic shock and intubated for airway protection. Hypoglycemic (FS 24) on admission, s/p dextrose. s/p IVF and levophed. s/p broad-spectrum antibiotics. Started on Synthroid for possible myxedema coma. CT spine demonstrated fx of anterior and posterior tubercle of left C6 transverse foramen. No surgical intervention, as per NSGY. Downgraded to SDU then telemetry s/p extubation. Per radiology, patient cannot receive C-spine MRI for spinal stenosis at this time, given his spinal stimulator.   Patient transferred from telemetry to med/surg 12/15    # Dysphagia, malnutrition and anorexia   # Patulous esophagus and poor bolus flow through proximal esophagus on imaging  - Diet advanced to soft and bite-sized with 1:1 feed and aspiration precautions, as per speech and swallow.  - Per speech and swallow assessment 12/15, patulous esophagus and poor bolus flow through proximal esophagus on FEES  - per GI, refused upper endoscopy  - f/u nutrition recs  - calorie count    # Left C6 transverse foraminal fracture  - CT spine demonstrated fx of anterior and posterior tubercle of left C6 transverse foramen.   - NSGY consulted, but no surgical intervention offered  - cervical collar in place  - Per resident chart note : "Radiology spoke to [employer], the spinal stimulator is only compatible with lower magnetic setting, the machine in house with those capability Is under repair and the estimated time is 6-8 weeks. So for now pt cannot get the MRI with the machine that we have."    # COVID-19 exposure -> infection  - Patient contracted COVID from roommate.  - Patient is vaccinated and received 1 booster.  - Currently asymptomatic and stable on RA  - Continue airborne isolation.    # Lower extremity cellulitis  # Toxic metabolic encephalopathy resolved   # Chronic b/l lower extremity cellulitis  # Left ankle ulcer  - Seen by burn and podiatry.  - LE arterial duplex WNL on 11/10.  - CT lower extremity on  showin) Bilateral lower extremity swelling and marked skin thickening, left  greater than right, compatible with cellulitis. Multiple rounded locules of gas are also seen tracking within the soft  tissues along the predominantly posterior calf and anterolateral/dorsal ankle and foot likely related to known multiple abrasions, can not completely exclude early necrotizing fasciitis.  - completed abx course, Seen by Burn.   - Continue wound care with Silvadene.    #Acute Hypoxic respiratory failure due to Septic shock.   -Resolved, s/p intubation in the ED , extubated    # Crohn's disease  # Diarrhea  - C. diff PCR negative.   - GI PCR negative.  - Rectal tube placed in ICU for stage 2 sacral ulcer.    # Hypothyroidism  - TSH 7.92 on admission -> 7.12.  - Continue synthroid 50mcg PO QD  - f/u T3/T4    # Anxiety  # Cachexia  - Seen by Psychiatry.  - Continue Zoloft 50mg PO QD.  - Continue Xanax 0.25mg BID.  - Per psychiatry, no contraindications for discharge, but needs outpatient psychiatry/anorexia follow-up.    # Rheumatoid arthritis  - f/u as outpatient with rheumatology      # DVT PPX  - Lovenox 30mg subQ.      #Misc  - DVT Prophylaxis: lovenox  - GI Prophylaxis: none  - Diet: soft and bite sized  - Activity: IAT  - IV Fluids: none    Dispo: pending SS/nutrition f/u

## 2022-12-16 NOTE — PROGRESS NOTE ADULT - ASSESSMENT
ASSESSMENT & PLAN:  68 y/o female with PMHx of anorexia, anxiety, depression, renal cysts, peripheral neuropathy, RA, OA, osteoporosis, low back pain, currently admitted after an unwitnessed fall with head strike and LOC. Originally admitted to MICU for possible septic shock and intubated for airway protection. Hypoglycemic (FS 24) on admission, s/p dextrose. s/p IVF and levophed. s/p broad-spectrum antibiotics. Started on Synthroid for possible myxedema coma. CT spine demonstrated fx of anterior and posterior tubercle of left C6 transverse foramen. No surgical intervention, as per NSGY. Downgraded to SDU then telemetry s/p extubation. Per radiology, patient cannot receive C-spine MRI for spinal stenosis at this time, given his spinal stimulator.     # Dysphagia, malnutrition and anorexia   # Patulous esophagus and poor bolus flow through proximal esophagus on imaging  - Diet advanced to soft and bite-sized with 1:1 feed and aspiration precautions, as per speech and swallow.  - Per speech and swallow assessment 12/15, patulous esophagus and poor bolus flow through proximal esophagus on FEES  - per GI, refused upper endoscopy  - f/u nutrition recs  - f/u SS  - calorie count    # Left C6 transverse foraminal fracture  - CT spine demonstrated fx of anterior and posterior tubercle of left C6 transverse foramen.   - NSGY consulted, but no surgical intervention offered  - cervical collar in place  - Per resident chart note : "Radiology spoke to [employer], the spinal stimulator is only compatible with lower magnetic setting, the machine in house with those capability Is under repair and the estimated time is 6-8 weeks. So for now pt cannot get the MRI with the machine that we have."    # COVID-19 exposure -> infection  - Patient contracted COVID from roommate.  - Patient is vaccinated and received 1 booster.  - Currently asymptomatic and stable on RA  - Continue airborne isolation.    # Lower extremity cellulitis  # Toxic metabolic encephalopathy resolved   # Chronic b/l lower extremity cellulitis  # Left ankle ulcer  - Seen by burn and podiatry.  - LE arterial duplex WNL on 11/10.  - CT lower extremity on  showin) Bilateral lower extremity swelling and marked skin thickening, left  greater than right, compatible with cellulitis. Multiple rounded locules of gas are also seen tracking within the soft  tissues along the predominantly posterior calf and anterolateral/dorsal ankle and foot likely related to known multiple abrasions, can not completely exclude early necrotizing fasciitis.  - completed abx course, Seen by Burn.   - Continue wound care with Silvadene.    #Acute Hypoxic respiratory failure due to Septic shock.   -Resolved, s/p intubation in the ED , extubated    # Crohn's disease  # Diarrhea  - C. diff PCR negative.   - GI PCR negative.  - Rectal tube placed in ICU for stage 2 sacral ulcer.    # Hypothyroidism  - TSH 7.92 on admission -> 7.12.  - Continue synthroid 50mcg PO QD  - f/u T3/T4    # Anxiety  # Cachexia  - Seen by Psychiatry.  - Continue Zoloft 50mg PO QD.  - Continue Xanax 0.25mg BID.  - Per psychiatry, no contraindications for discharge, but needs outpatient psychiatry/anorexia follow-up.    # Rheumatoid arthritis  - f/u as outpatient with rheumatology      # DVT PPX  - Lovenox 30mg subQ.      #Misc  - DVT Prophylaxis: lovenox  - GI Prophylaxis: none  - Diet: soft and bite sized  - Activity: IAT  - IV Fluids: none    Dispo: pending SS/nutrition f/u

## 2022-12-16 NOTE — CHART NOTE - NSCHARTNOTEFT_GEN_A_CORE
Calorie count initiated for days 12/17, 12/18, 12/19   RD assess kcal/pro intake on 12/20  RN made aware      Daysi Barrientos, RD  #[id number]

## 2022-12-16 NOTE — PROGRESS NOTE ADULT - SUBJECTIVE AND OBJECTIVE BOX
Patient is a 67y old  Female who presents with a chief complaint of AMS (15 Dec 2022 15:47)      Patient seen and examined at bedside.  Patient denies any chest pain or shortness of breath, reports continued pain issues of legs   ALLERGIES:  No Known Allergies    MEDICATIONS:  acetaminophen     Tablet .. 650 milliGRAM(s) Oral every 6 hours PRN  ALPRAZolam      ALPRAZolam 0.25 milliGRAM(s) Oral two times a day  calcium carbonate    500 mG (Tums) Chewable 1 Tablet(s) Chew two times a day PRN  chlorhexidine 2% Cloths 1 Application(s) Topical <User Schedule>  enoxaparin Injectable 30 milliGRAM(s) SubCutaneous every 24 hours  levothyroxine 50 MICROGram(s) Oral daily  melatonin 5 milliGRAM(s) Oral at bedtime  multivitamin/minerals 1 Tablet(s) Oral daily  oxycodone    5 mG/acetaminophen 325 mG 2 Tablet(s) Oral every 6 hours PRN  sertraline 50 milliGRAM(s) Oral daily  silver sulfADIAZINE 1% Cream 1 Application(s) Topical two times a day    Vital Signs Last 24 Hrs  T(F): 97.6 (16 Dec 2022 13:46), Max: 97.6 (16 Dec 2022 13:46)  HR: 76 (16 Dec 2022 13:46) (68 - 76)  BP: 107/60 (16 Dec 2022 13:46) (82/51 - 107/60)  RR: 18 (16 Dec 2022 13:46) (18 - 18)  SpO2: 97% (16 Dec 2022 13:46) (97% - 99%)  I&O's Summary    15 Dec 2022 07:01  -  16 Dec 2022 07:00  --------------------------------------------------------  IN: 420 mL / OUT: 1300 mL / NET: -880 mL        PHYSICAL EXAM:  General: NAD, A/O x 3, cachexia   ENT: MMM  Neck: Supple, No JVD  Lungs: Clear to auscultation bilaterally, no crackles   Cardio: RRR, S1/S2, 2/6 systolic murmur   Abdomen: Soft, Nontender, Nondistended; Bowel sounds present  Extremities: No cyanosis, No edema, both legs in wraps     LABS:                        9.7    4.65  )-----------( 154      ( 16 Dec 2022 06:51 )             28.4     12-16    136  |  97  |  12  ----------------------------<  100  4.8   |  30  |  <0.5    Ca    8.4      16 Dec 2022 06:51  Phos  3.4     12-16  Mg     1.5     12-16    TPro  4.4  /  Alb  2.3  /  TBili  <0.2  /  DBili  x   /  AST  26  /  ALT  35  /  AlkPhos  159  12-16 11-28 Chol 99 mg/dL LDL -- HDL 68 mg/dL Trig 54 mg/dL              POCT Blood Glucose.: 133 mg/dL (15 Dec 2022 16:50)          COVID-19 PCR: Detected (12-10-22 @ 11:19)  COVID-19 PCR: NotDetec (12-08-22 @ 03:25)      RADIOLOGY & ADDITIONAL TESTS:    Care Discussed with Consultants/Other Providers:

## 2022-12-16 NOTE — PROGRESS NOTE ADULT - SUBJECTIVE AND OBJECTIVE BOX
ANA LILIA VERMA 67y Female  MRN#: [id number]   Hospital Day: 19d    SUBJECTIVE  Patient is a 67y old Female who presents with a chief complaint of AMS (15 Dec 2022 15:47)  Currently admitted to medicine with the primary diagnosis of Acute respiratory failure with hypercapnia      INTERVAL HPI AND OVERNIGHT EVENTS:  Patient was examined and seen at bedside. This morning she is resting comfortably in bed. Reports she is ready to leave and tolerating her diet. Denies any headache, fever/chills, chest pain, abd pain.     OBJECTIVE  PAST MEDICAL & SURGICAL HISTORY  Anxiety    Depression    Osteoporosis    Kidney cysts    Peripheral neuropathy    RA (rheumatoid arthritis)    OA (osteoarthritis)    Low back pain with radiation  s/p &quot;nerve cutting&quot; 2015      ALLERGIES:  No Known Allergies    MEDICATIONS:  STANDING MEDICATIONS  ALPRAZolam      ALPRAZolam 0.25 milliGRAM(s) Oral two times a day  chlorhexidine 2% Cloths 1 Application(s) Topical <User Schedule>  enoxaparin Injectable 30 milliGRAM(s) SubCutaneous every 24 hours  levothyroxine 50 MICROGram(s) Oral daily  melatonin 5 milliGRAM(s) Oral at bedtime  multivitamin/minerals 1 Tablet(s) Oral daily  sertraline 50 milliGRAM(s) Oral daily  silver sulfADIAZINE 1% Cream 1 Application(s) Topical two times a day    PRN MEDICATIONS  acetaminophen     Tablet .. 650 milliGRAM(s) Oral every 6 hours PRN  calcium carbonate    500 mG (Tums) Chewable 1 Tablet(s) Chew two times a day PRN  oxycodone    5 mG/acetaminophen 325 mG 1 Tablet(s) Oral every 6 hours PRN      VITAL SIGNS: Last 24 Hours  T(C): 36.3 (16 Dec 2022 05:00), Max: 36.7 (15 Dec 2022 13:20)  T(F): 97.3 (16 Dec 2022 05:00), Max: 98 (15 Dec 2022 13:20)  HR: 74 (16 Dec 2022 05:00) (68 - 74)  BP: 82/51 (16 Dec 2022 05:00) (82/51 - 103/54)  BP(mean): --  RR: 18 (16 Dec 2022 05:00) (17 - 18)  SpO2: 99% (15 Dec 2022 21:08) (97% - 99%)    LABS:                        9.7    4.65  )-----------( 154      ( 16 Dec 2022 06:51 )             28.4     12-16    136  |  97<L>  |  12  ----------------------------<  100<H>  4.8   |  30  |  <0.5<L>    Ca    8.4      16 Dec 2022 06:51  Phos  3.4     12-16  Mg     1.5     12-16    TPro  4.4<L>  /  Alb  2.3<L>  /  TBili  <0.2  /  DBili  x   /  AST  26  /  ALT  35  /  AlkPhos  159<H>  12-16                  RADIOLOGY:      PHYSICAL EXAM:  CONSTITUTIONAL: No acute distress, cachectic  HEAD: Atraumatic, normocephalic  EYES: EOM intact, PERRLA, conjunctiva and sclera clear  ENT: cervical collar; moist mucous membranes  PULMONARY: Clear to auscultation bilaterally  CARDIOVASCULAR: Regular rate and rhythm  GASTROINTESTINAL: Soft, non-tender, non-distended; bowel sounds present  MUSCULOSKELETAL: no edema  NEUROLOGY: non-focal  SKIN: warm and dry

## 2022-12-17 NOTE — PROGRESS NOTE ADULT - SUBJECTIVE AND OBJECTIVE BOX
Patient is a 67y old  Female who presents with a chief complaint of AMS (16 Dec 2022 15:20)      Patient seen and examined at bedside.  Patient denies any chest pain or shortness of breath  ALLERGIES:  No Known Allergies    MEDICATIONS:  acetaminophen     Tablet .. 650 milliGRAM(s) Oral every 6 hours PRN  ALPRAZolam      ALPRAZolam 0.25 milliGRAM(s) Oral two times a day  calcium carbonate    500 mG (Tums) Chewable 1 Tablet(s) Chew two times a day PRN  chlorhexidine 2% Cloths 1 Application(s) Topical <User Schedule>  enoxaparin Injectable 30 milliGRAM(s) SubCutaneous every 24 hours  levothyroxine 50 MICROGram(s) Oral daily  melatonin 5 milliGRAM(s) Oral at bedtime  multivitamin/minerals 1 Tablet(s) Oral daily  oxycodone    5 mG/acetaminophen 325 mG 2 Tablet(s) Oral every 6 hours PRN  sertraline 50 milliGRAM(s) Oral daily  silver sulfADIAZINE 1% Cream 1 Application(s) Topical two times a day    Vital Signs Last 24 Hrs  T(F): 96.8 (17 Dec 2022 13:30), Max: 98 (16 Dec 2022 21:46)  HR: 68 (17 Dec 2022 13:30) (65 - 72)  BP: 98/58 (17 Dec 2022 13:30) (82/50 - 98/58)  RR: 18 (17 Dec 2022 13:30) (18 - 19)  SpO2: 98% (17 Dec 2022 13:30) (95% - 98%)  I&O's Summary      PHYSICAL EXAM:  General: NAD, A/O x 3, cachexia   ENT: MMM  Neck: Supple, No JVD  Lungs: Clear to auscultation bilaterally  Cardio: RRR, S1/S2, 2/6 systolic murmur   Abdomen: Soft, Nontender, Nondistended; Bowel sounds present  Extremities: No cyanosis, No edema    LABS:                        10.0   5.18  )-----------( 150      ( 17 Dec 2022 05:55 )             29.5     12-17    134  |  96  |  19  ----------------------------<  81  5.2   |  30  |  <0.5    Ca    8.7      17 Dec 2022 05:55  Phos  4.3     12-17  Mg     1.3     12-17    TPro  4.9  /  Alb  2.5  /  TBili  <0.2  /  DBili  x   /  AST  32  /  ALT  36  /  AlkPhos  164  12-17 11-28 Chol 99 mg/dL LDL -- HDL 68 mg/dL Trig 54 mg/dL              POCT Blood Glucose.: 205 mg/dL (17 Dec 2022 17:09)          COVID-19 PCR: Detected (12-10-22 @ 11:19)  COVID-19 PCR: NotDetec (12-08-22 @ 03:25)      RADIOLOGY & ADDITIONAL TESTS:    Care Discussed with Consultants/Other Providers:

## 2022-12-17 NOTE — PROGRESS NOTE ADULT - ASSESSMENT
66 y/o female with PMHx of anorexia, anxiety, depression, renal cysts, peripheral neuropathy, RA, OA, osteoporosis, low back pain, currently admitted after an unwitnessed fall with head strike and LOC. Originally admitted to MICU for possible septic shock and intubated for airway protection. Hypoglycemic (FS 24) on admission, s/p dextrose. s/p IVF and levophed. s/p broad-spectrum antibiotics. Started on Synthroid for possible myxedema coma. CT spine demonstrated fx of anterior and posterior tubercle of left C6 transverse foramen. No surgical intervention, as per NSGY. Downgraded to SDU then telemetry s/p extubation. Per radiology, patient cannot receive C-spine MRI for spinal stenosis at this time, given his spinal stimulator.   Patient transferred from telemetry to med/surg 12/15    # Dysphagia, malnutrition and anorexia   # Patulous esophagus and poor bolus flow through proximal esophagus on imaging  - Diet advanced to soft and bite-sized with 1:1 feed and aspiration precautions, as per speech and swallow.  - Per speech and swallow assessment 12/15, patulous esophagus and poor bolus flow through proximal esophagus on FEES  - per GI, refused upper endoscopy  - f/u nutrition recs  - calorie count started     # Left C6 transverse foraminal fracture  - CT spine demonstrated fx of anterior and posterior tubercle of left C6 transverse foramen.   - NSGY consulted, but no surgical intervention offered  - cervical collar in place  - Per resident chart note : "Radiology spoke to [employer], the spinal stimulator is only compatible with lower magnetic setting, the machine in house with those capability Is under repair and the estimated time is 6-8 weeks. So for now pt cannot get the MRI with the machine that we have."    # COVID-19 exposure -> infection  - Patient contracted COVID from roommate.  - Patient is vaccinated and received 1 booster.  - Currently asymptomatic and stable on RA  - Continue airborne isolation.    # Lower extremity cellulitis  # Toxic metabolic encephalopathy resolved   # Chronic b/l lower extremity cellulitis  # Left ankle ulcer  - Seen by burn and podiatry.  - LE arterial duplex WNL on 11/10.  - CT lower extremity on  showin) Bilateral lower extremity swelling and marked skin thickening, left  greater than right, compatible with cellulitis. Multiple rounded locules of gas are also seen tracking within the soft  tissues along the predominantly posterior calf and anterolateral/dorsal ankle and foot likely related to known multiple abrasions, can not completely exclude early necrotizing fasciitis.  - completed abx course, Seen by Burn.   - Continue wound care with Silvadene.    #Acute Hypoxic respiratory failure due to Septic shock.   -Resolved, s/p intubation in the ED , extubated    # Crohn's disease  # Diarrhea  - C. diff PCR negative.   - GI PCR negative.  - Rectal tube placed in ICU for stage 2 sacral ulcer.    # Hypothyroidism  - TSH 7.92 on admission -> 7.12.  - Continue synthroid 50mcg PO QD  - f/u T3/T4    # Anxiety  # Cachexia  - Seen by Psychiatry.  - Continue Zoloft 50mg PO QD.  - Continue Xanax 0.25mg BID.  - Per psychiatry, no contraindications for discharge, but needs outpatient psychiatry/anorexia follow-up.    # Rheumatoid arthritis  - f/u as outpatient with rheumatology      # DVT PPX  - Lovenox 30mg subQ.      #Misc  - DVT Prophylaxis: lovenox  - GI Prophylaxis: none  - Diet: soft and bite sized  - Activity: IAT  - IV Fluids: none    Dispo: pending calorie count

## 2022-12-18 NOTE — PROGRESS NOTE ADULT - ASSESSMENT
68 y/o female with PMHx of anorexia, anxiety, depression, renal cysts, peripheral neuropathy, RA, OA, osteoporosis, low back pain, currently admitted after an unwitnessed fall with head strike and LOC. Originally admitted to MICU for possible septic shock and intubated for airway protection. Hypoglycemic (FS 24) on admission, s/p dextrose. s/p IVF and levophed. s/p broad-spectrum antibiotics. Started on Synthroid for possible myxedema coma. CT spine demonstrated fx of anterior and posterior tubercle of left C6 transverse foramen. No surgical intervention, as per NSGY. Downgraded to SDU then telemetry s/p extubation. Per radiology, patient cannot receive C-spine MRI for spinal stenosis at this time, given his spinal stimulator.   Patient transferred from telemetry to med/surg 12/15    # Dysphagia, malnutrition and anorexia   # Patulous esophagus and poor bolus flow through proximal esophagus on imaging  - Diet advanced to soft and bite-sized with 1:1 feed and aspiration precautions, as per speech and swallow.  - Per speech and swallow assessment 12/15, patulous esophagus and poor bolus flow through proximal esophagus on FEES  - per GI, refused upper endoscopy  - f/u nutrition recs  - calorie count started   - need to discuss with patient her feelings on a psych consult for anorexia     # Left C6 transverse foraminal fracture  - CT spine demonstrated fx of anterior and posterior tubercle of left C6 transverse foramen.   - NSGY consulted, but no surgical intervention offered  - cervical collar in place  - Per resident chart note : "Radiology spoke to [employer], the spinal stimulator is only compatible with lower magnetic setting, the machine in house with those capability Is under repair and the estimated time is 6-8 weeks. So for now pt cannot get the MRI with the machine that we have."    # COVID-19 exposure -> infection  - Patient contracted COVID from roommate.  - Patient is vaccinated and received 1 booster.  - Currently asymptomatic and stable on RA  - Continue airborne isolation.    # Lower extremity cellulitis  # Toxic metabolic encephalopathy resolved   # Chronic b/l lower extremity cellulitis  # Left ankle ulcer  - Seen by burn and podiatry.  - LE arterial duplex WNL on 11/10.  - CT lower extremity on  showin) Bilateral lower extremity swelling and marked skin thickening, left  greater than right, compatible with cellulitis. Multiple rounded locules of gas are also seen tracking within the soft  tissues along the predominantly posterior calf and anterolateral/dorsal ankle and foot likely related to known multiple abrasions, can not completely exclude early necrotizing fasciitis.  - completed abx course, Seen by Burn.   - Continue wound care with Silvadene.    #Acute Hypoxic respiratory failure due to Septic shock.   -Resolved, s/p intubation in the ED , extubated    # Crohn's disease  # Diarrhea  - C. diff PCR negative.   - GI PCR negative.  - Rectal tube placed in ICU for stage 2 sacral ulcer.    # Hypothyroidism  - TSH 7.92 on admission -> 7.12.  - Continue synthroid 50mcg PO QD  - f/u T3/T4    # Anxiety  # Cachexia  - Seen by Psychiatry.  - Continue Zoloft 50mg PO QD.  - Continue Xanax 0.25mg BID.  - Per psychiatry, no contraindications for discharge, but needs outpatient psychiatry/anorexia follow-up.    # Rheumatoid arthritis  - f/u as outpatient with rheumatology      # DVT PPX  - Lovenox 30mg subQ.      #Misc  - DVT Prophylaxis: lovenox  - GI Prophylaxis: none  - Diet: soft and bite sized  - Activity: IAT  - IV Fluids: none    Dispo: pending calorie count, psych consult, final recs from ortho on c6 fracture

## 2022-12-18 NOTE — PROGRESS NOTE ADULT - SUBJECTIVE AND OBJECTIVE BOX
Patient is a 67y old  Female who presents with a chief complaint of AMS (16 Dec 2022 15:20)      Patient seen and examined at bedside.  Patient reports pain all over   ALLERGIES:  No Known Allergies    MEDICATIONS:  acetaminophen     Tablet .. 650 milliGRAM(s) Oral every 6 hours PRN  ALPRAZolam 0.25 milliGRAM(s) Oral two times a day  ALPRAZolam      calcium carbonate    500 mG (Tums) Chewable 1 Tablet(s) Chew two times a day PRN  chlorhexidine 2% Cloths 1 Application(s) Topical <User Schedule>  enoxaparin Injectable 30 milliGRAM(s) SubCutaneous every 24 hours  levothyroxine 50 MICROGram(s) Oral daily  magnesium sulfate  IVPB 2 Gram(s) IV Intermittent every 2 hours  melatonin 5 milliGRAM(s) Oral at bedtime  multivitamin/minerals 1 Tablet(s) Oral daily  oxycodone    5 mG/acetaminophen 325 mG 2 Tablet(s) Oral every 6 hours PRN  sertraline 50 milliGRAM(s) Oral daily  silver sulfADIAZINE 1% Cream 1 Application(s) Topical two times a day    Vital Signs Last 24 Hrs  T(F): 97.7 (18 Dec 2022 12:00), Max: 97.8 (18 Dec 2022 05:03)  HR: 86 (18 Dec 2022 12:00) (60 - 86)  BP: 116/62 (18 Dec 2022 12:00) (96/52 - 116/62)  RR: 17 (18 Dec 2022 12:00) (17 - 19)  SpO2: 100% (18 Dec 2022 12:00) (95% - 100%)  I&O's Summary    17 Dec 2022 07:01  -  18 Dec 2022 07:00  --------------------------------------------------------  IN: 0 mL / OUT: 1000 mL / NET: -1000 mL        PHYSICAL EXAM:  General: NAD, A/O x 3, cachexia   ENT: MMM  Neck: Supple, No JVD  Lungs: Clear to auscultation bilaterally  Cardio: RRR, S1/S2, No murmurs  Abdomen: Soft, Nontender, Nondistended; Bowel sounds present  Extremities: No cyanosis, No edema, muscle atrophy     LABS:                        9.8    4.87  )-----------( 143      ( 18 Dec 2022 07:34 )             29.6     12-18    134  |  95  |  29  ----------------------------<  96  4.9   |  32  |  <0.5    Ca    9.2      18 Dec 2022 07:34  Phos  4.5     12-18  Mg     1.3     12-18    TPro  5.0  /  Alb  2.7  /  TBili  <0.2  /  DBili  x   /  AST  23  /  ALT  31  /  AlkPhos  163  12-18              11-28 Chol 99 mg/dL LDL -- HDL 68 mg/dL Trig 54 mg/dL                      COVID-19 PCR: Detected (12-10-22 @ 11:19)  COVID-19 PCR: NotDetec (12-08-22 @ 03:25)      RADIOLOGY & ADDITIONAL TESTS:    Care Discussed with Consultants/Other Providers:

## 2022-12-19 NOTE — PROGRESS NOTE ADULT - SUBJECTIVE AND OBJECTIVE BOX
----------Daily Progress Note----------    HISTORY OF PRESENT ILLNESS:  Patient is a 67y old Female who presents with a chief complaint of AMS (18 Dec 2022 17:26)    Currently admitted to medicine with the primary diagnosis of Acute respiratory failure with hypercapnia       Today is hospital day 22d.     INTERVAL HOSPITAL COURSE / OVERNIGHT EVENTS:    No overnight events    Review of Systems: Otherwise unremarkable     <<<<<PAST MEDICAL & SURGICAL HISTORY>>>>>  Anxiety    Depression    Osteoporosis    Kidney cysts    Peripheral neuropathy    RA (rheumatoid arthritis)    OA (osteoarthritis)    Low back pain with radiation  s/p &quot;nerve cutting&quot; 2015      ALLERGIES  No Known Allergies      Home Medications:  ALPRAZolam 0.25 mg oral tablet: 1 tab(s) orally 2 times a day (29 Nov 2022 11:18)  Cardizem  mg/24 hours oral capsule, extended release: 1 cap(s) orally once a day (29 Nov 2022 11:18)  cilostazol 50 mg oral tablet: 1 tab(s) orally 2 times a day (29 Nov 2022 11:18)  gabapentin 600 mg oral tablet: 1 tab(s) orally 3 times a day (29 Nov 2022 11:18)  hydroCHLOROthiazide 25 mg oral tablet: 1 tab(s) orally 3 times a week (29 Nov 2022 11:18)  HYDROcodone-acetaminophen 10 mg-325 mg oral tablet: 1 tab(s) orally every 6 hours, As Needed - for severe pain (29 Nov 2022 11:18)  rosuvastatin 10 mg oral tablet: 1 tab(s) orally once a day (at bedtime) (29 Nov 2022 11:18)  sertraline 50 mg oral tablet: 1 tab(s) orally once a day (29 Nov 2022 11:18)        MEDICATIONS  STANDING MEDICATIONS  ALPRAZolam 0.25 milliGRAM(s) Oral two times a day  ALPRAZolam      chlorhexidine 2% Cloths 1 Application(s) Topical <User Schedule>  enoxaparin Injectable 30 milliGRAM(s) SubCutaneous every 24 hours  levothyroxine 50 MICROGram(s) Oral daily  melatonin 5 milliGRAM(s) Oral at bedtime  multivitamin/minerals 1 Tablet(s) Oral daily  sertraline 50 milliGRAM(s) Oral daily  silver sulfADIAZINE 1% Cream 1 Application(s) Topical two times a day    PRN MEDICATIONS  acetaminophen     Tablet .. 650 milliGRAM(s) Oral every 6 hours PRN  calcium carbonate    500 mG (Tums) Chewable 1 Tablet(s) Chew two times a day PRN  oxycodone    5 mG/acetaminophen 325 mG 2 Tablet(s) Oral every 6 hours PRN    VITALS:  T(F): 96.3  HR: 76  BP: 90/50  RR: 18  SpO2: 99%    <<<<<LABS>>>>>                        10.2   6.61  )-----------( 141      ( 19 Dec 2022 06:17 )             29.7     12-19    135  |  96<L>  |  28<H>  ----------------------------<  77  4.1   |  29  |  <0.5<L>    Ca    8.6      19 Dec 2022 06:17  Phos  4.3     12-19  Mg     2.2     12-19    TPro  5.0<L>  /  Alb  2.8<L>  /  TBili  <0.2  /  DBili  x   /  AST  16  /  ALT  27  /  AlkPhos  168<H>  12-19            [id number]        <<<<<RADIOLOGY>>>>>    < from: Xray Cinesophagram Swallow Function w/ Contrast (12.15.22 @ 10:45) >  PROCEDURE DATE:  12/15/2022          INTERPRETATION:  INDICATION: Dysphagia    PROCEDURE: Various consistencies of food were given to the patient and   swallowing was observed under fluoroscopy. This study was performed in   conjunction with the speech pathology department.    COMPARISON: None available    FINDINGS/  IMPRESSION:    Fluoroscopic assistance was provided by the radiologist to the speech   pathologist for amodified barium swallow study. Please refer to the   speech pathologist's report for a detailed description of the findings   and recommendations.    < end of copied text >      <<<<<PHYSICAL EXAM>>>>>  GENERAL: NAD, resting comfortably in bed; cachectic  PULMONARY: Clear to auscultation bilaterally. No rales, rhonchi, or wheezing.  CARDIOVASCULAR: Regular rate and rhythm, S1-S2, no murmurs  GASTROINTESTINAL: Soft, non-tender, non-distended, no guarding.  SKIN/EXTREMITIES: No LE edema b/l  NEUROLOGIC: AAOX3      ----------------------------------------------------------------------------------------------------------------------------------------------------------------------------------------------- ----------Daily Progress Note----------    HISTORY OF PRESENT ILLNESS:  Patient is a 67y old Female who presents with a chief complaint of AMS (18 Dec 2022 17:26)    Currently admitted to medicine with the primary diagnosis of Acute respiratory failure with hypercapnia     Today is hospital day 22d. pt fell at home, in the hospital she was intubated and subsequently extubated,   found to have c6 fracture    INTERVAL HOSPITAL COURSE / OVERNIGHT EVENTS:    No overnight events    Review of Systems: Otherwise unremarkable     <<<<<PAST MEDICAL & SURGICAL HISTORY>>>>>  Anxiety    Depression    Osteoporosis    Kidney cysts    Peripheral neuropathy    RA (rheumatoid arthritis)    OA (osteoarthritis)    Low back pain with radiation  s/p &quot;nerve cutting&quot; 2015      ALLERGIES  No Known Allergies      Home Medications:  ALPRAZolam 0.25 mg oral tablet: 1 tab(s) orally 2 times a day (29 Nov 2022 11:18)  Cardizem  mg/24 hours oral capsule, extended release: 1 cap(s) orally once a day (29 Nov 2022 11:18)  cilostazol 50 mg oral tablet: 1 tab(s) orally 2 times a day (29 Nov 2022 11:18)  gabapentin 600 mg oral tablet: 1 tab(s) orally 3 times a day (29 Nov 2022 11:18)  hydroCHLOROthiazide 25 mg oral tablet: 1 tab(s) orally 3 times a week (29 Nov 2022 11:18)  HYDROcodone-acetaminophen 10 mg-325 mg oral tablet: 1 tab(s) orally every 6 hours, As Needed - for severe pain (29 Nov 2022 11:18)  rosuvastatin 10 mg oral tablet: 1 tab(s) orally once a day (at bedtime) (29 Nov 2022 11:18)  sertraline 50 mg oral tablet: 1 tab(s) orally once a day (29 Nov 2022 11:18)        MEDICATIONS  STANDING MEDICATIONS  ALPRAZolam 0.25 milliGRAM(s) Oral two times a day  ALPRAZolam      chlorhexidine 2% Cloths 1 Application(s) Topical <User Schedule>  enoxaparin Injectable 30 milliGRAM(s) SubCutaneous every 24 hours  levothyroxine 50 MICROGram(s) Oral daily  melatonin 5 milliGRAM(s) Oral at bedtime  multivitamin/minerals 1 Tablet(s) Oral daily  sertraline 50 milliGRAM(s) Oral daily  silver sulfADIAZINE 1% Cream 1 Application(s) Topical two times a day    PRN MEDICATIONS  acetaminophen     Tablet .. 650 milliGRAM(s) Oral every 6 hours PRN  calcium carbonate    500 mG (Tums) Chewable 1 Tablet(s) Chew two times a day PRN  oxycodone    5 mG/acetaminophen 325 mG 2 Tablet(s) Oral every 6 hours PRN    VITALS:  T(F): 96.3  HR: 76  BP: 90/50  RR: 18  SpO2: 99%    <<<<<LABS>>>>>                        10.2   6.61  )-----------( 141      ( 19 Dec 2022 06:17 )             29.7     12-19    135  |  96<L>  |  28<H>  ----------------------------<  77  4.1   |  29  |  <0.5<L>    Ca    8.6      19 Dec 2022 06:17  Phos  4.3     12-19  Mg     2.2     12-19    TPro  5.0<L>  /  Alb  2.8<L>  /  TBili  <0.2  /  DBili  x   /  AST  16  /  ALT  27  /  AlkPhos  168<H>  12-19            [id number]        <<<<<RADIOLOGY>>>>>    < from: Xray Cinesophagram Swallow Function w/ Contrast (12.15.22 @ 10:45) >  PROCEDURE DATE:  12/15/2022          INTERPRETATION:  INDICATION: Dysphagia    PROCEDURE: Various consistencies of food were given to the patient and   swallowing was observed under fluoroscopy. This study was performed in   conjunction with the speech pathology department.    COMPARISON: None available    FINDINGS/  IMPRESSION:    Fluoroscopic assistance was provided by the radiologist to the speech   pathologist for amodified barium swallow study. Please refer to the   speech pathologist's report for a detailed description of the findings   and recommendations.    < end of copied text >      <<<<<PHYSICAL EXAM>>>>>  GENERAL: NAD, resting comfortably in bed; cachectic  PULMONARY: Clear to auscultation bilaterally. No rales, rhonchi, or wheezing.  CARDIOVASCULAR: Regular rate and rhythm, S1-S2, no murmurs  GASTROINTESTINAL: Soft, non-tender, non-distended, no guarding.  SKIN/EXTREMITIES: No LE edema b/l  NEUROLOGIC: AAOX3      ----------------------------------------------------------------------------------------------------------------------------------------------------------------------------------------------- ----------Daily Progress Note----------    HISTORY OF PRESENT ILLNESS:  Patient is a 67y old Female who presents with a chief complaint of AMS (18 Dec 2022 17:26)    Currently admitted to medicine with the primary diagnosis of Acute respiratory failure with hypercapnia     Today is hospital day 22d. pt fell at home, in the hospital she was intubated and subsequently extubated,   found to have c6 fracture    INTERVAL HOSPITAL COURSE / OVERNIGHT EVENTS:    No overnight events    Review of Systems: Otherwise unremarkable     T(F): 96.3 (12-19-22 @ 05:51), Max: 97.2 (12-18-22 @ 19:29)  HR: 76 (12-19-22 @ 05:51) (76 - 93)  BP: 90/50 (12-19-22 @ 05:51) (90/50 - 102/60)  RR: 18 (12-19-22 @ 05:51) (18 - 19)  SpO2: 99% (12-18-22 @ 19:29) (99% - 99%)    Physical exam:   constitutional NAD, cachectic , AAOX3, Respiratory  lungs CTA, CVS heart RRR, GI: abdomen Soft NT, ND, BS+, skin: lower ext ulcer   neuro exam severe gait disturbance, neck calor in place     <<<<<PAST MEDICAL & SURGICAL HISTORY>>>>>  Anxiety    Depression    Osteoporosis    Kidney cysts    Peripheral neuropathy    RA (rheumatoid arthritis)    OA (osteoarthritis)    Low back pain with radiation  s/p &quot;nerve cutting&quot; 2015      ALLERGIES  No Known Allergies      Home Medications:  ALPRAZolam 0.25 mg oral tablet: 1 tab(s) orally 2 times a day (29 Nov 2022 11:18)  Cardizem  mg/24 hours oral capsule, extended release: 1 cap(s) orally once a day (29 Nov 2022 11:18)  cilostazol 50 mg oral tablet: 1 tab(s) orally 2 times a day (29 Nov 2022 11:18)  gabapentin 600 mg oral tablet: 1 tab(s) orally 3 times a day (29 Nov 2022 11:18)  hydroCHLOROthiazide 25 mg oral tablet: 1 tab(s) orally 3 times a week (29 Nov 2022 11:18)  HYDROcodone-acetaminophen 10 mg-325 mg oral tablet: 1 tab(s) orally every 6 hours, As Needed - for severe pain (29 Nov 2022 11:18)  rosuvastatin 10 mg oral tablet: 1 tab(s) orally once a day (at bedtime) (29 Nov 2022 11:18)  sertraline 50 mg oral tablet: 1 tab(s) orally once a day (29 Nov 2022 11:18)        MEDICATIONS  STANDING MEDICATIONS  ALPRAZolam 0.25 milliGRAM(s) Oral two times a day  ALPRAZolam      chlorhexidine 2% Cloths 1 Application(s) Topical <User Schedule>  enoxaparin Injectable 30 milliGRAM(s) SubCutaneous every 24 hours  levothyroxine 50 MICROGram(s) Oral daily  melatonin 5 milliGRAM(s) Oral at bedtime  multivitamin/minerals 1 Tablet(s) Oral daily  sertraline 50 milliGRAM(s) Oral daily  silver sulfADIAZINE 1% Cream 1 Application(s) Topical two times a day    PRN MEDICATIONS  acetaminophen     Tablet .. 650 milliGRAM(s) Oral every 6 hours PRN  calcium carbonate    500 mG (Tums) Chewable 1 Tablet(s) Chew two times a day PRN  oxycodone    5 mG/acetaminophen 325 mG 2 Tablet(s) Oral every 6 hours PRN        <<<<<LABS>>>>>                        10.2   6.61  )-----------( 141      ( 19 Dec 2022 06:17 )             29.7     12-19    135  |  96<L>  |  28<H>  ----------------------------<  77  4.1   |  29  |  <0.5<L>    Ca    8.6      19 Dec 2022 06:17  Phos  4.3     12-19  Mg     2.2     12-19    TPro  5.0<L>  /  Alb  2.8<L>  /  TBili  <0.2  /  DBili  x   /  AST  16  /  ALT  27  /  AlkPhos  168<H>  12-19            [id number]        <<<<<RADIOLOGY>>>>>    < from: Xray Cinesophagram Swallow Function w/ Contrast (12.15.22 @ 10:45) >  PROCEDURE DATE:  12/15/2022          INTERPRETATION:  INDICATION: Dysphagia    PROCEDURE: Various consistencies of food were given to the patient and   swallowing was observed under fluoroscopy. This study was performed in   conjunction with the speech pathology department.    COMPARISON: None available    FINDINGS/  IMPRESSION:    Fluoroscopic assistance was provided by the radiologist to the speech   pathologist for amodified barium swallow study. Please refer to the   speech pathologist's report for a detailed description of the findings   and recommendations.    < end of copied text >      <<<<<PHYSICAL EXAM>>>>>  GENERAL: NAD, resting comfortably in bed; cachectic  PULMONARY: Clear to auscultation bilaterally. No rales, rhonchi, or wheezing.  CARDIOVASCULAR: Regular rate and rhythm, S1-S2, no murmurs  GASTROINTESTINAL: Soft, non-tender, non-distended, no guarding.  SKIN/EXTREMITIES: No LE edema b/l  NEUROLOGIC: AAOX3      -----------------------------------------------------------------------------------------------------------------------------------------------------------------------------------------------

## 2022-12-19 NOTE — PROGRESS NOTE ADULT - ASSESSMENT
68 y/o female with PMHx of anorexia, anxiety, depression, renal cysts, peripheral neuropathy, RA, OA, osteoporosis, low back pain, currently admitted after an unwitnessed fall with head strike and LOC. Originally admitted to MICU for possible septic shock and intubated for airway protection. Hypoglycemic (FS 24) on admission, s/p dextrose. s/p IVF and levophed. s/p broad-spectrum antibiotics. Started on Synthroid for possible myxedema coma. CT spine demonstrated fx of anterior and posterior tubercle of left C6 transverse foramen. No surgical intervention, as per NSGY. Downgraded to SDU then telemetry s/p extubation. Per radiology, patient cannot receive C-spine MRI for spinal stenosis at this time, given his spinal stimulator.   Patient transferred from telemetry to med/surg 12/15    # Dysphagia, malnutrition and anorexia   # Patulous esophagus and poor bolus flow through proximal esophagus on imaging  - Diet advanced to soft and bite-sized with 1:1 feed and aspiration precautions, as per speech and swallow.  - Per speech and swallow assessment 12/15, patulous esophagus and poor bolus flow through proximal esophagus on FEES  - per GI, refused upper endoscopy  - continue current diet; will add ensure TID      # Left C6 transverse foraminal fracture  - CT spine demonstrated fx of anterior and posterior tubercle of left C6 transverse foramen.   - NSGY consulted, but no surgical intervention offered  - cervical collar in place  - spinal stimulator not MRI compatible    # COVID-19 exposure -> infection  - Patient contracted COVID from roommate; tested positive 12/10  - Patient is vaccinated and received 1 booster.  - Currently asymptomatic and stable on RA  - Continue airborne isolation.    # Lower extremity cellulitis  # Toxic metabolic encephalopathy resolved   # Chronic b/l lower extremity cellulitis  # Left ankle ulcer  - Seen by burn and podiatry.  - LE arterial duplex WNL on 11/10.  - CT lower extremity on  showin) Bilateral lower extremity swelling and marked skin thickening, left  greater than right, compatible with cellulitis. Multiple rounded locules of gas are also seen tracking within the soft  tissues along the predominantly posterior calf and anterolateral/dorsal ankle and foot likely related to known multiple abrasions, can not completely exclude early necrotizing fasciitis.  - completed abx course, Seen by Burn.   - Continue wound care with Silvadene.    #Acute Hypoxic respiratory failure due to Septic shock.   -Resolved, s/p intubation in the ED , extubated    # Crohn's disease  # Diarrhea  - C. diff PCR negative.   - GI PCR negative.  - Rectal tube placed in ICU for stage 2 sacral ulcer.    # Hypothyroidism  - TSH 7.92 on admission -> 7.12.  - Continue synthroid 50mcg PO QD      # Anxiety  # Cachexia  - Seen by Psychiatry.  - Continue Zoloft 50mg PO QD.  - Continue Xanax 0.25mg BID.  - Per psychiatry, no contraindications for discharge, but needs outpatient psychiatry/anorexia follow-up.    # Rheumatoid arthritis  - f/u as outpatient with rheumatology      #Misc  - DVT Prophylaxis: lovenox  - GI Prophylaxis: none  - Diet: soft and bite sized; ensure TID added  - Activity: IAT     66 y/o female with PMHx of anorexia, anxiety, depression, renal cysts, peripheral neuropathy, RA, OA, osteoporosis, low back pain, currently admitted after an unwitnessed fall with head strike and LOC. Originally admitted to MICU for possible septic shock and intubated for airway protection. Hypoglycemic (FS 24) on admission, s/p dextrose. s/p IVF and levophed. s/p broad-spectrum antibiotics. Started on Synthroid for possible myxedema coma. CT spine demonstrated fx of anterior and posterior tubercle of left C6 transverse foramen. No surgical intervention, as per NSGY. Downgraded to SDU then telemetry s/p extubation. Per radiology, patient cannot receive C-spine MRI for spinal stenosis at this time, given his spinal stimulator.   Patient transferred from telemetry to med/surg 12/15    # cachexia, Dysphagia, malnutrition and anorexia, with hypoglycemia POA , pt has history of eating disorder and laxative  abuse per prior notes, now she has good appetite and is eating independently, she says she is hungry   - Diet advanced to soft and bite-sized with 1:1 feed and aspiration precautions, as per speech and swallow.  - Per speech and swallow assessment 12/15, patulous esophagus and poor bolus flow through proximal esophagus on FEES  - per GI, refused upper endoscopy, she does not want any endoscopy  - continue current diet; will add ensure TID    # Left C6 transverse foraminal fracture, due to fall at home  - CT spine demonstrated fx of anterior and posterior tubercle of left C6 transverse foramen.   - Neurosurgery  consulted, but no surgical intervention offered  - cervical collar in place  - spinal stimulator not MRI compatible    # COVID-19 exposure -> infection, asymptomatic   - Patient contracted COVID from roommate; tested positive 12/10  - Patient is vaccinated and received 1 booster.  - Currently asymptomatic and stable on RA  - Continue airborne isolation. per infectious control, she can be off isolation      # Lower extremity cellulitis  # Toxic metabolic encephalopathy , possibly due to hypoglycemia ( present on admission ) resolved   # Chronic b/l lower extremity cellulitis  # Left ankle ulcer  - Seen by burn and podiatry.  - LE arterial duplex WNL on 11/10.  - CT lower extremity on  showin) Bilateral lower extremity swelling and marked skin thickening, left  greater than right, compatible with cellulitis. Multiple rounded locules of gas are also seen tracking within the soft  tissues along the predominantly posterior calf and anterolateral/dorsal ankle and foot likely related to known multiple abrasions, can not completely exclude early necrotizing fasciitis.  - completed abx course, Seen by Burn.   - Continue wound care with Silvadene.    #Acute Hypoxic respiratory failure due to Septic shock.   -Resolved, s/p intubation in the ED , extubated    # Crohn's disease  # Diarrhea, hx of laxative abuse ?   - C. diff PCR negative.   - GI PCR negative.  - Rectal tube placed in ICU for stage 2 sacral ulcer., dc rectal tube     # Hypothyroidism  - TSH 7.92 on admission -> 7.12.  - Continue synthroid 50mcg PO QD      # Anxiety  # Cachexia  - Seen by Psychiatry.  - Continue Zoloft 50mg PO QD.  - Continue Xanax 0.25mg BID.  - Per psychiatry, no contraindications for discharge, but needs outpatient psychiatry/anorexia follow-up.    # Rheumatoid arthritis  - f/u as outpatient with rheumatology      #Misc  - DVT Prophylaxis: lovenox  - GI Prophylaxis: none  - Diet: soft and bite sized; ensure TID added  - Activity: IAT

## 2022-12-20 NOTE — DISCHARGE NOTE PROVIDER - DETAILS OF MALNUTRITION DIAGNOSIS/DIAGNOSES
This patient has been assessed with a concern for Malnutrition and was treated during this hospitalization for the following Nutrition diagnosis/diagnoses:     -  12/22/2022: Severe protein-calorie malnutrition   -  12/22/2022: Underweight (BMI < 19)

## 2022-12-20 NOTE — DISCHARGE NOTE PROVIDER - NSDCMRMEDTOKEN_GEN_ALL_CORE_FT
ALPRAZolam 0.25 mg oral tablet: 1 tab(s) orally 2 times a day  Cardizem  mg/24 hours oral capsule, extended release: 1 cap(s) orally once a day  cilostazol 50 mg oral tablet: 1 tab(s) orally 2 times a day  gabapentin 600 mg oral tablet: 1 tab(s) orally 3 times a day  hydroCHLOROthiazide 25 mg oral tablet: 1 tab(s) orally 3 times a week  HYDROcodone-acetaminophen 10 mg-325 mg oral tablet: 1 tab(s) orally every 6 hours, As Needed - for severe pain  rosuvastatin 10 mg oral tablet: 1 tab(s) orally once a day (at bedtime)  sertraline 50 mg oral tablet: 1 tab(s) orally once a day  Synthroid 50 mcg (0.05 mg) oral tablet: 1 tab(s) orally once a day   ALPRAZolam 0.25 mg oral tablet: 1 tab(s) orally 2 times a day  Cardizem  mg/24 hours oral capsule, extended release: 1 cap(s) orally once a day  cilostazol 50 mg oral tablet: 1 tab(s) orally 2 times a day  gabapentin 600 mg oral tablet: 1 tab(s) orally 3 times a day  hydroCHLOROthiazide 25 mg oral tablet: 1 tab(s) orally 3 times a week  HYDROcodone-acetaminophen 10 mg-325 mg oral tablet: 1 tab(s) orally every 6 hours, As Needed - for severe pain  loperamide 2 mg oral capsule: 1 cap(s) orally once a day, As needed, Diarrhea  rosuvastatin 10 mg oral tablet: 1 tab(s) orally once a day (at bedtime)  sertraline 50 mg oral tablet: 1 tab(s) orally once a day  Synthroid 50 mcg (0.05 mg) oral tablet: 1 tab(s) orally once a day   ALPRAZolam 0.25 mg oral tablet: 1 tab(s) orally 2 times a day  Cardizem  mg/24 hours oral capsule, extended release: 1 cap(s) orally once a day  cilostazol 50 mg oral tablet: 1 tab(s) orally 2 times a day  gabapentin 600 mg oral tablet: 1 tab(s) orally 3 times a day  hydroCHLOROthiazide 25 mg oral tablet: 1 tab(s) orally 3 times a week  HYDROcodone-acetaminophen 10 mg-325 mg oral tablet: 1 tab(s) orally every 6 hours, As Needed - for severe pain  loperamide 2 mg oral capsule: 1 cap(s) orally once a day, As needed, Diarrhea  magnesium oxide 400 mg oral tablet: 1 tab(s) orally 3 times a day   rosuvastatin 10 mg oral tablet: 1 tab(s) orally once a day (at bedtime)  sertraline 50 mg oral tablet: 1 tab(s) orally once a day  Synthroid 50 mcg (0.05 mg) oral tablet: 1 tab(s) orally once a day   ALPRAZolam 0.25 mg oral tablet: 1 tab(s) orally 2 times a day  Cardizem  mg/24 hours oral capsule, extended release: 1 cap(s) orally once a day  cilostazol 50 mg oral tablet: 1 tab(s) orally 2 times a day  gabapentin 600 mg oral tablet: 1 tab(s) orally 3 times a day  hydroCHLOROthiazide 25 mg oral tablet: 1 tab(s) orally 3 times a week  HYDROcodone-acetaminophen 10 mg-325 mg oral tablet: 1 tab(s) orally every 6 hours, As Needed - for severe pain  loperamide 2 mg oral capsule: 1 cap(s) orally once a day, As needed, Diarrhea  magnesium oxide 400 mg oral tablet: 1 tab(s) orally 3 times a day   predniSONE 20 mg oral tablet: 1 tab(s) orally once a day for 5 days   rosuvastatin 10 mg oral tablet: 1 tab(s) orally once a day (at bedtime)  sertraline 50 mg oral tablet: 1 tab(s) orally once a day  Synthroid 50 mcg (0.05 mg) oral tablet: 1 tab(s) orally once a day   ALPRAZolam 0.25 mg oral tablet: 1 tab(s) orally 2 times a day  Cardizem  mg/24 hours oral capsule, extended release: 1 cap(s) orally once a day  cilostazol 50 mg oral tablet: 1 tab(s) orally 2 times a day  gabapentin 600 mg oral tablet: 1 tab(s) orally 3 times a day  hydroCHLOROthiazide 25 mg oral tablet: 1 tab(s) orally 3 times a week  HYDROcodone-acetaminophen 10 mg-325 mg oral tablet: 1 tab(s) orally every 6 hours, As Needed - for severe pain  loperamide 2 mg oral capsule: 1 cap(s) orally once a day, As needed, Diarrhea  magnesium oxide 400 mg oral tablet: 1 tab(s) orally 3 times a day   oxycodone-acetaminophen 5 mg-325 mg oral tablet: 2 tab(s) orally every 6 hours, As needed, Severe Pain (7 - 10)  predniSONE 20 mg oral tablet: 1 tab(s) orally once a day for 5 days  until 12/27/2022  rosuvastatin 10 mg oral tablet: 1 tab(s) orally once a day (at bedtime)  sertraline 50 mg oral tablet: 1 tab(s) orally once a day  Synthroid 50 mcg (0.05 mg) oral tablet: 1 tab(s) orally once a day

## 2022-12-20 NOTE — PROGRESS NOTE ADULT - SUBJECTIVE AND OBJECTIVE BOX
pt seen and examined        ROS: no cp, no sob, no n/v, no fever    Vital Signs Last 24 Hrs  T(C): 36.1 (20 Dec 2022 04:30), Max: 36.4 (19 Dec 2022 21:09)  T(F): 97 (20 Dec 2022 04:30), Max: 97.5 (19 Dec 2022 21:09)  HR: 84 (20 Dec 2022 05:30) (80 - 88)  BP: 99/54 (20 Dec 2022 05:30) (86/53 - 99/54)  BP(mean): --  RR: 18 (20 Dec 2022 04:30) (18 - 18)  SpO2: 97% (20 Dec 2022 04:30) (91% - 99%)    Parameters below as of 19 Dec 2022 21:09  Patient On (Oxygen Delivery Method): room air        physical exam  constitutional NAD, AAOX3, Respiratory  lungs CTA, CVS heart RRR, GI: abdomen Soft NT, ND, BS+, skin: intact  neuro exam Motor, sensory and CN normal, no deficit     MEDICATIONS  (STANDING):  ALPRAZolam 0.25 milliGRAM(s) Oral two times a day  ALPRAZolam      chlorhexidine 2% Cloths 1 Application(s) Topical <User Schedule>  enoxaparin Injectable 30 milliGRAM(s) SubCutaneous every 24 hours  levothyroxine 50 MICROGram(s) Oral daily  melatonin 5 milliGRAM(s) Oral at bedtime  multivitamin/minerals 1 Tablet(s) Oral daily  multivitamin/minerals/iron Oral Solution (CENTRUM) 15 milliLiter(s) Oral daily  sertraline 50 milliGRAM(s) Oral daily  silver sulfADIAZINE 1% Cream 1 Application(s) Topical two times a day    MEDICATIONS  (PRN):  acetaminophen     Tablet .. 650 milliGRAM(s) Oral every 6 hours PRN Mild Pain (1 - 3), Moderate Pain (4 - 6)  calcium carbonate    500 mG (Tums) Chewable 1 Tablet(s) Chew two times a day PRN Heartburn  oxycodone    5 mG/acetaminophen 325 mG 2 Tablet(s) Oral every 6 hours PRN Severe Pain (7 - 10)    CAPILLARY BLOOD GLUCOSE      POCT Blood Glucose.: 97 mg/dL (20 Dec 2022 10:59)  POCT Blood Glucose.: 121 mg/dL (20 Dec 2022 07:34)  POCT Blood Glucose.: 161 mg/dL (19 Dec 2022 22:22)                          9.4    6.30  )-----------( 138      ( 20 Dec 2022 07:52 )             28.3     12-20    139  |  101  |  31<H>  ----------------------------<  112<H>  4.5   |  30  |  <0.5<L>    Ca    8.8      20 Dec 2022 07:52  Phos  4.3     12-19  Mg     1.6     12-20    TPro  4.8<L>  /  Alb  2.4<L>  /  TBili  <0.2  /  DBili  x   /  AST  11  /  ALT  18  /  AlkPhos  142<H>  12-20    D-Dimer Assay, Quantitative: 982 ng/mL DDU (12-14-22 @ 07:37)  D-Dimer Assay, Quantitative: 1602 ng/mL DDU [0 - 230] (12-02-22 @ 12:32)  Procalcitonin, Serum: 0.15 ng/mL [0.02 - 0.10] (11-29-22 @ 04:55)  Procalcitonin, Serum: 0.61 ng/mL [0.02 - 0.10] (11-27-22 @ 19:12)    COVID-19 PCR: Detected (12-10-22 @ 11:19)  COVID-19 PCR: NotDetec (12-08-22 @ 03:25)      < from: CT Cervical Spine No Cont (11.27.22 @ 04:14) >    CT HEAD:    No acute intracranial findings.    CT CERVICAL SPINE:    Fracture of the anterior and posterior tubercle of the left C6 transverse   foramen, new since prior remote study. Consider CTA to ensure no   underlying vascular injury.    Appearance of destructive process at the C6-C7 disc space with erosive   change to the adjacent endplates. Findings new from 2018, however no more  recent priors. Findings may reflect acute or chronic   discitis-osteomyelitis.    < end of copied text >      a/p  66 y/o female with PMHx of anorexia, anxiety, depression, renal cysts, peripheral neuropathy, RA, OA, osteoporosis, low back pain, currently admitted after an unwitnessed fall with head strike and LOC. Originally admitted to MICU for possible septic shock and intubated for airway protection. Hypoglycemic (FS 24) on admission, s/p dextrose. s/p IVF and levophed. s/p broad-spectrum antibiotics. Started on Synthroid for possible myxedema coma. CT spine demonstrated fx of anterior and posterior tubercle of left C6 transverse foramen. No surgical intervention, as per NSGY. Downgraded to SDU then telemetry s/p extubation. Per radiology, patient cannot receive C-spine MRI for spinal stenosis at this time, given her spinal stimulator.   Patient transferred from telemetry to med/surg     # fall at home, with hypoglycemia now has C6 spine fracture cont cervical Collar ,present on admission,  needs rehab    # cachexia, eating disorder, anxiety and depression, cont meds per psych,   # mild dysphagia, pt refusing egd, cont modified diet . with supplement   # RA, not on meds  # covid infection, asymptomatic, per infectious control team, may dc isolation today  12/20     #Progress Note Handoff  Pending (specify):  discharge planning   Family discussion: keshav pt,   Disposition: home vs SNF.  high risk pt with guarded prognosis   full code

## 2022-12-20 NOTE — CHART NOTE - NSCHARTNOTEFT_GEN_A_CORE
3 DAY CALORIE COUNT: 12/16 - 12/18    DAY 1  Not documented    DAY 2  Breakfast: 100% eggs, 100% juice  Lunch: 50% white rice with peas + green beans, 100% juice  Dinner: not documented  Total: 453.5 kcal, 17.1g protein    DAY 3  Breakfast: 0% of meal  Lunch: 100% pudding, 100% orange juice  Dinner: 0% of meal  Total: 210 kcal, 4g protein     Current Diet Order:  Diet, Soft and Bite Sized:   Supplement Feeding Modality:  Oral  Ensure Enlive Cans or Servings Per Day:  1       Frequency:  Three Times a day (12-19-22 @ 08:36) [Active]    Per MD note on 12/19:  # Cachexia, Dysphagia, malnutrition and anorexia, with hypoglycemia POA , pt has history of eating disorder and laxative  abuse per prior notes, now she has good appetite and is eating independently, she says she is hungry   - Diet advanced to soft and bite-sized with 1:1 feed and aspiration precautions, as per speech and swallow.  - Per speech and swallow assessment 12/15, patulous esophagus and poor bolus flow through proximal esophagus on FEES  - per GI, refused upper endoscopy, she does not want any endoscopy  - continue current diet; will add ensure TID    *** Per SLP note on 12/15, recommended consistencies: soft and bite sized, thin liquids. Recommended Feeding/Eating Techniques: allow for swallow between intakes; alternate food with liquid; crush medication (when feasible); maintain upright posture during/after eating for 30 mins; no straws; oral hygiene; position upright (90 degrees); small sips/bites; liquids via tsp sip, intermittent throat clear and dry swallow.  - Demonstrates Need for Referral to Another Service: GI; Outpatient GI f/u 2' patulous esophagus and poor bolus flow through proximal esophagus    Pt followed by Nutrition Support Team earlier this month. Last NST note on 12/5.     RD remains available: Beronica Carpenter [phone] or DALLIN

## 2022-12-20 NOTE — DISCHARGE NOTE PROVIDER - NSDCCPCAREPLAN_GEN_ALL_CORE_FT
PRINCIPAL DISCHARGE DIAGNOSIS  Diagnosis: Sepsis with acute hypoxic respiratory failure  Assessment and Plan of Treatment: Sepsis is a serious condition that occurs when the body overreacts to an infection. It is also called systemic inflammatory response syndrome (SIRS) with infection. An infection is usually caused by bacteria that attack the body. The body's defense system normally fights off infection within the affected body part. With sepsis, the body overreacts and causes symptoms to occur throughout the body. This leads to uncontrolled and widespread inflammation and clotting in small blood vessels. Blood flow to different body parts decreases and may lead to organ failure. Sepsis requires immediate treatment. You required intubation during your hospital stay and because your oxygen requirements improved, you were extubated (breathing tube removed).   You were treated in the hospital with IV antibiotics, and your symptoms resolved.   Please seek immediate medical attention if you develop fever >100.4 F, feel dizzy, have nausea or vomiting, coughing blood, have sudden trouble breathing or chest pain, severe weakness, or your skin or lips are turning blue.        SECONDARY DISCHARGE DIAGNOSES  Diagnosis: Cachexia  Assessment and Plan of Treatment: Cachexia is severe weight loss due to chronic illness. Please followup with your primary care doctor for further care and management.   SEEK IMMEDIATE MEDICAL CARE IF YOU HAVE ANY OF THE FOLLOWING SYMPTOMS: chest pain, shortness of breath, abdominal pain, sweating, vomiting, blood in vomit/bowel movements/urine, dizziness/lightheadedness, weakness or numbness to face/arm/leg, trouble speaking or understanding, facial droop.      Diagnosis: Cervical spine fracture  Assessment and Plan of Treatment: You sustained a fracture of one of the bones in your cervical spine for which a brace was placed. Please followup with the neurosurgeons for further care and managment.   SEEK IMMEDIATE MEDICAL CARE IF YOU HAVE ANY OF THE FOLLOWING SYMPTOMS: chest pain, shortness of breath, abdominal pain, sweating, vomiting, blood in vomit/bowel movements/urine, dizziness/lightheadedness, weakness or numbness to face/arm/leg, trouble speaking or understanding, facial droop.

## 2022-12-20 NOTE — DISCHARGE NOTE PROVIDER - CARE PROVIDER_API CALL
Truman Bueno (DO)  Infectious Disease; Internal Medicine  [facility] [place] [address]  Phone: [phone]  Fax: [phone]  Follow Up Time: 1 week    Praveen Dominguez)  Surgery  Neurosurgery  [address] [place] [address], [address]  [place] [address]  Phone: [phone]  Fax: [phone]  Follow Up Time: 1 week

## 2022-12-20 NOTE — DISCHARGE NOTE PROVIDER - HOSPITAL COURSE
67F w/ h/o anorexia, anxiety, depression, kidney cysts, peripheral neuropathy, RA, OA, osteoporosis, low back pain s/p unwitnessed fall, found down by  +HT, ?LOC, -AC. Contacted patient's  who was able to provide some history and states he returned home from work around 11pm when he found the patient down on the tiled kitchen floor. States the patient was awake and alert when he found her on the floor, but she didn't recall how she fell down.  speculates that she might've been sitting in chair and fallen out of it as this has happened before in the past.  states that the patient said she hit her head, and he noticed blood on the patient's knees, but no other injuries. Patient was complaining of left-sided neck pain after the fall, but  states she has been having this pain for about 1 month. About 1 hour after the fall, patient became lethargic and was unable to stay up on her feet, so  called for an ambulance.     Of note, pt has long-standing h/o anorexia for several years per . Does not induce vomiting, but frequently uses laxatives for weight loss. Has never consistently followed w/ psychologist/psychiatrist. After recent discharge from hospital two weeks ago, anorexia acutely worsened and pt has had minimal caloric intake.    On arrival to ED, patient unresponsive, hypotensive, bradycardic, and hypothermic. Patient also hypoglycemic (FS 24) and given dextrose immediately. Subsequently given Emiliana hugger, given 2L of warm IV fluids, started on levophed, and given broad-spectrum abx, and started on levothyroxine for possible myxedema coma (although no documentation in eMAR, so unclear if actually given). VBG noted respiratory acidosis. Pt unable to tolerate BiPAP due to current mental status, so patient intubated for airway protection. CT spine demonstrated fx of anterior and posterior tubercle of left C6 transverse foramen. NSGY consulted, but no surgical intervention offered. Subsequently admitted to MICU for presumed septic shock.      # cachexia, Dysphagia, malnutrition and anorexia, with hypoglycemia POA , pt has history of eating disorder and laxative  abuse per prior notes, now she has good appetite and is eating independently, she says she is hungry   - Diet advanced to soft and bite-sized with 1:1 feed and aspiration precautions, as per speech and swallow.  - Per speech and swallow assessment 12/15, patulous esophagus and poor bolus flow through proximal esophagus on FEES  - per GI, refused upper endoscopy, she does not want any endoscopy  - continue current diet    # Left C6 transverse foraminal fracture, due to fall at home  - CT spine demonstrated fx of anterior and posterior tubercle of left C6 transverse foramen.   - Neurosurgery  consulted, but no surgical intervention offered  - cervical collar in place  - spinal stimulator not MRI compatible    # COVID-19 exposure -> infection, asymptomatic   - Patient contracted COVID from roommate; tested positive 12/10  - Patient is vaccinated and received 1 booster.  - Currently asymptomatic and stable on RA  - Continue airborne isolation. per infectious control, she can be off isolation      # Lower extremity cellulitis  # Toxic metabolic encephalopathy , possibly due to hypoglycemia ( present on admission ) resolved   # Chronic b/l lower extremity cellulitis  # Left ankle ulcer  - Seen by burn and podiatry.  - LE arterial duplex WNL on 11/10.  - CT lower extremity on  showin) Bilateral lower extremity swelling and marked skin thickening, left  greater than right, compatible with cellulitis. Multiple rounded locules of gas are also seen tracking within the soft  tissues along the predominantly posterior calf and anterolateral/dorsal ankle and foot likely related to known multiple abrasions, can not completely exclude early necrotizing fasciitis.  - completed abx course, Seen by Burn.   - Continue wound care with Silvadene.    #Acute Hypoxic respiratory failure due to Septic shock.   -Resolved, s/p intubation in the ED , extubated    # Crohn's disease  # Diarrhea, hx of laxative abuse ?   - C. diff PCR negative.   - GI PCR negative.      # Hypothyroidism  - TSH 7.92 on admission -> 7.12.  - Continue synthroid 50mcg PO QD      # Anxiety  # Cachexia  - Seen by Psychiatry.  - Continue Zoloft 50mg PO QD.  - Continue Xanax 0.25mg BID.  - Per psychiatry, no contraindications for discharge, but needs outpatient psychiatry/anorexia follow-up.    # Rheumatoid arthritis  - f/u as outpatient with rheumatology   67F w/ h/o anorexia, anxiety, depression, kidney cysts, peripheral neuropathy, RA, OA, osteoporosis, low back pain s/p unwitnessed fall, found down by  +HT, ?LOC, -AC. Contacted patient's  who was able to provide some history and states he returned home from work around 11pm when he found the patient down on the tiled kitchen floor. States the patient was awake and alert when he found her on the floor, but she didn't recall how she fell down.  speculates that she might've been sitting in chair and fallen out of it as this has happened before in the past.  states that the patient said she hit her head, and he noticed blood on the patient's knees, but no other injuries. Patient was complaining of left-sided neck pain after the fall, but  states she has been having this pain for about 1 month. About 1 hour after the fall, patient became lethargic and was unable to stay up on her feet, so  called for an ambulance.     Of note, pt has long-standing h/o anorexia for several years per . Does not induce vomiting, but frequently uses laxatives for weight loss. Has never consistently followed w/ psychologist/psychiatrist. After recent discharge from hospital two weeks ago, anorexia acutely worsened and pt has had minimal caloric intake.    On arrival to ED, patient unresponsive, hypotensive, bradycardic, and hypothermic. Patient also hypoglycemic (FS 24) and given dextrose immediately. Subsequently given Emiliana hugger, given 2L of warm IV fluids, started on levophed, and given broad-spectrum abx, and started on levothyroxine for possible myxedema coma (although no documentation in eMAR, so unclear if actually given). VBG noted respiratory acidosis. Pt unable to tolerate BiPAP due to current mental status, so patient intubated for airway protection. CT spine demonstrated fx of anterior and posterior tubercle of left C6 transverse foramen. NSGY consulted, but no surgical intervention offered. Subsequently admitted to MICU for presumed septic shock.      # cachexia, Dysphagia, malnutrition and anorexia, with hypoglycemia POA , pt has history of eating disorder and laxative  abuse per prior notes, now she has good appetite and is eating independently, she says she is hungry   - Diet advanced to soft and bite-sized with 1:1 feed and aspiration precautions, as per speech and swallow.  - Per speech and swallow assessment 12/15, patulous esophagus and poor bolus flow through proximal esophagus on FEES  - per GI, refused upper endoscopy, she does not want any endoscopy  - continue current diet  - outpatient psych f/u for psychotherapy    # Left C6 transverse foraminal fracture, due to fall at home  - CT spine demonstrated fx of anterior and posterior tubercle of left C6 transverse foramen.   - Neurosurgery  consulted, but no surgical intervention offered  - cervical collar in place  - spinal stimulator not MRI compatible    # COVID-19 exposure -> infection, asymptomatic   - Patient contracted COVID from roommate; tested positive 12/10  - Patient is vaccinated and received 1 booster.  - Currently asymptomatic and stable on RA  - Continue airborne isolation. per infectious control, she can be off isolation      # Lower extremity cellulitis  # Toxic metabolic encephalopathy , possibly due to hypoglycemia ( present on admission ) resolved   # Chronic b/l lower extremity cellulitis  # Left ankle ulcer  - Seen by burn and podiatry.  - LE arterial duplex WNL on 11/10.  - CT lower extremity on  showin) Bilateral lower extremity swelling and marked skin thickening, left  greater than right, compatible with cellulitis. Multiple rounded locules of gas are also seen tracking within the soft  tissues along the predominantly posterior calf and anterolateral/dorsal ankle and foot likely related to known multiple abrasions, can not completely exclude early necrotizing fasciitis.  - completed abx course, Seen by Burn.   - Continue wound care with Silvadene.    #Acute Hypoxic respiratory failure due to Septic shock.   -Resolved, s/p intubation in the ED , extubated    # Crohn's disease  # Diarrhea, hx of laxative abuse ?   - C. diff PCR negative.   - GI PCR negative.      # Hypothyroidism  - TSH 7.92 on admission -> 7.12.  - Continue synthroid 50mcg PO QD      # Anxiety  # Cachexia  - Seen by Psychiatry.  - Continue Zoloft 50mg PO QD.  - Continue Xanax 0.25mg BID.  - Per psychiatry, no contraindications for discharge, but needs outpatient psychiatry/anorexia follow-up.    # Rheumatoid arthritis  - f/u as outpatient with rheumatology

## 2022-12-20 NOTE — DISCHARGE NOTE PROVIDER - PROVIDER TOKENS
PROVIDER:[TOKEN:[[id number]],FOLLOWUP:[1 week]],PROVIDER:[TOKEN:[[id number]],FOLLOWUP:[1 week]]

## 2022-12-21 NOTE — PROGRESS NOTE ADULT - SUBJECTIVE AND OBJECTIVE BOX
SABALEROYANA LILIA  67y  Female      Patient is a 67y old  Female who presents with a chief complaint of AMS.       INTERVAL HPI/OVERNIGHT EVENTS:      ******************************* REVIEW OF SYSTEMS:*********************************************    All other review of systems negative    *********************** VITALS ******************************************    T(F): 96.5 (12-21-22 @ 04:35)  HR: 81 (12-21-22 @ 04:35) (81 - 88)  BP: 112/59 (12-21-22 @ 04:35) (95/58 - 112/59)  RR: 19 (12-21-22 @ 04:35) (18 - 19)  SpO2: 98% (12-21-22 @ 04:35) (96% - 100%)            ******************************** PHYSICAL EXAM:**************************************************  GENERAL: NAD    PSYCH: no agitation, baseline mentation  HEENT: cachectic     NERVOUS SYSTEM:  Alert & Oriented X3,     PULMONARY: ODALYS, CTA    CARDIOVASCULAR: S1S2 RRR    GI: Soft, NT, ND; BS present.    EXTREMITIES:  2+ Peripheral Pulses, No clubbing, cyanosis, or edema. Dressing LE b/l     LYMPH: No lymphadenopathy noted    SKIN: multiple areas of skin breaks     **************************** LABS *******************************************************                          9.6    6.25  )-----------( 148      ( 21 Dec 2022 08:29 )             28.8     12-21    138  |  101  |  27<H>  ----------------------------<  77  4.4   |  32  |  <0.5<L>    Ca    8.9      21 Dec 2022 08:29  Mg     1.4     12-21    TPro  5.1<L>  /  Alb  2.4<L>  /  TBili  <0.2  /  DBili  x   /  AST  11  /  ALT  16  /  AlkPhos  136<H>  12-21          Lactate Trend        CAPILLARY BLOOD GLUCOSE      POCT Blood Glucose.: 199 mg/dL (21 Dec 2022 11:21)          **************************Active Medications *******************************************  No Known Allergies      acetaminophen     Tablet .. 650 milliGRAM(s) Oral every 6 hours PRN  ALPRAZolam 0.25 milliGRAM(s) Oral two times a day  ALPRAZolam      calcium carbonate    500 mG (Tums) Chewable 1 Tablet(s) Chew two times a day PRN  chlorhexidine 2% Cloths 1 Application(s) Topical <User Schedule>  enoxaparin Injectable 30 milliGRAM(s) SubCutaneous every 24 hours  levothyroxine 50 MICROGram(s) Oral daily  loperamide 2 milliGRAM(s) Oral once  melatonin 5 milliGRAM(s) Oral at bedtime  multivitamin/minerals 1 Tablet(s) Oral daily  multivitamin/minerals/iron Oral Solution (CENTRUM) 15 milliLiter(s) Oral daily  oxycodone    5 mG/acetaminophen 325 mG 2 Tablet(s) Oral every 6 hours PRN  sertraline 50 milliGRAM(s) Oral daily  silver sulfADIAZINE 1% Cream 1 Application(s) Topical two times a day      ***************************************************  RADIOLOGY & ADDITIONAL TESTS:    Imaging Personally Reviewed:  [ ] YES  [ ] NO    HEALTH ISSUES - PROBLEM Dx:

## 2022-12-21 NOTE — PROGRESS NOTE ADULT - SUBJECTIVE AND OBJECTIVE BOX
----------Daily Progress Note----------    HISTORY OF PRESENT ILLNESS:  Patient is a 67y old Female who presents with a chief complaint of AMS (18 Dec 2022 17:26)    Currently admitted to medicine with the primary diagnosis of Sepsis with acute hypoxic respiratory failure       Today is hospital day 24d.     INTERVAL HOSPITAL COURSE / OVERNIGHT EVENTS:    No overnight events    Review of Systems: Otherwise unremarkable     <<<<<PAST MEDICAL & SURGICAL HISTORY>>>>>  Anxiety    Depression    Osteoporosis    Kidney cysts    Peripheral neuropathy    RA (rheumatoid arthritis)    OA (osteoarthritis)    Low back pain with radiation  s/p &quot;nerve cutting&quot; 2015      ALLERGIES  No Known Allergies      Home Medications:  ALPRAZolam 0.25 mg oral tablet: 1 tab(s) orally 2 times a day (29 Nov 2022 11:18)  Cardizem  mg/24 hours oral capsule, extended release: 1 cap(s) orally once a day (29 Nov 2022 11:18)  cilostazol 50 mg oral tablet: 1 tab(s) orally 2 times a day (29 Nov 2022 11:18)  gabapentin 600 mg oral tablet: 1 tab(s) orally 3 times a day (29 Nov 2022 11:18)  hydroCHLOROthiazide 25 mg oral tablet: 1 tab(s) orally 3 times a week (29 Nov 2022 11:18)  HYDROcodone-acetaminophen 10 mg-325 mg oral tablet: 1 tab(s) orally every 6 hours, As Needed - for severe pain (29 Nov 2022 11:18)  rosuvastatin 10 mg oral tablet: 1 tab(s) orally once a day (at bedtime) (29 Nov 2022 11:18)  sertraline 50 mg oral tablet: 1 tab(s) orally once a day (29 Nov 2022 11:18)        MEDICATIONS  STANDING MEDICATIONS  ALPRAZolam 0.25 milliGRAM(s) Oral two times a day  ALPRAZolam      chlorhexidine 2% Cloths 1 Application(s) Topical <User Schedule>  enoxaparin Injectable 30 milliGRAM(s) SubCutaneous every 24 hours  levothyroxine 50 MICROGram(s) Oral daily  melatonin 5 milliGRAM(s) Oral at bedtime  multivitamin/minerals 1 Tablet(s) Oral daily  multivitamin/minerals/iron Oral Solution (CENTRUM) 15 milliLiter(s) Oral daily  sertraline 50 milliGRAM(s) Oral daily  silver sulfADIAZINE 1% Cream 1 Application(s) Topical two times a day    PRN MEDICATIONS  acetaminophen     Tablet .. 650 milliGRAM(s) Oral every 6 hours PRN  calcium carbonate    500 mG (Tums) Chewable 1 Tablet(s) Chew two times a day PRN  oxycodone    5 mG/acetaminophen 325 mG 2 Tablet(s) Oral every 6 hours PRN    VITALS:  T(F): 96.5  HR: 81  BP: 112/59  RR: 19  SpO2: 98%    <<<<<LABS>>>>>                        9.6    6.25  )-----------( 148      ( 21 Dec 2022 08:29 )             28.8     12-21    138  |  101  |  27<H>  ----------------------------<  77  4.4   |  32  |  <0.5<L>    Ca    8.9      21 Dec 2022 08:29  Mg     1.4     12-21    TPro  5.1<L>  /  Alb  2.4<L>  /  TBili  <0.2  /  DBili  x   /  AST  11  /  ALT  16  /  AlkPhos  136<H>  12-21            [id number]        <<<<<RADIOLOGY>>>>>    < from: Xray Cinesophagram Swallow Function w/ Contrast (12.15.22 @ 10:45) >  PROCEDURE DATE:  12/15/2022          INTERPRETATION:  INDICATION: Dysphagia    PROCEDURE: Various consistencies of food were given to the patient and   swallowing was observed under fluoroscopy. This study was performed in   conjunction with the speech pathology department.    COMPARISON: None available    FINDINGS/  IMPRESSION:    Fluoroscopic assistance was provided by the radiologist to the speech   pathologist for amodified barium swallow study. Please refer to the   speech pathologist's report for a detailed description of the findings   and recommendations.    < end of copied text >      <<<<<PHYSICAL EXAM>>>>>  GENERAL: NAD, resting comfortably in bed; cachectic  PULMONARY: Clear to auscultation bilaterally. No rales, rhonchi, or wheezing.  CARDIOVASCULAR: Regular rate and rhythm, S1-S2, no murmurs  GASTROINTESTINAL: Soft, non-tender, non-distended, no guarding.  SKIN/EXTREMITIES: No LE edema b/l  NEUROLOGIC: AAOX3        -----------------------------------------------------------------------------------------------------------------------------------------------------------------------------------------------

## 2022-12-21 NOTE — PROGRESS NOTE ADULT - ASSESSMENT
66 y/o female with PMHx of anorexia, anxiety, depression, renal cysts, peripheral neuropathy, RA, OA, osteoporosis, low back pain, currently admitted after an unwitnessed fall with head strike and LOC. Originally admitted to MICU for possible septic shock and intubated for airway protection. Hypoglycemic (FS 24) on admission, s/p dextrose. s/p IVF and levophed. s/p broad-spectrum antibiotics. Started on Synthroid for possible myxedema coma. CT spine demonstrated fx of anterior and posterior tubercle of left C6 transverse foramen. No surgical intervention, as per NSGY. Downgraded to SDU then telemetry s/p extubation. Per radiology, patient cannot receive C-spine MRI for spinal stenosis at this time, given his spinal stimulator.   Patient transferred from telemetry to med/surg 12/15    # Malnutrition with  anorexia,    pt has history of eating disorder and laxative  abuse per prior notes,    now she has good appetite and is eating independently, she says she is hungry   - Diet advanced to soft and bite-sized with 1:1 feed and aspiration precautions, as per speech and swallow.  - Per speech and swallow assessment 12/15, patulous esophagus and poor bolus flow through proximal esophagus on FEES  - per GI, refused upper endoscopy, she does not want any endoscopy  - continue current diet; added  ensure TID  - C/O diarrhea today. Will follow.     # Left C6 transverse foraminal fracture, due to fall at home  - CT spine demonstrated fx of anterior and posterior tubercle of left C6 transverse foramen.   - Neurosurgery  consulted, but no surgical intervention offered  - cervical collar in place  - spinal stimulator not MRI compatible    # COVID-19 exposure -> infection, asymptomatic   - Patient contracted COVID from roommate; tested positive 12/10  - Patient is vaccinated and received 1 booster.  - Currently asymptomatic and stable on RA  - Currently on airborne isolation >> per infectious control, she can be off isolation      # Metabolic encephalopathy , possibly due to hypoglycemia ( present on admission ) resolved   # Chronic b/l lower extremity cellulitis  # Left ankle ulcer  - Seen by burn and podiatry.  - LE arterial duplex WNL on 11/10.  - CT lower extremity on  showin) Bilateral lower extremity swelling and marked skin thickening, left  greater than right, compatible with cellulitis. Multiple rounded locules of gas are also seen tracking within the soft  tissues along the predominantly posterior calf and anterolateral/dorsal ankle and foot likely related to known multiple abrasions, can not completely exclude early necrotizing fasciitis.  - completed abx course, Seen by Burn.   - Continue wound care with Silvadene.    #Acute Hypoxic respiratory failure due to Septic shock.   -Resolved, s/p intubation in the ED , extubated    # Crohn's disease  # Diarrhea, hx of laxative abuse ?   - C. diff PCR negative.   - GI PCR negative.  - Rectal tube placed in ICU for stage 2 sacral ulcer., dc rectal tube     # Hypothyroidism  - TSH 7.92 on admission -> 7.12.  - Continue synthroid 50mcg PO QD    # Anxiety  # Cachexia  - Seen by Psychiatry.  - Continue Zoloft 50mg PO QD.  - Continue Xanax 0.25mg BID.  - Per psychiatry, no contraindications for discharge, but needs outpatient psychiatry/anorexia follow-up.    # Rheumatoid arthritis  - f/u as outpatient with rheumatology    #Misc  - DVT Prophylaxis: lovenox  - GI Prophylaxis: none  - Diet: soft and bite sized; ensure TID  - Activity: IAT    #Progress Note Handoff  Pending (specify): aUTH   Family discussion:  Disposition: SNF

## 2022-12-21 NOTE — PROGRESS NOTE ADULT - ASSESSMENT
68 y/o female with PMHx of anorexia, anxiety, depression, renal cysts, peripheral neuropathy, RA, OA, osteoporosis, low back pain, currently admitted after an unwitnessed fall with head strike and LOC. Originally admitted to MICU for possible septic shock and intubated for airway protection. Hypoglycemic (FS 24) on admission, s/p dextrose. s/p IVF and levophed. s/p broad-spectrum antibiotics. Started on Synthroid for possible myxedema coma. CT spine demonstrated fx of anterior and posterior tubercle of left C6 transverse foramen. No surgical intervention, as per NSGY. Downgraded to SDU then telemetry s/p extubation. Per radiology, patient cannot receive C-spine MRI for spinal stenosis at this time, given his spinal stimulator.   Patient transferred from telemetry to med/surg 12/15    # cachexia, Dysphagia, malnutrition and anorexia, with hypoglycemia POA , pt has history of eating disorder and laxative  abuse per prior notes, now she has good appetite and is eating independently, she says she is hungry   - Diet advanced to soft and bite-sized with 1:1 feed and aspiration precautions, as per speech and swallow.  - Per speech and swallow assessment 12/15, patulous esophagus and poor bolus flow through proximal esophagus on FEES  - per GI, refused upper endoscopy, she does not want any endoscopy  - continue current diet; will add ensure TID  - psych f/u    # Left C6 transverse foraminal fracture, due to fall at home  - CT spine demonstrated fx of anterior and posterior tubercle of left C6 transverse foramen.   - Neurosurgery  consulted, but no surgical intervention offered  - cervical collar in place  - spinal stimulator not MRI compatible    # COVID-19 exposure -> infection, asymptomatic   - Patient contracted COVID from roommate; tested positive 12/10  - Patient is vaccinated and received 1 booster.  - Currently asymptomatic and stable on RA  - Continue airborne isolation. per infectious control, she can be off isolation      # Lower extremity cellulitis  # Toxic metabolic encephalopathy , possibly due to hypoglycemia ( present on admission ) resolved   # Chronic b/l lower extremity cellulitis  # Left ankle ulcer  - Seen by burn and podiatry.  - LE arterial duplex WNL on 11/10.  - CT lower extremity on  showin) Bilateral lower extremity swelling and marked skin thickening, left  greater than right, compatible with cellulitis. Multiple rounded locules of gas are also seen tracking within the soft  tissues along the predominantly posterior calf and anterolateral/dorsal ankle and foot likely related to known multiple abrasions, can not completely exclude early necrotizing fasciitis.  - completed abx course, Seen by Burn.   - Continue wound care with Silvadene.    #Acute Hypoxic respiratory failure due to Septic shock.   -Resolved, s/p intubation in the ED , extubated    # Crohn's disease  # Diarrhea, hx of laxative abuse ?   - C. diff PCR negative.   - GI PCR negative.  - Rectal tube placed in ICU for stage 2 sacral ulcer., dc rectal tube     # Hypothyroidism  - TSH 7.92 on admission -> 7.12.  - Continue synthroid 50mcg PO QD      # Anxiety  # Cachexia  - Seen by Psychiatry.  - Continue Zoloft 50mg PO QD.  - Continue Xanax 0.25mg BID.  - Per psychiatry, no contraindications for discharge, but needs outpatient psychiatry/anorexia follow-up.    # Rheumatoid arthritis  - f/u as outpatient with rheumatology      #Misc  - DVT Prophylaxis: lovenox  - GI Prophylaxis: none  - Diet: soft and bite sized; ensure TID  - Activity: IAT

## 2022-12-22 NOTE — PHYSICAL EXAM
[New] : new [Size%: ______] : Size: [unfilled]% [Infected?] : Infected: Yes [3] : 3 out of 10 [Abnormal] : abnormal [Large] : medium [] : yes [de-identified] : The bilateral lower leg wounds measure 10x5cm and 10x5cm  and have open wounds.  Excisional debridement with scissors was performed on devitalized tissue down  to and including subcutaneous tissue bilateral lower legs. There is a new wound left plantar foot at [facility] and measures  4x2cm.  A wound culture was obtained.  Recommend an  xray to r/o osteomylitis.  The patient was instructed to clean  the wound with soap and water. Continue local wound.  Follow up 2 - 4  weeks. \par \par  [TWNoteComboBox1] : xeroform [TWNoteComboBox2] : False

## 2022-12-22 NOTE — PROGRESS NOTE ADULT - SUBJECTIVE AND OBJECTIVE BOX
SABALEROYANA LILIA  67y  Female      Patient is a 67y old  Female who presents with a chief complaint of AMS.       INTERVAL HPI/OVERNIGHT EVENTS:      ******************************* REVIEW OF SYSTEMS:**********************************************    All other review of systems negative    *********************** VITALS ******************************************    T(F): 97.7 (12-22-22 @ 04:47)  HR: 81 (12-22-22 @ 04:47) (81 - 89)  BP: 108/50 (12-22-22 @ 04:47) (91/50 - 109/63)  RR: 18 (12-22-22 @ 04:47) (18 - 18)  SpO2: 98% (12-22-22 @ 04:47) (98% - 100%)            ******************************** PHYSICAL EXAM:**************************************************  GENERAL: NAD    PSYCH: no agitation, baseline mentation  HEENT:     NERVOUS SYSTEM:  Alert & Oriented X3,  PULMONARY: ODALYS, CTA    CARDIOVASCULAR: S1S2 RRR    GI: Soft, NT, ND; BS present.    EXTREMITIES: Left ankle ulcer     LYMPH: No lymphadenopathy noted    SKIN: No rashes or lesions      **************************** LABS *******************************************************                          9.3    5.98  )-----------( 173      ( 22 Dec 2022 07:44 )             27.4     12-22    133<L>  |  95<L>  |  20  ----------------------------<  102<H>  4.5   |  33<H>  |  <0.5<L>    Ca    9.2      22 Dec 2022 07:44  Mg     1.4     12-22    TPro  5.1<L>  /  Alb  2.6<L>  /  TBili  <0.2  /  DBili  x   /  AST  12  /  ALT  15  /  AlkPhos  136<H>  12-22          Lactate Trend        CAPILLARY BLOOD GLUCOSE      POCT Blood Glucose.: 141 mg/dL (22 Dec 2022 11:20)          **************************Active Medications *******************************************  No Known Allergies      acetaminophen     Tablet .. 650 milliGRAM(s) Oral every 6 hours PRN  ALPRAZolam 0.25 milliGRAM(s) Oral two times a day  ALPRAZolam      calcium carbonate    500 mG (Tums) Chewable 1 Tablet(s) Chew two times a day PRN  chlorhexidine 2% Cloths 1 Application(s) Topical <User Schedule>  enoxaparin Injectable 30 milliGRAM(s) SubCutaneous every 24 hours  levothyroxine 50 MICROGram(s) Oral daily  loperamide 2 milliGRAM(s) Oral daily PRN  melatonin 5 milliGRAM(s) Oral at bedtime  multivitamin/minerals 1 Tablet(s) Oral daily  multivitamin/minerals/iron Oral Solution (CENTRUM) 15 milliLiter(s) Oral daily  oxycodone    5 mG/acetaminophen 325 mG 2 Tablet(s) Oral every 6 hours PRN  sertraline 50 milliGRAM(s) Oral daily  silver sulfADIAZINE 1% Cream 1 Application(s) Topical two times a day      ***************************************************  RADIOLOGY & ADDITIONAL TESTS:    Imaging Personally Reviewed:  [ ] YES  [ ] NO    HEALTH ISSUES - PROBLEM Dx:

## 2022-12-22 NOTE — DIETITIAN INITIAL EVALUATION ADULT - OTHER CALCULATIONS
Increased metabolic demand in consideration of pressure injury, BMI <19 with muscle wasting/subcutaneous fat loss.

## 2022-12-22 NOTE — DIETITIAN INITIAL EVALUATION ADULT - ADD RECOMMEND
Recommendation:  (1) Continue providing Soft & Bite Sized diet as per SLP recommendations.   (2) Add Ensure Plant three times daily 540 kcal, 60 g protein.  (3) Order MVI q24hrs  (4) Add an appetite stimulant.  (5) Provide Banatrol q12hrs while diarrhea persists.

## 2022-12-22 NOTE — PROGRESS NOTE ADULT - SUBJECTIVE AND OBJECTIVE BOX
----------Daily Progress Note----------    HISTORY OF PRESENT ILLNESS:  Patient is a 67y old Female who presents with a chief complaint of AMS (18 Dec 2022 17:26)    Currently admitted to medicine with the primary diagnosis of Sepsis with acute hypoxic respiratory failure       Today is hospital day 25d.     INTERVAL HOSPITAL COURSE / OVERNIGHT EVENTS:    No overnight events    Review of Systems: Otherwise unremarkable     <<<<<PAST MEDICAL & SURGICAL HISTORY>>>>>  Anxiety    Depression    Osteoporosis    Kidney cysts    Peripheral neuropathy    RA (rheumatoid arthritis)    OA (osteoarthritis)    Low back pain with radiation  s/p &quot;nerve cutting&quot; 2015      ALLERGIES  No Known Allergies      Home Medications:  ALPRAZolam 0.25 mg oral tablet: 1 tab(s) orally 2 times a day (29 Nov 2022 11:18)  Cardizem  mg/24 hours oral capsule, extended release: 1 cap(s) orally once a day (29 Nov 2022 11:18)  cilostazol 50 mg oral tablet: 1 tab(s) orally 2 times a day (29 Nov 2022 11:18)  gabapentin 600 mg oral tablet: 1 tab(s) orally 3 times a day (29 Nov 2022 11:18)  hydroCHLOROthiazide 25 mg oral tablet: 1 tab(s) orally 3 times a week (29 Nov 2022 11:18)  HYDROcodone-acetaminophen 10 mg-325 mg oral tablet: 1 tab(s) orally every 6 hours, As Needed - for severe pain (29 Nov 2022 11:18)  rosuvastatin 10 mg oral tablet: 1 tab(s) orally once a day (at bedtime) (29 Nov 2022 11:18)  sertraline 50 mg oral tablet: 1 tab(s) orally once a day (29 Nov 2022 11:18)        MEDICATIONS  STANDING MEDICATIONS  ALPRAZolam 0.25 milliGRAM(s) Oral two times a day  ALPRAZolam      chlorhexidine 2% Cloths 1 Application(s) Topical <User Schedule>  enoxaparin Injectable 30 milliGRAM(s) SubCutaneous every 24 hours  levothyroxine 50 MICROGram(s) Oral daily  melatonin 5 milliGRAM(s) Oral at bedtime  multivitamin/minerals 1 Tablet(s) Oral daily  multivitamin/minerals/iron Oral Solution (CENTRUM) 15 milliLiter(s) Oral daily  sertraline 50 milliGRAM(s) Oral daily  silver sulfADIAZINE 1% Cream 1 Application(s) Topical two times a day    PRN MEDICATIONS  acetaminophen     Tablet .. 650 milliGRAM(s) Oral every 6 hours PRN  calcium carbonate    500 mG (Tums) Chewable 1 Tablet(s) Chew two times a day PRN  loperamide 2 milliGRAM(s) Oral daily PRN  oxycodone    5 mG/acetaminophen 325 mG 2 Tablet(s) Oral every 6 hours PRN    VITALS:  T(F): 97.3  HR: 80  BP: 91/53  RR: 18  SpO2: 99%    <<<<<LABS>>>>>                        9.3    5.98  )-----------( 173      ( 22 Dec 2022 07:44 )             27.4     12-22    133<L>  |  95<L>  |  20  ----------------------------<  102<H>  4.5   |  33<H>  |  <0.5<L>    Ca    9.2      22 Dec 2022 07:44  Mg     1.4     12-22    TPro  5.1<L>  /  Alb  2.6<L>  /  TBili  <0.2  /  DBili  x   /  AST  12  /  ALT  15  /  AlkPhos  136<H>  12-22            [id number]        <<<<<RADIOLOGY>>>>>    < from: Xray Cinesophagram Swallow Function w/ Contrast (12.15.22 @ 10:45) >  PROCEDURE DATE:  12/15/2022          INTERPRETATION:  INDICATION: Dysphagia    PROCEDURE: Various consistencies of food were given to the patient and   swallowing was observed under fluoroscopy. This study was performed in   conjunction with the speech pathology department.    COMPARISON: None available    FINDINGS/  IMPRESSION:    Fluoroscopic assistance was provided by the radiologist to the speech   pathologist for amodified barium swallow study. Please refer to the   speech pathologist's report for a detailed description of the findings   and recommendations.    < end of copied text >      <<<<<PHYSICAL EXAM>>>>>  GENERAL: NAD, resting comfortably in bed; cachectic  PULMONARY: Clear to auscultation bilaterally. No rales, rhonchi, or wheezing.  CARDIOVASCULAR: Regular rate and rhythm, S1-S2, no murmurs  GASTROINTESTINAL: Soft, non-tender, non-distended, no guarding.  SKIN/EXTREMITIES: No LE edema b/l  NEUROLOGIC: AAOX3    -----------------------------------------------------------------------------------------------------------------------------------------------------------------------------------------------

## 2022-12-22 NOTE — ASSESSMENT
[Wound Care] : wound care [FreeTextEntry1] : The bilateral lower leg wounds measure 10x5cm and 10x5cm  and have open wounds.  Excisional debridement with scissors was performed on devitalized tissue down  to and including subcutaneous tissue bilateral lower legs. There is a new wound left plantar foot at [facility] and measures  4x2cm.  A wound culture was obtained.  Recommend an  xray to r/o osteomylitis.  The patient was instructed to clean  the wound with soap and water. Continue local wound.  Follow up 2 - 4  weeks. \par

## 2022-12-22 NOTE — DIETITIAN INITIAL EVALUATION ADULT - NSFNSGIIOFT_GEN_A_CORE
Reviewed daily wts:  44.5 kg (11/28), 47 kg (11/29), 52 kg (11/30), 54.6 kg (12/1), 52 kg (12/2), 50 kg (12/3), 51.6 kg (12/4), 50.1 kg (12/5), 45.2 kg (12/7), 47 kg (12/8), 45 kg (12/11, 12/12)    Latest dosing wt 43.6 kg (12/15)

## 2022-12-22 NOTE — DIETITIAN INITIAL EVALUATION ADULT - REASON INDICATOR FOR ASSESSMENT
RD c/s for poor po intake, cachectic, low BMI, palatous esophagus    Pt was previously followed by Nutrition Support Team, where enteral nutrition recommendations were provided while pt was intubated.

## 2022-12-22 NOTE — DIETITIAN INITIAL EVALUATION ADULT - OTHER INFO
Pertinent Medical Information: Pt found down by  +HT, ?LOC, -AC. Per chart, pt noted to have a long-standing h/o anorexia for several years per . Does not induce vomiting, but frequently uses laxatives for weight loss. Has never consistently followed w/ psychologist/psychiatrist. After recent discharge from hospital two weeks ago, anorexia acutely worsened and pt has had minimal caloric intake. Pt was admitted with the primary diagnosis of sepsis with acute hypoxic respiratory failure. Acute Hypoxic respiratory failure due to septic shock - noted resolved, s/p intubation in the ED 11/29, extubated. Cachexia, Dysphagia, malnutrition and anorexia, with hypoglycemia POA noted. Per GI, refused upper endoscopy, she does not want any endoscopy noted. Crohn's disease noted. Diarrhea, hx of laxative abuse ? noted; C.diff negative.    PMH includes anorexia, anxiety, depression, kidney cysts, peripheral neuropathy, RA, OA, osteoporosis, low back pain s/p unwitnessed fall. Pertinent Medical Information: Pt found down by  +HT, ?LOC, -AC. Per chart, pt noted to have a long-standing h/o anorexia for several years per . Does not induce vomiting, but frequently uses laxatives for weight loss. Has never consistently followed w/ psychologist/psychiatrist. After recent discharge from hospital two weeks ago, anorexia acutely worsened and pt has had minimal caloric intake. Pt was admitted with the primary diagnosis of sepsis with acute hypoxic respiratory failure. Acute Hypoxic respiratory failure due to septic shock - noted resolved, s/p intubation in the ED 11/29, extubated. Cachexia, Dysphagia, malnutrition and anorexia, with hypoglycemia POA noted. Per GI, refused upper endoscopy, she does not want any endoscopy noted. Crohn's disease noted. Diarrhea, hx of laxative abuse ? noted; C.diff negative.    Pt followed by SLP this admit. Per 12/15 VFSS/MBS assessment, moderate pharyngeal dysphagia for thin and mildly thick liquids; mild pharyngeal dysphagia for puree and soft and bite sized consistencies. Recommends soft and bite sized, thin liquids; allow for swallow between intakes; alternate food with liquid; crush medication (when feasible); maintain upright posture during/after eating for 30 mins; no straws; oral hygiene; position upright (90 degrees); small sips/bites; liquids via tsp sip, intermittent throat clear and dry swallow, frequent cues/help required; 1:1 for carryover of swallow strategies.    PMH includes anorexia, anxiety, depression, kidney cysts, peripheral neuropathy, RA, OA, osteoporosis, low back pain s/p unwitnessed fall.

## 2022-12-22 NOTE — DIETITIAN INITIAL EVALUATION ADULT - PERTINENT MEDS FT
MEDICATIONS  (STANDING):  ALPRAZolam 0.25 milliGRAM(s) Oral two times a day  ALPRAZolam      chlorhexidine 2% Cloths 1 Application(s) Topical <User Schedule>  enoxaparin Injectable 30 milliGRAM(s) SubCutaneous every 24 hours  levothyroxine 50 MICROGram(s) Oral daily  melatonin 5 milliGRAM(s) Oral at bedtime  multivitamin/minerals 1 Tablet(s) Oral daily  multivitamin/minerals/iron Oral Solution (CENTRUM) 15 milliLiter(s) Oral daily  sertraline 50 milliGRAM(s) Oral daily  silver sulfADIAZINE 1% Cream 1 Application(s) Topical two times a day    MEDICATIONS  (PRN):  acetaminophen     Tablet .. 650 milliGRAM(s) Oral every 6 hours PRN Mild Pain (1 - 3), Moderate Pain (4 - 6)  calcium carbonate    500 mG (Tums) Chewable 1 Tablet(s) Chew two times a day PRN Heartburn  loperamide 2 milliGRAM(s) Oral daily PRN Diarrhea  oxycodone    5 mG/acetaminophen 325 mG 2 Tablet(s) Oral every 6 hours PRN Severe Pain (7 - 10)

## 2022-12-22 NOTE — PROGRESS NOTE ADULT - ASSESSMENT
66 y/o female with PMHx of anorexia, anxiety, depression, renal cysts, peripheral neuropathy, RA, OA, osteoporosis, low back pain, currently admitted after an unwitnessed fall with head strike and LOC. Originally admitted to MICU for possible septic shock and intubated for airway protection. Hypoglycemic (FS 24) on admission, s/p dextrose. s/p IVF and levophed. s/p broad-spectrum antibiotics. Started on Synthroid for possible myxedema coma. CT spine demonstrated fx of anterior and posterior tubercle of left C6 transverse foramen. No surgical intervention, as per NSGY. Downgraded to SDU then telemetry s/p extubation. Per radiology, patient cannot receive C-spine MRI for spinal stenosis at this time, given his spinal stimulator.   Patient transferred from telemetry to med/surg 12/15    # cachexia, Dysphagia, malnutrition and anorexia, with hypoglycemia POA , pt has history of eating disorder and laxative  abuse per prior notes, now she has good appetite and is eating independently, she says she is hungry   - Diet advanced to soft and bite-sized with 1:1 feed and aspiration precautions, as per speech and swallow.  - Per speech and swallow assessment 12/15, patulous esophagus and poor bolus flow through proximal esophagus on FEES  - per GI, refused upper endoscopy, she does not want any endoscopy  - continue current diet; will add ensure TID  - psych f/u    # Left C6 transverse foraminal fracture, due to fall at home  - CT spine demonstrated fx of anterior and posterior tubercle of left C6 transverse foramen.   - Neurosurgery  consulted, but no surgical intervention offered  - cervical collar in place  - spinal stimulator not MRI compatible    # COVID-19 exposure -> infection, asymptomatic   - Patient contracted COVID from roommate; tested positive 12/10  - Patient is vaccinated and received 1 booster.  - Currently asymptomatic and stable on RA  - Continue airborne isolation. per infectious control, she can be off isolation      # Lower extremity cellulitis  # Toxic metabolic encephalopathy , possibly due to hypoglycemia ( present on admission ) resolved   # Chronic b/l lower extremity cellulitis  # Left ankle ulcer  - Seen by burn and podiatry.  - LE arterial duplex WNL on 11/10.  - CT lower extremity on  showin) Bilateral lower extremity swelling and marked skin thickening, left  greater than right, compatible with cellulitis. Multiple rounded locules of gas are also seen tracking within the soft  tissues along the predominantly posterior calf and anterolateral/dorsal ankle and foot likely related to known multiple abrasions, can not completely exclude early necrotizing fasciitis.  - completed abx course, Seen by Burn.   - Continue wound care with Silvadene.    #Acute Hypoxic respiratory failure due to Septic shock.   -Resolved, s/p intubation in the ED , extubated    # Crohn's disease  # Diarrhea, hx of laxative abuse ?   - C. diff PCR negative.   - GI PCR negative.  - Rectal tube placed in ICU for stage 2 sacral ulcer., dc rectal tube     # Hypothyroidism  - TSH 7.92 on admission -> 7.12.  - Continue synthroid 50mcg PO QD      # Anxiety  # Cachexia  - Seen by Psychiatry.  - Continue Zoloft 50mg PO QD.  - Continue Xanax 0.25mg BID.  - Per psychiatry, no contraindications for discharge, but needs outpatient psychiatry/anorexia follow-up.    # Rheumatoid arthritis  - f/u as outpatient with rheumatology      #Misc  - DVT Prophylaxis: lovenox  - GI Prophylaxis: none  - Diet: soft and bite sized; ensure TID  - Activity: IAT

## 2022-12-22 NOTE — DIETITIAN INITIAL EVALUATION ADULT - ORAL INTAKE PTA/DIET HISTORY
Pt declined to participate in nutrition interview at this time. No family at bedside to provide nutrition hx. Unable to contact family at this time.  Reviewed previous admit wt trends:    54.4 kg (1/7/2019)  60 kg (8/29/2020)  59 kg (1/16/2022)  59 kg (3/16/2022)  49.9 kg (4/4/2022)  48.1 kg (4/27/2022)  49 kg (5/12/2022)  49 kg (11/11/2022)    Current wt noted to be 43.6 kg (12/15). Compared to wt from 1 month ago 49 kg (11/11/2022), wt change is 11% (unintentional, severe).

## 2022-12-22 NOTE — DIETITIAN NUTRITION RISK NOTIFICATION - TREATMENT: THE FOLLOWING DIET HAS BEEN RECOMMENDED
Diet, Soft and Bite Sized:   Lacto-Ovo Veg (Accepts Milk Prod., Eggs) (12-20-22 @ 10:44) [Active]

## 2022-12-22 NOTE — DIETITIAN INITIAL EVALUATION ADULT - NUTRITIONGOAL OUTCOME1
Pt to demonstrate improved tolerance to diet order, with at least 50% po intake of meals & po supplements achieved over next 4 days.    Pt at high nutrition risk, RD to follow-up in 2-4 days. Pt at high nutrition risk.

## 2022-12-22 NOTE — PROGRESS NOTE ADULT - ASSESSMENT
68 y/o female with PMHx of anorexia, anxiety, depression, renal cysts, peripheral neuropathy, RA, OA, osteoporosis, low back pain, currently admitted after an unwitnessed fall with head strike and LOC. Originally admitted to MICU for possible septic shock and intubated for airway protection. Hypoglycemic (FS 24) on admission, s/p dextrose. s/p IVF and levophed. s/p broad-spectrum antibiotics. Started on Synthroid for possible myxedema coma. CT spine demonstrated fx of anterior and posterior tubercle of left C6 transverse foramen. No surgical intervention, as per NSGY. Downgraded to SDU then telemetry s/p extubation. Per radiology, patient cannot receive C-spine MRI for spinal stenosis at this time, given his spinal stimulator.   Patient transferred from telemetry to med/surg 12/15    # Malnutrition with  anorexia,    pt has history of eating disorder and laxative  abuse per prior notes,    now she has good appetite and is eating independently, she says she is hungry   - Diet advanced to soft and bite-sized with 1:1 feed and aspiration precautions, as per speech and swallow.  - Per speech and swallow assessment 12/15, patulous esophagus and poor bolus flow through proximal esophagus on FEES  - per GI, refused upper endoscopy, she does not want any endoscopy  - continue current diet; added  ensure TID  - C/O diarrhea >> Will get GI PCR, can not r/o Crohn's flare.    if Neg > Imodium, trial of prednisone.     # Left C6 transverse foraminal fracture, due to fall at home  - CT spine demonstrated fx of anterior and posterior tubercle of left C6 transverse foramen.   - Neurosurgery  consulted, but no surgical intervention offered  - cervical collar in place  - spinal stimulator not MRI compatible    # COVID-19 exposure -> infection, asymptomatic   - Patient contracted COVID from roommate; tested positive 12/10  - Patient is vaccinated and received 1 booster.  - Currently asymptomatic and stable on RA  - Currently on airborne isolation >> per infectious control, she can be off isolation      # Metabolic encephalopathy , possibly due to hypoglycemia ( present on admission ) resolved   # Chronic b/l lower extremity cellulitis  # Left ankle ulcer  - Seen by burn and podiatry.  - LE arterial duplex WNL on 11/10.  - CT lower extremity on  showin) Bilateral lower extremity swelling and marked skin thickening, left  greater than right, compatible with cellulitis. Multiple rounded locules of gas are also seen tracking within the soft  tissues along the predominantly posterior calf and anterolateral/dorsal ankle and foot likely related to known multiple abrasions, can not completely exclude early necrotizing fasciitis.  - completed abx course, Seen by Burn.   - Continue wound care with Silvadene.    #Acute Hypoxic respiratory failure due to Septic shock.   -Resolved, s/p intubation in the ED , extubated    # Crohn's disease  # Diarrhea, hx of laxative abuse ?   - C. diff PCR negative   - GI PCR negative.   - Rectal tube placed in ICU for stage 2 sacral ulcer., dc rectal tube     # Hypothyroidism  - TSH 7.92 on admission -> 7.12.  - Continue synthroid 50mcg PO QD    # Anxiety  # Cachexia  - Seen by Psychiatry.  - Continue Zoloft 50mg PO QD.  - Continue Xanax 0.25mg BID.  - Per psychiatry, no contraindications for discharge, but needs outpatient psychiatry/anorexia follow-up.    # Rheumatoid arthritis  - f/u as outpatient with rheumatology    #Misc  - DVT Prophylaxis: lovenox  - GI Prophylaxis: none  - Diet: soft and bite sized; ensure TID  - Activity: IAT    #Progress Note Handoff  Pending (specify): aUTH   Family discussion:  Disposition: SNF

## 2022-12-22 NOTE — DIETITIAN INITIAL EVALUATION ADULT - PERTINENT LABORATORY DATA
12-22    133<L>  |  95<L>  |  20  ----------------------------<  102<H>  4.5   |  33<H>  |  <0.5<L>    Ca    9.2      22 Dec 2022 07:44  Mg     1.4     12-22    TPro  5.1<L>  /  Alb  2.6<L>  /  TBili  <0.2  /  DBili  x   /  AST  12  /  ALT  15  /  AlkPhos  136<H>  12-22  POCT Blood Glucose.: 239 mg/dL (12-22-22 @ 17:08)  A1C with Estimated Average Glucose Result: 5.4 % (11-28-22 @ 04:30)  A1C with Estimated Average Glucose Result: 5.1 % (11-10-22 @ 17:16)

## 2022-12-22 NOTE — HISTORY OF PRESENT ILLNESS
[Did you have an operation on your burn/wound injury?] : Did you have an operation on your burn/wound injury? No [Did this injury occur on the job?] : Did this injury occur on the job? No [de-identified] : 3/31/22 [de-identified] : home [de-identified] : open wounds and edema bilateral lower legs post fall from chair with wound left knee [de-identified] : new blisters legs and infected wound plantar left lateral foot

## 2022-12-22 NOTE — DIETITIAN INITIAL EVALUATION ADULT - NS FNS DIET ORDER
Soft & Bite Sized diet with Lacto-Ovo Vegetarian diet. Per RN, pt with poor po intake in setting of poor appetite & persistent diarrhea. Consuming 25% of meals on average. Soft & Bite Sized diet with Lacto-Ovo Vegetarian diet. Per RN, pt with poor po intake in setting of poor appetite & persistent diarrhea. Consuming 25% of meals on average.  Calorie count documented from 12/16 - 12/18, results as follows:  "DAY 1  Not documented    DAY 2  Breakfast: 100% eggs, 100% juice  Lunch: 50% white rice with peas + green beans, 100% juice  Dinner: not documented  Total: 453.5 kcal, 17.1g protein    DAY 3  Breakfast: 0% of meal  Lunch: 100% pudding, 100% orange juice  Dinner: 0% of meal  Total: 210 kcal, 4g protein "    Pt meeting <50% of estimated energy needs based on calorie count documentation + po intake trends this admit.

## 2022-12-23 NOTE — PROGRESS NOTE ADULT - SUBJECTIVE AND OBJECTIVE BOX
SABASIERRA HARPERPADMINI  67y  Female      Patient is a 67y old  Female who presents with a chief complaint of ACUTE RESPIRATORY FAILURE WITH HYPERCAPNIA;       INTERVAL HPI/OVERNIGHT EVENTS:      ******************************* REVIEW OF SYSTEMS:**********************************************    All other review of systems negative    *********************** VITALS ******************************************    T(F): 97 (12-23-22 @ 04:54)  HR: 78 (12-23-22 @ 04:54) (78 - 86)  BP: 100/50 (12-23-22 @ 04:54) (91/53 - 107/58)  RR: 18 (12-23-22 @ 04:54) (18 - 18)  SpO2: 99% (12-23-22 @ 04:54) (99% - 99%)            ******************************** PHYSICAL EXAM:**************************************************  GENERAL: NAD    PSYCH: no agitation, baseline mentation  HEENT: Cachectic     NERVOUS SYSTEM:  Alert & Oriented X3,    PULMONARY: ODALYS, CTA    CARDIOVASCULAR: S1S2 RRR    GI: Soft, NT, ND; BS present.    EXTREMITIES:  2+ Peripheral Pulses, No clubbing, cyanosis, or edema    LYMPH: No lymphadenopathy noted    SKIN: No rashes or lesions      **************************** LABS *******************************************************                          8.5    4.63  )-----------( 190      ( 23 Dec 2022 06:35 )             25.7     12-23    136  |  95<L>  |  17  ----------------------------<  113<H>  4.1   |  31  |  <0.5<L>    Ca    8.8      23 Dec 2022 06:35  Mg     1.6     12-23    TPro  4.7<L>  /  Alb  2.5<L>  /  TBili  <0.2  /  DBili  x   /  AST  9   /  ALT  12  /  AlkPhos  122<H>  12-23          Lactate Trend        CAPILLARY BLOOD GLUCOSE      POCT Blood Glucose.: 121 mg/dL (23 Dec 2022 08:35)          **************************Active Medications *******************************************  No Known Allergies      acetaminophen     Tablet .. 650 milliGRAM(s) Oral every 6 hours PRN  ALPRAZolam 0.25 milliGRAM(s) Oral two times a day  ALPRAZolam      calcium carbonate    500 mG (Tums) Chewable 1 Tablet(s) Chew two times a day PRN  chlorhexidine 2% Cloths 1 Application(s) Topical <User Schedule>  enoxaparin Injectable 30 milliGRAM(s) SubCutaneous every 24 hours  levothyroxine 50 MICROGram(s) Oral daily  loperamide 2 milliGRAM(s) Oral daily PRN  melatonin 5 milliGRAM(s) Oral at bedtime  multivitamin/minerals 1 Tablet(s) Oral daily  multivitamin/minerals/iron Oral Solution (CENTRUM) 15 milliLiter(s) Oral daily  oxycodone    5 mG/acetaminophen 325 mG 2 Tablet(s) Oral every 6 hours PRN  sertraline 50 milliGRAM(s) Oral daily  silver sulfADIAZINE 1% Cream 1 Application(s) Topical two times a day      ***************************************************  RADIOLOGY & ADDITIONAL TESTS:    Imaging Personally Reviewed:  [ ] YES  [ ] NO    HEALTH ISSUES - PROBLEM Dx:

## 2022-12-23 NOTE — PROGRESS NOTE ADULT - ASSESSMENT
66 y/o female with PMHx of anorexia, anxiety, depression, renal cysts, peripheral neuropathy, RA, OA, osteoporosis, low back pain, currently admitted after an unwitnessed fall with head strike and LOC. Originally admitted to MICU for possible septic shock and intubated for airway protection. Hypoglycemic (FS 24) on admission, s/p dextrose. s/p IVF and levophed. s/p broad-spectrum antibiotics. Started on Synthroid for possible myxedema coma. CT spine demonstrated fx of anterior and posterior tubercle of left C6 transverse foramen. No surgical intervention, as per NSGY. Downgraded to SDU then telemetry s/p extubation. Per radiology, patient cannot receive C-spine MRI for spinal stenosis at this time, given his spinal stimulator.   Patient transferred from telemetry to med/surg 12/15    # cachexia, Dysphagia, malnutrition and anorexia, with hypoglycemia POA , pt has history of eating disorder and laxative  abuse per prior notes, now she has good appetite and is eating independently, she says she is hungry   - Diet advanced to soft and bite-sized with 1:1 feed and aspiration precautions, as per speech and swallow.  - Per speech and swallow assessment 12/15, patulous esophagus and poor bolus flow through proximal esophagus on FEES  - per GI, refused upper endoscopy, she does not want any endoscopy  - continue current diet with ensure TID  - outpatient psych f/u for psychotherapy    # Left C6 transverse foraminal fracture, due to fall at home  - CT spine demonstrated fx of anterior and posterior tubercle of left C6 transverse foramen.   - Neurosurgery  consulted, but no surgical intervention offered  - cervical collar in place  - spinal stimulator not MRI compatible    # COVID-19 exposure -> infection, asymptomatic   - Patient contracted COVID from roommate; tested positive 12/10  - Patient is vaccinated and received 1 booster.  - Currently asymptomatic and stable on RA  - Continue airborne isolation. per infectious control, she can be off isolation      # Lower extremity cellulitis  # Toxic metabolic encephalopathy , possibly due to hypoglycemia ( present on admission ) resolved   # Chronic b/l lower extremity cellulitis  # Left ankle ulcer  - Seen by burn and podiatry.  - LE arterial duplex WNL on 11/10.  - CT lower extremity on  showin) Bilateral lower extremity swelling and marked skin thickening, left  greater than right, compatible with cellulitis. Multiple rounded locules of gas are also seen tracking within the soft  tissues along the predominantly posterior calf and anterolateral/dorsal ankle and foot likely related to known multiple abrasions, can not completely exclude early necrotizing fasciitis.  - completed abx course, Seen by Burn.   - Continue wound care with Silvadene.    #Acute Hypoxic respiratory failure due to Septic shock.   -Resolved, s/p intubation in the ED , extubated    # Crohn's disease  # Diarrhea, hx of laxative abuse ?   - C. diff PCR negative.   - GI PCR negative.  - Rectal tube placed in ICU for stage 2 sacral ulcer., dc rectal tube     # Hypothyroidism  - TSH 7.92 on admission -> 7.12.  - Continue synthroid 50mcg PO QD      # Anxiety  # Cachexia  - Seen by Psychiatry.  - Continue Zoloft 50mg PO QD.  - Continue Xanax 0.25mg BID.  - Per psychiatry, no contraindications for discharge, but needs outpatient psychiatry/anorexia follow-up.    # Rheumatoid arthritis  - f/u as outpatient with rheumatology      #Misc  - DVT Prophylaxis: lovenox  - GI Prophylaxis: none  - Diet: soft and bite sized; ensure TID  - Activity: IAT

## 2022-12-23 NOTE — DISCHARGE NOTE NURSING/CASE MANAGEMENT/SOCIAL WORK - PATIENT PORTAL LINK FT
You can access the FollowMyHealth Patient Portal offered by [facility] by registering at the following website: [email or web address]. By joining [employer]’s FollowMyHealth portal, you will also be able to view your health information using other applications (apps) compatible with our system.
You can access the FollowMyHealth Patient Portal offered by [facility] by registering at the following website: [email or web address]. By joining [employer]’s FollowMyHealth portal, you will also be able to view your health information using other applications (apps) compatible with our system.

## 2022-12-23 NOTE — PROGRESS NOTE ADULT - ASSESSMENT
66 y/o female with PMHx of anorexia, anxiety, depression, renal cysts, peripheral neuropathy, RA, OA, osteoporosis, low back pain, currently admitted after an unwitnessed fall with head strike and LOC. Originally admitted to MICU for possible septic shock and intubated for airway protection. Hypoglycemic (FS 24) on admission, s/p dextrose. s/p IVF and levophed. s/p broad-spectrum antibiotics. Started on Synthroid for possible myxedema coma. CT spine demonstrated fx of anterior and posterior tubercle of left C6 transverse foramen. No surgical intervention, as per NSGY. Downgraded to SDU then telemetry s/p extubation. Per radiology, patient cannot receive C-spine MRI for spinal stenosis at this time, given his spinal stimulator.   Patient transferred from telemetry to med/surg 12/15    # Malnutrition with  anorexia,    pt has history of eating disorder and laxative  abuse per prior notes,    now she has good appetite and is eating independently, she says she is hungry   - Diet advanced to soft and bite-sized with 1:1 feed and aspiration precautions, as per speech and swallow.  - Per speech and swallow assessment 12/15, patulous esophagus and poor bolus flow through proximal esophagus on FEES  - per GI, refused upper endoscopy, she does not want any endoscopy  - continue current diet; added  ensure TID  - C/O diarrhea >> repeat  GI PCR > Neg     > Imodium PRN , trial of prednisone.     # Left C6 transverse foraminal fracture, due to fall at home  - CT spine demonstrated fx of anterior and posterior tubercle of left C6 transverse foramen.   - Neurosurgery  consulted, but no surgical intervention offered  - cervical collar in place  - spinal stimulator not MRI compatible    # COVID-19 exposure -> infection, asymptomatic   - Patient contracted COVID from roommate; tested positive 12/10  - Patient is vaccinated and received 1 booster.  - Currently asymptomatic and stable on RA  - Currently on airborne isolation >> per infectious control, she can be off isolation      # Metabolic encephalopathy , possibly due to hypoglycemia ( present on admission ) resolved   # Chronic b/l lower extremity cellulitis  # Left ankle ulcer  - Seen by burn and podiatry.  - LE arterial duplex WNL on 11/10.  - CT lower extremity on  showin) Bilateral lower extremity swelling and marked skin thickening, left  greater than right, compatible with cellulitis. Multiple rounded locules of gas are also seen tracking within the soft  tissues along the predominantly posterior calf and anterolateral/dorsal ankle and foot likely related to known multiple abrasions, can not completely exclude early necrotizing fasciitis.  - completed abx course, Seen by Burn.   - Continue wound care with Silvadene.    #Acute Hypoxic respiratory failure due to Septic shock.   -Resolved, s/p intubation in the ED , extubated    # Crohn's disease  # Diarrhea, hx of laxative abuse ?   - C. diff PCR negative   - GI PCR negative.   - Rectal tube placed in ICU for stage 2 sacral ulcer., dc rectal tube     # Hypothyroidism  - TSH 7.92 on admission -> 7.12.  - Continue synthroid 50mcg PO QD    # Anxiety  # Cachexia  - Seen by Psychiatry.  - Continue Zoloft 50mg PO QD.  - Continue Xanax 0.25mg BID.  - Per psychiatry, no contraindications for discharge, but needs outpatient psychiatry/anorexia follow-up.    # Rheumatoid arthritis  - f/u as outpatient with rheumatology    # Hypomagnesemia >> Being replated. 1.4 >> 1.4 >> 1..6 today.       #Misc  - DVT Prophylaxis: lovenox  - GI Prophylaxis: none  - Diet: soft and bite sized; ensure TID  - Activity: IAT    DC planning today .   #Progress Note Handoff  Pending (specify):   Family discussion:  Disposition: SNF

## 2022-12-23 NOTE — PROGRESS NOTE ADULT - SUBJECTIVE AND OBJECTIVE BOX
----------Daily Progress Note----------    HISTORY OF PRESENT ILLNESS:  Patient is a 67y old Female who presents with a chief complaint of ACUTE RESPIRATORY FAILURE WITH HYPERCAPNIA; HYPOTHERMIA, INDUCED; C6 CERVI     (22 Dec 2022 18:45)    Currently admitted to medicine with the primary diagnosis of Sepsis with acute hypoxic respiratory failure       Today is hospital day 26d.     INTERVAL HOSPITAL COURSE / OVERNIGHT EVENTS:    No overnight events    Review of Systems: Otherwise unremarkable     <<<<<PAST MEDICAL & SURGICAL HISTORY>>>>>  Anxiety    Depression    Osteoporosis    Kidney cysts    Peripheral neuropathy    RA (rheumatoid arthritis)    OA (osteoarthritis)    Low back pain with radiation  s/p &quot;nerve cutting&quot; 2015      ALLERGIES  No Known Allergies      Home Medications:  ALPRAZolam 0.25 mg oral tablet: 1 tab(s) orally 2 times a day (29 Nov 2022 11:18)  Cardizem  mg/24 hours oral capsule, extended release: 1 cap(s) orally once a day (29 Nov 2022 11:18)  cilostazol 50 mg oral tablet: 1 tab(s) orally 2 times a day (29 Nov 2022 11:18)  gabapentin 600 mg oral tablet: 1 tab(s) orally 3 times a day (29 Nov 2022 11:18)  hydroCHLOROthiazide 25 mg oral tablet: 1 tab(s) orally 3 times a week (29 Nov 2022 11:18)  HYDROcodone-acetaminophen 10 mg-325 mg oral tablet: 1 tab(s) orally every 6 hours, As Needed - for severe pain (29 Nov 2022 11:18)  loperamide 2 mg oral capsule: 1 cap(s) orally once a day, As needed, Diarrhea (23 Dec 2022 09:47)  rosuvastatin 10 mg oral tablet: 1 tab(s) orally once a day (at bedtime) (29 Nov 2022 11:18)  sertraline 50 mg oral tablet: 1 tab(s) orally once a day (29 Nov 2022 11:18)        MEDICATIONS  STANDING MEDICATIONS  ALPRAZolam 0.25 milliGRAM(s) Oral two times a day  ALPRAZolam      chlorhexidine 2% Cloths 1 Application(s) Topical <User Schedule>  enoxaparin Injectable 30 milliGRAM(s) SubCutaneous every 24 hours  levothyroxine 50 MICROGram(s) Oral daily  magnesium sulfate  IVPB 2 Gram(s) IV Intermittent every 2 hours  melatonin 5 milliGRAM(s) Oral at bedtime  multivitamin/minerals 1 Tablet(s) Oral daily  multivitamin/minerals/iron Oral Solution (CENTRUM) 15 milliLiter(s) Oral daily  sertraline 50 milliGRAM(s) Oral daily  silver sulfADIAZINE 1% Cream 1 Application(s) Topical two times a day    PRN MEDICATIONS  acetaminophen     Tablet .. 650 milliGRAM(s) Oral every 6 hours PRN  calcium carbonate    500 mG (Tums) Chewable 1 Tablet(s) Chew two times a day PRN  loperamide 2 milliGRAM(s) Oral daily PRN  oxycodone    5 mG/acetaminophen 325 mG 2 Tablet(s) Oral every 6 hours PRN    VITALS:  T(F): 97  HR: 78  BP: 100/50  RR: 18  SpO2: 99%    <<<<<LABS>>>>>                        8.5    4.63  )-----------( 190      ( 23 Dec 2022 06:35 )             25.7     12-23    136  |  95<L>  |  17  ----------------------------<  113<H>  4.1   |  31  |  <0.5<L>    Ca    8.8      23 Dec 2022 06:35  Mg     1.6     12-23    TPro  4.7<L>  /  Alb  2.5<L>  /  TBili  <0.2  /  DBili  x   /  AST  9   /  ALT  12  /  AlkPhos  122<H>  12-23            [id number]      <<<<<RADIOLOGY>>>>>    < from: Xray Cinesophagram Swallow Function w/ Contrast (12.15.22 @ 10:45) >  PROCEDURE DATE:  12/15/2022          INTERPRETATION:  INDICATION: Dysphagia    PROCEDURE: Various consistencies of food were given to the patient and   swallowing was observed under fluoroscopy. This study was performed in   conjunction with the speech pathology department.    COMPARISON: None available    FINDINGS/  IMPRESSION:    Fluoroscopic assistance was provided by the radiologist to the speech   pathologist for amodified barium swallow study. Please refer to the   speech pathologist's report for a detailed description of the findings   and recommendations.    < end of copied text >      <<<<<PHYSICAL EXAM>>>>>  GENERAL: NAD, resting comfortably in bed; cachectic  PULMONARY: Clear to auscultation bilaterally. No rales, rhonchi, or wheezing.  CARDIOVASCULAR: Regular rate and rhythm, S1-S2, no murmurs  GASTROINTESTINAL: Soft, non-tender, non-distended, no guarding.  SKIN/EXTREMITIES: No LE edema b/l  NEUROLOGIC: AAOX3  -----------------------------------------------------------------------------------------------------------------------------------------------------------------------------------------------

## 2022-12-23 NOTE — DISCHARGE NOTE NURSING/CASE MANAGEMENT/SOCIAL WORK - NSDCVIVACCINE_GEN_ALL_CORE_FT
Tdap; 24-Jul-2020 12:54; Lyla Rhodes); [employer]; [id number] (Exp. Date: 04-Apr-2022); IntraMuscular; Deltoid Right.; 0.5 milliLiter(s); VIS (VIS Published: 09-May-2013, VIS Presented: 24-Jul-2020);   
Tdap; 24-Jul-2020 12:54; Lyla Rhodes); [employer]; [id number] (Exp. Date: 04-Apr-2022); IntraMuscular; Deltoid Right.; 0.5 milliLiter(s); VIS (VIS Published: 09-May-2013, VIS Presented: 24-Jul-2020);

## 2022-12-23 NOTE — DISCHARGE NOTE NURSING/CASE MANAGEMENT/SOCIAL WORK - NSDCPEFALRISK_GEN_ALL_CORE
For information on Fall & Injury Prevention, visit: [email or web address] OR  [email or web address] OR  https://www.cdc.gov/steadi/patient.html
For information on Fall & Injury Prevention, visit: [email or web address] OR  [email or web address] OR  https://www.cdc.gov/steadi/patient.html

## 2022-12-24 NOTE — PROGRESS NOTE ADULT - SUBJECTIVE AND OBJECTIVE BOX
ANA LILIA VERMA  67y  Female      Patient is a 67y old  Female who presents with a chief complaint of ACUTE RESPIRATORY FAILURE WITH HYPERCAPNIA;      INTERVAL HPI/OVERNIGHT EVENTS:      ******************************* REVIEW OF SYSTEMS:**********************************************    All other review of systems negative    *********************** VITALS ******************************************    T(F): 98.9 (12-24-22 @ 04:24)  HR: 77 (12-24-22 @ 04:24) (75 - 92)  BP: 111/57 (12-24-22 @ 04:24) (93/52 - 111/57)  RR: 19 (12-24-22 @ 04:24) (18 - 19)  SpO2: 98% (12-24-22 @ 04:24) (98% - 99%)    12-23-22 @ 07:01  -  12-24-22 @ 07:00  --------------------------------------------------------  IN: 0 mL / OUT: 600 mL / NET: -600 mL            12-23-22 @ 07:01  -  12-24-22 @ 07:00  --------------------------------------------------------  IN: 0 mL / OUT: 600 mL / NET: -600 mL        ******************************** PHYSICAL EXAM:**************************************************  GENERAL: NAD    PSYCH: no agitation, baseline mentation  HEENT:     NERVOUS SYSTEM:  Alert & Oriented X3,    PULMONARY: ODALYS, CTA    CARDIOVASCULAR: S1S2 RRR    GI: Soft, NT, ND; BS present.    EXTREMITIES:  2+ Peripheral Pulses, No clubbing, cyanosis, or edema    LYMPH: No lymphadenopathy noted    SKIN: No rashes or lesions      **************************** LABS *******************************************************                          8.5    4.63  )-----------( 190      ( 23 Dec 2022 06:35 )             25.7     12-23    136  |  95<L>  |  17  ----------------------------<  113<H>  4.1   |  31  |  <0.5<L>    Ca    8.8      23 Dec 2022 06:35  Mg     1.6     12-23    TPro  4.7<L>  /  Alb  2.5<L>  /  TBili  <0.2  /  DBili  x   /  AST  9   /  ALT  12  /  AlkPhos  122<H>  12-23          Lactate Trend        CAPILLARY BLOOD GLUCOSE      POCT Blood Glucose.: 140 mg/dL (24 Dec 2022 08:02)          **************************Active Medications *******************************************  No Known Allergies      acetaminophen     Tablet .. 650 milliGRAM(s) Oral every 6 hours PRN  ALPRAZolam 0.25 milliGRAM(s) Oral two times a day  ALPRAZolam      calcium carbonate    500 mG (Tums) Chewable 1 Tablet(s) Chew two times a day PRN  chlorhexidine 2% Cloths 1 Application(s) Topical <User Schedule>  enoxaparin Injectable 30 milliGRAM(s) SubCutaneous every 24 hours  levothyroxine 50 MICROGram(s) Oral daily  loperamide 2 milliGRAM(s) Oral daily PRN  loperamide 2 milliGRAM(s) Oral once PRN  melatonin 5 milliGRAM(s) Oral at bedtime  multivitamin/minerals 1 Tablet(s) Oral daily  multivitamin/minerals/iron Oral Solution (CENTRUM) 15 milliLiter(s) Oral daily  oxycodone    5 mG/acetaminophen 325 mG 2 Tablet(s) Oral every 6 hours PRN  predniSONE   Tablet 20 milliGRAM(s) Oral daily  sertraline 50 milliGRAM(s) Oral daily  silver sulfADIAZINE 1% Cream 1 Application(s) Topical two times a day      ***************************************************  RADIOLOGY & ADDITIONAL TESTS:    Imaging Personally Reviewed:  [ ] YES  [ ] NO    HEALTH ISSUES - PROBLEM Dx:

## 2022-12-24 NOTE — PROGRESS NOTE ADULT - PROVIDER SPECIALTY LIST ADULT
Critical Care
Critical Care
Hospitalist
Internal Medicine
Podiatry
Pulmonology
Critical Care
Critical Care
Hospitalist
Infectious Disease
Infectious Disease
Internal Medicine
Nutrition Support
Trauma Surgery
Critical Care
Gastroenterology
Internal Medicine
Critical Care
Hospitalist
Hospitalist
Infectious Disease
Palliative Care

## 2022-12-24 NOTE — PROGRESS NOTE ADULT - ASSESSMENT
66 y/o female with PMHx of anorexia, anxiety, depression, renal cysts, peripheral neuropathy, RA, OA, osteoporosis, low back pain, currently admitted after an unwitnessed fall with head strike and LOC. Originally admitted to MICU for possible septic shock and intubated for airway protection. Hypoglycemic (FS 24) on admission, s/p dextrose. s/p IVF and levophed. s/p broad-spectrum antibiotics. Started on Synthroid for possible myxedema coma. CT spine demonstrated fx of anterior and posterior tubercle of left C6 transverse foramen. No surgical intervention, as per NSGY. Downgraded to SDU then telemetry s/p extubation. Per radiology, patient cannot receive C-spine MRI for spinal stenosis at this time, given his spinal stimulator.   Patient transferred from telemetry to med/surg 12/15    # Malnutrition with  anorexia,    pt has history of eating disorder and laxative  abuse per prior notes,    now she has good appetite and is eating independently, she says she is hungry   - Diet advanced to soft and bite-sized with 1:1 feed and aspiration precautions, as per speech and swallow.  - Per speech and swallow assessment 12/15, patulous esophagus and poor bolus flow through proximal esophagus on FEES  - per GI, refused upper endoscopy, she does not want any endoscopy  - continue current diet; added  ensure TID  - C/O diarrhea >> repeat  GI PCR > Neg     > Imodium PRN , trial of prednisone.     # Left C6 transverse foraminal fracture, due to fall at home  - CT spine demonstrated fx of anterior and posterior tubercle of left C6 transverse foramen.   - Neurosurgery  consulted, but no surgical intervention offered  - cervical collar in place  - spinal stimulator not MRI compatible    # COVID-19 exposure -> infection, asymptomatic   - Patient contracted COVID from roommate; tested positive 12/10  - Patient is vaccinated and received 1 booster.  - Currently asymptomatic and stable on RA  - Currently on airborne isolation >> per infectious control, she can be off isolation      # Metabolic encephalopathy , possibly due to hypoglycemia ( present on admission ) resolved   # Chronic b/l lower extremity cellulitis  # Left ankle ulcer  - Seen by burn and podiatry.  - LE arterial duplex WNL on 11/10.  - CT lower extremity on  showin) Bilateral lower extremity swelling and marked skin thickening, left  greater than right, compatible with cellulitis.   Multiple rounded locules of gas are also seen tracking within the soft  tissues along the predominantly posterior calf and anterolateral/dorsal ankle and foot likely related to known multiple abrasions, can not completely exclude early necrotizing fasciitis.  - completed abx course, Seen by Burn.   - Continue wound care with Silvadene.    #Acute Hypoxic respiratory failure due to Septic shock.   -Resolved, s/p intubation in the ED , extubated    # Crohn's disease  # Diarrhea, hx of laxative abuse ?   - C. diff PCR negative   - GI PCR negative.   - Rectal tube placed in ICU for stage 2 sacral ulcer., dc rectal tube     # Hypothyroidism  - TSH 7.92 on admission -> 7.12.  - Continue synthroid 50mcg PO QD    # Anxiety  # Cachexia  - Seen by Psychiatry.  - Continue Zoloft 50mg PO QD.  - Continue Xanax 0.25mg BID.  - Per psychiatry, no contraindications for discharge, but needs outpatient psychiatry/anorexia follow-up.    # Rheumatoid arthritis  - f/u as outpatient with rheumatology    # Hypomagnesemia >> Being replated. 1.4 >> 1.4 >> 1.6 .       #Misc  - DVT Prophylaxis: lovenox  - GI Prophylaxis: none  - Diet: soft and bite sized; ensure TID  - Activity: IAT    DC planning.  #Progress Note Handoff  Pending (specify):   Family discussion:  Disposition: SNF  Prepared for Discharge today.

## 2022-12-24 NOTE — PROGRESS NOTE ADULT - NUTRITIONAL ASSESSMENT
This patient has been assessed with a concern for Malnutrition and has been determined to have a diagnosis/diagnoses of Severe protein-calorie malnutrition and Underweight (BMI < 19).    This patient is being managed with:   Diet Soft and Bite Sized-  Lacto-Ovo Veg (Accepts Milk Prod. Eggs)  Entered: Dec 20 2022 10:44AM    

## 2023-01-01 ENCOUNTER — INPATIENT (INPATIENT)
Facility: HOSPITAL | Age: 68
LOS: 2 days | DRG: 640 | End: 2023-02-23
Attending: HOSPITALIST | Admitting: HOSPITALIST
Payer: MEDICARE

## 2023-01-01 ENCOUNTER — APPOINTMENT (OUTPATIENT)
Dept: BURN CARE | Facility: CLINIC | Age: 68
End: 2023-01-01

## 2023-01-01 VITALS
RESPIRATION RATE: 16 BRPM | HEART RATE: 89 BPM | SYSTOLIC BLOOD PRESSURE: 101 MMHG | TEMPERATURE: 98 F | DIASTOLIC BLOOD PRESSURE: 68 MMHG | OXYGEN SATURATION: 98 %

## 2023-01-01 VITALS — OXYGEN SATURATION: 63 % | HEART RATE: 119 BPM

## 2023-01-01 DIAGNOSIS — K80.20 CALCULUS OF GALLBLADDER WITHOUT CHOLECYSTITIS WITHOUT OBSTRUCTION: ICD-10-CM

## 2023-01-01 DIAGNOSIS — E16.2 HYPOGLYCEMIA, UNSPECIFIED: ICD-10-CM

## 2023-01-01 DIAGNOSIS — E03.9 HYPOTHYROIDISM, UNSPECIFIED: ICD-10-CM

## 2023-01-01 DIAGNOSIS — L03.90 CELLULITIS, UNSPECIFIED: ICD-10-CM

## 2023-01-01 DIAGNOSIS — E87.6 HYPOKALEMIA: ICD-10-CM

## 2023-01-01 DIAGNOSIS — G89.29 OTHER CHRONIC PAIN: ICD-10-CM

## 2023-01-01 DIAGNOSIS — Z20.822 CONTACT WITH AND (SUSPECTED) EXPOSURE TO COVID-19: ICD-10-CM

## 2023-01-01 DIAGNOSIS — M06.9 RHEUMATOID ARTHRITIS, UNSPECIFIED: ICD-10-CM

## 2023-01-01 DIAGNOSIS — R41.82 ALTERED MENTAL STATUS, UNSPECIFIED: ICD-10-CM

## 2023-01-01 DIAGNOSIS — J69.0 PNEUMONITIS DUE TO INHALATION OF FOOD AND VOMIT: ICD-10-CM

## 2023-01-01 DIAGNOSIS — G62.9 POLYNEUROPATHY, UNSPECIFIED: ICD-10-CM

## 2023-01-01 DIAGNOSIS — R74.01 ELEVATION OF LEVELS OF LIVER TRANSAMINASE LEVELS: ICD-10-CM

## 2023-01-01 DIAGNOSIS — I08.3 COMBINED RHEUMATIC DISORDERS OF MITRAL, AORTIC AND TRICUSPID VALVES: ICD-10-CM

## 2023-01-01 DIAGNOSIS — F50.00 ANOREXIA NERVOSA, UNSPECIFIED: ICD-10-CM

## 2023-01-01 DIAGNOSIS — F41.9 ANXIETY DISORDER, UNSPECIFIED: ICD-10-CM

## 2023-01-01 DIAGNOSIS — M81.0 AGE-RELATED OSTEOPOROSIS WITHOUT CURRENT PATHOLOGICAL FRACTURE: ICD-10-CM

## 2023-01-01 DIAGNOSIS — R65.21 SEVERE SEPSIS WITH SEPTIC SHOCK: ICD-10-CM

## 2023-01-01 DIAGNOSIS — R62.7 ADULT FAILURE TO THRIVE: ICD-10-CM

## 2023-01-01 DIAGNOSIS — J80 ACUTE RESPIRATORY DISTRESS SYNDROME: ICD-10-CM

## 2023-01-01 DIAGNOSIS — M19.90 UNSPECIFIED OSTEOARTHRITIS, UNSPECIFIED SITE: ICD-10-CM

## 2023-01-01 DIAGNOSIS — K92.2 GASTROINTESTINAL HEMORRHAGE, UNSPECIFIED: ICD-10-CM

## 2023-01-01 DIAGNOSIS — F32.A DEPRESSION, UNSPECIFIED: ICD-10-CM

## 2023-01-01 DIAGNOSIS — E83.42 HYPOMAGNESEMIA: ICD-10-CM

## 2023-01-01 DIAGNOSIS — Z66 DO NOT RESUSCITATE: ICD-10-CM

## 2023-01-01 DIAGNOSIS — D64.9 ANEMIA, UNSPECIFIED: ICD-10-CM

## 2023-01-01 DIAGNOSIS — A41.9 SEPSIS, UNSPECIFIED ORGANISM: ICD-10-CM

## 2023-01-01 DIAGNOSIS — M54.40 LUMBAGO WITH SCIATICA, UNSPECIFIED SIDE: Chronic | ICD-10-CM

## 2023-01-01 DIAGNOSIS — Z51.5 ENCOUNTER FOR PALLIATIVE CARE: ICD-10-CM

## 2023-01-01 DIAGNOSIS — E43 UNSPECIFIED SEVERE PROTEIN-CALORIE MALNUTRITION: ICD-10-CM

## 2023-01-01 DIAGNOSIS — E83.39 OTHER DISORDERS OF PHOSPHORUS METABOLISM: ICD-10-CM

## 2023-01-01 DIAGNOSIS — J90 PLEURAL EFFUSION, NOT ELSEWHERE CLASSIFIED: ICD-10-CM

## 2023-01-01 DIAGNOSIS — D69.6 THROMBOCYTOPENIA, UNSPECIFIED: ICD-10-CM

## 2023-01-01 DIAGNOSIS — E83.51 HYPOCALCEMIA: ICD-10-CM

## 2023-01-01 LAB
24R-OH-CALCIDIOL SERPL-MCNC: 80 NG/ML — SIGNIFICANT CHANGE UP (ref 30–80)
6-ACETYLMORPHINE, UR RESULT: NEGATIVE NG/ML — SIGNIFICANT CHANGE UP
6MAM UR CFM-MCNC: NEGATIVE NG/ML — SIGNIFICANT CHANGE UP
A1C WITH ESTIMATED AVERAGE GLUCOSE RESULT: 4.6 % — SIGNIFICANT CHANGE UP (ref 4–5.6)
ALBUMIN SERPL ELPH-MCNC: 1.4 G/DL — LOW (ref 3.5–5.2)
ALBUMIN SERPL ELPH-MCNC: 1.6 G/DL — LOW (ref 3.5–5.2)
ALBUMIN SERPL ELPH-MCNC: 1.8 G/DL — LOW (ref 3.5–5.2)
ALBUMIN SERPL ELPH-MCNC: 1.9 G/DL — LOW (ref 3.5–5.2)
ALBUMIN SERPL ELPH-MCNC: 2.2 G/DL — LOW (ref 3.5–5.2)
ALBUMIN SERPL ELPH-MCNC: 2.4 G/DL — LOW (ref 3.5–5.2)
ALP SERPL-CCNC: 233 U/L — HIGH (ref 30–115)
ALP SERPL-CCNC: 284 U/L — HIGH (ref 30–115)
ALP SERPL-CCNC: 312 U/L — HIGH (ref 30–115)
ALP SERPL-CCNC: 313 U/L — HIGH (ref 30–115)
ALP SERPL-CCNC: 401 U/L — HIGH (ref 30–115)
ALP SERPL-CCNC: 402 U/L — HIGH (ref 30–115)
ALP SERPL-CCNC: 447 U/L — HIGH (ref 30–115)
ALP SERPL-CCNC: 459 U/L — HIGH (ref 30–115)
ALT FLD-CCNC: 109 U/L — HIGH (ref 0–41)
ALT FLD-CCNC: 114 U/L — HIGH (ref 0–41)
ALT FLD-CCNC: 152 U/L — HIGH (ref 0–41)
ALT FLD-CCNC: 155 U/L — HIGH (ref 0–41)
ALT FLD-CCNC: 180 U/L — HIGH (ref 0–41)
ALT FLD-CCNC: 184 U/L — HIGH (ref 0–41)
ALT FLD-CCNC: 73 U/L — HIGH (ref 0–41)
ALT FLD-CCNC: 98 U/L — HIGH (ref 0–41)
AMPHET UR-MCNC: NEGATIVE — SIGNIFICANT CHANGE UP
ANION GAP SERPL CALC-SCNC: 10 MMOL/L — SIGNIFICANT CHANGE UP (ref 7–14)
ANION GAP SERPL CALC-SCNC: 11 MMOL/L — SIGNIFICANT CHANGE UP (ref 7–14)
ANION GAP SERPL CALC-SCNC: 13 MMOL/L — SIGNIFICANT CHANGE UP (ref 7–14)
ANION GAP SERPL CALC-SCNC: 3 MMOL/L — LOW (ref 7–14)
ANION GAP SERPL CALC-SCNC: 4 MMOL/L — LOW (ref 7–14)
ANION GAP SERPL CALC-SCNC: 6 MMOL/L — LOW (ref 7–14)
ANION GAP SERPL CALC-SCNC: 6 MMOL/L — LOW (ref 7–14)
ANION GAP SERPL CALC-SCNC: 7 MMOL/L — SIGNIFICANT CHANGE UP (ref 7–14)
ANION GAP SERPL CALC-SCNC: 7 MMOL/L — SIGNIFICANT CHANGE UP (ref 7–14)
ANION GAP SERPL CALC-SCNC: 9 MMOL/L — SIGNIFICANT CHANGE UP (ref 7–14)
ANISOCYTOSIS BLD QL: SIGNIFICANT CHANGE UP
APPEARANCE UR: ABNORMAL
APTT BLD: 40.7 SEC — HIGH (ref 27–39.2)
APTT BLD: 41.5 SEC — HIGH (ref 27–39.2)
APTT BLD: 42.4 SEC — HIGH (ref 27–39.2)
AST SERPL-CCNC: 131 U/L — HIGH (ref 0–41)
AST SERPL-CCNC: 162 U/L — HIGH (ref 0–41)
AST SERPL-CCNC: 172 U/L — HIGH (ref 0–41)
AST SERPL-CCNC: 231 U/L — HIGH (ref 0–41)
AST SERPL-CCNC: 59 U/L — HIGH (ref 0–41)
AST SERPL-CCNC: 69 U/L — HIGH (ref 0–41)
AST SERPL-CCNC: 85 U/L — HIGH (ref 0–41)
AST SERPL-CCNC: 87 U/L — HIGH (ref 0–41)
BACTERIA # UR AUTO: ABNORMAL
BARBITURATES UR SCN-MCNC: NEGATIVE — SIGNIFICANT CHANGE UP
BASE EXCESS BLDA CALC-SCNC: 3.5 MMOL/L — HIGH (ref -2–3)
BASE EXCESS BLDV CALC-SCNC: 2 MMOL/L — SIGNIFICANT CHANGE UP (ref -2–3)
BASOPHILS # BLD AUTO: 0 K/UL — SIGNIFICANT CHANGE UP (ref 0–0.2)
BASOPHILS # BLD AUTO: 0.01 K/UL — SIGNIFICANT CHANGE UP (ref 0–0.2)
BASOPHILS # BLD AUTO: 0.02 K/UL — SIGNIFICANT CHANGE UP (ref 0–0.2)
BASOPHILS NFR BLD AUTO: 0 % — SIGNIFICANT CHANGE UP (ref 0–1)
BASOPHILS NFR BLD AUTO: 0.1 % — SIGNIFICANT CHANGE UP (ref 0–1)
BASOPHILS NFR BLD AUTO: 0.2 % — SIGNIFICANT CHANGE UP (ref 0–1)
BASOPHILS NFR BLD AUTO: 0.2 % — SIGNIFICANT CHANGE UP (ref 0–1)
BASOPHILS NFR BLD AUTO: 0.9 % — SIGNIFICANT CHANGE UP (ref 0–1)
BENZODIAZ UR-MCNC: NEGATIVE — SIGNIFICANT CHANGE UP
BILIRUB DIRECT SERPL-MCNC: <0.2 MG/DL — SIGNIFICANT CHANGE UP (ref 0–0.3)
BILIRUB DIRECT SERPL-MCNC: <0.2 MG/DL — SIGNIFICANT CHANGE UP (ref 0–0.3)
BILIRUB INDIRECT FLD-MCNC: >0.1 MG/DL — LOW (ref 0.2–1.2)
BILIRUB INDIRECT FLD-MCNC: SIGNIFICANT CHANGE UP MG/DL (ref 0.2–1.2)
BILIRUB SERPL-MCNC: 0.2 MG/DL — SIGNIFICANT CHANGE UP (ref 0.2–1.2)
BILIRUB SERPL-MCNC: 0.3 MG/DL — SIGNIFICANT CHANGE UP (ref 0.2–1.2)
BILIRUB SERPL-MCNC: 0.4 MG/DL — SIGNIFICANT CHANGE UP (ref 0.2–1.2)
BILIRUB SERPL-MCNC: <0.2 MG/DL — SIGNIFICANT CHANGE UP (ref 0.2–1.2)
BILIRUB UR-MCNC: NEGATIVE — SIGNIFICANT CHANGE UP
BLD GP AB SCN SERPL QL: SIGNIFICANT CHANGE UP
BLD GP AB SCN SERPL QL: SIGNIFICANT CHANGE UP
BUN SERPL-MCNC: 11 MG/DL — SIGNIFICANT CHANGE UP (ref 10–20)
BUN SERPL-MCNC: 12 MG/DL — SIGNIFICANT CHANGE UP (ref 10–20)
BUN SERPL-MCNC: 13 MG/DL — SIGNIFICANT CHANGE UP (ref 10–20)
BUN SERPL-MCNC: 15 MG/DL — SIGNIFICANT CHANGE UP (ref 10–20)
BUN SERPL-MCNC: 16 MG/DL — SIGNIFICANT CHANGE UP (ref 10–20)
BUN SERPL-MCNC: 16 MG/DL — SIGNIFICANT CHANGE UP (ref 10–20)
BUN SERPL-MCNC: 20 MG/DL — SIGNIFICANT CHANGE UP (ref 10–20)
BUN SERPL-MCNC: 27 MG/DL — HIGH (ref 10–20)
BURR CELLS BLD QL SMEAR: PRESENT — SIGNIFICANT CHANGE UP
BURR CELLS BLD QL SMEAR: SIGNIFICANT CHANGE UP
CA-I SERPL-SCNC: 1.26 MMOL/L — SIGNIFICANT CHANGE UP (ref 1.15–1.33)
CALCIUM SERPL-MCNC: 6.5 MG/DL — LOW (ref 8.4–10.5)
CALCIUM SERPL-MCNC: 6.7 MG/DL — LOW (ref 8.4–10.5)
CALCIUM SERPL-MCNC: 7 MG/DL — LOW (ref 8.4–10.5)
CALCIUM SERPL-MCNC: 7.1 MG/DL — LOW (ref 8.4–10.5)
CALCIUM SERPL-MCNC: 7.3 MG/DL — LOW (ref 8.4–10.5)
CALCIUM SERPL-MCNC: 7.7 MG/DL — LOW (ref 8.4–10.5)
CALCIUM SERPL-MCNC: 7.9 MG/DL — LOW (ref 8.4–10.5)
CALCIUM SERPL-MCNC: 8 MG/DL — LOW (ref 8.4–10.4)
CALCIUM SERPL-MCNC: 8 MG/DL — LOW (ref 8.4–10.5)
CALCIUM SERPL-MCNC: 8.1 MG/DL — LOW (ref 8.4–10.4)
CHLORIDE SERPL-SCNC: 100 MMOL/L — SIGNIFICANT CHANGE UP (ref 98–110)
CHLORIDE SERPL-SCNC: 101 MMOL/L — SIGNIFICANT CHANGE UP (ref 98–110)
CHLORIDE SERPL-SCNC: 101 MMOL/L — SIGNIFICANT CHANGE UP (ref 98–110)
CHLORIDE SERPL-SCNC: 102 MMOL/L — SIGNIFICANT CHANGE UP (ref 98–110)
CHLORIDE SERPL-SCNC: 102 MMOL/L — SIGNIFICANT CHANGE UP (ref 98–110)
CHLORIDE SERPL-SCNC: 104 MMOL/L — SIGNIFICANT CHANGE UP (ref 98–110)
CHLORIDE SERPL-SCNC: 106 MMOL/L — SIGNIFICANT CHANGE UP (ref 98–110)
CHLORIDE SERPL-SCNC: 107 MMOL/L — SIGNIFICANT CHANGE UP (ref 98–110)
CHLORIDE SERPL-SCNC: 109 MMOL/L — SIGNIFICANT CHANGE UP (ref 98–110)
CHLORIDE SERPL-SCNC: 99 MMOL/L — SIGNIFICANT CHANGE UP (ref 98–110)
CHOLEST SERPL-MCNC: 83 MG/DL — SIGNIFICANT CHANGE UP
CK MB CFR SERPL CALC: 13.3 NG/ML — HIGH (ref 0.6–6.3)
CK SERPL-CCNC: 387 U/L — HIGH (ref 0–225)
CO2 SERPL-SCNC: 21 MMOL/L — SIGNIFICANT CHANGE UP (ref 17–32)
CO2 SERPL-SCNC: 23 MMOL/L — SIGNIFICANT CHANGE UP (ref 17–32)
CO2 SERPL-SCNC: 23 MMOL/L — SIGNIFICANT CHANGE UP (ref 17–32)
CO2 SERPL-SCNC: 24 MMOL/L — SIGNIFICANT CHANGE UP (ref 17–32)
CO2 SERPL-SCNC: 26 MMOL/L — SIGNIFICANT CHANGE UP (ref 17–32)
CO2 SERPL-SCNC: 27 MMOL/L — SIGNIFICANT CHANGE UP (ref 17–32)
CO2 SERPL-SCNC: 30 MMOL/L — SIGNIFICANT CHANGE UP (ref 17–32)
COCAINE METAB.OTHER UR-MCNC: NEGATIVE — SIGNIFICANT CHANGE UP
CODEINE UR CFM-MCNC: NEGATIVE NG/ML — SIGNIFICANT CHANGE UP
CODEINE, UR RESULT: NEGATIVE NG/ML — SIGNIFICANT CHANGE UP
COLOR SPEC: YELLOW — SIGNIFICANT CHANGE UP
CREAT ?TM UR-MCNC: 60 MG/DL — SIGNIFICANT CHANGE UP
CREAT SERPL-MCNC: 0.5 MG/DL — LOW (ref 0.7–1.5)
CREAT SERPL-MCNC: <0.5 MG/DL — LOW (ref 0.7–1.5)
DIFF PNL FLD: ABNORMAL
EGFR: 103 ML/MIN/1.73M2 — SIGNIFICANT CHANGE UP
EGFR: 108 ML/MIN/1.73M2 — SIGNIFICANT CHANGE UP
EGFR: 116 ML/MIN/1.73M2 — SIGNIFICANT CHANGE UP
EOSINOPHIL # BLD AUTO: 0 K/UL — SIGNIFICANT CHANGE UP (ref 0–0.7)
EOSINOPHIL # BLD AUTO: 0.04 K/UL — SIGNIFICANT CHANGE UP (ref 0–0.7)
EOSINOPHIL # BLD AUTO: 0.04 K/UL — SIGNIFICANT CHANGE UP (ref 0–0.7)
EOSINOPHIL NFR BLD AUTO: 0 % — SIGNIFICANT CHANGE UP (ref 0–8)
EOSINOPHIL NFR BLD AUTO: 0.5 % — SIGNIFICANT CHANGE UP (ref 0–8)
EOSINOPHIL NFR BLD AUTO: 0.5 % — SIGNIFICANT CHANGE UP (ref 0–8)
EPI CELLS # UR: 2 /HPF — SIGNIFICANT CHANGE UP (ref 0–5)
ESTIMATED AVERAGE GLUCOSE: 85 MG/DL — SIGNIFICANT CHANGE UP (ref 68–114)
GAS PNL BLDA: SIGNIFICANT CHANGE UP
GAS PNL BLDA: SIGNIFICANT CHANGE UP
GAS PNL BLDV: 135 MMOL/L — LOW (ref 136–145)
GAS PNL BLDV: SIGNIFICANT CHANGE UP
GAS PNL BLDV: SIGNIFICANT CHANGE UP
GIANT PLATELETS BLD QL SMEAR: PRESENT — SIGNIFICANT CHANGE UP
GLUCOSE BLDC GLUCOMTR-MCNC: 103 MG/DL — HIGH (ref 70–99)
GLUCOSE BLDC GLUCOMTR-MCNC: 116 MG/DL — HIGH (ref 70–99)
GLUCOSE BLDC GLUCOMTR-MCNC: 162 MG/DL — HIGH (ref 70–99)
GLUCOSE BLDC GLUCOMTR-MCNC: 170 MG/DL — HIGH (ref 70–99)
GLUCOSE BLDC GLUCOMTR-MCNC: 37 MG/DL — CRITICAL LOW (ref 70–99)
GLUCOSE BLDC GLUCOMTR-MCNC: 432 MG/DL — HIGH (ref 70–99)
GLUCOSE BLDC GLUCOMTR-MCNC: 48 MG/DL — CRITICAL LOW (ref 70–99)
GLUCOSE BLDC GLUCOMTR-MCNC: 53 MG/DL — CRITICAL LOW (ref 70–99)
GLUCOSE BLDC GLUCOMTR-MCNC: 53 MG/DL — CRITICAL LOW (ref 70–99)
GLUCOSE BLDC GLUCOMTR-MCNC: 58 MG/DL — LOW (ref 70–99)
GLUCOSE BLDC GLUCOMTR-MCNC: 85 MG/DL — SIGNIFICANT CHANGE UP (ref 70–99)
GLUCOSE BLDC GLUCOMTR-MCNC: 85 MG/DL — SIGNIFICANT CHANGE UP (ref 70–99)
GLUCOSE BLDC GLUCOMTR-MCNC: 86 MG/DL — SIGNIFICANT CHANGE UP (ref 70–99)
GLUCOSE SERPL-MCNC: 106 MG/DL — HIGH (ref 70–99)
GLUCOSE SERPL-MCNC: 117 MG/DL — HIGH (ref 70–99)
GLUCOSE SERPL-MCNC: 119 MG/DL — HIGH (ref 70–99)
GLUCOSE SERPL-MCNC: 123 MG/DL — HIGH (ref 70–99)
GLUCOSE SERPL-MCNC: 128 MG/DL — HIGH (ref 70–99)
GLUCOSE SERPL-MCNC: 136 MG/DL — HIGH (ref 70–99)
GLUCOSE SERPL-MCNC: 414 MG/DL — HIGH (ref 70–99)
GLUCOSE SERPL-MCNC: 58 MG/DL — LOW (ref 70–99)
GLUCOSE SERPL-MCNC: 66 MG/DL — LOW (ref 70–99)
GLUCOSE SERPL-MCNC: 71 MG/DL — SIGNIFICANT CHANGE UP (ref 70–99)
GLUCOSE UR QL: NEGATIVE — SIGNIFICANT CHANGE UP
HAV IGM SER-ACNC: SIGNIFICANT CHANGE UP
HBV CORE IGM SER-ACNC: SIGNIFICANT CHANGE UP
HBV SURFACE AG SER-ACNC: SIGNIFICANT CHANGE UP
HCO3 BLDA-SCNC: 27 MMOL/L — SIGNIFICANT CHANGE UP (ref 21–28)
HCO3 BLDV-SCNC: 28 MMOL/L — SIGNIFICANT CHANGE UP (ref 22–29)
HCT VFR BLD CALC: 28.9 % — LOW (ref 37–47)
HCT VFR BLD CALC: 31.2 % — LOW (ref 37–47)
HCT VFR BLD CALC: 33.2 % — LOW (ref 37–47)
HCT VFR BLD CALC: 33.4 % — LOW (ref 37–47)
HCT VFR BLD CALC: 34.7 % — LOW (ref 37–47)
HCT VFR BLDA CALC: 36 % — LOW (ref 39–51)
HCV AB S/CO SERPL IA: 0.42 S/CO — SIGNIFICANT CHANGE UP (ref 0–0.99)
HCV AB SERPL-IMP: SIGNIFICANT CHANGE UP
HDLC SERPL-MCNC: 58 MG/DL — SIGNIFICANT CHANGE UP
HGB BLD CALC-MCNC: 11.9 G/DL — LOW (ref 12.6–17.4)
HGB BLD-MCNC: 10.6 G/DL — LOW (ref 12–16)
HGB BLD-MCNC: 10.7 G/DL — LOW (ref 12–16)
HGB BLD-MCNC: 11.3 G/DL — LOW (ref 12–16)
HGB BLD-MCNC: 11.4 G/DL — LOW (ref 12–16)
HGB BLD-MCNC: 9.7 G/DL — LOW (ref 12–16)
HOROWITZ INDEX BLDA+IHG-RTO: 32 — SIGNIFICANT CHANGE UP
HYALINE CASTS # UR AUTO: 2 /LPF — SIGNIFICANT CHANGE UP (ref 0–7)
HYDROCODONE UR QL CFM: 493 NG/ML — SIGNIFICANT CHANGE UP
HYDROCODONE, UR RESULT: 493 NG/ML — SIGNIFICANT CHANGE UP
HYDROMORPHONE UR QL CFM: NEGATIVE NG/ML — SIGNIFICANT CHANGE UP
HYDROMORPHONE, UR RESULT: NEGATIVE NG/ML — SIGNIFICANT CHANGE UP
IMM GRANULOCYTES NFR BLD AUTO: 0 % — LOW (ref 0.1–0.3)
IMM GRANULOCYTES NFR BLD AUTO: 0 % — LOW (ref 0.1–0.3)
IMM GRANULOCYTES NFR BLD AUTO: 0.5 % — HIGH (ref 0.1–0.3)
IMM GRANULOCYTES NFR BLD AUTO: 0.5 % — HIGH (ref 0.1–0.3)
IMM GRANULOCYTES NFR BLD AUTO: 0.6 % — HIGH (ref 0.1–0.3)
INR BLD: 1.41 RATIO — HIGH (ref 0.65–1.3)
INR BLD: 1.98 RATIO — HIGH (ref 0.65–1.3)
INR BLD: 2.34 RATIO — HIGH (ref 0.65–1.3)
KETONES UR-MCNC: NEGATIVE — SIGNIFICANT CHANGE UP
LACTATE BLDV-MCNC: 1.5 MMOL/L — SIGNIFICANT CHANGE UP (ref 0.5–2)
LACTATE SERPL-SCNC: 1.5 MMOL/L — SIGNIFICANT CHANGE UP (ref 0.7–2)
LEUKOCYTE ESTERASE UR-ACNC: ABNORMAL
LIPID PNL WITH DIRECT LDL SERPL: 17 MG/DL — SIGNIFICANT CHANGE UP
LYMPHOCYTES # BLD AUTO: 0.12 K/UL — LOW (ref 1.2–3.4)
LYMPHOCYTES # BLD AUTO: 0.32 K/UL — LOW (ref 1.2–3.4)
LYMPHOCYTES # BLD AUTO: 0.46 K/UL — LOW (ref 1.2–3.4)
LYMPHOCYTES # BLD AUTO: 1.09 K/UL — LOW (ref 1.2–3.4)
LYMPHOCYTES # BLD AUTO: 1.25 K/UL — SIGNIFICANT CHANGE UP (ref 1.2–3.4)
LYMPHOCYTES # BLD AUTO: 13.3 % — LOW (ref 20.5–51.1)
LYMPHOCYTES # BLD AUTO: 15.5 % — LOW (ref 20.5–51.1)
LYMPHOCYTES # BLD AUTO: 29.9 % — SIGNIFICANT CHANGE UP (ref 20.5–51.1)
LYMPHOCYTES # BLD AUTO: 50 % — SIGNIFICANT CHANGE UP (ref 20.5–51.1)
LYMPHOCYTES # BLD AUTO: 8.1 % — LOW (ref 20.5–51.1)
MACROCYTES BLD QL: SLIGHT — SIGNIFICANT CHANGE UP
MAGNESIUM SERPL-MCNC: 1.3 MG/DL — LOW (ref 1.8–2.4)
MAGNESIUM SERPL-MCNC: 1.4 MG/DL — LOW (ref 1.8–2.4)
MAGNESIUM SERPL-MCNC: 1.5 MG/DL — LOW (ref 1.8–2.4)
MAGNESIUM SERPL-MCNC: 1.6 MG/DL — LOW (ref 1.8–2.4)
MAGNESIUM SERPL-MCNC: 1.7 MG/DL — LOW (ref 1.8–2.4)
MAGNESIUM SERPL-MCNC: 1.8 MG/DL — SIGNIFICANT CHANGE UP (ref 1.8–2.4)
MAGNESIUM SERPL-MCNC: 1.9 MG/DL — SIGNIFICANT CHANGE UP (ref 1.8–2.4)
MAGNESIUM SERPL-MCNC: 1.9 MG/DL — SIGNIFICANT CHANGE UP (ref 1.8–2.4)
MANUAL SMEAR VERIFICATION: SIGNIFICANT CHANGE UP
MCHC RBC-ENTMCNC: 29.7 PG — SIGNIFICANT CHANGE UP (ref 27–31)
MCHC RBC-ENTMCNC: 30.1 PG — SIGNIFICANT CHANGE UP (ref 27–31)
MCHC RBC-ENTMCNC: 30.2 PG — SIGNIFICANT CHANGE UP (ref 27–31)
MCHC RBC-ENTMCNC: 30.5 PG — SIGNIFICANT CHANGE UP (ref 27–31)
MCHC RBC-ENTMCNC: 31 PG — SIGNIFICANT CHANGE UP (ref 27–31)
MCHC RBC-ENTMCNC: 32.2 G/DL — SIGNIFICANT CHANGE UP (ref 32–37)
MCHC RBC-ENTMCNC: 32.6 G/DL — SIGNIFICANT CHANGE UP (ref 32–37)
MCHC RBC-ENTMCNC: 33.6 G/DL — SIGNIFICANT CHANGE UP (ref 32–37)
MCHC RBC-ENTMCNC: 34 G/DL — SIGNIFICANT CHANGE UP (ref 32–37)
MCHC RBC-ENTMCNC: 34.1 G/DL — SIGNIFICANT CHANGE UP (ref 32–37)
MCV RBC AUTO: 88.4 FL — SIGNIFICANT CHANGE UP (ref 81–99)
MCV RBC AUTO: 90.9 FL — SIGNIFICANT CHANGE UP (ref 81–99)
MCV RBC AUTO: 91.2 FL — SIGNIFICANT CHANGE UP (ref 81–99)
MCV RBC AUTO: 91.3 FL — SIGNIFICANT CHANGE UP (ref 81–99)
MCV RBC AUTO: 93.5 FL — SIGNIFICANT CHANGE UP (ref 81–99)
METHADONE UR-MCNC: NEGATIVE — SIGNIFICANT CHANGE UP
MONOCYTES # BLD AUTO: 0 K/UL — LOW (ref 0.1–0.6)
MONOCYTES # BLD AUTO: 0.03 K/UL — LOW (ref 0.1–0.6)
MONOCYTES # BLD AUTO: 0.11 K/UL — SIGNIFICANT CHANGE UP (ref 0.1–0.6)
MONOCYTES # BLD AUTO: 0.13 K/UL — SIGNIFICANT CHANGE UP (ref 0.1–0.6)
MONOCYTES # BLD AUTO: 0.19 K/UL — SIGNIFICANT CHANGE UP (ref 0.1–0.6)
MONOCYTES NFR BLD AUTO: 0 % — LOW (ref 1.7–9.3)
MONOCYTES NFR BLD AUTO: 1.6 % — LOW (ref 1.7–9.3)
MONOCYTES NFR BLD AUTO: 1.9 % — SIGNIFICANT CHANGE UP (ref 1.7–9.3)
MONOCYTES NFR BLD AUTO: 2.4 % — SIGNIFICANT CHANGE UP (ref 1.7–9.3)
MONOCYTES NFR BLD AUTO: 2.8 % — SIGNIFICANT CHANGE UP (ref 1.7–9.3)
MORPHINE UR QL CFM: NEGATIVE NG/ML — SIGNIFICANT CHANGE UP
MORPHINE, UR RESULT: NEGATIVE NG/ML — SIGNIFICANT CHANGE UP
NEUTROPHILS # BLD AUTO: 0.12 K/UL — LOW (ref 1.4–6.5)
NEUTROPHILS # BLD AUTO: 0.71 K/UL — LOW (ref 1.4–6.5)
NEUTROPHILS # BLD AUTO: 5.1 K/UL — SIGNIFICANT CHANGE UP (ref 1.4–6.5)
NEUTROPHILS # BLD AUTO: 6.51 K/UL — HIGH (ref 1.4–6.5)
NEUTROPHILS # BLD AUTO: 6.87 K/UL — HIGH (ref 1.4–6.5)
NEUTROPHILS NFR BLD AUTO: 50 % — SIGNIFICANT CHANGE UP (ref 42.2–75.2)
NEUTROPHILS NFR BLD AUTO: 66.4 % — SIGNIFICANT CHANGE UP (ref 42.2–75.2)
NEUTROPHILS NFR BLD AUTO: 80.9 % — HIGH (ref 42.2–75.2)
NEUTROPHILS NFR BLD AUTO: 83.9 % — HIGH (ref 42.2–75.2)
NEUTROPHILS NFR BLD AUTO: 89.3 % — HIGH (ref 42.2–75.2)
NITRITE UR-MCNC: POSITIVE
NON HDL CHOLESTEROL: 25 MG/DL — SIGNIFICANT CHANGE UP
NOROXYCODONE (OPIATES), UR RESULT: NEGATIVE NG/ML — SIGNIFICANT CHANGE UP
NOROXYCODONE UR CFM-MCNC: NEGATIVE NG/ML — SIGNIFICANT CHANGE UP
NRBC # BLD: 0 /100 WBCS — SIGNIFICANT CHANGE UP (ref 0–0)
NRBC # BLD: 12 /100 WBCS — HIGH (ref 0–0)
NRBC # BLD: 2 /100 WBCS — HIGH (ref 0–0)
NRBC # BLD: 26 /100 — HIGH (ref 0–0)
OPIATES IN-HOUSE INTERPRETATION: POSITIVE
OPIATES UR QL CFM: POSITIVE
OPIATES UR-MCNC: POSITIVE
OSMOLALITY SERPL: 306 MOS/KG — HIGH (ref 280–301)
OSMOLALITY UR: 683 MOS/KG — SIGNIFICANT CHANGE UP (ref 50–1200)
OXYCODONE (OPIATES), UR RESULT: NEGATIVE NG/ML — SIGNIFICANT CHANGE UP
OXYCODONE UR-MCNC: NEGATIVE NG/ML — SIGNIFICANT CHANGE UP
OXYMORPHONE (OPIATES), UR RESULT: NEGATIVE NG/ML — SIGNIFICANT CHANGE UP
OXYMORPHONE UR CFM-MCNC: NEGATIVE NG/ML — SIGNIFICANT CHANGE UP
PCO2 BLDA: 35 MMHG — SIGNIFICANT CHANGE UP (ref 25–48)
PCO2 BLDV: 50 MMHG — HIGH (ref 39–42)
PCP SPEC-MCNC: SIGNIFICANT CHANGE UP
PH BLDA: 7.49 — HIGH (ref 7.35–7.45)
PH BLDV: 7.36 — SIGNIFICANT CHANGE UP (ref 7.32–7.43)
PH UR: 6 — SIGNIFICANT CHANGE UP (ref 5–8)
PHOSPHATE SERPL-MCNC: 1.7 MG/DL — LOW (ref 2.1–4.9)
PHOSPHATE SERPL-MCNC: 2 MG/DL — LOW (ref 2.1–4.9)
PHOSPHATE SERPL-MCNC: 2.1 MG/DL — SIGNIFICANT CHANGE UP (ref 2.1–4.9)
PHOSPHATE SERPL-MCNC: 2.2 MG/DL — SIGNIFICANT CHANGE UP (ref 2.1–4.9)
PHOSPHATE SERPL-MCNC: 2.7 MG/DL — SIGNIFICANT CHANGE UP (ref 2.1–4.9)
PHOSPHATE SERPL-MCNC: 3.8 MG/DL — SIGNIFICANT CHANGE UP (ref 2.1–4.9)
PHOSPHATE SERPL-MCNC: 4.2 MG/DL — SIGNIFICANT CHANGE UP (ref 2.1–4.9)
PHOSPHATE SERPL-MCNC: 7 MG/DL — HIGH (ref 2.1–4.9)
PLAT MORPH BLD: NORMAL — SIGNIFICANT CHANGE UP
PLATELET # BLD AUTO: 111 K/UL — LOW (ref 130–400)
PLATELET # BLD AUTO: 113 K/UL — LOW (ref 130–400)
PLATELET # BLD AUTO: 116 K/UL — LOW (ref 130–400)
PLATELET # BLD AUTO: 69 K/UL — LOW (ref 130–400)
PLATELET # BLD AUTO: 98 K/UL — LOW (ref 130–400)
PO2 BLDA: 57 MMHG — LOW (ref 83–108)
PO2 BLDV: 38 MMHG — SIGNIFICANT CHANGE UP
POIKILOCYTOSIS BLD QL AUTO: SIGNIFICANT CHANGE UP
POLYCHROMASIA BLD QL SMEAR: SLIGHT — SIGNIFICANT CHANGE UP
POTASSIUM BLDV-SCNC: 5 MMOL/L — SIGNIFICANT CHANGE UP (ref 3.5–5.1)
POTASSIUM SERPL-MCNC: 3.5 MMOL/L — SIGNIFICANT CHANGE UP (ref 3.5–5)
POTASSIUM SERPL-MCNC: 3.5 MMOL/L — SIGNIFICANT CHANGE UP (ref 3.5–5)
POTASSIUM SERPL-MCNC: 3.7 MMOL/L — SIGNIFICANT CHANGE UP (ref 3.5–5)
POTASSIUM SERPL-MCNC: 4 MMOL/L — SIGNIFICANT CHANGE UP (ref 3.5–5)
POTASSIUM SERPL-MCNC: 4.1 MMOL/L — SIGNIFICANT CHANGE UP (ref 3.5–5)
POTASSIUM SERPL-MCNC: 4.1 MMOL/L — SIGNIFICANT CHANGE UP (ref 3.5–5)
POTASSIUM SERPL-MCNC: 4.2 MMOL/L — SIGNIFICANT CHANGE UP (ref 3.5–5)
POTASSIUM SERPL-MCNC: 4.4 MMOL/L — SIGNIFICANT CHANGE UP (ref 3.5–5)
POTASSIUM SERPL-MCNC: 4.8 MMOL/L — SIGNIFICANT CHANGE UP (ref 3.5–5)
POTASSIUM SERPL-MCNC: 5 MMOL/L — SIGNIFICANT CHANGE UP (ref 3.5–5)
POTASSIUM SERPL-SCNC: 3.5 MMOL/L — SIGNIFICANT CHANGE UP (ref 3.5–5)
POTASSIUM SERPL-SCNC: 3.5 MMOL/L — SIGNIFICANT CHANGE UP (ref 3.5–5)
POTASSIUM SERPL-SCNC: 3.7 MMOL/L — SIGNIFICANT CHANGE UP (ref 3.5–5)
POTASSIUM SERPL-SCNC: 4 MMOL/L — SIGNIFICANT CHANGE UP (ref 3.5–5)
POTASSIUM SERPL-SCNC: 4.1 MMOL/L — SIGNIFICANT CHANGE UP (ref 3.5–5)
POTASSIUM SERPL-SCNC: 4.1 MMOL/L — SIGNIFICANT CHANGE UP (ref 3.5–5)
POTASSIUM SERPL-SCNC: 4.2 MMOL/L — SIGNIFICANT CHANGE UP (ref 3.5–5)
POTASSIUM SERPL-SCNC: 4.4 MMOL/L — SIGNIFICANT CHANGE UP (ref 3.5–5)
POTASSIUM SERPL-SCNC: 4.8 MMOL/L — SIGNIFICANT CHANGE UP (ref 3.5–5)
POTASSIUM SERPL-SCNC: 5 MMOL/L — SIGNIFICANT CHANGE UP (ref 3.5–5)
PROCALCITONIN SERPL-MCNC: 2.25 NG/ML — HIGH (ref 0.02–0.1)
PROCALCITONIN SERPL-MCNC: 2.58 NG/ML — HIGH (ref 0.02–0.1)
PROPOXYPHENE QUALITATIVE URINE RESULT: NEGATIVE — SIGNIFICANT CHANGE UP
PROT SERPL-MCNC: 3.1 G/DL — LOW (ref 6–8)
PROT SERPL-MCNC: 3.8 G/DL — LOW (ref 6–8)
PROT SERPL-MCNC: 3.9 G/DL — LOW (ref 6–8)
PROT SERPL-MCNC: 4.1 G/DL — LOW (ref 6–8)
PROT SERPL-MCNC: 4.6 G/DL — LOW (ref 6–8)
PROT SERPL-MCNC: 4.7 G/DL — LOW (ref 6–8)
PROT SERPL-MCNC: 5 G/DL — LOW (ref 6–8)
PROT SERPL-MCNC: 5.1 G/DL — LOW (ref 6–8)
PROT UR-MCNC: ABNORMAL
PROTHROM AB SERPL-ACNC: 16.3 SEC — HIGH (ref 9.95–12.87)
PROTHROM AB SERPL-ACNC: 23.1 SEC — HIGH (ref 9.95–12.87)
PROTHROM AB SERPL-ACNC: 27.4 SEC — HIGH (ref 9.95–12.87)
RBC # BLD: 3.18 M/UL — LOW (ref 4.2–5.4)
RBC # BLD: 3.42 M/UL — LOW (ref 4.2–5.4)
RBC # BLD: 3.55 M/UL — LOW (ref 4.2–5.4)
RBC # BLD: 3.78 M/UL — LOW (ref 4.2–5.4)
RBC # BLD: 3.8 M/UL — LOW (ref 4.2–5.4)
RBC # FLD: 14.9 % — HIGH (ref 11.5–14.5)
RBC # FLD: 15 % — HIGH (ref 11.5–14.5)
RBC # FLD: 15 % — HIGH (ref 11.5–14.5)
RBC # FLD: 15.5 % — HIGH (ref 11.5–14.5)
RBC # FLD: 15.7 % — HIGH (ref 11.5–14.5)
RBC BLD AUTO: ABNORMAL
RBC CASTS # UR COMP ASSIST: 36 /HPF — HIGH (ref 0–4)
SAO2 % BLDA: 86.1 % — LOW (ref 94–98)
SAO2 % BLDV: 53.8 % — SIGNIFICANT CHANGE UP
SARS-COV-2 RNA SPEC QL NAA+PROBE: SIGNIFICANT CHANGE UP
SMUDGE CELLS # BLD: PRESENT — SIGNIFICANT CHANGE UP
SODIUM SERPL-SCNC: 129 MMOL/L — LOW (ref 135–146)
SODIUM SERPL-SCNC: 133 MMOL/L — LOW (ref 135–146)
SODIUM SERPL-SCNC: 134 MMOL/L — LOW (ref 135–146)
SODIUM SERPL-SCNC: 135 MMOL/L — SIGNIFICANT CHANGE UP (ref 135–146)
SODIUM SERPL-SCNC: 138 MMOL/L — SIGNIFICANT CHANGE UP (ref 135–146)
SODIUM SERPL-SCNC: 139 MMOL/L — SIGNIFICANT CHANGE UP (ref 135–146)
SODIUM SERPL-SCNC: 140 MMOL/L — SIGNIFICANT CHANGE UP (ref 135–146)
SODIUM SERPL-SCNC: 141 MMOL/L — SIGNIFICANT CHANGE UP (ref 135–146)
SODIUM UR-SCNC: 59 MMOL/L — SIGNIFICANT CHANGE UP
SP GR SPEC: 1.02 — SIGNIFICANT CHANGE UP (ref 1.01–1.03)
SPHEROCYTES BLD QL SMEAR: SLIGHT — SIGNIFICANT CHANGE UP
T PALLIDUM AB TITR SER: NEGATIVE — SIGNIFICANT CHANGE UP
T4 FREE SERPL-MCNC: 1 NG/DL — SIGNIFICANT CHANGE UP (ref 0.9–1.8)
T4 FREE+ TSH PNL SERPL: 9.9 UIU/ML — HIGH (ref 0.27–4.2)
TARGETS BLD QL SMEAR: SLIGHT — SIGNIFICANT CHANGE UP
TRIGL SERPL-MCNC: 38 MG/DL — SIGNIFICANT CHANGE UP
TROPONIN T SERPL-MCNC: <0.01 NG/ML — SIGNIFICANT CHANGE UP
TROPONIN T SERPL-MCNC: <0.01 NG/ML — SIGNIFICANT CHANGE UP
UROBILINOGEN FLD QL: SIGNIFICANT CHANGE UP
WBC # BLD: 0.24 K/UL — CRITICAL LOW (ref 4.8–10.8)
WBC # BLD: 1.07 K/UL — LOW (ref 4.8–10.8)
WBC # BLD: 5.71 K/UL — SIGNIFICANT CHANGE UP (ref 4.8–10.8)
WBC # BLD: 8.05 K/UL — SIGNIFICANT CHANGE UP (ref 4.8–10.8)
WBC # BLD: 8.19 K/UL — SIGNIFICANT CHANGE UP (ref 4.8–10.8)
WBC # FLD AUTO: 0.24 K/UL — CRITICAL LOW (ref 4.8–10.8)
WBC # FLD AUTO: 1.07 K/UL — LOW (ref 4.8–10.8)
WBC # FLD AUTO: 5.71 K/UL — SIGNIFICANT CHANGE UP (ref 4.8–10.8)
WBC # FLD AUTO: 8.05 K/UL — SIGNIFICANT CHANGE UP (ref 4.8–10.8)
WBC # FLD AUTO: 8.19 K/UL — SIGNIFICANT CHANGE UP (ref 4.8–10.8)
WBC UR QL: 4 /HPF — SIGNIFICANT CHANGE UP (ref 0–5)

## 2023-01-01 PROCEDURE — 87086 URINE CULTURE/COLONY COUNT: CPT

## 2023-01-01 PROCEDURE — 82570 ASSAY OF URINE CREATININE: CPT

## 2023-01-01 PROCEDURE — 99223 1ST HOSP IP/OBS HIGH 75: CPT

## 2023-01-01 PROCEDURE — 71045 X-RAY EXAM CHEST 1 VIEW: CPT

## 2023-01-01 PROCEDURE — 82962 GLUCOSE BLOOD TEST: CPT

## 2023-01-01 PROCEDURE — 85610 PROTHROMBIN TIME: CPT

## 2023-01-01 PROCEDURE — 83605 ASSAY OF LACTIC ACID: CPT

## 2023-01-01 PROCEDURE — 73100 X-RAY EXAM OF WRIST: CPT | Mod: LT

## 2023-01-01 PROCEDURE — 71045 X-RAY EXAM CHEST 1 VIEW: CPT | Mod: 26

## 2023-01-01 PROCEDURE — 99291 CRITICAL CARE FIRST HOUR: CPT | Mod: GC,25

## 2023-01-01 PROCEDURE — 99291 CRITICAL CARE FIRST HOUR: CPT

## 2023-01-01 PROCEDURE — 81001 URINALYSIS AUTO W/SCOPE: CPT

## 2023-01-01 PROCEDURE — 86850 RBC ANTIBODY SCREEN: CPT

## 2023-01-01 PROCEDURE — 84100 ASSAY OF PHOSPHORUS: CPT

## 2023-01-01 PROCEDURE — 85014 HEMATOCRIT: CPT

## 2023-01-01 PROCEDURE — 87077 CULTURE AEROBIC IDENTIFY: CPT

## 2023-01-01 PROCEDURE — 99233 SBSQ HOSP IP/OBS HIGH 50: CPT

## 2023-01-01 PROCEDURE — 36415 COLL VENOUS BLD VENIPUNCTURE: CPT

## 2023-01-01 PROCEDURE — 80361 OPIATES 1 OR MORE: CPT

## 2023-01-01 PROCEDURE — 82306 VITAMIN D 25 HYDROXY: CPT

## 2023-01-01 PROCEDURE — 36556 INSERT NON-TUNNEL CV CATH: CPT | Mod: GC

## 2023-01-01 PROCEDURE — 84484 ASSAY OF TROPONIN QUANT: CPT

## 2023-01-01 PROCEDURE — 99221 1ST HOSP IP/OBS SF/LOW 40: CPT

## 2023-01-01 PROCEDURE — 80048 BASIC METABOLIC PNL TOTAL CA: CPT

## 2023-01-01 PROCEDURE — 86901 BLOOD TYPING SEROLOGIC RH(D): CPT

## 2023-01-01 PROCEDURE — 82553 CREATINE MB FRACTION: CPT

## 2023-01-01 PROCEDURE — 82803 BLOOD GASES ANY COMBINATION: CPT

## 2023-01-01 PROCEDURE — 84443 ASSAY THYROID STIM HORMONE: CPT

## 2023-01-01 PROCEDURE — 87040 BLOOD CULTURE FOR BACTERIA: CPT

## 2023-01-01 PROCEDURE — 82330 ASSAY OF CALCIUM: CPT

## 2023-01-01 PROCEDURE — 87150 DNA/RNA AMPLIFIED PROBE: CPT

## 2023-01-01 PROCEDURE — 80053 COMPREHEN METABOLIC PANEL: CPT

## 2023-01-01 PROCEDURE — 84295 ASSAY OF SERUM SODIUM: CPT

## 2023-01-01 PROCEDURE — 84300 ASSAY OF URINE SODIUM: CPT

## 2023-01-01 PROCEDURE — 87635 SARS-COV-2 COVID-19 AMP PRB: CPT

## 2023-01-01 PROCEDURE — 84145 PROCALCITONIN (PCT): CPT

## 2023-01-01 PROCEDURE — 80307 DRUG TEST PRSMV CHEM ANLYZR: CPT

## 2023-01-01 PROCEDURE — 70450 CT HEAD/BRAIN W/O DYE: CPT | Mod: 26

## 2023-01-01 PROCEDURE — 83735 ASSAY OF MAGNESIUM: CPT

## 2023-01-01 PROCEDURE — 99239 HOSP IP/OBS DSCHRG MGMT >30: CPT

## 2023-01-01 PROCEDURE — 71250 CT THORAX DX C-: CPT | Mod: 26

## 2023-01-01 PROCEDURE — 93010 ELECTROCARDIOGRAM REPORT: CPT

## 2023-01-01 PROCEDURE — 80074 ACUTE HEPATITIS PANEL: CPT

## 2023-01-01 PROCEDURE — 76705 ECHO EXAM OF ABDOMEN: CPT

## 2023-01-01 PROCEDURE — 86900 BLOOD TYPING SEROLOGIC ABO: CPT

## 2023-01-01 PROCEDURE — 80061 LIPID PANEL: CPT

## 2023-01-01 PROCEDURE — 84439 ASSAY OF FREE THYROXINE: CPT

## 2023-01-01 PROCEDURE — 71275 CT ANGIOGRAPHY CHEST: CPT

## 2023-01-01 PROCEDURE — 85018 HEMOGLOBIN: CPT

## 2023-01-01 PROCEDURE — 94002 VENT MGMT INPAT INIT DAY: CPT

## 2023-01-01 PROCEDURE — 80076 HEPATIC FUNCTION PANEL: CPT

## 2023-01-01 PROCEDURE — 82550 ASSAY OF CK (CPK): CPT

## 2023-01-01 PROCEDURE — 86780 TREPONEMA PALLIDUM: CPT

## 2023-01-01 PROCEDURE — 85730 THROMBOPLASTIN TIME PARTIAL: CPT

## 2023-01-01 PROCEDURE — 83036 HEMOGLOBIN GLYCOSYLATED A1C: CPT

## 2023-01-01 PROCEDURE — 71275 CT ANGIOGRAPHY CHEST: CPT | Mod: 26

## 2023-01-01 PROCEDURE — 73100 X-RAY EXAM OF WRIST: CPT | Mod: 26,LT

## 2023-01-01 PROCEDURE — C9113: CPT

## 2023-01-01 PROCEDURE — 76705 ECHO EXAM OF ABDOMEN: CPT | Mod: 26

## 2023-01-01 PROCEDURE — 70450 CT HEAD/BRAIN W/O DYE: CPT

## 2023-01-01 PROCEDURE — 84630 ASSAY OF ZINC: CPT

## 2023-01-01 PROCEDURE — 71250 CT THORAX DX C-: CPT

## 2023-01-01 PROCEDURE — 83935 ASSAY OF URINE OSMOLALITY: CPT

## 2023-01-01 PROCEDURE — 84132 ASSAY OF SERUM POTASSIUM: CPT

## 2023-01-01 PROCEDURE — 85025 COMPLETE CBC W/AUTO DIFF WBC: CPT

## 2023-01-01 PROCEDURE — 93005 ELECTROCARDIOGRAM TRACING: CPT

## 2023-01-01 RX ORDER — DILTIAZEM HCL 120 MG
120 CAPSULE, EXT RELEASE 24 HR ORAL DAILY
Refills: 0 | Status: DISCONTINUED | OUTPATIENT
Start: 2023-01-01 | End: 2023-01-01

## 2023-01-01 RX ORDER — DEXMEDETOMIDINE HYDROCHLORIDE IN 0.9% SODIUM CHLORIDE 4 UG/ML
0.2 INJECTION INTRAVENOUS
Qty: 400 | Refills: 0 | Status: DISCONTINUED | OUTPATIENT
Start: 2023-01-01 | End: 2023-01-01

## 2023-01-01 RX ORDER — SODIUM CHLORIDE 9 MG/ML
500 INJECTION, SOLUTION INTRAVENOUS ONCE
Refills: 0 | Status: COMPLETED | OUTPATIENT
Start: 2023-01-01 | End: 2023-01-01

## 2023-01-01 RX ORDER — VASOPRESSIN 20 [USP'U]/ML
0.04 INJECTION INTRAVENOUS
Qty: 40 | Refills: 0 | Status: DISCONTINUED | OUTPATIENT
Start: 2023-01-01 | End: 2023-01-01

## 2023-01-01 RX ORDER — EPINEPHRINE 0.3 MG/.3ML
1 INJECTION INTRAMUSCULAR; SUBCUTANEOUS
Qty: 8 | Refills: 0 | Status: DISCONTINUED | OUTPATIENT
Start: 2023-01-01 | End: 2023-01-01

## 2023-01-01 RX ORDER — MAGNESIUM SULFATE 500 MG/ML
2 VIAL (ML) INJECTION ONCE
Refills: 0 | Status: COMPLETED | OUTPATIENT
Start: 2023-01-01 | End: 2023-01-01

## 2023-01-01 RX ORDER — SODIUM CHLORIDE 9 MG/ML
1000 INJECTION, SOLUTION INTRAVENOUS
Refills: 0 | Status: DISCONTINUED | OUTPATIENT
Start: 2023-01-01 | End: 2023-01-01

## 2023-01-01 RX ORDER — GABAPENTIN 400 MG/1
600 CAPSULE ORAL THREE TIMES A DAY
Refills: 0 | Status: DISCONTINUED | OUTPATIENT
Start: 2023-01-01 | End: 2023-01-01

## 2023-01-01 RX ORDER — SODIUM CHLORIDE 9 MG/ML
1000 INJECTION INTRAMUSCULAR; INTRAVENOUS; SUBCUTANEOUS ONCE
Refills: 0 | Status: COMPLETED | OUTPATIENT
Start: 2023-01-01 | End: 2023-01-01

## 2023-01-01 RX ORDER — MAGNESIUM SULFATE 500 MG/ML
2 VIAL (ML) INJECTION EVERY 4 HOURS
Refills: 0 | Status: DISCONTINUED | OUTPATIENT
Start: 2023-01-01 | End: 2023-01-01

## 2023-01-01 RX ORDER — MULTIVIT-MIN/FERROUS GLUCONATE 9 MG/15 ML
1 LIQUID (ML) ORAL DAILY
Refills: 0 | Status: DISCONTINUED | OUTPATIENT
Start: 2023-01-01 | End: 2023-01-01

## 2023-01-01 RX ORDER — NOREPINEPHRINE BITARTRATE/D5W 8 MG/250ML
0.05 PLASTIC BAG, INJECTION (ML) INTRAVENOUS
Qty: 16 | Refills: 0 | Status: DISCONTINUED | OUTPATIENT
Start: 2023-01-01 | End: 2023-01-01

## 2023-01-01 RX ORDER — NOREPINEPHRINE BITARTRATE/D5W 8 MG/250ML
0.25 PLASTIC BAG, INJECTION (ML) INTRAVENOUS
Qty: 8 | Refills: 0 | Status: DISCONTINUED | OUTPATIENT
Start: 2023-01-01 | End: 2023-01-01

## 2023-01-01 RX ORDER — CHLORHEXIDINE GLUCONATE 213 G/1000ML
15 SOLUTION TOPICAL EVERY 12 HOURS
Refills: 0 | Status: DISCONTINUED | OUTPATIENT
Start: 2023-01-01 | End: 2023-01-01

## 2023-01-01 RX ORDER — FENTANYL CITRATE 50 UG/ML
50 INJECTION INTRAVENOUS ONCE
Refills: 0 | Status: COMPLETED | OUTPATIENT
Start: 2023-01-01 | End: 2023-01-01

## 2023-01-01 RX ORDER — THIAMINE MONONITRATE (VIT B1) 100 MG
100 TABLET ORAL DAILY
Refills: 0 | Status: DISCONTINUED | OUTPATIENT
Start: 2023-01-01 | End: 2023-01-01

## 2023-01-01 RX ORDER — CILOSTAZOL 100 MG/1
50 TABLET ORAL
Refills: 0 | Status: DISCONTINUED | OUTPATIENT
Start: 2023-01-01 | End: 2023-01-01

## 2023-01-01 RX ORDER — SERTRALINE 25 MG/1
50 TABLET, FILM COATED ORAL DAILY
Refills: 0 | Status: DISCONTINUED | OUTPATIENT
Start: 2023-01-01 | End: 2023-01-01

## 2023-01-01 RX ORDER — FENTANYL CITRATE 50 UG/ML
0.5 INJECTION INTRAVENOUS
Qty: 2500 | Refills: 0 | Status: DISCONTINUED | OUTPATIENT
Start: 2023-01-01 | End: 2023-01-01

## 2023-01-01 RX ORDER — DEXTROSE 50 % IN WATER 50 %
50 SYRINGE (ML) INTRAVENOUS ONCE
Refills: 0 | Status: COMPLETED | OUTPATIENT
Start: 2023-01-01 | End: 2023-01-01

## 2023-01-01 RX ORDER — VANCOMYCIN HCL 1 G
750 VIAL (EA) INTRAVENOUS EVERY 12 HOURS
Refills: 0 | Status: DISCONTINUED | OUTPATIENT
Start: 2023-01-01 | End: 2023-01-01

## 2023-01-01 RX ORDER — FENTANYL CITRATE 50 UG/ML
0.5 INJECTION INTRAVENOUS
Qty: 5000 | Refills: 0 | Status: DISCONTINUED | OUTPATIENT
Start: 2023-01-01 | End: 2023-01-01

## 2023-01-01 RX ORDER — POTASSIUM CHLORIDE 20 MEQ
20 PACKET (EA) ORAL
Refills: 0 | Status: DISCONTINUED | OUTPATIENT
Start: 2023-01-01 | End: 2023-01-01

## 2023-01-01 RX ORDER — HYDROMORPHONE HYDROCHLORIDE 2 MG/ML
0.5 INJECTION INTRAMUSCULAR; INTRAVENOUS; SUBCUTANEOUS
Refills: 0 | Status: DISCONTINUED | OUTPATIENT
Start: 2023-01-01 | End: 2023-01-01

## 2023-01-01 RX ORDER — SERTRALINE 25 MG/1
75 TABLET, FILM COATED ORAL DAILY
Refills: 0 | Status: DISCONTINUED | OUTPATIENT
Start: 2023-01-01 | End: 2023-01-01

## 2023-01-01 RX ORDER — CEFEPIME 1 G/1
2000 INJECTION, POWDER, FOR SOLUTION INTRAMUSCULAR; INTRAVENOUS ONCE
Refills: 0 | Status: COMPLETED | OUTPATIENT
Start: 2023-01-01 | End: 2023-01-01

## 2023-01-01 RX ORDER — ROBINUL 0.2 MG/ML
0.4 INJECTION INTRAMUSCULAR; INTRAVENOUS
Refills: 0 | Status: DISCONTINUED | OUTPATIENT
Start: 2023-01-01 | End: 2023-01-01

## 2023-01-01 RX ORDER — DEXTROSE 50 % IN WATER 50 %
20 SYRINGE (ML) INTRAVENOUS ONCE
Refills: 0 | Status: COMPLETED | OUTPATIENT
Start: 2023-01-01 | End: 2023-01-01

## 2023-01-01 RX ORDER — DEXTROSE 50 % IN WATER 50 %
20 SYRINGE (ML) INTRAVENOUS
Refills: 0 | Status: DISCONTINUED | OUTPATIENT
Start: 2023-01-01 | End: 2023-01-01

## 2023-01-01 RX ORDER — OXYCODONE AND ACETAMINOPHEN 5; 325 MG/1; MG/1
1 TABLET ORAL EVERY 6 HOURS
Refills: 0 | Status: DISCONTINUED | OUTPATIENT
Start: 2023-01-01 | End: 2023-01-01

## 2023-01-01 RX ORDER — ASCORBIC ACID 60 MG
500 TABLET,CHEWABLE ORAL DAILY
Refills: 0 | Status: DISCONTINUED | OUTPATIENT
Start: 2023-01-01 | End: 2023-01-01

## 2023-01-01 RX ORDER — HYDROCORTISONE 20 MG
100 TABLET ORAL EVERY 8 HOURS
Refills: 0 | Status: DISCONTINUED | OUTPATIENT
Start: 2023-01-01 | End: 2023-01-01

## 2023-01-01 RX ORDER — CEFEPIME 1 G/1
2000 INJECTION, POWDER, FOR SOLUTION INTRAMUSCULAR; INTRAVENOUS EVERY 8 HOURS
Refills: 0 | Status: DISCONTINUED | OUTPATIENT
Start: 2023-01-01 | End: 2023-01-01

## 2023-01-01 RX ORDER — POTASSIUM PHOSPHATE, MONOBASIC POTASSIUM PHOSPHATE, DIBASIC 236; 224 MG/ML; MG/ML
15 INJECTION, SOLUTION INTRAVENOUS ONCE
Refills: 0 | Status: COMPLETED | OUTPATIENT
Start: 2023-01-01 | End: 2023-01-01

## 2023-01-01 RX ORDER — PHENYLEPHRINE HYDROCHLORIDE 10 MG/ML
10 INJECTION INTRAVENOUS
Qty: 160 | Refills: 0 | Status: DISCONTINUED | OUTPATIENT
Start: 2023-01-01 | End: 2023-01-01

## 2023-01-01 RX ORDER — CEFEPIME 1 G/1
INJECTION, POWDER, FOR SOLUTION INTRAMUSCULAR; INTRAVENOUS
Refills: 0 | Status: DISCONTINUED | OUTPATIENT
Start: 2023-01-01 | End: 2023-01-01

## 2023-01-01 RX ORDER — SODIUM CHLORIDE 9 MG/ML
500 INJECTION INTRAMUSCULAR; INTRAVENOUS; SUBCUTANEOUS ONCE
Refills: 0 | Status: DISCONTINUED | OUTPATIENT
Start: 2023-01-01 | End: 2023-01-01

## 2023-01-01 RX ORDER — HYDROCHLOROTHIAZIDE 25 MG
1 TABLET ORAL
Qty: 0 | Refills: 0 | DISCHARGE

## 2023-01-01 RX ORDER — ALPRAZOLAM 0.25 MG
0.25 TABLET ORAL
Refills: 0 | Status: DISCONTINUED | OUTPATIENT
Start: 2023-01-01 | End: 2023-01-01

## 2023-01-01 RX ORDER — MAGNESIUM SULFATE 500 MG/ML
4 VIAL (ML) INJECTION ONCE
Refills: 0 | Status: DISCONTINUED | OUTPATIENT
Start: 2023-01-01 | End: 2023-01-01

## 2023-01-01 RX ORDER — PANTOPRAZOLE SODIUM 20 MG/1
80 TABLET, DELAYED RELEASE ORAL ONCE
Refills: 0 | Status: COMPLETED | OUTPATIENT
Start: 2023-01-01 | End: 2023-01-01

## 2023-01-01 RX ORDER — VANCOMYCIN HCL 1 G
600 VIAL (EA) INTRAVENOUS EVERY 12 HOURS
Refills: 0 | Status: DISCONTINUED | OUTPATIENT
Start: 2023-01-01 | End: 2023-01-01

## 2023-01-01 RX ORDER — VANCOMYCIN HCL 1 G
750 VIAL (EA) INTRAVENOUS ONCE
Refills: 0 | Status: DISCONTINUED | OUTPATIENT
Start: 2023-01-01 | End: 2023-01-01

## 2023-01-01 RX ORDER — SODIUM,POTASSIUM PHOSPHATES 278-250MG
1.5 POWDER IN PACKET (EA) ORAL
Refills: 0 | Status: DISCONTINUED | OUTPATIENT
Start: 2023-01-01 | End: 2023-01-01

## 2023-01-01 RX ORDER — LEVOTHYROXINE SODIUM 125 MCG
50 TABLET ORAL DAILY
Refills: 0 | Status: DISCONTINUED | OUTPATIENT
Start: 2023-01-01 | End: 2023-01-01

## 2023-01-01 RX ORDER — VANCOMYCIN HCL 1 G
VIAL (EA) INTRAVENOUS
Refills: 0 | Status: DISCONTINUED | OUTPATIENT
Start: 2023-01-01 | End: 2023-01-01

## 2023-01-01 RX ORDER — PANTOPRAZOLE SODIUM 20 MG/1
8 TABLET, DELAYED RELEASE ORAL
Qty: 80 | Refills: 0 | Status: DISCONTINUED | OUTPATIENT
Start: 2023-01-01 | End: 2023-01-01

## 2023-01-01 RX ORDER — THIAMINE MONONITRATE (VIT B1) 100 MG
500 TABLET ORAL ONCE
Refills: 0 | Status: COMPLETED | OUTPATIENT
Start: 2023-01-01 | End: 2023-01-01

## 2023-01-01 RX ORDER — OLANZAPINE 15 MG/1
2.5 TABLET, FILM COATED ORAL EVERY 12 HOURS
Refills: 0 | Status: DISCONTINUED | OUTPATIENT
Start: 2023-01-01 | End: 2023-01-01

## 2023-01-01 RX ORDER — POTASSIUM PHOSPHATE, MONOBASIC POTASSIUM PHOSPHATE, DIBASIC 236; 224 MG/ML; MG/ML
30 INJECTION, SOLUTION INTRAVENOUS ONCE
Refills: 0 | Status: COMPLETED | OUTPATIENT
Start: 2023-01-01 | End: 2023-01-01

## 2023-01-01 RX ORDER — ACETAMINOPHEN 500 MG
650 TABLET ORAL EVERY 6 HOURS
Refills: 0 | Status: DISCONTINUED | OUTPATIENT
Start: 2023-01-01 | End: 2023-01-01

## 2023-01-01 RX ORDER — ENOXAPARIN SODIUM 100 MG/ML
40 INJECTION SUBCUTANEOUS EVERY 24 HOURS
Refills: 0 | Status: DISCONTINUED | OUTPATIENT
Start: 2023-01-01 | End: 2023-01-01

## 2023-01-01 RX ORDER — SODIUM CHLORIDE 9 MG/ML
750 INJECTION INTRAMUSCULAR; INTRAVENOUS; SUBCUTANEOUS ONCE
Refills: 0 | Status: COMPLETED | OUTPATIENT
Start: 2023-01-01 | End: 2023-01-01

## 2023-01-01 RX ADMIN — Medication 25 GRAM(S): at 12:52

## 2023-01-01 RX ADMIN — SODIUM CHLORIDE 2000 MILLILITER(S): 9 INJECTION, SOLUTION INTRAVENOUS at 17:40

## 2023-01-01 RX ADMIN — GABAPENTIN 600 MILLIGRAM(S): 400 CAPSULE ORAL at 16:39

## 2023-01-01 RX ADMIN — PANTOPRAZOLE SODIUM 10 MG/HR: 20 TABLET, DELAYED RELEASE ORAL at 03:27

## 2023-01-01 RX ADMIN — Medication 50 MILLILITER(S): at 17:40

## 2023-01-01 RX ADMIN — Medication 0.25 MILLIGRAM(S): at 06:19

## 2023-01-01 RX ADMIN — POTASSIUM PHOSPHATE, MONOBASIC POTASSIUM PHOSPHATE, DIBASIC 62.5 MILLIMOLE(S): 236; 224 INJECTION, SOLUTION INTRAVENOUS at 06:19

## 2023-01-01 RX ADMIN — Medication 50 MICROGRAM(S): at 06:17

## 2023-01-01 RX ADMIN — CEFEPIME 100 MILLIGRAM(S): 1 INJECTION, POWDER, FOR SOLUTION INTRAMUSCULAR; INTRAVENOUS at 03:37

## 2023-01-01 RX ADMIN — Medication 25 GRAM(S): at 22:20

## 2023-01-01 RX ADMIN — SODIUM CHLORIDE 150 MILLILITER(S): 9 INJECTION, SOLUTION INTRAVENOUS at 06:43

## 2023-01-01 RX ADMIN — SODIUM CHLORIDE 1000 MILLILITER(S): 9 INJECTION, SOLUTION INTRAVENOUS at 06:55

## 2023-01-01 RX ADMIN — CILOSTAZOL 50 MILLIGRAM(S): 100 TABLET ORAL at 18:41

## 2023-01-01 RX ADMIN — SODIUM CHLORIDE 1000 MILLILITER(S): 9 INJECTION INTRAMUSCULAR; INTRAVENOUS; SUBCUTANEOUS at 06:55

## 2023-01-01 RX ADMIN — Medication 20 MILLILITER(S): at 22:00

## 2023-01-01 RX ADMIN — SODIUM CHLORIDE 1500 MILLILITER(S): 9 INJECTION INTRAMUSCULAR; INTRAVENOUS; SUBCUTANEOUS at 00:45

## 2023-01-01 RX ADMIN — Medication 15.8 MICROGRAM(S)/KG/MIN: at 15:49

## 2023-01-01 RX ADMIN — Medication 19.7 MICROGRAM(S)/KG/MIN: at 17:40

## 2023-01-01 RX ADMIN — GABAPENTIN 600 MILLIGRAM(S): 400 CAPSULE ORAL at 06:17

## 2023-01-01 RX ADMIN — GABAPENTIN 600 MILLIGRAM(S): 400 CAPSULE ORAL at 22:17

## 2023-01-01 RX ADMIN — Medication 25 GRAM(S): at 18:12

## 2023-01-01 RX ADMIN — Medication 105 MILLIGRAM(S): at 14:35

## 2023-01-01 RX ADMIN — GABAPENTIN 600 MILLIGRAM(S): 400 CAPSULE ORAL at 22:18

## 2023-01-01 RX ADMIN — Medication 50 MILLIEQUIVALENT(S): at 06:54

## 2023-01-01 RX ADMIN — SODIUM CHLORIDE 100 MILLILITER(S): 9 INJECTION, SOLUTION INTRAVENOUS at 17:42

## 2023-01-01 RX ADMIN — Medication 120 MILLIGRAM(S): at 06:17

## 2023-01-01 RX ADMIN — SODIUM CHLORIDE 50 MILLILITER(S): 9 INJECTION, SOLUTION INTRAVENOUS at 15:53

## 2023-01-01 RX ADMIN — SODIUM CHLORIDE 150 MILLILITER(S): 9 INJECTION, SOLUTION INTRAVENOUS at 17:25

## 2023-01-01 RX ADMIN — ENOXAPARIN SODIUM 40 MILLIGRAM(S): 100 INJECTION SUBCUTANEOUS at 06:18

## 2023-01-01 RX ADMIN — Medication 100 MILLIGRAM(S): at 04:38

## 2023-01-01 RX ADMIN — CILOSTAZOL 50 MILLIGRAM(S): 100 TABLET ORAL at 06:17

## 2023-01-01 RX ADMIN — PANTOPRAZOLE SODIUM 80 MILLIGRAM(S): 20 TABLET, DELAYED RELEASE ORAL at 01:09

## 2023-01-01 RX ADMIN — CHLORHEXIDINE GLUCONATE 15 MILLILITER(S): 213 SOLUTION TOPICAL at 06:53

## 2023-01-01 RX ADMIN — POTASSIUM PHOSPHATE, MONOBASIC POTASSIUM PHOSPHATE, DIBASIC 62.5 MILLIMOLE(S): 236; 224 INJECTION, SOLUTION INTRAVENOUS at 16:00

## 2023-01-01 RX ADMIN — POTASSIUM PHOSPHATE, MONOBASIC POTASSIUM PHOSPHATE, DIBASIC 83.33 MILLIMOLE(S): 236; 224 INJECTION, SOLUTION INTRAVENOUS at 18:12

## 2023-01-29 NOTE — ED PROVIDER NOTE - INPATIENT RESIDENT/ACP NOTIFIED DATE
Ran out of medication several days ago.  Has a history of EF 30% based on echo in April 2020.  Repeat echocardiogram with improved EF  Reportedly acute exacerbation on admit - currently euvolemic  following     10-Nov-2022 13:50

## 2023-02-20 NOTE — ED ADULT NURSE NOTE - NSIMPLEMENTINTERV_GEN_ALL_ED
Implemented All Fall with Harm Risk Interventions:  [place] to call system. Call bell, personal items and telephone within reach. Instruct patient to call for assistance. Room bathroom lighting operational. Non-slip footwear when patient is off stretcher. Physically safe environment: no spills, clutter or unnecessary equipment. Stretcher in lowest position, wheels locked, appropriate side rails in place. Provide visual cue, wrist band, yellow gown, etc. Monitor gait and stability. Monitor for mental status changes and reorient to person, place, and time. Review medications for side effects contributing to fall risk. Reinforce activity limits and safety measures with patient and family. Provide visual clues: red socks.

## 2023-02-20 NOTE — ED ADULT NURSE NOTE - NSICDXPASTMEDICALHX_GEN_ALL_CORE_FT
PAST MEDICAL HISTORY:  Anorexia     Anxiety     Depression     Kidney cysts     OA (osteoarthritis)     Osteoporosis     Peripheral neuropathy     RA (rheumatoid arthritis)

## 2023-02-20 NOTE — ED PROVIDER NOTE - OBJECTIVE STATEMENT
67 y/o F with PMH anorexia, depressions, renal cysts, peripheral neuropathy, RA, OA, osteoporosis presenting for weakness since yesterday. Per pt's , pt has had anorexia for most of her adult life and has not eaten or drank anything in days. He attempted to give her some juice prior to ED arrival. Pt denies any complaints. Recently admitted to hospital and discharged 12/2022 for septic shock, intubated for respiratory failure.

## 2023-02-20 NOTE — ED PROVIDER NOTE - CLINICAL SUMMARY MEDICAL DECISION MAKING FREE TEXT BOX
68-year-old female presents to the ED for weakness, failure to thrive, not eating, and hypoglycemia.  Vitals reviewed by me noted to be wnl.  Physical exam is noted to have dry mucous membranes, skin turgor.  Consistent with dehydration.  We obtained labs which noted to have persistent hyperglycemia.  Patient was alert and oriented so we opted to treat with D5 NS.  Labs reviewed by me noted to have thrombocytopenia, hypomagnesemia, elevated LFTs, hypocalcemia.  VBG unremarkable.  Repleted mag.  Repeat fingerstick noted to be 86.  Additionally story provided by  at bedside.  Patient's  reports patient is more depressed lately.  Will need inpatient work-up for failure to thrive and depression.

## 2023-02-20 NOTE — ED PROVIDER NOTE - CARE PLAN
Principal Discharge DX:	Hypoglycemia  Secondary Diagnosis:	Failure to thrive in adult  Secondary Diagnosis:	Hypomagnesemia  Secondary Diagnosis:	Thrombocytopenia  Secondary Diagnosis:	Elevated LFTs   1

## 2023-02-20 NOTE — ED ADULT NURSE NOTE - NSFALLRSKHARMRISK_ED_ALL_ED
From: Obdulia Ray  To: Genesis Bonilla  Sent: 5/5/2022 4:45 PM CDT  Subject: Carbs    I mentioned I would send you a picture of the Nutrition Facts on Soft [employer] shells we found to make sure they are good.  
yes

## 2023-02-20 NOTE — ED PROVIDER NOTE - PHYSICAL EXAMINATION
CONSTITUTIONAL: Well-developed; well-nourished; in no acute distress.   SKIN: Warm, dry  HEAD: Normocephalic; atraumatic  EYES: PERRL, EOMI, normal sclera and conjunctiva   ENT: No nasal discharge; airway clear.  CARD:  Regular rate and rhythm. Normal S1, S2  RESP: No increased WOB. CTA b/l without wheezes, crackles, rhonchi  ABD: Normoactive BS. Soft, nontender, nondistended.  EXT: Normal ROM.   NEURO: Alert, oriented, grossly unremarkable  PSYCH: Cooperative, appropriate.

## 2023-02-21 NOTE — H&P ADULT - NSHPPHYSICALEXAM_GEN_ALL_CORE
VITALS:   T(C): 36.7 (02-20-23 @ 16:51), Max: 36.7 (02-20-23 @ 16:51)  HR: 80 (02-20-23 @ 22:51) (80 - 89)  BP: 96/57 (02-20-23 @ 22:51) (88/53 - 101/68)  RR: 18 (02-20-23 @ 22:51) (16 - 18)  SpO2: 99% (02-20-23 @ 22:51) (98% - 99%)    GENERAL: NAD, lying in bed comfortably  HEAD:  Atraumatic, normocephalic  EYES: EOMI, PERRLA, conjunctiva and sclera clear  ENT: Moist mucous membranes  NECK: Supple, no JVD  HEART: Regular rate and rhythm, no murmurs, rubs, or gallops  LUNGS: Unlabored respirations.  Clear to auscultation bilaterally, no crackles, wheezing, or rhonchi  ABDOMEN: Soft, nontender, nondistended, +BS  EXTREMITIES: 2+ peripheral pulses bilaterally. No clubbing, cyanosis, or edema  NERVOUS SYSTEM:  A&Ox3, no focal deficits   SKIN: No rashes or lesions VITALS:   T(C): 36.7 (02-20-23 @ 16:51), Max: 36.7 (02-20-23 @ 16:51)  HR: 80 (02-20-23 @ 22:51) (80 - 89)  BP: 96/57 (02-20-23 @ 22:51) (88/53 - 101/68)  RR: 18 (02-20-23 @ 22:51) (16 - 18)  SpO2: 99% (02-20-23 @ 22:51) (98% - 99%)    GENERAL: weak and thin appearing  HEAD:  Atraumatic, normocephalic  EYES: EOMI, PERRLA, conjunctiva and sclera clear  ENT: Moist mucous membranes  NECK: Supple, no JVD  HEART: Regular rate and rhythm, no murmurs, rubs, or gallops  LUNGS: Unlabored respirations.  Clear to auscultation bilaterally, no crackles, wheezing, or rhonchi  ABDOMEN: Soft, nontender, nondistended, +BS  EXTREMITIES: b/l erythema LE  NERVOUS SYSTEM:  A&Ox3, no focal deficits

## 2023-02-21 NOTE — PATIENT PROFILE ADULT - FALL HARM RISK - HARM RISK INTERVENTIONS
all other ROS negative except as per HPI Assistance with ambulation/Assistance OOB with selected safe patient handling equipment/Communicate Risk of Fall with Harm to all staff/Discuss with provider need for PT consult/Monitor gait and stability/Reinforce activity limits and safety measures with patient and family/Tailored Fall Risk Interventions/Visual Cue: Yellow wristband and red socks/Bed in lowest position, wheels locked, appropriate side rails in place/Call bell, personal items and telephone in reach/Instruct patient to call for assistance before getting out of bed or chair/Non-slip footwear when patient is out of bed/[place] to call system/Physically safe environment - no spills, clutter or unnecessary equipment/Purposeful Proactive Rounding/Room/bathroom lighting operational, light cord in reach

## 2023-02-21 NOTE — PATIENT PROFILE ADULT - SURGICAL SITE INCISION
Pt BIB EMS from [facility] with c/o sleep deprivation x2 nights. Pt states his sleep apnea mask is ill fitting causing him to lose sleep. Pt states he is bipolar and is having no s/s of bipolar now. Pt denies SI/HI/hallucinations, or pain. Pt is compliant with all medications. no

## 2023-02-21 NOTE — PATIENT PROFILE ADULT - FUNCTIONAL ASSESSMENT - BASIC MOBILITY 5.
General Sunscreen Counseling: I recommended a broad spectrum sunscreen with a SPF of 30 or higher.  I explained that SPF 30 sunscreens block approximately 97 percent of the sun's harmful rays.  Sunscreens should be applied at least 15 minutes prior to expected sun exposure and then every 2 hours after that as long as sun exposure continues. If swimming or exercising sunscreen should be reapplied every 45 minutes to an hour after getting wet or sweating.  I also recommended a lip balm with a sunscreen as well. Sun protective clothing can be used in lieu of sunscreen but must be worn the entire time you are exposed to the sun's rays. Detail Level: Generalized 1 = Total assistance

## 2023-02-21 NOTE — H&P ADULT - HISTORY OF PRESENT ILLNESS
67 year old Female w/ PMH anorexia w/ laxative abuse, anxiety, depression, kidney cysts, peripheral neuropathy, RA, OA, osteoporosis, low back pain, recurrent LE cellulitis complicated by nec fasc and septic shock presents for weakness. Per pt's , pt has had anorexia for most of her adult life and has not eaten or drank anything in days. He attempted to give her some juice prior to ED arrival. Pt denies any complaints. ON arrival to the ED glucose was 53. Denies suicidal ideation.     In the ED vitals stable.   Labs show PLT 98, Mag 1.4, , ,      67 year old Female w/ PMH anorexia w/ laxative abuse, anxiety, depression, kidney cysts, peripheral neuropathy, RA, OA, osteoporosis, low back pain, recurrent LE cellulitis complicated by nec fasc and septic shock presents for weakness. Per pt's , pt has had anorexia for most of her adult life and has not eaten or drank anything in days. He attempted to give her some juice prior to ED arrival. Pt denies any complaints. is not very participatory with history taking though. On arrival to the ED glucose was 53. Denies suicidal ideation. States that she wants to go home.     In the ED vitals stable.   Labs show PLT 98, Mag 1.4, , , .

## 2023-02-21 NOTE — CHART NOTE - NSCHARTNOTEFT_GEN_A_CORE
NUTRITION SUPPORT TEAM  -  CONSULT NOTE     67 year old Female w/ PMH anorexia w/ laxative abuse, anxiety, depression, kidney cysts, peripheral neuropathy, RA, OA, osteoporosis, low back pain, recurrent LE cellulitis complicated by nec fasc and septic shock presents for weakness. Per pt's , pt has had anorexia for most of her adult life and has not eaten or drank anything in days. He attempted to give her some juice prior to ED arrival. Pt denies any complaints. is not very participatory with history taking though. On arrival to the ED glucose was 53. Denies suicidal ideation. States that she wants to go home.     In the ED vitals stable.   Labs show PLT 98, Mag 1.4, , , .  (21 Feb 2023 02:50)      NUTRITION SUPPORT NOTE:          REVIEW OF SYSTEMS:  Negative except as noted above.     PAST MEDICAL/SURGICAL HISTORY:   Anxiety  Depression  Osteoporosis  Kidney cysts  Peripheral neuropathy  RA (rheumatoid arthritis)  OA (osteoarthritis)  Anorexia  Low back pain with radiation  s/p &quot;nerve cutting&quot; 2015      ALLERGIES:  No Known Allergies      VITALS:  T(F): 97.4 (02-21 @ 06:11), Max: 97.4 (02-21 @ 06:11)  HR: 80 (02-21 @ 06:11) (80 - 80)  BP: 114/71 (02-21 @ 06:11) (114/71 - 114/71)  RR: 18 (02-21 @ 06:11) (18 - 18)  SpO2: 97% (02-21 @ 06:11) (97% - 97%)    HEIGHT/WEIGHT/BMI:   Height (cm): 170.2 (11-28), 170.2 (11-11), 170.2 (05-12), 170.2 (05-05)  Weight (kg): 45.9 (02-20), 43.6 (12-15), 49 (11-11), 49 (05-12)  BMI (kg/m2): 15.8 (02-20), 15.1 (12-15), 16.9 (11-28), 16.9 (11-11)    PHYSICAL EXAM:   GENERAL:    HEENT:    ABDOMEN:    EXTREMITIES:     SKIN:    IV ACCESS:   ENTERAL ACCESS:     I/Os:     STANDING MEDICATIONS:   acetaminophen     Tablet .. 650 milliGRAM(s) Oral every 6 hours PRN  ALPRAZolam 0.25 milliGRAM(s) Oral two times a day  cilostazol 50 milliGRAM(s) Oral two times a day  dextrose 5% + sodium chloride 0.45%. 1000 milliLiter(s) IV Continuous <Continuous>  diltiazem    milliGRAM(s) Oral daily  enoxaparin Injectable 40 milliGRAM(s) SubCutaneous every 24 hours  gabapentin 600 milliGRAM(s) Oral three times a day  levothyroxine 50 MICROGram(s) Oral daily  oxycodone    5 mG/acetaminophen 325 mG 1 Tablet(s) Oral every 6 hours PRN  sertraline 50 milliGRAM(s) Oral daily  thiamine IVPB 500 milliGRAM(s) IV Intermittent once      LABS:                         10.6   8.05  )-----------( 116      ( 21 Feb 2023 07:19 )             31.2     Mean Cell Volume: 91.2 fL (02.21.23 @ 07:19)  Red Cell Distrib Width: 15.7 % (02.21.23 @ 07:19)      139  |  106  |  20  ----------------------------<  128<H>          (02-21-23 @ 07:19)  4.0   |  26  |  <0.5<L>    Ca    7.9<L>          (02-21-23 @ 07:19)  Phos  2.2         (02-20-23 @ 18:22)  Mg     1.4         (02-20-23 @ 18:22)    TPro  4.6<L>  /  Alb  2.2<L>  /  TBili  <0.2  /  DBili  <0.2  /  AST  162<H>  /  ALT  155<H>  /  AlkPhos  402<H>       02-21-23 @ 07:19    Triglycerides, Serum: 38 mg/dL (02-21 @ 07:19)    Vitamin D, 25-Hydroxy: 89 ng/mL (11-28 @ 04:30)    Folate: 8.4 ng/mL (12-20 @ 07:52)    Vitamin B12, Serum: 1371 pg/mL (12-20 @ 07:52)    Zinc Level, Plasma: 77 ug/dL (12-07 @ 07:15)    CRP:   A1c: 4.6 % (02-21-23 @ 07:19)    Blood Glucose (Past 24 hours):  85 mg/dL (02-21 @ 12:21)  103 mg/dL (02-21 @ 01:10)  86 mg/dL (02-20 @ 18:55)  53 mg/dL (02-20 @ 17:58)  44 mg/dL (02-20 @ 16:54)      DIET:   Diet, Regular (02-21-23 @ 03:04) [Active]      RADIOLOGY:   < from: US Abdomen Upper Quadrant Right (02.21.23 @ 08:05) >    PROCEDURE DATE:  02/21/2023      INTERPRETATION:  CLINICAL INFORMATION: Hypoxia    COMPARISON: November 27, 2022    TECHNIQUE: Sonography of the right upper quadrant.    FINDINGS:  Liver: Within normal limits.  Bile ducts: Normal caliber. Common bile duct measures 8 mm.  Gallbladder: Stones and sludge are seen within the gallbladder.   Gallbladder wall is thickened at 3 mm.  Pancreas: Visualized portions are within normal limits.  Right kidney: 7.7 cm. No hydronephrosis. The kidney is echogenic.  Ascites: Mild ascites.  IVC: Visualized portions are within normal limits.    IMPRESSION:  Echogenic kidney consistent with medical renal disease.    Cholelithiasis.  --- End of Report ---  < end of copied text >      ASSESSMENT  67 year old Female w/ PMH anorexia w/ laxative abuse, anxiety, depression, kidney cysts, peripheral neuropathy, RA, OA, osteoporosis, low back pain, recurrent LE cellulitis complicated by nec fasc and septic shock presents for weakness and anorexia found to be hypoglycemia     - anorexia nervosa  - hypomagnesemia  - anemia, thrombocytopenia  - transaminitis  - cholelithiasis on US  - hypoglycemia  - underweight  - severe protein calorie malnutrition, chronic       PLAN  - replace phos and Mg now and once levels WNL can start feeding  - bmp, in phos, mg after lytes supplemented  - when start feeds give Osmolite 1.0 120ml over 30 min via gravity drip via NGT X 4 feeds/day to start  - monitor bmp, phos, mg closely once feeds start, high risk for refeeding syndrome    - add 25-oh vitamin D level to next blood draw NUTRITION SUPPORT TEAM  -  CONSULT NOTE     67 year old Female w/ PMH anorexia w/ laxative abuse, anxiety, depression, kidney cysts, peripheral neuropathy, RA, OA, osteoporosis, low back pain, recurrent LE cellulitis complicated by nec fasc and septic shock presents for weakness. Per pt's , pt has had anorexia for most of her adult life and has not eaten or drank anything in days. He attempted to give her some juice prior to ED arrival. Pt denies any complaints. is not very participatory with history taking though. On arrival to the ED glucose was 53. Denies suicidal ideation. States that she wants to go home.     In the ED vitals stable.   Labs show PLT 98, Mag 1.4, , , .  (21 Feb 2023 02:50)      NUTRITION SUPPORT NOTE:    Consult called for anorexia, malnutrition. d/w hospitalist and medical resident. NGT inserted and plan to start TF's however must correct electrolytes prior to initiation of feeds      REVIEW OF SYSTEMS:  Negative except as noted above.     PAST MEDICAL/SURGICAL HISTORY:   Anxiety  Depression  Osteoporosis  Kidney cysts  Peripheral neuropathy  RA (rheumatoid arthritis)  OA (osteoarthritis)  Anorexia  Low back pain with radiation  s/p &quot;nerve cutting&quot; 2015      ALLERGIES:  No Known Allergies      VITALS:  T(F): 97.4 (02-21 @ 06:11), Max: 97.4 (02-21 @ 06:11)  HR: 80 (02-21 @ 06:11) (80 - 80)  BP: 114/71 (02-21 @ 06:11) (114/71 - 114/71)  RR: 18 (02-21 @ 06:11) (18 - 18)  SpO2: 97% (02-21 @ 06:11) (97% - 97%)    HEIGHT/WEIGHT/BMI:   Height (cm): 170.2 (11-28), 170.2 (11-11), 170.2 (05-12), 170.2 (05-05)  Weight (kg): 45.9 (02-20), 43.6 (12-15), 49 (11-11), 49 (05-12)  BMI (kg/m2): 15.8 (02-20), 15.1 (12-15), 16.9 (11-28), 16.9 (11-11)    PHYSICAL EXAM:   GENERAL: NAD, lethargic, ill appearing, severely cachectic  HEENT: Dry mucous membranes, no lesions  ABDOMEN: soft, Nontender, nondistended  EXTREMITIES: +severe muscle wasting    SKIN: warm and dry, stage IV sacral pressure ulcer  IV ACCESS: peripheral  ENTERAL ACCESS: NGT    I/Os:     STANDING MEDICATIONS:   acetaminophen     Tablet .. 650 milliGRAM(s) Oral every 6 hours PRN  ALPRAZolam 0.25 milliGRAM(s) Oral two times a day  cilostazol 50 milliGRAM(s) Oral two times a day  dextrose 5% + sodium chloride 0.45%. 1000 milliLiter(s) IV Continuous <Continuous>  diltiazem    milliGRAM(s) Oral daily  enoxaparin Injectable 40 milliGRAM(s) SubCutaneous every 24 hours  gabapentin 600 milliGRAM(s) Oral three times a day  levothyroxine 50 MICROGram(s) Oral daily  oxycodone    5 mG/acetaminophen 325 mG 1 Tablet(s) Oral every 6 hours PRN  sertraline 50 milliGRAM(s) Oral daily  thiamine IVPB 500 milliGRAM(s) IV Intermittent once      LABS:                         10.6   8.05  )-----------( 116      ( 21 Feb 2023 07:19 )             31.2     Mean Cell Volume: 91.2 fL (02.21.23 @ 07:19)  Red Cell Distrib Width: 15.7 % (02.21.23 @ 07:19)      139  |  106  |  20  ----------------------------<  128<H>          (02-21-23 @ 07:19)  4.0   |  26  |  <0.5<L>    Ca    7.9<L>          (02-21-23 @ 07:19)  Phos  2.2         (02-20-23 @ 18:22)  Mg     1.4         (02-20-23 @ 18:22)    TPro  4.6<L>  /  Alb  2.2<L>  /  TBili  <0.2  /  DBili  <0.2  /  AST  162<H>  /  ALT  155<H>  /  AlkPhos  402<H>       02-21-23 @ 07:19    Triglycerides, Serum: 38 mg/dL (02-21 @ 07:19)    Vitamin D, 25-Hydroxy: 89 ng/mL (11-28 @ 04:30)    Folate: 8.4 ng/mL (12-20 @ 07:52)    Vitamin B12, Serum: 1371 pg/mL (12-20 @ 07:52)    Zinc Level, Plasma: 77 ug/dL (12-07 @ 07:15)    CRP:   A1c: 4.6 % (02-21-23 @ 07:19)    Blood Glucose (Past 24 hours):  85 mg/dL (02-21 @ 12:21)  103 mg/dL (02-21 @ 01:10)  86 mg/dL (02-20 @ 18:55)  53 mg/dL (02-20 @ 17:58)  44 mg/dL (02-20 @ 16:54)      DIET:   Diet, Regular (02-21-23 @ 03:04) [Active]      RADIOLOGY:   < from: US Abdomen Upper Quadrant Right (02.21.23 @ 08:05) >    PROCEDURE DATE:  02/21/2023      INTERPRETATION:  CLINICAL INFORMATION: Hypoxia    COMPARISON: November 27, 2022    TECHNIQUE: Sonography of the right upper quadrant.    FINDINGS:  Liver: Within normal limits.  Bile ducts: Normal caliber. Common bile duct measures 8 mm.  Gallbladder: Stones and sludge are seen within the gallbladder.   Gallbladder wall is thickened at 3 mm.  Pancreas: Visualized portions are within normal limits.  Right kidney: 7.7 cm. No hydronephrosis. The kidney is echogenic.  Ascites: Mild ascites.  IVC: Visualized portions are within normal limits.    IMPRESSION:  Echogenic kidney consistent with medical renal disease.    Cholelithiasis.  --- End of Report ---  < end of copied text >      ASSESSMENT  67 year old Female w/ PMH anorexia w/ laxative abuse, anxiety, depression, kidney cysts, peripheral neuropathy, RA, OA, osteoporosis, low back pain, recurrent LE cellulitis complicated by nec fasc and septic shock presents for weakness and anorexia found to be hypoglycemia     - anorexia nervosa  - hypomagnesemia  - anemia, thrombocytopenia  - transaminitis  - cholelithiasis on US  - hypoglycemia  - underweight  - severe protein calorie malnutrition, chronic   - at risk for refeeding syndrome    PLAN  - replace phos and Mg now and once levels WNL can start feeding  - bmp, in phos, mg after lytes supplemented  - when start feeds give Osmolite 1.0 120ml over 30 min via gravity drip via NGT X 4 feeds/day to start  - monitor bmp, phos, mg closely once feeds start, high risk for refeeding syndrome    - add 25-oh vitamin D and zinc level to next blood draw  - add MV with minerals daily and 500mg vitamin C daily  - will follow

## 2023-02-21 NOTE — H&P ADULT - ATTENDING COMMENTS
#Anorexia nervosa, severe malnutrition/ starvation  attempt to obtain height for bmi calculation  cont d5 half ns at 150 cc/hr  place ngt, start TF  target calorie 9150-4342 today  give mg 2g x1 now  bmp with mg, phos bid  monitor on tele  critical care eval  tte  appreciate psych    #Progress Note Handoff:  Pending (specify):  Consults_________, Tests________, Test Results_______, Other__ngt_______  Family discussion:  Disposition: Home___/SNF___/Other________/Unknown at this time_____x___ #Anorexia nervosa, severe malnutrition/ starvation  attempt to obtain height for bmi calculation  cont d5 half ns at 150 cc/hr  place ngt, start TF  target calorie 4640-8999 today  give mg 2g x1 now  bmp with mg, phos bid  monitor on tele  critical care eval  tte  appreciate psych  #Transaminitis, hepatocellular predom  unclear etiology  ruq us unrevealing  trend    #Progress Note Handoff:  Pending (specify):  Consults_________, Tests________, Test Results_______, Other__ngt_______  Family discussion:  Disposition: Home___/SNF___/Other________/Unknown at this time_____x___

## 2023-02-21 NOTE — H&P ADULT - ASSESSMENT
67 year old Female w/ PMH anorexia w/ laxative abuse, anxiety, depression, kidney cysts, peripheral neuropathy, RA, OA, osteoporosis, low back pain, recurrent LE cellulitis complicated by nec fasc and septic shock presents for weakness and anorexia found to be hypoglycemia     #anorexia   #malnutrition   #depression/anxiety  #thrombocytopenia   #hypomagnesemia   - NUtrition consult, calorie count   - psych consult   - replete elctrolytes as needed   - thrombocytopenia likley 2/2 malnutrition       #moderate mixed transaminitis   - avoid hepatotoxic meds, monitor LFTs  - RUQ US  - acute hepatitis panel  - hepatic profile, coags, Utox   - if initial workup unrevealing would consider CLD workup     #Misc  - DVT Prophylaxis: LMWH  - Diet:Regular   - Activity:AAt       67 year old Female w/ PMH anorexia w/ laxative abuse, anxiety, depression, kidney cysts, peripheral neuropathy, RA, OA, osteoporosis, low back pain, recurrent LE cellulitis complicated by nec fasc and septic shock presents for weakness and anorexia found to be hypoglycemia     #anorexia   #malnutrition   #depression/anxiety  #thrombocytopenia   #hypomagnesemia   - NUtrition consult, calorie count   - psych consult   - replete elctrolytes as needed   - thrombocytopenia likley 2/2 malnutrition   - c/w sertraline 50mg qd and alprazolam     #moderate mixed transaminitis   - avoid hepatotoxic meds, monitor LFTs  - RUQ US  - acute hepatitis panel  - hepatic profile, coags, Utox   - if initial workup unrevealing would consider CLD workup     #OA, RA, osteoporosis, chornic back pain  #peripheral neuropathy   #PVD  - c/w percocet q6h PRN, gabapentin, cilostazol     #Misc  - DVT Prophylaxis: LMWH  - Diet:Regular   - Activity:AAt

## 2023-02-21 NOTE — H&P ADULT - NSHPLABSRESULTS_GEN_ALL_CORE
LABS/RADIOLOGY RESULTS:                          10.7   8.19  )-----------( 98       ( 20 Feb 2023 18:22 )             33.2   02-20    140  |  107  |  27<H>  ----------------------------<  136<H>  4.8   |  30  |  0.5<L>    Ca    8.1<L>      20 Feb 2023 18:22  Phos  2.2     02-20  Mg     1.4     02-20    TPro  5.1<L>  /  Alb  2.4<L>  /  TBili  0.2  /  DBili  x   /  AST  231<H>  /  ALT  184<H>  /  AlkPhos  447<H>  02-20  Blood Cultures

## 2023-02-21 NOTE — H&P ADULT - NSHPREVIEWOFSYSTEMS_GEN_ALL_CORE
REVIEW OF SYSTEMS:    CONSTITUTIONAL: see hpi   EYES: No eye pain, visual disturbances, or discharge  ENMT:  No difficulty hearing, tinnitus, vertigo; No sinus or throat pain  NECK: No pain or stiffness  BREASTS: No pain, masses, or nipple discharge  RESPIRATORY: No cough, wheezing, chills or hemoptysis; No shortness of breath  CARDIOVASCULAR: No chest pain, palpitations, dizziness, or leg swelling  GASTROINTESTINAL: No abdominal or epigastric pain. No nausea, vomiting, or hematemesis; No diarrhea or constipation. No melena or hematochezia.  GENITOURINARY: No dysuria, frequency, hematuria, or incontinence  NEUROLOGICAL: No headaches, memory loss, loss of strength, numbness, or tremors  SKIN: No itching, burning, rashes, or lesions   LYMPH NODES: No enlarged glands  ENDOCRINE: No heat or cold intolerance; No hair loss  MUSCULOSKELETAL: No joint pain or swelling; No muscle, back, or extremity pain  PSYCHIATRIC: see hpi   HEME/LYMPH: No easy bruising, or bleeding gums  ALLERGY AND IMMUNOLOGIC: No hives or eczema

## 2023-02-22 NOTE — CONSULT NOTE ADULT - SUBJECTIVE AND OBJECTIVE BOX
Patient is a 68y old  Female who presents with a chief complaint of hypoglycemia (22 Feb 2023 12:01)      HPI:  67 year old Female w/ PMH anorexia w/ laxative abuse, anxiety, depression, kidney cysts, peripheral neuropathy, RA, OA, osteoporosis, low back pain, recurrent LE cellulitis complicated by nec fasc and septic shock presents for weakness. Per pt's , pt has had anorexia for most of her adult life and has not eaten or drank anything in days. He attempted to give her some juice prior to ED arrival. Pt denies any complaints. is not very participatory with history taking though. On arrival to the ED glucose was 53. Denies suicidal ideation. States that she wants to go home.     In the ED vitals stable.   Labs show PLT 98, Mag 1.4, , , .     (21 Feb 2023 02:50)      PAST MEDICAL & SURGICAL HISTORY:  Anxiety      Depression      Osteoporosis      Kidney cysts      Peripheral neuropathy      RA (rheumatoid arthritis)      OA (osteoarthritis)      Anorexia      Low back pain with radiation  s/p &quot;nerve cutting&quot; 2015          SOCIAL HX:   Smoking                         ETOH                            Other    FAMILY HISTORY:  Family history of stroke (Mother)    :  No known cardiovacular family hisotry     Review Of Systems:     All ROS are negative except per HPI       Allergies    No Known Allergies    Intolerances          PHYSICAL EXAM    ICU Vital Signs Last 24 Hrs  T(C): 35.7 (22 Feb 2023 04:30), Max: 36.9 (21 Feb 2023 16:15)  T(F): 96.2 (22 Feb 2023 04:30), Max: 98.4 (21 Feb 2023 16:15)  HR: 84 (22 Feb 2023 04:30) (79 - 107)  BP: 101/59 (22 Feb 2023 04:30) (95/54 - 128/75)  BP(mean): 81 (21 Feb 2023 23:39) (81 - 94)  ABP: --  ABP(mean): --  RR: 16 (22 Feb 2023 04:30) (14 - 18)  SpO2: 100% (21 Feb 2023 23:39) (100% - 100%)    O2 Parameters below as of 21 Feb 2023 23:39  Patient On (Oxygen Delivery Method): room air            CONSTITUTIONAL:  Ill apearing    ENT:   Airway patent,     CARDIAC:   Normal rate,   Regular rhythm.    No edema      Vascular:   normal systolic impulse  no bruits    RESPIRATORY:   No wheezing  Bilateral BS   Not tachypneic,  No use of accessory muscles    GASTROINTESTINAL:  Abdomen soft,   Non-tender,   No guarding,   + BS    NEUROLOGICAL:   Alert and oriented         02-21-23 @ 07:01  -  02-22-23 @ 07:00  --------------------------------------------------------  IN:    dextrose 5% + sodium chloride 0.45%: 3002 mL    Enteral Tube Flush: 60 mL  Total IN: 3062 mL    OUT:    Voided (mL): 100 mL  Total OUT: 100 mL    Total NET: 2962 mL      02-22-23 @ 07:01  -  02-22-23 @ 13:55  --------------------------------------------------------  IN:    dextrose 5% + sodium chloride 0.45%: 300 mL  Total IN: 300 mL    OUT:  Total OUT: 0 mL    Total NET: 300 mL          LABS:                          11.3   5.71  )-----------( 113      ( 22 Feb 2023 05:38 )             34.7                                               02-22    141  |  104  |  15  ----------------------------<  58<L>  4.4   |  27  |  <0.5<L>    Ca    7.7<L>      22 Feb 2023 05:38  Phos  2.0     02-22  Mg     1.8     02-22    TPro  4.7<L>  /  Alb  2.2<L>  /  TBili  0.3  /  DBili  x   /  AST  131<H>  /  ALT  152<H>  /  AlkPhos  401<H>  02-22      PT/INR - ( 21 Feb 2023 07:19 )   PT: 16.30 sec;   INR: 1.41 ratio         PTT - ( 21 Feb 2023 07:19 )  PTT:42.4 sec                                                                                     LIVER FUNCTIONS - ( 22 Feb 2023 05:38 )  Alb: 2.2 g/dL / Pro: 4.7 g/dL / ALK PHOS: 401 U/L / ALT: 152 U/L / AST: 131 U/L / GGT: x                                                                                                                                         MEDICATIONS  (STANDING):  ALPRAZolam 0.25 milliGRAM(s) Oral two times a day  ascorbic acid 500 milliGRAM(s) Oral daily  cilostazol 50 milliGRAM(s) Oral two times a day  dextrose 5% + sodium chloride 0.45%. 1000 milliLiter(s) (100 mL/Hr) IV Continuous <Continuous>  diltiazem    milliGRAM(s) Oral daily  enoxaparin Injectable 40 milliGRAM(s) SubCutaneous every 24 hours  gabapentin 600 milliGRAM(s) Oral three times a day  levothyroxine 50 MICROGram(s) Oral daily  magnesium sulfate  IVPB 2 Gram(s) IV Intermittent once  multivitamin/minerals 1 Tablet(s) Oral daily  OLANZapine 2.5 milliGRAM(s) Oral every 12 hours  sertraline 75 milliGRAM(s) Oral daily  thiamine Injectable 100 milliGRAM(s) IV Push daily    MEDICATIONS  (PRN):  acetaminophen     Tablet .. 650 milliGRAM(s) Oral every 6 hours PRN Temp greater or equal to 38C (100.4F), Mild Pain (1 - 3)  oxycodone    5 mG/acetaminophen 325 mG 1 Tablet(s) Oral every 6 hours PRN Severe Pain (7 - 10)

## 2023-02-22 NOTE — RAPID RESPONSE TEAM SUMMARY - NSSITUATIONBACKGROUNDRRT_GEN_ALL_CORE
Rapid response called for patient having decrease level of consciousness. Patient seen and assessed at bedside. Unable to respond to deep sternal stimulation. Vitals obtained: Cardiac moniter attached. EKG stat taken. Stat labs taken.   FBS 41. Dextrose 50% boluses given. Status improved. Levophed dose increased. IV fluids rate increased. Senior resident aware.  Rapid response called for patient having decrease level of consciousness. Patient seen and assessed at bedside. Unable to respond to deep sternal stimulation.  Vitals obtained: Pulse 70, RR 10, BP 84/52, FS 48.  Cardiac monitor attached. EKG stat taken. No significant events noted on EKG and cardiac monitor.   Stat labs taken.   Two Dextrose 50% boluses given. Levophed dose increased. IV fluids rate increased.  Senior resident aware    Status improved post dextrose inj, iv fluids and increasing levophed dose.   Will follow  Rapid response called for patient having decrease level of consciousness. Patient seen and assessed at bedside. Unable to respond to deep sternal stimulation.  Vitals obtained: Pulse 70, RR 10, BP 84/52, FS 48.  Cardiac monitor attached. EKG stat taken. No significant events noted on EKG and cardiac monitor.   Stat labs taken.   Two Dextrose 50% boluses given. Levophed dose increased. IV fluids rate increased.  Senior resident aware    Status improved post dextrose Inj, iv fluids and increasing levophed dose.   Will follow

## 2023-02-22 NOTE — PROGRESS NOTE ADULT - ASSESSMENT
68 y/o woman with PMH of anorexia with laxative abuse, anxiety, depression, kidney cysts, peripheral neuropathy, RA, OA, osteoporosis, low back pain, recurrent LE cellulitis complicated by necrotizing fasciitis and septic shock in 12/22 now presents for weakness and not eating or drinking for days per her . On arrival to the ED, glucose was 53. She denied suicidal ideation per chart.  Previous notes reviewed by me.     1. Anorexia/ severe malnutrition/hypoglycemia  nutritional support following  at high risk for refeeding syndrome  on thiamine 100mg IV daily  continue IVF for now - D5 1/2 NS at 100cc/hr  monitor FS  hold feeds until phos level >3 per nutrition  NGT in place for feeds and meds (CXR reviewed and interpreted by me)  when feeds are started, use Osmolite 1.0 120cc over 30 minutes via gravity drip x 4 feeds/day to start  BMP, Mg and Phos bid for now and replete as needed  check 25-OH vit D and Zinc level  continue MV with minerals daily and 500mg vitamin C daily    2. Depression/anxiety  Psych consult appreciated - will follow with us  sertraline increased to 75mg daily  zyprexa added  alprazolam bid  no need for 1 to 1 at this time    3. Hypophosphatemia - repleted and f/u level today and in AM    4. Transaminitis - mixed picture  trending down  no vomiting, tenderness on exam  Hep A, B and C nonreactive  RUQ US: cholelithiasis  continue to trend LFTs and avoid hepatotoxic meds    5. Thrombocytopenia - now improving    6. Hypothyroid - continue synthroid 50mcg daily    7. Hold cardizem for SBP <100    8. Chronic pain, OA, RA, peripheral neuropathy  on gabapentin   hold percocet for altered mental status    9. PVD on cilostazol    10. DVT prophylaxis - lovenox      full code  very guarded prognosis  high risk pt  continue telemetry monitoring for now      PROGRESS NOTE HANDOFF    Pending: labs at 11AM, labs bid to monitor for refeeding syndrome, vit D and Zinc levels    Family discussion: med staff will update  today    Disposition: from home, to be determined

## 2023-02-22 NOTE — PROGRESS NOTE ADULT - ASSESSMENT
67 year old Female w/ PMH anorexia w/ laxative abuse, anxiety, depression, kidney cysts, peripheral neuropathy, RA, OA, osteoporosis, low back pain, recurrent LE cellulitis complicated by nec fasc and septic shock presents for weakness and anorexia found to be hypoglycemia     - anorexia nervosa  - hypomagnesemia  - anemia, thrombocytopenia  - transaminitis  - cholelithiasis on US  - hypoglycemia  - underweight  - severe protein calorie malnutrition, chronic   - at risk for refeeding syndrome    PLAN  - Started feeds with Osmolite 1.0 120ml over 30 min via gravity drip via NGT X 4 feeds/day  - will plan to continue this rate until electrolytes stabilized  - monitor BMP, Phos, Mg closely once feeds start (next set should be at 20:00)   - if phos level drops after feeding starts then hold tube feeds until adequately repleted   - recommend further decreasing rate of D5 fluids if phos level drops after starting feeds as the additional dextrose may contribute to refeeding syndrome   - 25-oh vitamin D and zinc level pending   - added selenium level to next blood draw  - continue MV with minerals daily and 500mg vitamin C daily  - please contact via teams with any questions   - will follow.

## 2023-02-22 NOTE — CONSULT NOTE ADULT - ASSESSMENT
IMPRESSION:    Anorexia/ severe malnutrition/hypoglycemia  Refeeding syndrome hypokalemia/hypophosphatemia/hypomagnesemia   Depression/anxiety  Transaminitis   Hypothyroidism  PVD    PLAN:    CNS: C/w psych medications, psych evaluation.    HEENT: Oral care    PULMONARY:  HOB @ 45 degrees.  Aspiration precautions. On room air. Keep sat >92%    CARDIOVASCULAR: Goal directed fluid resuscitation. c/w D5 1/2 NS. Avoid volume overload. Strict I's and O's. ECHO. Target MAP>65. Wean pressors as tolerated.     GI: PO Feeding per nutrition services.  Bowel regimen. RUQ showed cholelithiasis but no signs of cholecystitis    RENAL:  Follow up lytes. Check BMP, Mg, Phosp q 4 hours and correct as needed.    INFECTIOUS DISEASE: Hypothermic. Get Procalcitonin. Follow pan cultures. UA.    HEMATOLOGICAL:  DVT prophylaxis. Dimer    ENDOCRINE:  Follow up FS.  Insulin protocol if needed.     MUSCULOSKELETAL: IAT    SDU

## 2023-02-22 NOTE — CONSULT NOTE ADULT - ATTENDING COMMENTS
Ms Roberts was seen and examined at bedside, the patient was unresponsive to stimuli, however did move her eye to look at me during my evaluation while attempting painful stimuli.  Patient is examines hypovolemic, and while on Levophed does have a blood pressure that is sufficient at this time.  Overall, I am unclear the etiology of the patient's shock, and recommend admission to SDU.  Also recommend obtaining CT of the chest without contrast, echocardiogram, and full septic work-up with urine and blood studies to be repeated.  Recommend repeating IVF boluses as needed to maintain MAP > 65.  Avoid sedating medications and hold antihypertensives.  I agree with the fellow note, with the exceptions listed in my attestation above.  The remainder of impression and plan per fellow note.

## 2023-02-22 NOTE — PROGRESS NOTE ADULT - SUBJECTIVE AND OBJECTIVE BOX
NUTRITION SUPPORT TEAM  -  PROGRESS NOTE     Interval Events:        REVIEW OF SYSTEMS:  Negative except as noted above.     VITALS:  T(F): 96.2 (02-22 @ 04:30), Max: 96.2 (02-22 @ 04:30)  HR: 77 (02-22 @ 13:32) (75 - 84)  BP: 72/48 (02-22 @ 13:32) (66/42 - 101/59)  RR: 14 (02-22 @ 13:32) (14 - 16)  SpO2: 100% (02-22 @ 13:32) (100% - 100%)    HEIGHT/WEIGHT/BMI:   Height (cm): 170.2 (11-28), 170.2 (11-11), 170.2 (05-12), 170.2 (05-05)  Weight (kg): 42 (02-22), 43.6 (12-15), 49 (11-11), 49 (05-12)  BMI (kg/m2): 14.5 (02-22), 15.1 (12-15), 16.9 (11-28), 16.9 (11-11)    I/Os:     02-21-23 @ 07:01  -  02-22-23 @ 07:00  --------------------------------------------------------  IN:    dextrose 5% + sodium chloride 0.45%: 3002 mL    Enteral Tube Flush: 60 mL  Total IN: 3062 mL    OUT:    Voided (mL): 100 mL  Total OUT: 100 mL    Total NET: 2962 mL        STANDING MEDICATIONS:   ALPRAZolam 0.25 milliGRAM(s) Oral two times a day  ascorbic acid 500 milliGRAM(s) Oral daily  cilostazol 50 milliGRAM(s) Oral two times a day  dextrose 5% + sodium chloride 0.45%. 1000 milliLiter(s) (100 mL/Hr) IV Continuous <Continuous>  diltiazem    milliGRAM(s) Oral daily  enoxaparin Injectable 40 milliGRAM(s) SubCutaneous every 24 hours  gabapentin 600 milliGRAM(s) Oral three times a day  lactated ringers. 1000 milliLiter(s) (50 mL/Hr) IV Continuous <Continuous>  levothyroxine 50 MICROGram(s) Oral daily  magnesium sulfate  IVPB 2 Gram(s) IV Intermittent every 4 hours  magnesium sulfate  IVPB 2 Gram(s) IV Intermittent once  multivitamin/minerals 1 Tablet(s) Oral daily  norepinephrine Infusion 0.2 MICROgram(s)/kG/Min (15.8 mL/Hr) IV Continuous <Continuous>  OLANZapine 2.5 milliGRAM(s) Oral every 12 hours  sertraline 75 milliGRAM(s) Oral daily  thiamine Injectable 100 milliGRAM(s) IV Push daily    LABS:                         11.3   5.71  )-----------( 113      ( 22 Feb 2023 05:38 )             34.7     134<L>  |  102  |  13  ----------------------------<  123<H>          (02-22-23 @ 13:00)  3.7   |  26  |  <0.5<L>    Ca    7.3<L>          (02-22-23 @ 13:00)  Phos  2.7         (02-22-23 @ 13:00)  Mg     1.5         (02-22-23 @ 13:00)    TPro  3.9<L>  /  Alb  1.8<L>  /  TBili  0.3  /  DBili  x   /  AST  87<H>  /  ALT  114<H>  /  AlkPhos  313<H>       02-22-23 @ 13:00    Triglycerides, Serum: 38 mg/dL (02-21 @ 07:19)  Vitamin D, 25-Hydroxy: 89 ng/mL (11-28 @ 04:30)  Folate: 8.4 ng/mL (12-20 @ 07:52)  Vitamin B12, Serum: 1371 pg/mL (12-20 @ 07:52)  Zinc Level, Plasma: 77 ug/dL (12-07 @ 07:15)      Blood Glucose (Past 24 hours):    DIET:   Diet, NPO with Tube Feed:   Tube Feeding Modality: Nasogastric  Osmolite 1.0 Zhang  Total Volume for 24 Hours (mL): 480  Bolus  Total Volume of Bolus (mL):  120  Total # of Feeds: 4  Tube Feed Frequency: Every 6 hours   Tube Feed Start Time: 09:00  Bolus Feed Rate (mL per Hour): 240   Bolus Feed Duration (in Hours): 0.5  Free Water Flush  Free Water Flush Instructions:  50 mL free water flush before and 100mL after each feed (02-22-23 @ 14:25) [Active] NUTRITION SUPPORT TEAM  -  PROGRESS NOTE     Interval Events:    Phos remained 2.0 despite repletion   D5 1/2 NS at 150 mL/hr due to hypotension   Discussed with primary team and decreased rate of D5 1/2 NS to 100 mL/hr   Phos level 2.7 and feeds ordered     REVIEW OF SYSTEMS:  Negative except as noted above.     VITALS:  T(F): 96.2 (02-22 @ 04:30), Max: 96.2 (02-22 @ 04:30)  HR: 77 (02-22 @ 13:32) (75 - 84)  BP: 72/48 (02-22 @ 13:32) (66/42 - 101/59)  RR: 14 (02-22 @ 13:32) (14 - 16)  SpO2: 100% (02-22 @ 13:32) (100% - 100%)    HEIGHT/WEIGHT/BMI:   Height (cm): 170.2 (11-28), 170.2 (11-11), 170.2 (05-12), 170.2 (05-05)  Weight (kg): 42 (02-22), 43.6 (12-15), 49 (11-11), 49 (05-12)  BMI (kg/m2): 14.5 (02-22), 15.1 (12-15), 16.9 (11-28), 16.9 (11-11)    I/Os:     02-21-23 @ 07:01  -  02-22-23 @ 07:00  --------------------------------------------------------  IN:    dextrose 5% + sodium chloride 0.45%: 3002 mL    Enteral Tube Flush: 60 mL  Total IN: 3062 mL    OUT:    Voided (mL): 100 mL  Total OUT: 100 mL    Total NET: 2962 mL        STANDING MEDICATIONS:   ALPRAZolam 0.25 milliGRAM(s) Oral two times a day  ascorbic acid 500 milliGRAM(s) Oral daily  cilostazol 50 milliGRAM(s) Oral two times a day  dextrose 5% + sodium chloride 0.45%. 1000 milliLiter(s) (100 mL/Hr) IV Continuous <Continuous>  diltiazem    milliGRAM(s) Oral daily  enoxaparin Injectable 40 milliGRAM(s) SubCutaneous every 24 hours  gabapentin 600 milliGRAM(s) Oral three times a day  lactated ringers. 1000 milliLiter(s) (50 mL/Hr) IV Continuous <Continuous>  levothyroxine 50 MICROGram(s) Oral daily  magnesium sulfate  IVPB 2 Gram(s) IV Intermittent every 4 hours  magnesium sulfate  IVPB 2 Gram(s) IV Intermittent once  multivitamin/minerals 1 Tablet(s) Oral daily  norepinephrine Infusion 0.2 MICROgram(s)/kG/Min (15.8 mL/Hr) IV Continuous <Continuous>  OLANZapine 2.5 milliGRAM(s) Oral every 12 hours  sertraline 75 milliGRAM(s) Oral daily  thiamine Injectable 100 milliGRAM(s) IV Push daily    LABS:                         11.3   5.71  )-----------( 113      ( 22 Feb 2023 05:38 )             34.7     134<L>  |  102  |  13  ----------------------------<  123<H>          (02-22-23 @ 13:00)  3.7   |  26  |  <0.5<L>    Ca    7.3<L>          (02-22-23 @ 13:00)  Phos  2.7         (02-22-23 @ 13:00)  Mg     1.5         (02-22-23 @ 13:00)    TPro  3.9<L>  /  Alb  1.8<L>  /  TBili  0.3  /  DBili  x   /  AST  87<H>  /  ALT  114<H>  /  AlkPhos  313<H>       02-22-23 @ 13:00    Triglycerides, Serum: 38 mg/dL (02-21 @ 07:19)  Vitamin D, 25-Hydroxy: 89 ng/mL (11-28 @ 04:30)  Folate: 8.4 ng/mL (12-20 @ 07:52)  Vitamin B12, Serum: 1371 pg/mL (12-20 @ 07:52)  Zinc Level, Plasma: 77 ug/dL (12-07 @ 07:15)      Blood Glucose (Past 24 hours):    DIET:   Diet, NPO with Tube Feed:   Tube Feeding Modality: Nasogastric  Osmolite 1.0 Zhang  Total Volume for 24 Hours (mL): 480  Bolus  Total Volume of Bolus (mL):  120  Total # of Feeds: 4  Tube Feed Frequency: Every 6 hours   Tube Feed Start Time: 09:00  Bolus Feed Rate (mL per Hour): 240   Bolus Feed Duration (in Hours): 0.5  Free Water Flush  Free Water Flush Instructions:  50 mL free water flush before and 100mL after each feed (02-22-23 @ 14:25) [Active]

## 2023-02-22 NOTE — CHART NOTE - NSCHARTNOTEFT_GEN_A_CORE
Transfer from: Medical Floor    Transfer to:  (  ) CCU    (  ) MICU   (  ) Telemetry     (  ) RCU                               (  ) Palliative    (  ) Stroke Unit    ( x ) Step Down    Accepting Physician: MD Obie    Signout given to:     Follow up:    Hospital Course:     HPI:  67 year old Female w/ PMH anorexia w/ laxative abuse, anxiety, depression, kidney cysts, peripheral neuropathy, RA, OA, osteoporosis, low back pain, recurrent LE cellulitis complicated by nec fasc and septic shock presents for weakness. Per pt's , pt has had anorexia for most of her adult life and has not eaten or drank anything in days. He attempted to give her some juice prior to ED arrival. Pt denies any complaints. is not very participatory with history taking though. On arrival to the ED glucose was 53. Denies suicidal ideation. States that she wants to go home.     3C course:  Patient became hypotensive 68/42 and fluid bolus was started on Levophed. Etiology of patient's shock is unknown and is being upgraded to SDU. Pending CT of the chest without contrast, echocardiogram, and full septic work-up with urine and blood studies.     #Anorexia/ severe malnutrition/hypoglycemia  nutritional support following  at high risk for refeeding syndrome  on thiamine 100mg IV daily  continue IVF for now - D5 1/2 NS at 100cc/hr  monitor FS  hold feeds until phos level >3 per nutrition  NGT in place for feeds and meds   when feeds are started, use Osmolite 1.0 120cc over 30 minutes via gravity drip x 4 feeds/day to start  BMP, Mg and Phos bid for now and replete as needed  check 25-OH vit D and Zinc level  continue MV with minerals daily and 500mg vitamin C daily    #Depression/anxiety  Psych consult appreciated - will follow  sertraline increased to 75mg daily  zyprexa added  alprazolam bid  no need for 1 to 1 at this time    #Hypophosphatemia   - repleted and f/u level today and in AM    #Transaminitis   - mixed picture  trending down  no vomiting, tenderness on exam  Hep A, B and C nonreactive  RUQ US: cholelithiasis  continue to trend LFTs and avoid hepatotoxic meds    #Thrombocytopenia   - now improving    #Hypothyroid   - continue synthroid 50mcg daily    #Hold cardizem for SBP <100    #Chronic pain, OA, RA, peripheral neuropathy  on gabapentin   hold percocet for altered mental status    #PVD on cilostazol    # DVT prophylaxis - lovenox      PHYSICAL EXAM:  GEN: AAOx1  HEENT: Normocephalic  NECK: Supple  LUNGS: Decreased breathe sounds  HEART: Regular  ABD: Non-distended  EXT: No pitting edema  NEURO: Non-focal  PSYCH: Mood is appropriate, following commands    LABS:                               11.3   5.71  )-----------( 113      ( 22 Feb 2023 05:38 )             34.7       02-22    134<L>  |  102  |  13  ----------------------------<  123<H>  3.7   |  26  |  <0.5<L>    Ca    7.3<L>      22 Feb 2023 13:00  Phos  2.7     02-22  Mg     1.5     02-22    TPro  3.9<L>  /  Alb  1.8<L>  /  TBili  0.3  /  DBili  x   /  AST  87<H>  /  ALT  114<H>  /  AlkPhos  313<H>  02-22      PT/INR - ( 21 Feb 2023 07:19 )   PT: 16.30 sec;   INR: 1.41 ratio         PTT - ( 21 Feb 2023 07:19 )  PTT:42.4 sec

## 2023-02-22 NOTE — PROGRESS NOTE ADULT - SUBJECTIVE AND OBJECTIVE BOX
ANA LILIA VERMA  68y Female    INTERVAL HPI/OVERNIGHT EVENTS:    pt is awake but not responding to questions  no fever  unable to obtain ROS  NGT in place    T(F): 96.2 (23 @ 04:30), Max: 98.4 (23 @ 16:15)  HR: 84 (23 @ 04:30) (79 - 107)  BP: 101/59 (23 @ 04:30) (95/54 - 128/75)  RR: 16 (23 @ 04:30) (14 - 18)  SpO2: 100% (23 @ 23:39) (100% - 100%) on RA    I&O's Summary    2023 07:01  -  2023 07:00  --------------------------------------------------------  IN: 3062 mL / OUT: 100 mL / NET: 2962 mL    2023 07:01  -  2023 12:01  --------------------------------------------------------  IN: 300 mL / OUT: 0 mL / NET: 300 mL      Daily Weight in k (2023 04:30)    CAPILLARY BLOOD GLUCOSE      POCT Blood Glucose.: 85 mg/dL (2023 12:21)        PHYSICAL EXAM:  GENERAL: NAD  HEAD:  Normocephalic  EYES:  conjunctiva and sclera clear  ENMT: dry MM  NECK: Supple  NERVOUS SYSTEM: awake, not responding to questions  CHEST/LUNG: shallow BS b/l  HEART: Regular rate and rhythm  ABDOMEN: Soft, Nontender, distended; Bowel sounds present  EXTREMITIES: mild LE edema  SKIN: some petechiae, pale    Consultant(s) Notes Reviewed:  [x ] YES  [ ] NO  Care Discussed with Consultants/Other Providers [ x] YES  [ ] NO    MEDICATIONS  (STANDING):  ALPRAZolam 0.25 milliGRAM(s) Oral two times a day  ascorbic acid 500 milliGRAM(s) Oral daily  cilostazol 50 milliGRAM(s) Oral two times a day  dextrose 5% + sodium chloride 0.45%. 1000 milliLiter(s) (150 mL/Hr) IV Continuous <Continuous>  diltiazem    milliGRAM(s) Oral daily  enoxaparin Injectable 40 milliGRAM(s) SubCutaneous every 24 hours  gabapentin 600 milliGRAM(s) Oral three times a day  levothyroxine 50 MICROGram(s) Oral daily  magnesium sulfate  IVPB 2 Gram(s) IV Intermittent once  multivitamin/minerals 1 Tablet(s) Oral daily  OLANZapine 2.5 milliGRAM(s) Oral every 12 hours  sertraline 75 milliGRAM(s) Oral daily  thiamine Injectable 100 milliGRAM(s) IV Push daily    MEDICATIONS  (PRN):  acetaminophen     Tablet .. 650 milliGRAM(s) Oral every 6 hours PRN Temp greater or equal to 38C (100.4F), Mild Pain (1 - 3)  oxycodone    5 mG/acetaminophen 325 mG 1 Tablet(s) Oral every 6 hours PRN Severe Pain (7 - 10)      Telemetry reviewed by me    LABS:                        11.3   5.71  )-----------( 113      ( 2023 05:38 )             34.7         141  |  104  |  15  ----------------------------<  58<L>  4.4   |  27  |  <0.5<L>    Ca    7.7<L>      2023 05:38  Phos  2.0       Mg     1.8         TPro  4.7<L>  /  Alb  2.2<L>  /  TBili  0.3  /  DBili  x   /  AST  131<H>  /  ALT  152<H>  /  AlkPhos  401<H>    PT/INR - ( 2023 07:19 )   PT: 16.30 sec;   INR: 1.41 ratio  PTT - ( 2023 07:19 )  PTT:42.4 sec          RADIOLOGY & ADDITIONAL TESTS:  Imaging or report Personally Reviewed:  [x ] YES  [ ] NO    < from: Xray Wrist 2 Views, Left (23 @ 14:08) >  FINDINGS/  IMPRESSION:    Limited exam due to suboptimal patient positioning.    No acute fracture is seen. There is a chronic deformity of the distal   radius. No definite malalignment. There is moderate radiocarpal arthrosis.    < end of copied text >      < from: Xray Chest 1 View-PORTABLE IMMEDIATE (Xray Chest 1 View-PORTABLE IMMEDIATE .) (23 @ 13:07) >    Impression:    No radiographic evidence of acute cardiopulmonary disease.    < end of copied text >      < from: US Abdomen Upper Quadrant Right (23 @ 08:05) >  IMPRESSION:  Echogenic kidney consistent with medical renal disease.    Cholelithiasis.    < end of copied text >      < from: TTE Echo Complete w/o Contrast w/ Doppler (22 @ 10:42) >  Summary:   1.Technically difficult and limited study. Only subcostal images   obtained.   2. Left ventricular ejection fraction, by visual estimation, is >50%.   3. Grossly normal global left ventricular systolic function.   4. Spectral Doppler shows impaired relaxation pattern of left   ventricular myocardial filling (Grade I diastolic dysfunction).   5. Mild aortic regurgitation.   6. Mild mitral regurgitation.   7. Mild tricuspid regurgitation.    < end of copied text >      Case discussed with residents and RN on rounds today

## 2023-02-23 NOTE — CONSULT NOTE ADULT - SUBJECTIVE AND OBJECTIVE BOX
Last Known well: Unclear      HPI:  67 year old Female w/ PMH anorexia w/ laxative abuse, anxiety, depression, kidney cysts, peripheral neuropathy, RA, OA, osteoporosis, low back pain, recurrent LE cellulitis complicated by nec fasc and septic shock presents for generalized weakness, admitted under the care of the medicine team for anorexia nervosa. Neurology consulted for AMS since 5 pm. As per CEU team the patient previously base line was AOx3 was hypoglycemic glucose in the 50's received dextrose with no improvement in her mental status. Stroke code activated and neurology consulted. Upon presentation Patient is AOX0  laying in bed unresponsive, hypoxic anticipating intubation. LKW unknown for patient was found altered mental at 5pm. On exam: Patient unresponsive to painful stimuli, there is no gaze preference, + blink to threat, vestibular ocular reflex intact, no evidence of shaking, normal extensor posturing, upper arms flaccid, lower extremity rigidity. The patient was also noted to be hypotensive on pressors. During intubation around 700cc of coffee ground secretions were suctioned.      PAST MEDICAL & SURGICAL HISTORY:  Anxiety      Depression      Osteoporosis      Kidney cysts      Peripheral neuropathy      RA (rheumatoid arthritis)      OA (osteoarthritis)      Anorexia      Low back pain with radiation  s/p &quot;nerve cutting&quot; 2015          FAMILY HISTORY:  Family history of stroke (Mother)      Allergies    No Known Allergies    Intolerances        MEDICATIONS  (STANDING):  ALPRAZolam 0.25 milliGRAM(s) Oral two times a day  ascorbic acid 500 milliGRAM(s) Oral daily  cilostazol 50 milliGRAM(s) Oral two times a day  dextrose 5% + sodium chloride 0.9%. 1000 milliLiter(s) (100 mL/Hr) IV Continuous <Continuous>  enoxaparin Injectable 40 milliGRAM(s) SubCutaneous every 24 hours  fentaNYL   Infusion. 0.5 MICROgram(s)/kG/Hr (2.1 mL/Hr) IV Continuous <Continuous>  gabapentin 600 milliGRAM(s) Oral three times a day  levothyroxine 50 MICROGram(s) Oral daily  magnesium sulfate  IVPB 2 Gram(s) IV Intermittent every 4 hours  multivitamin/minerals 1 Tablet(s) Oral daily  norepinephrine Infusion 0.25 MICROgram(s)/kG/Min (19.7 mL/Hr) IV Continuous <Continuous>  OLANZapine 2.5 milliGRAM(s) Oral every 12 hours  sertraline 75 milliGRAM(s) Oral daily  sodium chloride 0.9% Bolus 750 milliLiter(s) IV Bolus once  thiamine Injectable 100 milliGRAM(s) IV Push daily    MEDICATIONS  (PRN):  acetaminophen     Tablet .. 650 milliGRAM(s) Oral every 6 hours PRN Temp greater or equal to 38C (100.4F), Mild Pain (1 - 3)  dextrose 50% Injectable 20 milliLiter(s) IV Push once PRN hypoglycemia less than 50  oxycodone    5 mG/acetaminophen 325 mG 1 Tablet(s) Oral every 6 hours PRN Severe Pain (7 - 10)        Vital Signs Last 24 Hrs  T(C): 35.6 (2023 21:00), Max: 36.2 (2023 20:00)  T(F): 96.1 (2023 21:00), Max: 97.1 (2023 20:00)  HR: 77 (2023 21:00) (70 - 100)  BP: 116/60 (2023 21:00) (66/42 - 116/60)  BP(mean): --  RR: 14 (2023 21:00) (14 - 16)  SpO2: 92% (2023 21:00) (92% - 100%)    Parameters below as of 2023 21:00  Patient On (Oxygen Delivery Method): nasal cannula  O2 Flow (L/min): 4      Examination:  MS: Obtunded, not responsive to sternal rub, not following commands  CN: no gaze preference, reactive pupils, blinks to threat  Motor: Hypotonic in all extremities, not withdrawing to painful stimuli  Sensory: Not withdrawing to painful stimuli  Respiratory:  On non rebreather      Labs:   CBC Full  -  ( 2023 05:38 )  WBC Count : 5.71 K/uL  RBC Count : 3.80 M/uL  Hemoglobin : 11.3 g/dL  Hematocrit : 34.7 %  Platelet Count - Automated : 113 K/uL  Mean Cell Volume : 91.3 fL  Mean Cell Hemoglobin : 29.7 pg  Mean Cell Hemoglobin Concentration : 32.6 g/dL  Auto Neutrophil # : 5.10 K/uL  Auto Lymphocyte # : 0.46 K/uL  Auto Monocyte # : 0.11 K/uL  Auto Eosinophil # : 0.00 K/uL  Auto Basophil # : 0.01 K/uL  Auto Neutrophil % : 89.3 %  Auto Lymphocyte % : 8.1 %  Auto Monocyte % : 1.9 %  Auto Eosinophil % : 0.0 %  Auto Basophil % : 0.2 %        135  |  101  |  11  ----------------------------<  117<H>  4.1   |  21  |  0.5<L>    Ca    7.1<L>      2023 23:27  Phos  3.8       Mg     1.9         TPro  4.1<L>  /  Alb  1.9<L>  /  TBili  0.3  /  DBili  x   /  AST  85<H>  /  ALT  109<H>  /  AlkPhos  312<H>      LIVER FUNCTIONS - ( 2023 23:27 )  Alb: 1.9 g/dL / Pro: 4.1 g/dL / ALK PHOS: 312 U/L / ALT: 109 U/L / AST: 85 U/L / GGT: x           PT/INR - ( 2023 07:19 )   PT: 16.30 sec;   INR: 1.41 ratio         PTT - ( 2023 07:19 )  PTT:42.4 sec  Urinalysis Basic - ( 2023 15:00 )    Color: Yellow / Appearance: Slightly Turbid / S.022 / pH: x  Gluc: x / Ketone: Negative  / Bili: Negative / Urobili: <2 mg/dL   Blood: x / Protein: 30 mg/dL / Nitrite: Positive   Leuk Esterase: Large / RBC: 36 /HPF / WBC 4 /HPF   Sq Epi: x / Non Sq Epi: 2 /HPF / Bacteria: Many          Neuroimaging:  Pending [facility]

## 2023-02-23 NOTE — PROGRESS NOTE ADULT - ASSESSMENT
IMPRESSION:  Acute hypoxemic respiratory failure   Aspiration   ARDS   Septic shock   Anorexia/ severe malnutrition/hypoglycemia  Refeeding syndrome hypokalemia/hypophosphatemia/hypomagnesemia   Depression/anxiety  Hypothyroidism  PVD    PLAN:    CNS:      HEENT: Oral care.  ET care     PULMONARY:  HOB @ 45 degrees.  Aspiration precautions. ARDS network MV settings     CARDIOVASCULAR: Goal directed fluid resuscitation.  MAP>65. Wean pressors as tolerated.     GI:  OG tube to suction.  RUQ showed cholelithiasis but no signs of cholecystitis    RENAL:  Follow up lytes. Check BMP, Mg, Phosp q 4 hours and correct as needed.    INFECTIOUS DISEASE: Hypothermic. Get Procalcitonin. Follow pan cultures. UA.  Continue ABX     HEMATOLOGICAL:  DVT prophylaxis.    ENDOCRINE:  Follow up FS.  Insulin protocol if needed.     MUSCULOSKELETAL: Bed rest     Grave prognosis              IMPRESSION:  Acute hypoxemic respiratory failure   Aspiration   ARDS   Septic shock   Anorexia/ severe malnutrition/hypoglycemia  Refeeding syndrome hypokalemia/hypophosphatemia/hypomagnesemia   Depression/anxiety  Hypothyroidism  PVD    PLAN:    CNS:      HEENT: Oral care.  ET care     PULMONARY:  HOB @ 45 degrees.  Aspiration precautions. ARDS network MV settings     CARDIOVASCULAR: Goal directed fluid resuscitation.  MAP>65. Wean pressors as tolerated.     GI:  OG tube to suction.  RUQ showed cholelithiasis but no signs of cholecystitis    RENAL:  Follow up lytes. Check BMP, Mg, Phosp q 4 hours and correct as needed.    INFECTIOUS DISEASE: Hypothermic. Get Procalcitonin. Follow pan cultures. UA.  Continue ABX     HEMATOLOGICAL:  DVT prophylaxis.    ENDOCRINE:  Follow up FS.  Insulin protocol if needed.     MUSCULOSKELETAL: Bed rest     Grave prognosis       DW family at the bed side.  They wish liberation adn CMO

## 2023-02-23 NOTE — CHART NOTE - NSCHARTNOTEFT_GEN_A_CORE
Rapid response was called for altered mental status, non responsiveness, desaturation reaching 90 on maximum non-rebreather.     Attempting intubation lead to huge amount of coffee ground emesis, which was suctioned giving 500cc. Intubation completed, OG tube was placed for stomach decompression.     Labs were sent: stable Hb, high INR 2, stable electrolytes, procalcitonin 2.5.    CT head showed no acute intracranial pathology  CTA primary reading by our team showed no PE    She was given bolus of NS 1 L, resumed on D5NS rate of 50 cc/hr     She developed another vomiting with OG tube connected to suctioning giving 600cc of brownish fluid, could be coffee ground vomiting     Assessment:  A 68 with malnourishment, anorexia, admitted and started on feedings, developed altered mental status, desaturation, severe vomiting with coffee ground emesis. Intubated, PE ruled out preliminary, no intracranial pathology on CT brain. Given 1 L of NS. stable Hb.     # Altered mental status  - possibly due to GI bleed, sepsis, hypoglycemia, hypoxia ( multifactorial )  - neurology on board  - treat underlying causes  - hold feedings for now  - DC Xanax and Gabapentin   - off sedation   - follow up TSH, ammonia     # Aspiration pneumonia   - showed on CT chest  - procalcitonin 2.5  - start cefepime and vancomycin   - blood cultures sent, follow up   - follow up MRSA PCR   - follow up CTA final report     # Coffee ground emesis  - total 1.1 L suctioned. Given 1 L bolus   - stable Hb ( could be concentrated )  - INR 2, PTT 42  - Continue D5 NS 50cc/hr   - give protonix 80mg STAT, start drip 8 mg/hr   - active type and screen   - hold AC   - GI consult     # Malnourishment, Anorexia   - Hold feedings for now   - stable electrolytes  - continue IV fluids  - continue thiamine, vitamins   - follow up with GI and nutritionist   - follow up labs twice to avoid refeeding syndrome Rapid response was called for altered mental status, non responsiveness, desaturation reaching 90 on maximum non-rebreather.     Attempting intubation lead to huge amount of coffee ground emesis, which was suctioned giving 500cc. Intubation completed, OG tube was placed for stomach decompression.     Labs were sent: stable Hb, high INR 2, stable electrolytes, procalcitonin 2.5.    CT head showed no acute intracranial pathology  CTA primary reading by our team showed no PE    She was given bolus of NS 1 L, resumed on D5NS rate of 50 cc/hr     She developed another vomiting with OG tube connected to suctioning giving 600cc of brownish fluid, could be coffee ground vomiting     Assessment:  A 68 with malnourishment, anorexia, admitted and started on feedings, developed altered mental status, desaturation, severe vomiting with coffee ground emesis. Intubated, PE ruled out preliminary, no intracranial pathology on CT brain. Given 1 L of NS. stable Hb.     # Altered mental status  - possibly due to GI bleed, sepsis, hypoglycemia, hypoxia ( multifactorial )  - neurology on board  - treat underlying causes  - hold feedings for now  - DC Xanax and Gabapentin   - off sedation   - follow up TSH, ammonia     # Aspiration pneumonia   # Septic shock   - showed on CT chest  - procalcitonin 2.5  - ABG - pH, Arterial: 7.49  pCO2: 35 pO2: 57HCO3: 27  SaO2: 86.1    - start cefepime and vancomycin   - continue Levophed   - on mechanical ventilation   - blood cultures sent, follow up   - follow up MRSA PCR   - follow up CTA final report     # Coffee ground emesis - possibly cushing ulcer   # Hypovolemic shock   - total 1.1 L suctioned. Given 1 L bolus   - stable Hb ( could be concentrated )  - INR 2, PTT 42  - Continue D5 NS 50cc/hr   - give protonix 80mg STAT, start drip 8 mg/hr   - active type and screen   - hold AC   - GI consult     # Malnourishment, Anorexia   - Hold feedings for now   - stable electrolytes  - continue IV fluids  - continue thiamine, vitamins   - follow up with GI and nutritionist   - follow up labs twice to avoid refeeding syndrome

## 2023-02-23 NOTE — RAPID RESPONSE TEAM SUMMARY - NSADDTLFINDINGSRRT_GEN_ALL_CORE
suctioning prior to intubation showed ~ 0.5 L ground glass secretions in oral cavity
Post treatment vitals: HR 67, spo2 100 percent on room air, bp 121/79, finger stick sugar 432.

## 2023-02-23 NOTE — CONSULT NOTE ADULT - ASSESSMENT
67 year old Female w/ PMH anorexia w/ laxative abuse, anxiety, depression, kidney cysts, peripheral neuropathy, RA, OA, osteoporosis, low back pain, recurrent LE cellulitis complicated by nec fasc and septic shock presents for generalized weakness, admitted under the care of the medicine team for anorexia nervosa. Neurology consulted for AMS since 5 pm S/P SYMPTOMATIC hypoglycemic glucose 37 S/P dextrose with no improvement in her mental status. Stroke code activated. NIHSS 24 as patient obtunded. No gaze preference or facial asymmetry noted. Hypoxic anticipating intubation, hypotensive, with possible acute GIB. Not TPA candidate. mRS 2-3.    Impression:  AMS secondary to TME in the setting of severe hypoglycemia, possible GI bleed, hypotension and desaturation, possible aspiration  CTH once patient is medically stabilized and airway protected  CTA 11/22 with normal vessels, intact anterior and posterior circulation. No need for CTA  Please call back as needed with questions      Discussed with neurology attending jose

## 2023-02-23 NOTE — DISCHARGE NOTE FOR THE EXPIRED PATIENT - HOSPITAL COURSE
67 year old Female w/ PMH anorexia w/ laxative abuse, anxiety, depression, kidney cysts, peripheral neuropathy, RA, OA, osteoporosis, low back pain, recurrent LE cellulitis complicated by nec fasc and septic shock presents for weakness. Per pt's , pt has had anorexia for most of her adult life and has not eaten or drank anything in days. He attempted to give her some juice prior to ED arrival. Pt denies any complaints. is not very participatory with history taking though. On arrival to the ED glucose was 53. Denies suicidal ideation. States that she wants to go home.     3C course:  Patient became hypotensive 68/42 and fluid bolus was started on Levophed. Etiology of patient's shock is unknown and is being upgraded to SDU. Pending CT of the chest without contrast, echocardiogram, and full septic work-up with urine and blood studies.    CEU course:  Overnight patient had another rapid response for  67 year old Female w/ PMH anorexia w/ laxative abuse, anxiety, depression, kidney cysts, peripheral neuropathy, RA, OA, osteoporosis, low back pain, recurrent LE cellulitis complicated by nec fasc and septic shock presents for weakness. Per pt's , pt has had anorexia for most of her adult life and has not eaten or drank anything in days. He attempted to give her some juice prior to ED arrival. Pt denies any complaints. is not very participatory with history taking though. On arrival to the ED glucose was 53. Denies suicidal ideation. States that she wants to go home.     3C course:  Patient became hypotensive 68/42 and fluid bolus was started on Levophed. Etiology of patient's shock is unknown and is being upgraded to SDU. Pending CT of the chest without contrast, echocardiogram, and full septic work-up with urine and blood studies.    CEU course:  Overnight patient had another rapid response for altered mental status and hypotension. Code stroke was called, and then cancelled. Patient was intubated for airway protection. CT head was done which was negative. CT chest PE was done which showed     No central pulmonary embolism.    Increased consolidative and atelectatic opacities of the right lower   lobe. Increased patchy groundglass and consolidative opacities of the   right upper and middle lobes. These findings may represent infectious   etiology.    Increased moderate loculated right pleural effusion. Unchanged moderate   left pleural effusion.    A second rapid response was called for hypotension and bradycardia. Patient was started on multiple pressors including levo, vaso, and zaheer. Patient became tachy cardic and blood pressures were ranging from 40-60 systolic and 20-40 diastolic. Patient was saturating in the mid 60's on 100% FiO2 through the ET Tube. Patient's family was contacted. They opted to make the patient DNR and were coming to the hospital, stating the patient had suffered enough in her life, they wanted her to pass peacefully. Once they arrived at the hospital they opted to pursue comfort measures over life prolonging measures. Patient was placed on a fentanyl drip, all medications were stopped, patient was extubated and immediately  at 08:30.

## 2023-02-23 NOTE — RAPID RESPONSE TEAM SUMMARY - NSSITUATIONBACKGROUNDRRT_GEN_ALL_CORE
She developed bradycardia reaching 39, with Hypoxia on mechanical ventilation, saturation of 60.  BP was 60/30.

## 2023-02-23 NOTE — RAPID RESPONSE TEAM SUMMARY - NSOTHERINTERVENTIONSRRT_GEN_ALL_CORE
CT head (no CTP/CTA, outside window, previous -ve CTA)  CTA r/o PE  check Ammonia, TSH, Syphillis, CBC, CMP, PT, PTT

## 2023-02-23 NOTE — STROKE CODE NOTE - ABCD2 HIDDEN
Sensor (FREESTYLE SOFI 14 DAY SENSOR) MISC Use daily 3/7/19   Trudy Owen MD   lisinopril (PRINIVIL;ZESTRIL) 5 MG tablet Take 1 tablet by mouth daily 3/6/19   Trudy Owen MD   diclofenac (VOLTAREN) 75 MG EC tablet Take 1 tablet by mouth 2 times daily (with meals) 3/6/19   Trudy Owen MD   insulin glargine (TOUJEO SOLOSTAR) 300 UNIT/ML injection pen Inject 50 Units into the skin nightly 2/27/19   Trudy Owen MD   metFORMIN (GLUCOPHAGE) 1000 MG tablet Take 1 tablet by mouth 2 times daily (with meals) 2/27/19   Trudy Owen MD   citalopram (CELEXA) 20 MG tablet Take 30 mg by mouth daily     Historical Provider, MD   QUEtiapine (SEROQUEL) 300 MG tablet Take 300 mg by mouth daily     Historical Provider, MD       Social History:   TOBACCO:   reports that he quit smoking about 23 years ago. His smoking use included cigarettes. He has a 50.00 pack-year smoking history. He has never used smokeless tobacco.  ETOH:   reports that he does not drink alcohol. DRUGS:  reports that he does not use drugs. OCCUPATION:      Family History:       Problem Relation Age of Onset    Alzheimer's Disease Mother     Arthritis Mother     Diabetes Father     Heart Disease Father     Heart Disease Paternal Grandfather            REVIEW OF SYSTEMS (ROS):  Review of Systems -   Unable to obtain due to patient being poor historian. Physical Exam:    Vitals: BP (!) 100/52   Pulse 106   Temp 99.1 °F (37.3 °C)   Resp 21   Ht 5' 10\" (1.778 m)   Wt 200 lb (90.7 kg)   SpO2 93%   BMI 28.70 kg/m²     Last Body weight:   Wt Readings from Last 3 Encounters:   05/22/19 200 lb (90.7 kg)   04/22/19 200 lb (90.7 kg)   04/15/19 211 lb (95.7 kg)       Body Mass Index : Body mass index is 28.7 kg/m².         PHYSICAL EXAMINATION :  Constitutional: Appears well, no distress  EENT: PERRLA, EOMI, sclera clear, anicteric, oropharynx clear and dry  Neck: Supple, symmetrical, trachea midline, no adenopathy, thyroid symmetric, no jvd skin normal  Respiratory: clear to auscultation, no wheezes or rales and unlabored breathing. No intercostal tenderness  Cardiovascular: tachycardic rate and regular rhythm, normal S1, S2, no murmur noted and 2+ pulses throughout  Abdomen: soft, nontender, nondistended, no masses or organomegaly  Extremities:  peripheral pulses normal, no pedal edema, no clubbing or cyanosis    Any additional physical findings:      Laboratory findings:-    CBC:   Recent Labs     05/22/19 2256   WBC 27.9*   HGB 7.1*   *     BMP:    Recent Labs     05/22/19 2256   *   K 6.6*   CL 79*   CO2 6*   *   CREATININE 4.55*   GLUCOSE 830*     S. Calcium:  Recent Labs     05/22/19 2256   CALCIUM 9.3     S. Ionized Calcium:No results for input(s): IONCA in the last 72 hours. S. Magnesium:  Recent Labs     05/22/19 2256   MG 3.1*     S. Phosphorus:No results for input(s): PHOS in the last 72 hours. S. Glucose:No results for input(s): POCGLU in the last 72 hours. Glycosylated hemoglobin A1C: No results for input(s): LABA1C in the last 72 hours. INR:   Recent Labs     05/22/19 2256   INR 1.1     Hepatic functions:   Recent Labs     05/22/19 2256   ALKPHOS 120   ALT 6   AST 6   PROT 8.1   BILITOT 0.20*   LABALBU 3.4*     Pancreatic functions:No results for input(s): LACTA, AMYLASE in the last 72 hours. S. Lactic Acid: No results for input(s): LACTA in the last 72 hours. Cardiac enzymes:No results for input(s): CKTOTAL, CKMB, CKMBINDEX, TROPONINI in the last 72 hours. BNP:No results for input(s): BNP in the last 72 hours. Lipid profile: No results for input(s): CHOL, TRIG, HDL, LDLCALC in the last 72 hours.     Invalid input(s): LDL  Blood Gases: No results found for: PH, PCO2, PO2, HCO3, O2SAT  Thyroid functions:   Lab Results   Component Value Date    TSH 1.93 02/27/2019        Urinalysis:     Microbiology:    Cultures during this admission:     Blood cultures:                 [] None drawn [] Negative             []  Positive (Details:  )  Urine Culture:                   [] None drawn      [] Negative             []  Positive (Details:  )  Sputum Culture:               [] None drawn       [] Negative             []  Positive (Details:  )   Endotracheal aspirate:     [] None drawn       [] Negative             []  Positive (Details:  )     -----------------------------------------------------------------  Radiological reports:    (See actual reports for details)  XR TIBIA FIBULA LEFT (2 VIEWS)   Final Result   1. Unremarkable radiographs of the left tibia and fibula. XR FOOT LEFT (MIN 3 VIEWS)   Preliminary Result   1. Soft tissue ulceration along the plantar aspect of the heel. No evidence   of osteomyelitis. Underlying infection cannot be excluded. 2. No fracture. XR CHEST PORTABLE   Final Result   1. Marked low lung volumes with bibasilar atelectasis. Recent Cardiac Catheterization summary:   (See actual reports for details)    Electrocardiogram:   EKG Interpretation    Interpreted by me    Rhythm: normal sinus   Rate: normal  Axis: normal  Ectopy: none  Conduction: qtc 475  ST Segments: no acute change  T Waves: Hyperacute  Q Waves: none    Clinical Impression: Peaked T waves, long QTC    Last Echocardiogram findings:   (See actual reports for details)       Assessment and Plan       Patient Active Problem List   Diagnosis    Pure hypercholesterolemia    Hypertriglyceridemia    Neuropathy    Essential hypertension    Type 2 diabetes mellitus with insulin therapy ([address]    Skin ulcer ([address]    Hammer toe of right foot    DM type 2, uncontrolled, with neuropathy ([address]    Upper GI bleed         Additional assessment:    Patient is a 43-year-old male with history of type 2 diabetes presenting with sepsis, wet gangrene left medial heel, acute renal failure, DKA.     Plan:    Neuro  - neuro checks  - pain control    CV  - telemetry  - ekg with undetermined rhythm, review of data including imaging and labs, discussions with other team members and physicians at least 28   Min so far today, excluding procedures. Treatment plan Discussed with nursing staff in detail , all questions answered . Electronically signed by Julio C Dao MD on   5/23/19 at 10:18 PM    Please note that this chart was generated using voice recognition Dragon dictation software. Although every effort was made to ensure the accuracy of this automated transcription, some errors in transcription may have occurred. show

## 2023-02-23 NOTE — STROKE CODE NOTE - NSMDCONSULT [id number] FT
Neurology Consult    Patient is a 68y old  Female who presents with a chief complaint of hypoglycemia (2023 13:54)      HPI:  67 year old Female w/ PMH anorexia w/ laxative abuse, anxiety, depression, kidney cysts, peripheral neuropathy, RA, OA, osteoporosis, low back pain, recurrent LE cellulitis complicated by nec fasc and septic shock presents for weakness. Per pt's , pt has had anorexia for most of her adult life and has not eaten or drank anything in days. He attempted to give her some juice prior to ED arrival. Pt denies any complaints. is not very participatory with history taking though. On arrival to the ED glucose was 53. Denies suicidal ideation. States that she wants to go home.     **Neurology consulted for AMS since 5 pm. Patient at base line is AOx3. Stroke code activated and neurology consulted. Upon presentation Patient is laying in bed unresponsive with     In the ED vitals stable.   Labs show PLT 98, Mag 1.4, , , .     (2023 02:50)      PAST MEDICAL & SURGICAL HISTORY:  Anxiety      Depression      Osteoporosis      Kidney cysts      Peripheral neuropathy      RA (rheumatoid arthritis)      OA (osteoarthritis)      Anorexia      Low back pain with radiation  s/p &quot;nerve cutting&quot; 2015          FAMILY HISTORY:  Family history of stroke (Mother)        Social History: (-) x 3    Allergies    No Known Allergies    Intolerances        MEDICATIONS  (STANDING):  ALPRAZolam 0.25 milliGRAM(s) Oral two times a day  ascorbic acid 500 milliGRAM(s) Oral daily  cilostazol 50 milliGRAM(s) Oral two times a day  dextrose 5% + sodium chloride 0.9%. 1000 milliLiter(s) (100 mL/Hr) IV Continuous <Continuous>  enoxaparin Injectable 40 milliGRAM(s) SubCutaneous every 24 hours  fentaNYL   Infusion. 0.5 MICROgram(s)/kG/Hr (2.1 mL/Hr) IV Continuous <Continuous>  gabapentin 600 milliGRAM(s) Oral three times a day  levothyroxine 50 MICROGram(s) Oral daily  magnesium sulfate  IVPB 2 Gram(s) IV Intermittent every 4 hours  multivitamin/minerals 1 Tablet(s) Oral daily  norepinephrine Infusion 0.25 MICROgram(s)/kG/Min (19.7 mL/Hr) IV Continuous <Continuous>  OLANZapine 2.5 milliGRAM(s) Oral every 12 hours  sertraline 75 milliGRAM(s) Oral daily  sodium chloride 0.9% Bolus 750 milliLiter(s) IV Bolus once  thiamine Injectable 100 milliGRAM(s) IV Push daily    MEDICATIONS  (PRN):  acetaminophen     Tablet .. 650 milliGRAM(s) Oral every 6 hours PRN Temp greater or equal to 38C (100.4F), Mild Pain (1 - 3)  dextrose 50% Injectable 20 milliLiter(s) IV Push once PRN hypoglycemia less than 50  oxycodone    5 mG/acetaminophen 325 mG 1 Tablet(s) Oral every 6 hours PRN Severe Pain (7 - 10)      Review of systems:    Constitutional: as per HPI  Eyes: No eye pain or discharge  ENMT:  No difficulty hearing; No sinus or throat pain  Neck: No pain or stiffness  Respiratory: No cough, wheezing, chills or hemoptysis  Cardiovascular: No chest pain, palpitations, shortness of breath, dyspnea on exertion  Gastrointestinal: No abdominal pain, nausea, vomiting or hematemesis; No diarrhea or constipation.   Genitourinary: No dysuria, frequency, hematuria or incontinence  Neurological: As per HPI  Skin: No rashes or lesions   Endocrine: No heat or cold intolerance; No hair loss  Musculoskeletal: No joint pain or swelling  Psychiatric: No depression, anxiety, mood swings  Heme/Lymph: No easy bruising or bleeding gums    Vital Signs Last 24 Hrs  T(C): 35.6 (2023 21:00), Max: 36.2 (2023 20:00)  T(F): 96.1 (2023 21:00), Max: 97.1 (2023 20:00)  HR: 77 (2023 21:00) (70 - 100)  BP: 116/60 (2023 21:00) (66/42 - 116/60)  BP(mean): --  RR: 14 (2023 21:00) (14 - 16)  SpO2: 92% (2023 21:00) (92% - 100%)    Parameters below as of 2023 21:00  Patient On (Oxygen Delivery Method): nasal cannula  O2 Flow (L/min): 4      Examination:  General:  Appearance is consistent with chronologic age.  No abnormal facies.  Gross skin survey within normal limits.    Cognitive/Language:  The patient is oriented to person, place, time and date.  Recent and remote memory intact.  Fund of knowledge is intact and normal.  Language with normal repetition, comprehension and naming.  Nondysarthric.    Eyes: intact VA, VFF.  EOMI w/o nystagmus, skew or reported double vision.  PERRL.  No ptosis/weakness of eyelid closure.    Face:  Facial sensation normal V1 - 3, no facial asymmetry.    Ears/Nose/Throat:  Hearing grossly intact b/l.  Palate elevates midline.  Tongue and uvula midline.   Motor examination:   Normal tone, bulk and range of motion.  No tenderness, twitching, tremors or involuntary movements.  Formal Muscle Strength Testing: (MRC grade R/L) 5/5 UE; 5/5 LE.  No observable drift.  Reflexes:   2+ b/l pectoralis, biceps, triceps, brachioradialis, patella and Achilles.  Plantar response downgoing b/l.  Jaw jerk, Сергей, clonus absent.  Sensory examination:   Intact to light touch and pinprick, pain, temperature and proprioception and vibration in all extremities.  Cerebellum:   FTN/HKS intact with normal LEXUS in all limbs.  No dysmetria or dysdiadokinesia.  Gait narrow based and normal.    Respiratory:  no audible wheezing or inspiratory stridor.  no use of accessory muscles.   Cardiac: pulse palpable, no audible bruits  Abdomen: supple, no guarding, no TTP    Labs:   CBC Full  -  ( 2023 05:38 )  WBC Count : 5.71 K/uL  RBC Count : 3.80 M/uL  Hemoglobin : 11.3 g/dL  Hematocrit : 34.7 %  Platelet Count - Automated : 113 K/uL  Mean Cell Volume : 91.3 fL  Mean Cell Hemoglobin : 29.7 pg  Mean Cell Hemoglobin Concentration : 32.6 g/dL  Auto Neutrophil # : 5.10 K/uL  Auto Lymphocyte # : 0.46 K/uL  Auto Monocyte # : 0.11 K/uL  Auto Eosinophil # : 0.00 K/uL  Auto Basophil # : 0.01 K/uL  Auto Neutrophil % : 89.3 %  Auto Lymphocyte % : 8.1 %  Auto Monocyte % : 1.9 %  Auto Eosinophil % : 0.0 %  Auto Basophil % : 0.2 %        135  |  101  |  11  ----------------------------<  117<H>  4.1   |  21  |  0.5<L>    Ca    7.1<L>      2023 23:27  Phos  3.8       Mg     1.9         TPro  4.1<L>  /  Alb  1.9<L>  /  TBili  0.3  /  DBili  x   /  AST  85<H>  /  ALT  109<H>  /  AlkPhos  312<H>      LIVER FUNCTIONS - ( 2023 23:27 )  Alb: 1.9 g/dL / Pro: 4.1 g/dL / ALK PHOS: 312 U/L / ALT: 109 U/L / AST: 85 U/L / GGT: x           PT/INR - ( 2023 07:19 )   PT: 16.30 sec;   INR: 1.41 ratio         PTT - ( 2023 07:19 )  PTT:42.4 sec  Urinalysis Basic - ( 2023 15:00 )    Color: Yellow / Appearance: Slightly Turbid / S.022 / pH: x  Gluc: x / Ketone: Negative  / Bili: Negative / Urobili: <2 mg/dL   Blood: x / Protein: 30 mg/dL / Nitrite: Positive   Leuk Esterase: Large / RBC: 36 /HPF / WBC 4 /HPF   Sq Epi: x / Non Sq Epi: 2 /HPF / Bacteria: Many          [facility]  [facility]     23 @ 00:30 Neurology Consult    Patient is a 68y old  Female who presents with a chief complaint of hypoglycemia (2023 13:54)      HPI:  67 year old Female w/ PMH anorexia w/ laxative abuse, anxiety, depression, kidney cysts, peripheral neuropathy, RA, OA, osteoporosis, low back pain, recurrent LE cellulitis complicated by nec fasc and septic shock presents for weakness. Per pt's , pt has had anorexia for most of her adult life and has not eaten or drank anything in days. He attempted to give her some juice prior to ED arrival. Pt denies any complaints. is not very participatory with history taking though. On arrival to the ED glucose was 53. Denies suicidal ideation. States that she wants to go home.     **Neurology consulted for AMS since 5 pm. As per [facility] medical doctor patient previously base line was AOx3 was hypoglycemic glucose in the 50's received dextrose. Stroke code activated and neurology consulted. Upon presentation Patient is AOX0  laying in bed unresponsive, hypoxic anticipating  intubation. LKW unknown for patient was found altered mental at 5pm. On exam: Patient unresponsive to painful stimuli, there is no gaze preference, + blink to threat, vestibular ocular reflex intact, no evidence of shaking, normal extensor posturing, upper arms flaccid, lower extremity rigidity. Vitals: BP: 116/60 HR 77 .     In the ED vitals stable.   Labs show PLT 98, Mag 1.4, , , .     (2023 02:50)      PAST MEDICAL & SURGICAL HISTORY:  Anxiety      Depression      Osteoporosis      Kidney cysts      Peripheral neuropathy      RA (rheumatoid arthritis)      OA (osteoarthritis)      Anorexia      Low back pain with radiation  s/p &quot;nerve cutting&quot; 2015          FAMILY HISTORY:  Family history of stroke (Mother)        Social History: (-) x 3    Allergies    No Known Allergies    Intolerances        MEDICATIONS  (STANDING):  ALPRAZolam 0.25 milliGRAM(s) Oral two times a day  ascorbic acid 500 milliGRAM(s) Oral daily  cilostazol 50 milliGRAM(s) Oral two times a day  dextrose 5% + sodium chloride 0.9%. 1000 milliLiter(s) (100 mL/Hr) IV Continuous <Continuous>  enoxaparin Injectable 40 milliGRAM(s) SubCutaneous every 24 hours  fentaNYL   Infusion. 0.5 MICROgram(s)/kG/Hr (2.1 mL/Hr) IV Continuous <Continuous>  gabapentin 600 milliGRAM(s) Oral three times a day  levothyroxine 50 MICROGram(s) Oral daily  magnesium sulfate  IVPB 2 Gram(s) IV Intermittent every 4 hours  multivitamin/minerals 1 Tablet(s) Oral daily  norepinephrine Infusion 0.25 MICROgram(s)/kG/Min (19.7 mL/Hr) IV Continuous <Continuous>  OLANZapine 2.5 milliGRAM(s) Oral every 12 hours  sertraline 75 milliGRAM(s) Oral daily  sodium chloride 0.9% Bolus 750 milliLiter(s) IV Bolus once  thiamine Injectable 100 milliGRAM(s) IV Push daily    MEDICATIONS  (PRN):  acetaminophen     Tablet .. 650 milliGRAM(s) Oral every 6 hours PRN Temp greater or equal to 38C (100.4F), Mild Pain (1 - 3)  dextrose 50% Injectable 20 milliLiter(s) IV Push once PRN hypoglycemia less than 50  oxycodone    5 mG/acetaminophen 325 mG 1 Tablet(s) Oral every 6 hours PRN Severe Pain (7 - 10)        Vital Signs Last 24 Hrs  T(C): 35.6 (2023 21:00), Max: 36.2 (2023 20:00)  T(F): 96.1 (2023 21:00), Max: 97.1 (2023 20:00)  HR: 77 (2023 21:00) (70 - 100)  BP: 116/60 (2023 21:00) (66/42 - 116/60)  BP(mean): --  RR: 14 (2023 21:00) (14 - 16)  SpO2: 92% (2023 21:00) (92% - 100%)    Parameters below as of 2023 21:00  Patient On (Oxygen Delivery Method): nasal cannula  O2 Flow (L/min): 4      Examination:  General:  Appearance is consistent with chronologic age.  No abnormal facies.  Gross skin survey within normal limits.    Cognitive/Language:  The patient is oriented to person, place, time and date.  Recent and remote memory intact.  Fund of knowledge is intact and normal.  Language with normal repetition, comprehension and naming.  Nondysarthric.    Eyes: intact VA, VFF.  EOMI w/o nystagmus, skew or reported double vision.  PERRL.  No ptosis/weakness of eyelid closure.    Face:  Facial sensation normal V1 - 3, no facial asymmetry.    Ears/Nose/Throat:  Hearing grossly intact b/l.  Palate elevates midline.  Tongue and uvula midline.   Motor examination:   Normal tone, bulk and range of motion.  No tenderness, twitching, tremors or involuntary movements.  Formal Muscle Strength Testing: (MRC grade R/L) 5/5 UE; 5/5 LE.  No observable drift.  Reflexes:   2+ b/l pectoralis, biceps, triceps, brachioradialis, patella and Achilles.  Plantar response downgoing b/l.  Jaw jerk, Серегй, clonus absent.  Sensory examination:   Intact to light touch and pinprick, pain, temperature and proprioception and vibration in all extremities.  Cerebellum:   FTN/HKS intact with normal LEXUS in all limbs.  No dysmetria or dysdiadokinesia.  Gait narrow based and normal.    Respiratory:  no audible wheezing or inspiratory stridor.  no use of accessory muscles.   Cardiac: pulse palpable, no audible bruits  Abdomen: supple, no guarding, no TTP    Labs:   CBC Full  -  ( 2023 05:38 )  WBC Count : 5.71 K/uL  RBC Count : 3.80 M/uL  Hemoglobin : 11.3 g/dL  Hematocrit : 34.7 %  Platelet Count - Automated : 113 K/uL  Mean Cell Volume : 91.3 fL  Mean Cell Hemoglobin : 29.7 pg  Mean Cell Hemoglobin Concentration : 32.6 g/dL  Auto Neutrophil # : 5.10 K/uL  Auto Lymphocyte # : 0.46 K/uL  Auto Monocyte # : 0.11 K/uL  Auto Eosinophil # : 0.00 K/uL  Auto Basophil # : 0.01 K/uL  Auto Neutrophil % : 89.3 %  Auto Lymphocyte % : 8.1 %  Auto Monocyte % : 1.9 %  Auto Eosinophil % : 0.0 %  Auto Basophil % : 0.2 %        135  |  101  |  11  ----------------------------<  117<H>  4.1   |  21  |  0.5<L>    Ca    7.1<L>      2023 23:27  Phos  3.8       Mg     1.9         TPro  4.1<L>  /  Alb  1.9<L>  /  TBili  0.3  /  DBili  x   /  AST  85<H>  /  ALT  109<H>  /  AlkPhos  312<H>      LIVER FUNCTIONS - ( 2023 23:27 )  Alb: 1.9 g/dL / Pro: 4.1 g/dL / ALK PHOS: 312 U/L / ALT: 109 U/L / AST: 85 U/L / GGT: x           PT/INR - ( 2023 07:19 )   PT: 16.30 sec;   INR: 1.41 ratio         PTT - ( 2023 07:19 )  PTT:42.4 sec  Urinalysis Basic - ( 2023 15:00 )    Color: Yellow / Appearance: Slightly Turbid / S.022 / pH: x  Gluc: x / Ketone: Negative  / Bili: Negative / Urobili: <2 mg/dL   Blood: x / Protein: 30 mg/dL / Nitrite: Positive   Leuk Esterase: Large / RBC: 36 /HPF / WBC 4 /HPF   Sq Epi: x / Non Sq Epi: 2 /HPF / Bacteria: Many          Neuroimaging:  [facility]:     23 @ 00:30          Impression:    Neurology consulted for AMS since 5 pm. As per [facility] doctor patient previously base line was AOx3 was hypoglycemic glucose in the 50's received dextrose. Stroke code activated and neurology consulted. Upon presentation Patient is AOX0  laying in bed unresponsive, hypoxic anticipating  intubation. LKW unknown for patient was found altered mental at 5pm. On exam: Patient unresponsive to painful stimuli, there is no gaze preference, + blink to threat, pupils responsive to light, vestibular ocular reflex intact, no evidence of shaking, upper arms flaccid, lower extremity rigidity. Vitals: BP: 116/60 HR 77 .        Recommendations:    EEG  Telemonitoring Neurology Consult    Patient is a 68y old  Female who presents with a chief complaint of hypoglycemia (2023 13:54)      HPI:  67 year old Female w/ PMH anorexia w/ laxative abuse, anxiety, depression, kidney cysts, peripheral neuropathy, RA, OA, osteoporosis, low back pain, recurrent LE cellulitis complicated by nec fasc and septic shock presents for weakness. Per pt's , pt has had anorexia for most of her adult life and has not eaten or drank anything in days. He attempted to give her some juice prior to ED arrival. Pt denies any complaints. is not very participatory with history taking though. On arrival to the ED glucose was 53. Denies suicidal ideation. States that she wants to go home.     **Neurology consulted for AMS since 5 pm. As per [facility] doctor patient previously base line was AOx3 symptomatic hypoglycemia glucose 37 received dextrose. Stroke code activated and neurology consulted. Upon presentation Patient is AOX0  laying in bed unresponsive, hypoxic anticipating  intubation. LKW unknown for patient was found altered mental at 5pm. On exam: Patient unresponsive to painful stimuli, there is no gaze preference, + blink to threat, vestibular ocular reflex intact, no evidence of shaking, normal extensor posturing, upper arms flaccid, lower extremity rigidity. Vitals: BP: 116/60 HR 77 .     In the ED vitals stable.   Labs show PLT 98, Mag 1.4, , , .     (2023 02:50)      PAST MEDICAL & SURGICAL HISTORY:  Anxiety      Depression      Osteoporosis      Kidney cysts      Peripheral neuropathy      RA (rheumatoid arthritis)      OA (osteoarthritis)      Anorexia      Low back pain with radiation  s/p &quot;nerve cutting&quot; 2015          FAMILY HISTORY:  Family history of stroke (Mother)        Social History: (-) x 3    Allergies    No Known Allergies    Intolerances        MEDICATIONS  (STANDING):  ALPRAZolam 0.25 milliGRAM(s) Oral two times a day  ascorbic acid 500 milliGRAM(s) Oral daily  cilostazol 50 milliGRAM(s) Oral two times a day  dextrose 5% + sodium chloride 0.9%. 1000 milliLiter(s) (100 mL/Hr) IV Continuous <Continuous>  enoxaparin Injectable 40 milliGRAM(s) SubCutaneous every 24 hours  fentaNYL   Infusion. 0.5 MICROgram(s)/kG/Hr (2.1 mL/Hr) IV Continuous <Continuous>  gabapentin 600 milliGRAM(s) Oral three times a day  levothyroxine 50 MICROGram(s) Oral daily  magnesium sulfate  IVPB 2 Gram(s) IV Intermittent every 4 hours  multivitamin/minerals 1 Tablet(s) Oral daily  norepinephrine Infusion 0.25 MICROgram(s)/kG/Min (19.7 mL/Hr) IV Continuous <Continuous>  OLANZapine 2.5 milliGRAM(s) Oral every 12 hours  sertraline 75 milliGRAM(s) Oral daily  sodium chloride 0.9% Bolus 750 milliLiter(s) IV Bolus once  thiamine Injectable 100 milliGRAM(s) IV Push daily    MEDICATIONS  (PRN):  acetaminophen     Tablet .. 650 milliGRAM(s) Oral every 6 hours PRN Temp greater or equal to 38C (100.4F), Mild Pain (1 - 3)  dextrose 50% Injectable 20 milliLiter(s) IV Push once PRN hypoglycemia less than 50  oxycodone    5 mG/acetaminophen 325 mG 1 Tablet(s) Oral every 6 hours PRN Severe Pain (7 - 10)        Vital Signs Last 24 Hrs  T(C): 35.6 (2023 21:00), Max: 36.2 (2023 20:00)  T(F): 96.1 (2023 21:00), Max: 97.1 (2023 20:00)  HR: 77 (2023 21:00) (70 - 100)  BP: 116/60 (2023 21:00) (66/42 - 116/60)  BP(mean): --  RR: 14 (2023 21:00) (14 - 16)  SpO2: 92% (2023 21:00) (92% - 100%)    Parameters below as of 2023 21:00  Patient On (Oxygen Delivery Method): nasal cannula  O2 Flow (L/min): 4      Examination:    MS: Obtunded, not responsive to sternal rub, not following commands  CN: no gaze preference, reactive pupils, blinks to threat  Motor: Hypotonic in all extremities, not withdrawing to painful stimuli  Sensory: Not withdrawing to painful stimuli  Respiratory:  On non rebreather      Labs:   CBC Full  -  ( 2023 05:38 )  WBC Count : 5.71 K/uL  RBC Count : 3.80 M/uL  Hemoglobin : 11.3 g/dL  Hematocrit : 34.7 %  Platelet Count - Automated : 113 K/uL  Mean Cell Volume : 91.3 fL  Mean Cell Hemoglobin : 29.7 pg  Mean Cell Hemoglobin Concentration : 32.6 g/dL  Auto Neutrophil # : 5.10 K/uL  Auto Lymphocyte # : 0.46 K/uL  Auto Monocyte # : 0.11 K/uL  Auto Eosinophil # : 0.00 K/uL  Auto Basophil # : 0.01 K/uL  Auto Neutrophil % : 89.3 %  Auto Lymphocyte % : 8.1 %  Auto Monocyte % : 1.9 %  Auto Eosinophil % : 0.0 %  Auto Basophil % : 0.2 %        135  |  101  |  11  ----------------------------<  117<H>  4.1   |  21  |  0.5<L>    Ca    7.1<L>      2023 23:27  Phos  3.8       Mg     1.9         TPro  4.1<L>  /  Alb  1.9<L>  /  TBili  0.3  /  DBili  x   /  AST  85<H>  /  ALT  109<H>  /  AlkPhos  312<H>      LIVER FUNCTIONS - ( 2023 23:27 )  Alb: 1.9 g/dL / Pro: 4.1 g/dL / ALK PHOS: 312 U/L / ALT: 109 U/L / AST: 85 U/L / GGT: x           PT/INR - ( 2023 07:19 )   PT: 16.30 sec;   INR: 1.41 ratio         PTT - ( 2023 07:19 )  PTT:42.4 sec  Urinalysis Basic - ( 2023 15:00 )    Color: Yellow / Appearance: Slightly Turbid / S.022 / pH: x  Gluc: x / Ketone: Negative  / Bili: Negative / Urobili: <2 mg/dL   Blood: x / Protein: 30 mg/dL / Nitrite: Positive   Leuk Esterase: Large / RBC: 36 /HPF / WBC 4 /HPF   Sq Epi: x / Non Sq Epi: 2 /HPF / Bacteria: Many          [facility]  [facility]     23 @ 00:30      < from: CT Brain Stroke Protocol (23 @ 01:51) >    MPRESSION:      No acute intracranial pathology.      < end of copied text >        Impression:    Neurology consulted for AMS since 5 pm. As per [facility] doctor patient previously base line was AOx3 symptomatic hypoglycemic glucose 37 received dextrose. Stroke code activated and neurology consulted. Upon presentation Patient is AOX0  laying in bed unresponsive, hypoxic anticipating  intubation. LKW unknown patient found altered mental at 5pm. On exam: Patient unresponsive to painful stimuli, there is no gaze preference, + blink to threat, pupils responsive to light, vestibular ocular reflex intact, no evidence of shaking, upper arms flaccid, lower extremity rigidity. Vitals: BP: 116/60 HR 77 .        Recommendations:    EEG  Telemonitoring Neurology Consult    Patient is a 68y old  Female who presents with a chief complaint of hypoglycemia (2023 13:54)      HPI:  67 year old Female w/ PMH anorexia w/ laxative abuse, anxiety, depression, kidney cysts, peripheral neuropathy, RA, OA, osteoporosis, low back pain, recurrent LE cellulitis complicated by nec fasc and septic shock presents for weakness. Per pt's , pt has had anorexia for most of her adult life and has not eaten or drank anything in days. He attempted to give her some juice prior to ED arrival. Pt denies any complaints. is not very participatory with history taking though. On arrival to the ED glucose was 53. Denies suicidal ideation. States that she wants to go home.     **Neurology consulted for AMS since 5 pm. As per [facility] doctor patient previously base line was AOx3 symptomatic hypoglycemia glucose 37 received dextrose. Stroke code activated and neurology consulted. Upon presentation Patient is AOX0  laying in bed unresponsive, hypoxic anticipating  intubation. LKW unknown for patient was found altered mental at 5pm. On exam: Patient unresponsive to painful stimuli, there is no gaze preference, + blink to threat, vestibular ocular reflex intact, no evidence of shaking, normal extensor posturing, upper arms flaccid, lower extremity rigidity. Vitals: BP: 116/60 HR 77 .     In the ED vitals stable.   Labs show PLT 98, Mag 1.4, , , .     (2023 02:50)      PAST MEDICAL & SURGICAL HISTORY:  Anxiety      Depression      Osteoporosis      Kidney cysts      Peripheral neuropathy      RA (rheumatoid arthritis)      OA (osteoarthritis)      Anorexia      Low back pain with radiation  s/p &quot;nerve cutting&quot; 2015          FAMILY HISTORY:  Family history of stroke (Mother)        Social History: (-) x 3    Allergies    No Known Allergies    Intolerances        MEDICATIONS  (STANDING):  ALPRAZolam 0.25 milliGRAM(s) Oral two times a day  ascorbic acid 500 milliGRAM(s) Oral daily  cilostazol 50 milliGRAM(s) Oral two times a day  dextrose 5% + sodium chloride 0.9%. 1000 milliLiter(s) (100 mL/Hr) IV Continuous <Continuous>  enoxaparin Injectable 40 milliGRAM(s) SubCutaneous every 24 hours  fentaNYL   Infusion. 0.5 MICROgram(s)/kG/Hr (2.1 mL/Hr) IV Continuous <Continuous>  gabapentin 600 milliGRAM(s) Oral three times a day  levothyroxine 50 MICROGram(s) Oral daily  magnesium sulfate  IVPB 2 Gram(s) IV Intermittent every 4 hours  multivitamin/minerals 1 Tablet(s) Oral daily  norepinephrine Infusion 0.25 MICROgram(s)/kG/Min (19.7 mL/Hr) IV Continuous <Continuous>  OLANZapine 2.5 milliGRAM(s) Oral every 12 hours  sertraline 75 milliGRAM(s) Oral daily  sodium chloride 0.9% Bolus 750 milliLiter(s) IV Bolus once  thiamine Injectable 100 milliGRAM(s) IV Push daily    MEDICATIONS  (PRN):  acetaminophen     Tablet .. 650 milliGRAM(s) Oral every 6 hours PRN Temp greater or equal to 38C (100.4F), Mild Pain (1 - 3)  dextrose 50% Injectable 20 milliLiter(s) IV Push once PRN hypoglycemia less than 50  oxycodone    5 mG/acetaminophen 325 mG 1 Tablet(s) Oral every 6 hours PRN Severe Pain (7 - 10)        Vital Signs Last 24 Hrs  T(C): 35.6 (2023 21:00), Max: 36.2 (2023 20:00)  T(F): 96.1 (2023 21:00), Max: 97.1 (2023 20:00)  HR: 77 (2023 21:00) (70 - 100)  BP: 116/60 (2023 21:00) (66/42 - 116/60)  BP(mean): --  RR: 14 (2023 21:00) (14 - 16)  SpO2: 92% (2023 21:00) (92% - 100%)    Parameters below as of 2023 21:00  Patient On (Oxygen Delivery Method): nasal cannula  O2 Flow (L/min): 4      Examination:    MS: Obtunded, not responsive to sternal rub, not following commands  CN: no gaze preference, reactive pupils, blinks to threat  Motor: Hypotonic in all extremities, not withdrawing to painful stimuli  Sensory: Not withdrawing to painful stimuli  Respiratory:  On non rebreather      Labs:   CBC Full  -  ( 2023 05:38 )  WBC Count : 5.71 K/uL  RBC Count : 3.80 M/uL  Hemoglobin : 11.3 g/dL  Hematocrit : 34.7 %  Platelet Count - Automated : 113 K/uL  Mean Cell Volume : 91.3 fL  Mean Cell Hemoglobin : 29.7 pg  Mean Cell Hemoglobin Concentration : 32.6 g/dL  Auto Neutrophil # : 5.10 K/uL  Auto Lymphocyte # : 0.46 K/uL  Auto Monocyte # : 0.11 K/uL  Auto Eosinophil # : 0.00 K/uL  Auto Basophil # : 0.01 K/uL  Auto Neutrophil % : 89.3 %  Auto Lymphocyte % : 8.1 %  Auto Monocyte % : 1.9 %  Auto Eosinophil % : 0.0 %  Auto Basophil % : 0.2 %        135  |  101  |  11  ----------------------------<  117<H>  4.1   |  21  |  0.5<L>    Ca    7.1<L>      2023 23:27  Phos  3.8       Mg     1.9         TPro  4.1<L>  /  Alb  1.9<L>  /  TBili  0.3  /  DBili  x   /  AST  85<H>  /  ALT  109<H>  /  AlkPhos  312<H>      LIVER FUNCTIONS - ( 2023 23:27 )  Alb: 1.9 g/dL / Pro: 4.1 g/dL / ALK PHOS: 312 U/L / ALT: 109 U/L / AST: 85 U/L / GGT: x           PT/INR - ( 2023 07:19 )   PT: 16.30 sec;   INR: 1.41 ratio         PTT - ( 2023 07:19 )  PTT:42.4 sec  Urinalysis Basic - ( 2023 15:00 )    Color: Yellow / Appearance: Slightly Turbid / S.022 / pH: x  Gluc: x / Ketone: Negative  / Bili: Negative / Urobili: <2 mg/dL   Blood: x / Protein: 30 mg/dL / Nitrite: Positive   Leuk Esterase: Large / RBC: 36 /HPF / WBC 4 /HPF   Sq Epi: x / Non Sq Epi: 2 /HPF / Bacteria: Many          Neuroimaging:  [facility]:     23 @ 00:30      < from: CT Brain Stroke Protocol (23 @ 01:51) >    MPRESSION:      No acute intracranial pathology.      < end of copied text >        Impression:    AMS secondary to TME in the setting of severe hypoglycemia, possible GI bleed, hypotension and desaturation, possible aspiration. Not TPA candidate. mRS 2-3.      CTH 2023 with no significant acute findings  CTA  with normal vessels, intact anterior and posterior circulation. No need for CTA  Please call back as needed with questions

## 2023-02-23 NOTE — GOALS OF CARE CONVERSATION - ADVANCED CARE PLANNING - CONVERSATION DETAILS
Family answered and goals of care was discussed and it was explained that the patient has a very poor prognosis, all means of treatment was provided and she is not improving.   Her  responded that she had suffered a lot during her life and he wants her to pass away peacefully.     He agreed on DNR.   will continue same type of treatment for now

## 2023-02-23 NOTE — STROKE CODE NOTE - NSSTROKEABCD2TIADURATION_GEN_A_CORE
-- DO NOT REPLY / DO NOT REPLY ALL --  -- Message is from the [facility]--    General Patient Message      Reason for Call: Patient is calling stated only the TB questionnaire is needs and it was attached to patient portal for her to file out.    Caller Information       Type Contact Phone    12/27/2021 10:50 AM CST Phone (Incoming) Neelam Floyd (Self) [phone] (M)          Alternative phone number: none    Turnaround time given to caller:   \"This message will be sent to [state Provider's name]. The clinical team will fulfill your request as soon as they review your message.\"    
>= 60 minutes

## 2023-02-23 NOTE — RAPID RESPONSE TEAM SUMMARY - NSREASONFORCALLINGRRT_GEN_ALL_CORE
Acute mental status changes/Hypotension/Staff concern
Abnormal heart rate/Hypoxia
Acute mental status changes/Hypotension/Hypoxia

## 2023-02-23 NOTE — PROCEDURE NOTE - ADDITIONAL PROCEDURE DETAILS
anesthesia response to rapid response. pt unresponsive. pre oxygenated with 100% NRMB. positive emesis suctioned prior to induction. etomidate 10mg IV succinycholine 100mg IV given. glidescope mac 3 used quick easy atraumatic intubation noted. grade 1 view.  BP 87/59 o2 sat 100 on vent. currently on levophed gtt.

## 2023-02-23 NOTE — RAPID RESPONSE TEAM SUMMARY - NSMEDICATIONSRRT_GEN_ALL_CORE
Dextrose 50%   Levophed 
Started on Vasopressin and phenylephrine. Her BP remained very low  A-line inserted, BP remained 60/30  Plan to maximize the pressors 
Will consider Antibiotics based on CT results

## 2023-02-23 NOTE — PROGRESS NOTE ADULT - SUBJECTIVE AND OBJECTIVE BOX
Patient is a 68y old  Female who presents with a chief complaint of hypoglycemia (2023 01:18)        Over Night Events:  RR over night.  Was intubated  Now on MV.  Multiple pressors.  Sedated.          ROS:     All ROS are negative except HPI         PHYSICAL EXAM    ICU Vital Signs Last 24 Hrs  T(C): 35.7 (2023 00:00), Max: 36.2 (2023 20:00)  T(F): 96.3 (2023 00:00), Max: 97.1 (2023 20:00)  HR: 119 (2023 04:30) (35 - 119)  BP: 80/40 (2023 04:30) (66/42 - 116/60)  BP(mean): --  ABP: --  ABP(mean): --  RR: 22 (2023 04:30) (14 - 25)  SpO2: 86% (2023 04:30) (80% - 100%)    O2 Parameters below as of 2023 04:30  Patient On (Oxygen Delivery Method): ventilator            CONSTITUTIONAL:  Ill appearing in NAD    ENT:   Airway patent,   Mouth with normal mucosa.   ET     EYES:   Pupils equal,   Round and reactive to light.    CARDIAC:   Tachycardic   Regular rhythm.      RESPIRATORY:   No wheezing  Bilateral BS  Normal chest expansion  Not tachypneic,  No use of accessory muscles    GASTROINTESTINAL:  Abdomen soft,   Non-tender,   No guarding,   + BS    MUSCULOSKELETAL:   Range of motion is not limited,  No clubbing, cyanosis    NEUROLOGICAL:   Sedated     SKIN:   Skin normal color for race,   No evidence of rash.        23 @ 07:01  -  23 @ 07:00  --------------------------------------------------------  IN:    dextrose 5% + sodium chloride 0.45%: 300 mL    Enteral Tube Flush: 150 mL    Lactated Ringers: 100 mL    Lactated Ringers: 200 mL    Lactated Ringers Bolus: 1000 mL    Norepinephrine: 63.2 mL    Norepinephrine: 39.4 mL    Osmolite: 120 mL  Total IN: 1972.6 mL    OUT:    Voided (mL): 475 mL  Total OUT: 475 mL    Total NET: 1497.6 mL          LABS:                            9.7    0.24  )-----------( 69       ( 2023 05:37 )             28.9                                                   138  |  109  |  12  ----------------------------<  106<H>  3.5   |  23  |  <0.5<L>    Ca    6.5<L>      2023 05:37  Phos  4.2       Mg     1.6         TPro  3.1<L>  /  Alb  1.4<L>  /  TBili  0.3  /  DBili  <0.2  /  AST  59<H>  /  ALT  73<H>  /  AlkPhos  233<H>        PT/INR - ( 2023 05:37 )   PT: 27.40 sec;   INR: 2.34 ratio         PTT - ( 2023 05:37 )  PTT:40.7 sec                                       Urinalysis Basic - ( 2023 15:00 )    Color: Yellow / Appearance: Slightly Turbid / S.022 / pH: x  Gluc: x / Ketone: Negative  / Bili: Negative / Urobili: <2 mg/dL   Blood: x / Protein: 30 mg/dL / Nitrite: Positive   Leuk Esterase: Large / RBC: 36 /HPF / WBC 4 /HPF   Sq Epi: x / Non Sq Epi: 2 /HPF / Bacteria: Many        CARDIAC MARKERS ( 2023 02:18 )  x     / <0.01 ng/mL / x     / x     / 13.3 ng/mL  CARDIAC MARKERS ( 2023 00:35 )  x     / x     / 387 U/L / x     / x      CARDIAC MARKERS ( 2023 15:00 )  x     / <0.01 ng/mL / x     / x     / x                                                LIVER FUNCTIONS - ( 2023 05:37 )  Alb: 1.4 g/dL / Pro: 3.1 g/dL / ALK PHOS: 233 U/L / ALT: 73 U/L / AST: 59 U/L / GGT: x                                                                                                                                   ABG - ( 2023 05:12 )  pH, Arterial: 7.28  pH, Blood: x     /  pCO2: 46    /  pO2: 57    / HCO3: 22    / Base Excess: -5.0  /  SaO2: 81.1                MEDICATIONS  (STANDING):  ascorbic acid 500 milliGRAM(s) Oral daily  cefepime   IVPB      cefepime   IVPB 2000 milliGRAM(s) IV Intermittent every 8 hours  chlorhexidine 0.12% Liquid 15 milliLiter(s) Oral Mucosa every 12 hours  cilostazol 50 milliGRAM(s) Oral two times a day  dexMEDEtomidine Infusion 0.2 MICROgram(s)/kG/Hr (2.1 mL/Hr) IV Continuous <Continuous>  dextrose 5% + sodium chloride 0.9%. 1000 milliLiter(s) (50 mL/Hr) IV Continuous <Continuous>  EPINEPHrine    Infusion 1 MICROgram(s)/kG/Min (78.8 mL/Hr) IV Continuous <Continuous>  fentaNYL   Infusion. 0.5 MICROgram(s)/kG/Hr (2.1 mL/Hr) IV Continuous <Continuous>  fentaNYL   Infusion. 0.5 MICROgram(s)/kG/Hr (2.1 mL/Hr) IV Continuous <Continuous>  fentaNYL   Infusion... 0.5 MICROgram(s)/kG/Hr (1.05 mL/Hr) IV Continuous <Continuous>  hydrocortisone sodium succinate Injectable 100 milliGRAM(s) IV Push every 8 hours  levothyroxine 50 MICROGram(s) Oral daily  magnesium sulfate  IVPB 2 Gram(s) IV Intermittent every 4 hours  multivitamin/minerals 1 Tablet(s) Oral daily  norepinephrine Infusion 0.05 MICROgram(s)/kG/Min (1.97 mL/Hr) IV Continuous <Continuous>  OLANZapine 2.5 milliGRAM(s) Oral every 12 hours  pantoprazole Infusion 8 mG/Hr (10 mL/Hr) IV Continuous <Continuous>  phenylephrine    Infusion 10 MICROgram(s)/kG/Min (78.8 mL/Hr) IV Continuous <Continuous>  potassium chloride  20 mEq/100 mL IVPB 20 milliEquivalent(s) IV Intermittent every 2 hours  sertraline 75 milliGRAM(s) Oral daily  sodium chloride 0.9% Bolus 500 milliLiter(s) IV Bolus once  thiamine Injectable 100 milliGRAM(s) IV Push daily  vancomycin  IVPB      vancomycin  IVPB 750 milliGRAM(s) IV Intermittent once  vancomycin  IVPB 750 milliGRAM(s) IV Intermittent every 12 hours  vasopressin Infusion 0.04 Unit(s)/Min (6 mL/Hr) IV Continuous <Continuous>    MEDICATIONS  (PRN):  acetaminophen     Tablet .. 650 milliGRAM(s) Oral every 6 hours PRN Temp greater or equal to 38C (100.4F), Mild Pain (1 - 3)  dextrose 50% Injectable 20 milliLiter(s) IV Push every 15 minutes PRN hypoglycemia less than 60  oxycodone    5 mG/acetaminophen 325 mG 1 Tablet(s) Oral every 6 hours PRN Severe Pain (7 - 10)      New X-rays reviewed:                                                                                  ECHO

## 2023-02-23 NOTE — STROKE CODE NOTE - IV ALTEPLASE EXCLUSION ABS OTHER
Patient is out of the window for IV thrombolytics and IA intervention and is not a candidate for IV thrombolytics and IA intervention

## 2023-02-23 NOTE — RAPID RESPONSE TEAM SUMMARY - NSSITUATIONBACKGROUNDRRT_GEN_ALL_CORE
Anorexia, recent hypoglycemia  GCS < 8 (unresponsive to painful stimuli, doesn't open eyes, no verbal response)  DTR 1+ diffusely, babinski ve, no gaze preference, reactive pupils  ABG pH 7.49, CO2 35, pO 57, FiO2 32%  SpO2 90% on NRB

## 2023-02-24 LAB
-  BLOOD PCR PANEL: SIGNIFICANT CHANGE UP
CULTURE RESULTS: SIGNIFICANT CHANGE UP
GRAM STN FLD: SIGNIFICANT CHANGE UP
GRAM STN FLD: SIGNIFICANT CHANGE UP
METHOD TYPE: SIGNIFICANT CHANGE UP
SPECIMEN SOURCE: SIGNIFICANT CHANGE UP
ZINC SERPL-MCNC: 26 UG/DL — LOW (ref 44–115)

## 2023-02-25 LAB
CULTURE RESULTS: SIGNIFICANT CHANGE UP
CULTURE RESULTS: SIGNIFICANT CHANGE UP
GRAM STN FLD: SIGNIFICANT CHANGE UP
ORGANISM # SPEC MICROSCOPIC CNT: SIGNIFICANT CHANGE UP
ORGANISM # SPEC MICROSCOPIC CNT: SIGNIFICANT CHANGE UP
SPECIMEN SOURCE: SIGNIFICANT CHANGE UP
SPECIMEN SOURCE: SIGNIFICANT CHANGE UP

## 2023-02-26 LAB
CULTURE RESULTS: SIGNIFICANT CHANGE UP
SPECIMEN SOURCE: SIGNIFICANT CHANGE UP

## 2023-03-22 NOTE — BRIEF OPERATIVE NOTE - OPERATION/FINDINGS
Today's Date: 2/23/2022  Patient Name: Acacia Brannon  Date of admission: 2/20/2022  9:53 AM  Patient's age: 63 y.o., 1962  Admission Dx: Dehydration [E86.0]  Confusion [R41.0]  Hypoxia [R09.02]  Fatigue, unspecified type [R53.83]  AMS (altered mental status) [R41.82]    Reason for Consult: management recommendations  Requesting Physician: Eleni Preciado MD    Chief Complaint:  Nausea, vomiting, abdominal pain    History Obtained From:  electronic medical record    Interval history:     Patient seen and examined  Labs and vitals reviewed. WBC down to 8.8 - on Unasyn for UTI  KUB on 2/22 showed disease progression with worsening strictures of mid sigmoid colon w/ no obstruction. Patient mentation deteriorated she is not able to give any meaningful history  Neurology onboard - indicating possible seizure/encephalopathy. Currently on Keppra + Lamictal  Awaiting MRI brain    History of Present Illness: The patient is a 63 y.o. female who is admitted to the hospital for chief complains abdominal pain nausea vomiting which started yesterday. Patient's oncologic history is as below. Patient also recently had a DVT for which she has been started on Eliquis. Patient had a KUB done which shows findings of ileus. Patient last CT abdomen pelvis from 10/29. Patient is also on antiseizure medication. Patient is admitted with above complaints. Underwent placement of NG tube per IR.     Patient is well-known to our practice. She has history of recurrent ovarian cancer. Patient is currently on cytotoxic therapy using Gemzar and has been tolerating it well. Patient last Ca1 25 was relatively stable.   Patient CT chest from 2/20/2022 shows bilateral lung nodules thoracic adenopathy and sclerotic bony lesions    Past Medical History:   has a past medical history of Anemia, Bleeding, Cervical cancer ([address] Depression, Diabetes mellitus ([address] GERD (gastroesophageal reflux disease), Hx of blood clots, Hypertension, Metastatic cancer ([facility] Ovarian cancer ([facility] Post chemo evaluation, and Splenic lesion. Past Surgical History:   has a past surgical history that includes Hysterectomy, total abdominal; Port Surgery; Tonsillectomy; IR PORT PLACEMENT > 5 YEARS (08/24/2020); Anus surgery; Abscess Drainage (2013); colectomy (03/2013); IR EMBOLIZATION HEMORRHAGE (10/05/2020); and Cardiac catheterization. Medications:    Prior to Admission medications    Medication Sig Start Date End Date Taking? Authorizing Provider   LORazepam (ATIVAN) 1 MG tablet Take 1 tablet by mouth every 8 hours as needed for Anxiety for up to 30 doses. 2/18/22 4/2/22  Kayla Allan MD   polyethylene glycol (MIRALAX) 17 g packet Take 17 g by mouth daily as needed for Constipation 2/18/22 3/20/22  Kayla Allan MD   lamoTRIgine (LAMICTAL) 25 MG tablet TAKE 3 TABS IN IN THE MORNING AND 3 TABS IN IN THE EVENING 2/9/22   Jr Chino MD   levETIRAcetam (KEPPRA) 750 MG tablet Take 2 tablets by mouth 2 times daily 2/9/22   Sebas Howard MD   diazePAM 5 MG/0.1ML LIQD 1 puff by Nasal route as needed (seizures)  Patient not taking: Reported on 2/18/2022 2/9/22   Sebas Howard MD   morphine (MS CONTIN) 30 MG extended release tablet TAKE 1 TABLET BY MOUTH 2 TIMES DAILY FOR 30 DAYS. 1/27/22 2/26/22  Dmitriy Bhatia MD   morphine (MS CONTIN) 15 MG extended release tablet TAKE 1 TABLET BY MOUTH 2 TIMES DAILY FOR 30 DAYS.  1/27/22 2/26/22  Kayla Allan MD   oxyCODONE-acetaminophen (PERCOCET) 5-325 MG per tablet TAKE 1 TABLET BY MOUTH EVERY 4 HOURS AS NEEDED FOR PAIN (BREAKTHROUGH PAIN) - REDUCE DOSES TAKEN AS PAIN BECOMES MANAGEABLE 1/27/22 2/26/22  Kayla Allan MD   pantoprazole (PROTONIX) 40 MG tablet Take 1 tablet by mouth daily 1/3/22   Kayla Allan MD   ondansetron (ZOFRAN-ODT) 4 MG disintegrating tablet Take 1 tablet by mouth every 8 hours as needed for Nausea or Vomiting 12/3/21   Justus SUTHERLAND MD Sriram   naloxegol (MOVANTIK) 12.5 MG TABS tablet Take 1 tablet by mouth every morning  Patient not taking: Reported on 2/18/2022 11/15/21   Sarahi Bhatia MD   ferrous sulfate (IRON 325) 325 (65 Fe) MG tablet Take 1 tablet by mouth daily (with breakfast) 10/31/21   Arnold Fothergill, MD   morphine (MSIR) 30 MG tablet Take 30 mg by mouth 2 times daily as needed for Pain.     Historical Provider, MD   sertraline (ZOLOFT) 100 MG tablet TAKE 1 TABLET BY MOUTH DAILY 10/4/21 2/18/22  Josy Guzman MD   ELIQUIS 5 MG TABS tablet Take 5 mg by mouth 2 times daily  5/26/21   Historical Provider, MD     Current Facility-Administered Medications   Medication Dose Route Frequency Provider Last Rate Last Admin    potassium chloride 10 mEq/100 mL IVPB (Peripheral Line)  10 mEq IntraVENous Q1H Liana Wahl  mL/hr at 02/23/22 1335 10 mEq at 02/23/22 1335    labetalol (NORMODYNE;TRANDATE) injection 10 mg  10 mg IntraVENous Q6H PRN Liana Wahl MD        Or    hydrALAZINE (APRESOLINE) injection 10 mg  10 mg IntraVENous Q6H PRN Liana Wahl MD        lamoTRIgine (LAMICTAL) tablet 100 mg  100 mg Oral BID Moshe Montero MD   100 mg at 02/23/22 0859    bisacodyl (DULCOLAX) suppository 10 mg  10 mg Rectal Daily PRN Liana Wahl MD        enoxaparin (LOVENOX) injection 40 mg  40 mg SubCUTAneous BID Liana Wahl MD   40 mg at 02/23/22 0900    albuterol (PROVENTIL) nebulizer solution 2.5 mg  2.5 mg Nebulization As Directed RT PRN Liana Wahl MD        ampicillin-sulbactam (UNASYN) 3000 mg ivpb minibag  3,000 mg IntraVENous Q6H Liana Wahl MD   Stopped at 02/23/22 1310    sodium chloride flush 0.9 % injection 5-40 mL  5-40 mL IntraVENous 2 times per day Gretchen Andrade DO   10 mL at 02/20/22 2023    sodium chloride flush 0.9 % injection 5-40 mL  5-40 mL IntraVENous PRN Regenia Alexanders, DO        0.9 % sodium chloride infusion  25 mL IntraVENous PRN Regenia Erastos, DO        sodium chloride flush 0.9 % injection 5-40 mL  5-40 mL IntraVENous 2 times per day Tanna Leyva MD   10 mL at 02/20/22 2306    sodium chloride flush 0.9 % injection 5-40 mL  5-40 mL IntraVENous PRN Tanna Leyva MD   10 mL at 02/22/22 0918    0.9 % sodium chloride infusion  25 mL IntraVENous PRN Tanna Leyva MD        acetaminophen (TYLENOL) tablet 650 mg  650 mg Oral Q6H PRN Tanna Leyva MD   650 mg at 02/22/22 1625    Or    acetaminophen (TYLENOL) suppository 650 mg  650 mg Rectal Q6H PRN Tanna Leyva MD        0.9 % sodium chloride infusion   IntraVENous Continuous Tanna Leyva MD 75 mL/hr at 02/23/22 0904 New Bag at 02/23/22 0904    potassium chloride (KLOR-CON M) extended release tablet 40 mEq  40 mEq Oral PRN Tanna Leyva MD        Or    potassium bicarb-citric acid (EFFER-K) effervescent tablet 40 mEq  40 mEq Oral PRN Tanna Leyva MD        Or    potassium chloride 10 mEq/100 mL IVPB (Peripheral Line)  10 mEq IntraVENous PRN Tanna Leyva MD        levETIRAcetam (KEPPRA) 1500 mg/100 mL IVPB  1,500 mg IntraVENous Q12H Tanna Leyva MD   Stopped at 02/23/22 1131    morphine (PF) injection 1 mg  1 mg IntraVENous Q4H PRN Tanna Leyva MD   1 mg at 02/21/22 2225    pantoprazole (PROTONIX) injection 40 mg  40 mg IntraVENous Daily Tanna Leyva MD   40 mg at 02/23/22 0900    And    sodium chloride (PF) 0.9 % injection 10 mL  10 mL IntraVENous Daily Tanna Leyva MD   10 mL at 02/23/22 0900    promethazine (PHENERGAN) tablet 12.5 mg  12.5 mg Oral Q6H PRN Tanna Leyva MD   12.5 mg at 02/20/22 2305    Or    ondansetron (ZOFRAN) injection 4 mg  4 mg IntraVENous Q6H PRN Tanna Leyva MD   4 mg at 02/21/22 2225       Allergies:  Ceftriaxone    Social History:   reports that she has been smoking cigarettes. She has been smoking about 1.00 pack per day. She uses smokeless tobacco. She reports previous alcohol use. She reports that she does not use drugs. Family History: family history includes Alcohol Abuse in her mother; Cirrhosis in her mother.     Review of Symptoms: Patient not able to give any meaningful history due to altered mental status    PHYSICAL EXAM:      BP (!) 157/60   Pulse 52   Temp 98.6 °F (37 °C) (Oral)   Resp 16   Wt 168 lb (76.2 kg)   SpO2 94%   BMI 27.96 kg/m²    Temp (24hrs), Av.1 °F (37.3 °C), Min:98.3 °F (36.8 °C), Max:100.4 °F (38 °C)      General appearance -sick appearing, no in pain or distress   Mental status -confused  Eyes - pupils equal and reactive, extraocular eye movements intact   Ears - bilateral TM's and external ear canals normal   Mouth - mucous membranes moist, pharynx normal without lesions.   NG tube in place  Neck - supple, no significant adenopathy   Lymphatics - no palpable lymphadenopathy, no hepatosplenomegaly   Chest - clear to auscultation, no wheezes, rales or rhonchi, symmetric air entry   Heart - normal rate, regular rhythm, normal S1, S2, no murmurs  Abdomen - soft, nontender, nondistended, no masses or organomegaly   Neurological -confused and not following commands  Musculoskeletal - no joint tenderness, deformity or swelling   Extremities - peripheral pulses normal, no pedal edema, no clubbing or cyanosis   Skin - normal coloration and turgor, no rashes, no suspicious skin lesions noted ,       Labs:   Complete Blood Count:   Recent Labs     22  0610 22  0637 22  0541   WBC 14.1* 14.2* 8.8   HGB 10.5* 11.2* 10.1*   MCV 94.7 99.7 96.6   * 551* 493*   RBC 3.56* 3.88* 3.54*   HCT 33.7* 38.7 34.2*   MCH 29.5 28.9 28.5   MCHC 31.2 28.9 29.5   RDW 17.4* 17.3* 17.0*   MPV 9.4 9.6 9.6      PT/INR:    Lab Results   Component Value Date    PROTIME 10.1 2021    INR 0.9 2021     PTT:    Lab Results   Component Value Date    APTT 21.0 2021       Basal Metabolic Profile:   Recent Labs     22  0610 22  0637 22  0541    137 138   K 3.6* 3.6* 3.4*   BUN 8 8 7   CREATININE 0.39* 0.35* 0.33*    100 103   CO2 22 23 21      LFTs  No results for input(s): ALKPHOS, ALT, AST, BILITOT, BILIDIR, LABALBU in the last 72 hours. Imaging:  XR ABDOMEN (KUB) (SINGLE AP VIEW)    Result Date: 2/20/2022  Ileus. Probable gallstone. Catheter left abdomen and pelvis requires clinical correlation. CT Head WO Contrast    Result Date: 2/20/2022  No acute intracranial abnormality. CT ABDOMEN PELVIS W IV CONTRAST Additional Contrast? Oral    Result Date: 2/22/2022  1. Disease progression with increased size of bilateral pulmonary metastases, slightly increased size of right retrocrural and left inguinal nannette metastases, and possible slight increase in size of peritoneal metastases. Abdominal and pelvic nannette metastases and skeletal metastases appear unchanged. 2. Persistent trace bilateral pleural effusions and small pericardial effusion, likely malignant in etiology. 3. Worsened stricturing of the mid sigmoid colon with no obstruction at this time. This may be a sequela of prior treatment to the area. 4. The gastric tube has been retracted with tip now at the gastroesophageal junction and the sideport not included. Recommend advancement     XR CHEST PORTABLE    Result Date: 2/20/2022  Chronic findings in the chest without acute airspace disease identified. CT CHEST PULMONARY EMBOLISM W CONTRAST    Result Date: 2/20/2022  1. No evidence for acute pulmonary embolism. 2.  Bilateral pulmonary nodules, thoracic lymphadenopathy, soft tissue calcifications in the chest and upper abdomen and sclerotic bone lesions again demonstrated, as described previously, which may be related to the patient's history of malignancy. 3.  Small pericardial effusion. Trace left pleural effusion. 4.  Mild septal thickening suggestive of interstitial edema. XR ABDOMEN FOR NG/OG/NE TUBE PLACEMENT    Result Date: 2/22/2022  Enteric tube is appropriately positioned. IR PLACE NG TUBE BY DR Annette Carlson    Result Date: 2/21/2022  Successful placement of nasogastric tube. Okay to use.        Impression: Primary Problem  Dehydration  Active Hospital Problems    Diagnosis Date Noted    Acute metabolic encephalopathy [U47.08]     Confusion [R41.0]     Urinary tract infection without hematuria [N39.0]     Seizure disorder ([address] [G40.909]     Breakthrough seizure ([address] [G40.919]     Lactic acidosis [E87.2] 02/21/2022    Ileus ([address] [K56.7] 02/21/2022    Elevated troponin [R77.8] 02/21/2022    Pericardial effusion [I31.3] 02/21/2022    Hypokalemia [E87.6] 02/21/2022    Acute respiratory failure with hypoxia (HCC) [J96.01] 02/21/2022    Dehydration [E86.0] 02/20/2022    AMS (altered mental status) [R41.82] 02/20/2022    Ovarian cancer ([address] [C56.9] 05/29/2021    Chronic deep vein thrombosis (DVT) of femoral vein of left lower extremity ([address] [I82.512] 03/11/2021    Fatigue [R53.83]     Cancer, metastatic to bone ([address] [C79.51] 01/28/2021    Encephalopathy acute [G93.40]     Type 2 diabetes mellitus without complication, without long-term current use of insulin ([address] [E11.9]     Seizure ([address] [R56.9] 10/21/2020         Assessment:  Recurrent ovarian cancer chemotherapy using gemcitabine with stable CA-125  Abdominal pain nausea vomiting  Ileus per KUB  CT abdomen 2/23 worsening strictures of mid-sigmoid colon w/ no obstruction; disease progression  Seizure disorder - Keppra and Lamictal   DVT of femoral vein on anticoagulation using Eliquis  Resolved leukocytosis - currently on Unasyn for UTI        RECOMMENDATIONS:  1. I personally reviewed results of lab work-up imaging studies and other relevant clinical data. 2. CA-125 has been added stable  3. Continue symptomatic supportive care  4. Follow neurology recommendations for further assessment of encephalopathy/seizure  5. Ovarian cancer metastasizes in contiguous fashion unlikely to metastasis to the brain but cannot be completely ruled out. Follow-up on MRI brain        Discussed with Nurse.       Thank you for asking us to see this necrotic tissue patient. Peterson Head, OMS 3  Hematology/Oncology  [facility]         2/23/2022, 1:38 PM              This note is created with the assistance of a speech recognition program.  While intending to generate a document that actually reflects the content of the visit, the document can still have some errors including those of syntax and sound a like substitutions which may escape proof reading. It such instances, actual meaning can be extrapolated by contextual diversion. stage II stage I

## 2023-03-25 NOTE — ED PROVIDER NOTE - CONSTITUTIONAL NEGATIVE STATEMENT, MLM
Post-Partum Day Number 1 Progress Note    Patient doing well post-partum without significant complaint. Voiding withour difficulty, normal lochia. Vitals:  Patient Vitals for the past 8 hrs:   BP Temp Pulse Resp SpO2   23 0820 (!) 142/82 97.7 °F (36.5 °C) 78 14 99 %   23 0305 130/74 98.4 °F (36.9 °C) 81 17 99 %     Temp (24hrs), Av.4 °F (36.9 °C), Min:97.7 °F (36.5 °C), Max:98.9 °F (37.2 °C)      Vital signs stable, afebrile. Exam:  Patient without distress. Abdomen soft, fundus firm at level of umbilicus, nontender               Perineum with normal lochia noted. Lower extremities are negative for swelling, cords or tenderness. Lab/Data Review:  BMP: No results found for: NA, K, CL, CO2, AGAP, GLU, BUN, CREA, GFRAA, GFRNA  CMP: No results found for: NA, K, CL, CO2, AGAP, GLU, BUN, CREA, GFRAA, GFRNA, CA, MG, PHOS, ALB, TBIL, TP, ALB, GLOB, AGRAT, ALT  CBC: No results found for: WBC, HGB, HGBEXT, HCT, HCTEXT, PLT, PLTEXT, HGBEXT, HCTEXT, PLTEXT    Assessment and Plan:  Patient appears to be having uncomplicated post-partum course. Continue routine perineal care and maternal education. Plan discharge tomorrow if no problems occur. Slight blood pressure elevation, will continue to monitor. Will add low dose labetalol.   No HA, visual changes, or other symptoms  All questions answered no fever and no chills.

## 2023-05-12 NOTE — ED ADULT NURSE NOTE - NSIMPLEMENTINTERV_GEN_ALL_ED
Implemented All Fall with Harm Risk Interventions:  [place] to call system. Call bell, personal items and telephone within reach. Instruct patient to call for assistance. Room bathroom lighting operational. Non-slip footwear when patient is off stretcher. Physically safe environment: no spills, clutter or unnecessary equipment. Stretcher in lowest position, wheels locked, appropriate side rails in place. Provide visual cue, wrist band, yellow gown, etc. Monitor gait and stability. Monitor for mental status changes and reorient to person, place, and time. Review medications for side effects contributing to fall risk. Reinforce activity limits and safety measures with patient and family. Provide visual clues: red socks. Statement Selected

## 2023-06-07 NOTE — BH CONSULTATION LIAISON PROGRESS NOTE - NSBHCONSULTRECOMMENDOTHER_PSY_A_CORE FT
0
1. There is no acute indication for the use of psychotropic medications in this patients   2. We recommend continuation of the antibiotic management of her UTI and continued evaluation for and of other medical causes of delirium in this patient   3. We recommend melatonin 2-5mg P.o bedtime to regulate sleep wake cycle   4. Upon discharge, we recommend outpatient psychiatry and psychotherapy services if patient is amenable.   5. We also recommend referral to an Anorexia Nervosa outpatient or inpatient unit if patient is agreeable

## 2023-12-28 NOTE — SWALLOW BEDSIDE ASSESSMENT ADULT - ORAL PHASE
Pharmacy requested refills that are already active on file. Refused request to pharmacy.  
Within functional limits
Delayed oral transit time
Within functional limits
Within functional limits

## 2024-03-21 NOTE — PROGRESS NOTE ADULT - ATTENDING COMMENTS
Improvement in cyanotic changes b/l feet  second toe dorsal ulcerations. no acute signs of infection-->conservative care with xeroform dressing    My notes supersedes the above resident note in case of discrepancy.     Gary Nieto DPM, FACFAS
a/p  # cachexia, eating disorder, anxiety and depression, cont meds per psych,   # dysphagia, pt refusing egd, cont modified diet . with supplement   # fall at home, with hypoglycemia present on admission, c6 fx, colar in place, needs rehab   # RA, not on meds  # covid infection, asymptomatic, per infectious control team, may dc isolation on 12/20     #Progress Note Handoff  Pending (specify):  Consults_PT   Family discussion: keshav pt,   Disposition: home vs SNF
events noted, head ct reviewed, TSH noted, plan as above
I edited the note.
events noted, on RA, Dec plt HIT -, .? Meds induced, plan as above
21-Mar-2024 23:58:42

## 2024-06-07 NOTE — DISCHARGE NOTE PROVIDER - CARE PROVIDER_API CALL
Use 35 units of tresiba daily  We prescribed medication for you, continue taking the metformin until you receive the Synjardy(combination drug)  
Truman Bueno (DO)  Infectious Disease; Internal Medicine  [address] [place] [id number]  Phone: [phone]  Fax: [phone]  Follow Up Time: 1-3 days    Aldair Ansari)  Plastic Surgery  [address] [place] [address]  Phone: [phone]  Fax: [phone]  Follow Up Time: 1 week

## 2024-06-18 NOTE — PHYSICAL THERAPY INITIAL EVALUATION ADULT - LEVEL OF CONSCIOUSNESS, REHAB EVAL
alert Detail Level: Detailed Depth Of Biopsy: dermis Was A Bandage Applied: Yes Size Of Lesion In Cm: 0 Biopsy Type: H and E Biopsy Method: Dermablade Anesthesia Type: 1% lidocaine with epinephrine Anesthesia Volume In Cc: 0.5 Hemostasis: Drysol Wound Care: Petrolatum Dressing: bandage Destruction After The Procedure: No Type Of Destruction Used: Curettage Curettage Text: The wound bed was treated with curettage after the biopsy was performed. Cryotherapy Text: The wound bed was treated with cryotherapy after the biopsy was performed. Electrodesiccation Text: The wound bed was treated with electrodesiccation after the biopsy was performed. Electrodesiccation And Curettage Text: The wound bed was treated with electrodesiccation and curettage after the biopsy was performed. Silver Nitrate Text: The wound bed was treated with silver nitrate after the biopsy was performed. Lab: [id number] Lab Facility:  Consent: Written consent was obtained and risks were reviewed including but not limited to scarring, infection, bleeding, scabbing, incomplete removal, nerve damage and allergy to anesthesia. Post-Care Instructions: I reviewed with the patient in detail post-care instructions. Patient is to keep the biopsy site dry overnight, and then apply bacitracin twice daily until healed. Patient may apply hydrogen peroxide soaks to remove any crusting. Notification Instructions: Patient will be notified of biopsy results. However, patient instructed to call the office if not contacted within 2 weeks. Billing Type: Third-Party Bill Information: Selecting Yes will display possible errors in your note based on the variables you have selected. This validation is only offered as a suggestion for you. PLEASE NOTE THAT THE VALIDATION TEXT WILL BE REMOVED WHEN YOU FINALIZE YOUR NOTE. IF YOU WANT TO FAX A PRELIMINARY NOTE YOU WILL NEED TO TOGGLE THIS TO 'NO' IF YOU DO NOT WANT IT IN YOUR FAXED NOTE.

## 2024-07-16 NOTE — ED PROVIDER NOTE - HIV OFFER
How Severe Are They?: moderate Is This A New Presentation, Or A Follow-Up?: Scars Previously Declined (within the last year)

## 2024-08-18 NOTE — SWALLOW BEDSIDE ASSESSMENT ADULT - SWALLOW EVAL: RECOMMENDED FEEDING/EATING TECHNIQUES
oral hygiene/position upright (90 degrees)
crush medication (when feasible)/oral hygiene/position upright (90 degrees)/small sips/bites
intermittent cough/throat clear t/o meal/allow for swallow between intakes/alternate food with liquid/crush medication (when feasible)/oral hygiene/position upright (90 degrees)/small sips/bites
position upright (90 degrees)
intermittent cough/throat clear t/o meal/allow for swallow between intakes/alternate food with liquid/crush medication (when feasible)/oral hygiene/position upright (90 degrees)/small sips/bites
crush medication (when feasible)/oral hygiene/small sips/bites
Zechariah Kahn
crush medication (when feasible)/oral hygiene/position upright (90 degrees)/small sips/bites

## 2024-09-05 NOTE — PROVIDER CONTACT NOTE (OTHER) - BACKGROUND
Airway  Urgency: elective    Date/Time: 9/5/2024 2:23 PM  Airway not difficult    General Information and Staff    Patient location during procedure: OR  CRNA/CAA: Alma Rosa Castrejon CRNA    Indications and Patient Condition  Indications for airway management: airway protection    Preoxygenated: yes  Mask difficulty assessment: 1 - vent by mask    Final Airway Details  Final airway type: endotracheal airway      Successful airway: ETT  Cuffed: yes   Successful intubation technique: video laryngoscopy  Facilitating devices/methods: intubating stylet and OPA  Endotracheal tube insertion site: oral  Blade: Villatoro  Blade size: 3  ETT size (mm): 7.5  Cormack-Lehane Classification: grade I - full view of glottis  Placement verified by: chest auscultation and capnometry   Measured from: teeth  ETT/EBT  to teeth (cm): 21  Number of attempts at approach: 1  Assessment: lips, teeth, and gum same as pre-op and atraumatic intubation            
Pt has low urine output of only 75ml for the last 4 hours. Pt has in place gilbert catheter and has refused most oral intake. Pt started on IVF @ approximately 00:30

## 2024-12-13 NOTE — CHART NOTE - NSCHARTNOTEFT_GEN_A_CORE
spoke to patient's , patient's  wishes for patient to be full code. he is aware of the prognosis of the patient and the high risk of the patient developing refeeding syndrome. Patient's  would like all measures to be taken, cpr, intubation, feeding if necessary. No 13-Dec-2024 12:07
